# Patient Record
Sex: MALE | Race: WHITE | Employment: OTHER | URBAN - METROPOLITAN AREA
[De-identification: names, ages, dates, MRNs, and addresses within clinical notes are randomized per-mention and may not be internally consistent; named-entity substitution may affect disease eponyms.]

---

## 2019-05-03 LAB
INR, EXTERNAL: 1.1
PT, EXTERNAL: 12.7

## 2019-05-31 ENCOUNTER — HOSPITAL ENCOUNTER (OUTPATIENT)
Dept: LAB | Age: 79
Discharge: HOME | End: 2019-05-31
Payer: MEDICARE

## 2019-05-31 ENCOUNTER — OFFICE VISIT (OUTPATIENT)
Dept: FAMILY MEDICINE CLINIC | Age: 79
End: 2019-05-31

## 2019-05-31 VITALS
OXYGEN SATURATION: 94 % | RESPIRATION RATE: 14 BRPM | DIASTOLIC BLOOD PRESSURE: 49 MMHG | HEART RATE: 48 BPM | WEIGHT: 163 LBS | SYSTOLIC BLOOD PRESSURE: 101 MMHG | TEMPERATURE: 97.6 F | HEIGHT: 63 IN | BODY MASS INDEX: 28.88 KG/M2

## 2019-05-31 DIAGNOSIS — N40.1 BENIGN PROSTATIC HYPERPLASIA WITH URINARY FREQUENCY: ICD-10-CM

## 2019-05-31 DIAGNOSIS — R35.0 URINARY FREQUENCY: Primary | ICD-10-CM

## 2019-05-31 DIAGNOSIS — R05.9 COUGH: ICD-10-CM

## 2019-05-31 DIAGNOSIS — R35.0 BENIGN PROSTATIC HYPERPLASIA WITH URINARY FREQUENCY: ICD-10-CM

## 2019-05-31 LAB
BILIRUB UR QL STRIP: NEGATIVE
GLUCOSE UR-MCNC: NEGATIVE MG/DL
KETONES P FAST UR STRIP-MCNC: NEGATIVE MG/DL
PH UR STRIP: 6.5 [PH] (ref 4.6–8)
PROT UR QL STRIP: NEGATIVE
SP GR UR STRIP: 1.01 (ref 1–1.03)
UA UROBILINOGEN AMB POC: NORMAL (ref 0.2–1)
URINALYSIS CLARITY POC: CLEAR
URINALYSIS COLOR POC: YELLOW
URINE BLOOD POC: NORMAL
URINE LEUKOCYTES POC: NORMAL
URINE NITRITES POC: NEGATIVE

## 2019-05-31 PROCEDURE — 87086 URINE CULTURE/COLONY COUNT: CPT

## 2019-05-31 RX ORDER — ATORVASTATIN CALCIUM 20 MG/1
TABLET, FILM COATED ORAL
Refills: 1 | COMMUNITY
Start: 2019-04-14

## 2019-05-31 RX ORDER — NIFEDIPINE 30 MG/1
TABLET, FILM COATED, EXTENDED RELEASE ORAL
Refills: 0 | COMMUNITY
Start: 2019-05-05 | End: 2019-06-10

## 2019-05-31 RX ORDER — AMLODIPINE BESYLATE 10 MG/1
TABLET ORAL
Refills: 3 | COMMUNITY
Start: 2019-04-14 | End: 2019-06-28 | Stop reason: DRUGHIGH

## 2019-05-31 RX ORDER — CYANOCOBALAMIN 1000 UG/ML
1000 INJECTION, SOLUTION INTRAMUSCULAR; SUBCUTANEOUS ONCE
COMMUNITY
End: 2019-11-25

## 2019-05-31 RX ORDER — QUETIAPINE FUMARATE 25 MG/1
TABLET, FILM COATED ORAL
Refills: 6 | COMMUNITY
Start: 2019-04-09 | End: 2022-02-02 | Stop reason: SDUPTHER

## 2019-05-31 RX ORDER — ASPIRIN 81 MG/1
TABLET ORAL DAILY
COMMUNITY

## 2019-05-31 RX ORDER — CHOLECALCIFEROL (VITAMIN D3) 125 MCG
CAPSULE ORAL
COMMUNITY
End: 2021-01-28

## 2019-05-31 RX ORDER — BUPROPION HYDROCHLORIDE 100 MG/1
TABLET ORAL
Refills: 2 | COMMUNITY
Start: 2019-04-19 | End: 2019-12-11 | Stop reason: SDUPTHER

## 2019-05-31 RX ORDER — RANITIDINE 150 MG/1
TABLET, FILM COATED ORAL
Refills: 1 | COMMUNITY
Start: 2019-04-14 | End: 2021-01-28

## 2019-05-31 RX ORDER — ALENDRONATE SODIUM 70 MG/1
TABLET ORAL
Refills: 3 | COMMUNITY
Start: 2019-04-14 | End: 2020-01-21 | Stop reason: SINTOL

## 2019-05-31 RX ORDER — ISOSORBIDE MONONITRATE 30 MG/1
TABLET, EXTENDED RELEASE ORAL
Refills: 2 | COMMUNITY
Start: 2019-04-23 | End: 2020-03-11 | Stop reason: SDUPTHER

## 2019-05-31 RX ORDER — METFORMIN HYDROCHLORIDE 1000 MG/1
TABLET ORAL
Refills: 3 | COMMUNITY
Start: 2019-04-07 | End: 2019-11-21 | Stop reason: SDUPTHER

## 2019-05-31 RX ORDER — DONEPEZIL HYDROCHLORIDE 10 MG/1
TABLET, FILM COATED ORAL
Refills: 6 | COMMUNITY
Start: 2019-04-14

## 2019-05-31 RX ORDER — ALBUTEROL SULFATE 2.5 MG/.5ML
SOLUTION RESPIRATORY (INHALATION) ONCE
COMMUNITY
End: 2019-12-20 | Stop reason: SDUPTHER

## 2019-05-31 RX ORDER — MEMANTINE HYDROCHLORIDE 10 MG/1
TABLET ORAL
Refills: 2 | COMMUNITY
Start: 2019-05-22 | End: 2020-01-07 | Stop reason: SDUPTHER

## 2019-05-31 RX ORDER — TAMSULOSIN HYDROCHLORIDE 0.4 MG/1
0.4 CAPSULE ORAL DAILY
Qty: 30 CAP | Refills: 0 | Status: SHIPPED | OUTPATIENT
Start: 2019-05-31 | End: 2019-06-10 | Stop reason: SDUPTHER

## 2019-05-31 RX ORDER — CARVEDILOL 12.5 MG/1
TABLET ORAL
Refills: 0 | COMMUNITY
Start: 2019-04-14 | End: 2020-01-07

## 2019-05-31 RX ORDER — IRON POLYSACCHARIDE COMPLEX 200 MG
CAPSULE ORAL
Refills: 0 | COMMUNITY
Start: 2019-05-06 | End: 2019-06-10 | Stop reason: SDUPTHER

## 2019-05-31 RX ORDER — LEVOFLOXACIN 750 MG/1
750 TABLET ORAL DAILY
Qty: 7 TAB | Refills: 0 | Status: SHIPPED | OUTPATIENT
Start: 2019-05-31 | End: 2019-06-07

## 2019-05-31 RX ORDER — SERTRALINE HYDROCHLORIDE 100 MG/1
TABLET, FILM COATED ORAL
Refills: 3 | COMMUNITY
Start: 2019-04-19 | End: 2019-12-11 | Stop reason: SDUPTHER

## 2019-05-31 RX ORDER — CLOPIDOGREL BISULFATE 75 MG/1
TABLET ORAL
Refills: 3 | COMMUNITY
Start: 2019-04-14 | End: 2019-12-20 | Stop reason: SDUPTHER

## 2019-05-31 NOTE — PATIENT INSTRUCTIONS
Take the flomax every day  Take the levaquin every day for 7 days  Call Urology to schedule an appointment   We will call you with the results of the CXR and the urine culture   Follow up with cardiology  Use the albuterol scheduled four times a day x the next 3 days, then switch to as needed. Do the spiriva daily.      Follow up in 1 week

## 2019-05-31 NOTE — PROGRESS NOTES
Chief Complaint   Patient presents with    Urinary Frequency     times 10 days    Chest Congestion     times one month    Cough     1. Have you been to the ER, urgent care clinic since your last visit? Hospitalized since your last visit? N/A    2. Have you seen or consulted any other health care providers outside of the [address] [place] [address] since your last visit? Include any pap smears or colon screening.  No

## 2019-05-31 NOTE — PROGRESS NOTES
HPI       Chief Complaint: Acute care    Brianne Lloyd is a 70 y.o. male who presents for sick visit only. Has appt to establish care 6/14/19 with Dr. Terri Biggs. Pt with Alzheimer's. Prior PCP was Dr. Rony Drake ([facility]), they came to the house and provided care, but he does not take medicare. Urinary frequency - x 10 days. Increased urinary frequency (20 times yesterday), dribbles, having urinary incontinence. No dysuria. Reports hx of increased frequency but this is severely worse. Has hx of enlarged prostate, was under control, was on medication, but was taken off the medication. Reports hx of UTIs where the UA was negative but Cx was then positive. Does not remember what abx he was on before. Is not currently following with Urology. Cough, chest congestion - has been on doxy for cough, ordered by [facility] ED 5/2/19. Cough since end of April. Was admitted to [facility] ED for chest pain x 4 days, did not require cath, was treated medically. Has f/u with cardiology today. Cough became minimally improved on doxy but never resolved. Finished doxy a few weeks ago. Pt was also dc'd with prednisone taper. No fevers, cough is productive for sputum, +nasal congestion, SOB (slowly worsened over the past month). Cough does not wake him up at night. No ear pain or itchy eyes. Pt also with LE edema they are attributing to cardiac etiology. Pt with hx of COPD. Is also on albuterol neb and another inhaler. Is not following pulm. Requesting refill of spiriva     PMHx:  No past medical history on file. Meds:     Allergies: Allergies   Allergen Reactions    Adempas [Riociguat] Unknown (comments)    Ativan [Lorazepam] Other (comments)     Altered LOC    Codeine Unknown (comments)    Dilaudid [Hydromorphone] Unknown (comments)    Lisinopril Cough       ROS  Review of Systems   Constitutional: Negative for chills, fever and weight loss. HENT: Positive for congestion.  Negative for sinus pain and sore throat. Eyes: Negative for blurred vision and double vision. Respiratory: Positive for sputum production, shortness of breath and wheezing. Negative for cough. Cardiovascular: Negative for chest pain and palpitations. Gastrointestinal: Negative for abdominal pain, constipation, diarrhea, nausea and vomiting. Genitourinary: Positive for dysuria, frequency and urgency. Negative for hematuria. Neurological: Negative for dizziness, weakness and headaches. Physical Exam:  Visit Vitals  /49   Pulse (!) 48   Temp 97.6 °F (36.4 °C) (Oral)   Resp 14   Ht 5' 3\" (1.6 m)   Wt 163 lb (73.9 kg)   SpO2 94%   BMI 28.87 kg/m²     Physical Exam   Constitutional: He appears well-developed. HENT:   Head: Normocephalic and atraumatic. Neck: Normal range of motion. Neck supple. No thyromegaly present. Cardiovascular: Normal rate, regular rhythm and normal heart sounds. No murmur heard. Pulmonary/Chest: Effort normal and breath sounds normal. No respiratory distress. He has no wheezes. He has no rales. Comfortable, no increased WOB. Speaking in full sentences. Poor air movement throughout. Expiratory rhonchi/wheezing audible in all lung fields. Abdominal: Soft. Bowel sounds are normal. He exhibits no distension. There is no tenderness.    No CVA tenderness     I personally reviewed the following lab results:   Recent Results (from the past 12 hour(s))   AMB POC URINALYSIS DIP STICK AUTO W/O MICRO    Collection Time: 05/31/19 10:49 AM   Result Value Ref Range    Color (UA POC) Yellow     Clarity (UA POC) Clear     Glucose (UA POC) Negative Negative    Bilirubin (UA POC) Negative Negative    Ketones (UA POC) Negative Negative    Specific gravity (UA POC) 1.015 1.001 - 1.035    Blood (UA POC) Trace Negative    pH (UA POC) 6.5 4.6 - 8.0    Protein (UA POC) Negative Negative    Urobilinogen (UA POC) 0.2 mg/dL 0.2 - 1    Nitrites (UA POC) Negative Negative    Leukocyte esterase (UA POC) Trace Negative             Assessment     70 y.o. male with:    ICD-10-CM ICD-9-CM    1. Urinary frequency R35.0 788.41 AMB POC URINALYSIS DIP STICK AUTO W/O MICRO      CULTURE, URINE      CBC W/O DIFF      METABOLIC PANEL, COMPREHENSIVE   2. Benign prostatic hyperplasia with urinary frequency N40.1 600.01 REFERRAL TO UROLOGY    R35.0 788.41 tamsulosin (FLOMAX) 0.4 mg capsule   3. Cough R05 786.2 XR CHEST PA LAT      CBC W/O DIFF      METABOLIC PANEL, COMPREHENSIVE      levoFLOXacin (LEVAQUIN) 750 mg tablet      tiotropium bromide (SPIRIVA RESPIMAT) 1.25 mcg/actuation inhaler            Plan     1. Urinary frequency  UA with trace blood and trace LE. Will send for urine culture and treat with empiric levaquin (to cover both respiratory and urinary nay). Obtain routine bloodwork. No fever, chills, or systemic symptoms to concern for prostatitis. - AMB POC URINALYSIS DIP STICK AUTO W/O MICRO  - CULTURE, URINE  - CBC W/O DIFF  - METABOLIC PANEL, COMPREHENSIVE    2. Benign prostatic hyperplasia with urinary frequency  Sxs of increased urinary frequency likely due to return of BPH, will resume flomax and refer to Urology  - REFERRAL TO UROLOGY  - tamsulosin (FLOMAX) 0.4 mg capsule; Take 1 Cap by mouth daily. Dispense: 30 Cap; Refill: 0    3. Cough  Prolonged x 4 weeks, exam with rhonchi/wheezing. S/p course of doxy. Will treat with levaquin (to cover respiratory and UTI nay), refill spiriva. Continue albuterol QID. - XR CHEST PA LAT; Future  - CBC W/O DIFF  - METABOLIC PANEL, COMPREHENSIVE  - levoFLOXacin (LEVAQUIN) 750 mg tablet; Take 1 Tab by mouth daily for 7 days. Dispense: 7 Tab; Refill: 0  - tiotropium bromide (SPIRIVA RESPIMAT) 1.25 mcg/actuation inhaler; Take 2 Puffs by inhalation daily. Dispense: 1 Inhaler; Refill: 3    RTC in 1 week for f/u (separate from establish care appt). Discussed ED precautions.    Patient discussed with Dr. Ivania Jay (Attending Physician)    I have discussed the diagnosis with the patient and the intended plan as seen in the above orders. The patient has received an after-visit summary and questions were answered concerning future plans. I have discussed medication side effects and warnings with the patient as well.     January Calero MD  Family Medicine Resident  PGY-2

## 2019-06-02 LAB — BACTERIA UR CULT: NORMAL

## 2019-06-10 ENCOUNTER — TELEPHONE (OUTPATIENT)
Dept: FAMILY MEDICINE CLINIC | Age: 79
End: 2019-06-10

## 2019-06-10 ENCOUNTER — OFFICE VISIT (OUTPATIENT)
Dept: FAMILY MEDICINE CLINIC | Age: 79
End: 2019-06-10

## 2019-06-10 ENCOUNTER — HOSPITAL ENCOUNTER (OUTPATIENT)
Dept: LAB | Age: 79
Discharge: HOME | End: 2019-06-10
Payer: MEDICARE

## 2019-06-10 VITALS
TEMPERATURE: 97.4 F | HEIGHT: 63 IN | WEIGHT: 160.8 LBS | DIASTOLIC BLOOD PRESSURE: 60 MMHG | OXYGEN SATURATION: 95 % | BODY MASS INDEX: 28.49 KG/M2 | SYSTOLIC BLOOD PRESSURE: 112 MMHG | RESPIRATION RATE: 18 BRPM | HEART RATE: 54 BPM

## 2019-06-10 DIAGNOSIS — N40.1 BENIGN PROSTATIC HYPERPLASIA WITH URINARY FREQUENCY: ICD-10-CM

## 2019-06-10 DIAGNOSIS — E55.9 VITAMIN D DEFICIENCY: ICD-10-CM

## 2019-06-10 DIAGNOSIS — R35.0 URINARY FREQUENCY: Primary | ICD-10-CM

## 2019-06-10 DIAGNOSIS — R35.0 BENIGN PROSTATIC HYPERPLASIA WITH URINARY FREQUENCY: ICD-10-CM

## 2019-06-10 DIAGNOSIS — E53.8 B12 DEFICIENCY: ICD-10-CM

## 2019-06-10 DIAGNOSIS — R05.9 COUGH: Primary | ICD-10-CM

## 2019-06-10 DIAGNOSIS — D50.9 IRON DEFICIENCY ANEMIA, UNSPECIFIED IRON DEFICIENCY ANEMIA TYPE: ICD-10-CM

## 2019-06-10 PROCEDURE — 82607 VITAMIN B-12: CPT

## 2019-06-10 PROCEDURE — 82306 VITAMIN D 25 HYDROXY: CPT

## 2019-06-10 PROCEDURE — 36415 COLL VENOUS BLD VENIPUNCTURE: CPT

## 2019-06-10 RX ORDER — TAMSULOSIN HYDROCHLORIDE 0.4 MG/1
0.4 CAPSULE ORAL DAILY
Qty: 90 CAP | Refills: 1 | Status: SHIPPED | OUTPATIENT
Start: 2019-06-10 | End: 2019-06-14 | Stop reason: ALTCHOICE

## 2019-06-10 RX ORDER — FLUTICASONE PROPIONATE 50 MCG
2 SPRAY, SUSPENSION (ML) NASAL DAILY
Qty: 1 BOTTLE | Refills: 1 | Status: SHIPPED | OUTPATIENT
Start: 2019-06-10 | End: 2020-04-10 | Stop reason: DRUGHIGH

## 2019-06-10 RX ORDER — FLUTICASONE PROPIONATE 50 MCG
SPRAY, SUSPENSION (ML) NASAL
Qty: 1 BOTTLE | Status: CANCELLED | OUTPATIENT
Start: 2019-06-10

## 2019-06-10 RX ORDER — IRON POLYSACCHARIDE COMPLEX 200 MG
CAPSULE ORAL
Qty: 90 CAP | Refills: 1 | Status: SHIPPED | OUTPATIENT
Start: 2019-06-10 | End: 2022-07-01

## 2019-06-10 NOTE — PROGRESS NOTES
Chief Complaint   Patient presents with    Follow-up     urinary frequency, better    Medication Refill     ezfe(iron)     1. Have you been to the ER, urgent care clinic since your last visit? Hospitalized since your last visit? No    2. Have you seen or consulted any other health care providers outside of the [address] [place] [address] since your last visit? Include any pap smears or colon screening.  No

## 2019-06-10 NOTE — PROGRESS NOTES
HPI       Chief Complaint: Urinary frequency and cough    Jaleesa Carvajal is a 70 y.o. male who presents for f/u of acute visit on 5/31 for urinary frequency and cough. Will return for establish care visit. Urinary frequency - Completed antibiotics. Urinary frequency has improved, but still having urinary incontinence. Is now wearing adult diapers when out - when at home makes it to the bathroom without issue Have not yet called Urology. Denies dysuria, fevers, or chills. Cough - Cough has continued to improve since it began 4/2019 (dry and productive). Was told it may have been related to volume overload which has since improved since his cardiologist dc'd nifedipine (is still taking amlodipine). Is still taking albuterol neb QID - scheduled. Did not  the combivent - was not aware it was ready for pickup. Does not have plans to follow up with pulm. Still occasionally SOB. +Rhinorrhea, nasal congestion. No itchy, watery eyes. Cough is not worse at night. +Hx of seasonal allergies. Asking for iron refill. Reports he has chronic FREYA. Reports he went to the cardiologist (Dr. Teddy Ni) last week, now taking coreg 12.5 BID instead of 25mg daily. He was taken off the nifedipine because he was inadvertently taking nifedipine and amlodipine. Has f/u visit 6/28. States he has never had a medicare wellness exam    PMHx:  History reviewed. No pertinent past medical history. Meds:   Current Outpatient Medications   Medication Sig Dispense Refill    EZFE 200 200 mg iron cap TAKE ONE CAPSULE BY MOUTH ONE TIME DAILY 90 Cap 1    tamsulosin (FLOMAX) 0.4 mg capsule Take 1 Cap by mouth daily.  90 Cap 1    alendronate (FOSAMAX) 70 mg tablet TAKE ONE TABLET BY MOUTH EVERY WEEK  3    amLODIPine (NORVASC) 10 mg tablet TAKE ONE TABLET BY MOUTH EVERY MORNING  3    atorvastatin (LIPITOR) 20 mg tablet TAKE ONE TABLET BY MOUTH ONE TIME DAILY  1    buPROPion (WELLBUTRIN) 100 mg tablet TAKE ONE TABLET BY MOUTH TWICE A DAY  2    carvedilol (COREG) 12.5 mg tablet TAKE ONE TABLET BY MOUTH TWICE A DAY  0    clopidogrel (PLAVIX) 75 mg tab TAKE ONE TABLET BY MOUTH ONE TIME DAILY  3    donepezil (ARICEPT) 10 mg tablet TAKE ONE TABLET BY MOUTH AT BEDTIME  6    isosorbide mononitrate ER (IMDUR) 30 mg tablet TAKE THREE TABLETS BY MOUTH TWICE A DAY  2    memantine (NAMENDA) 10 mg tablet TAKE ONE TABLET BY MOUTH TWICE A DAY  2    metFORMIN (GLUCOPHAGE) 1,000 mg tablet TAKE ONE TABLET BY MOUTH TWICE A DAY  3    QUEtiapine (SEROQUEL) 25 mg tablet TAKE ONE TABLET BY MOUTH EVERY MORNING, TAKE TWO TABLETS BY MOUTH ONE TIME DAILY AT LUNCH, TAKE THREE TABLETS BY MOUTH AT BEDTIME  6    raNITIdine (ZANTAC) 150 mg tablet TAKE ONE TABLET BY MOUTH TWICE A DAY  1    sertraline (ZOLOFT) 100 mg tablet TAKE ONE TABLET BY MOUTH ONE TIME DAILY  3    aspirin delayed-release 81 mg tablet Take  by mouth daily.  cholecalciferol, vitamin D3, (VITAMIN D3) 2,000 unit tab Take  by mouth.  cyanocobalamin (VITAMIN B-12) 1,000 mcg/mL injection 1,000 mcg by IntraMUSCular route once.  albuterol sulfate (PROVENTIL;VENTOLIN) 2.5 mg/0.5 mL nebu nebulizer solution by Nebulization route once. Allergies: Allergies   Allergen Reactions    Adempas [Riociguat] Unknown (comments)    Ativan [Lorazepam] Other (comments)     Altered LOC    Codeine Unknown (comments)    Dilaudid [Hydromorphone] Unknown (comments)    Lisinopril Cough     ROS  Review of Systems   Constitutional: Negative for chills, fever and weight loss. HENT: Negative for congestion, sinus pain and sore throat. Eyes: Negative for blurred vision and double vision. Respiratory: Positive for cough and shortness of breath. Negative for wheezing. Cardiovascular: Negative for chest pain and palpitations. Gastrointestinal: Negative for abdominal pain, constipation, diarrhea, nausea and vomiting. Genitourinary: Positive for frequency and urgency.  Negative for dysuria. Neurological: Negative for dizziness, weakness and headaches. Physical Exam:  Visit Vitals  /60   Pulse (!) 54   Temp 97.4 °F (36.3 °C) (Oral)   Resp 18   Ht 5' 3\" (1.6 m)   Wt 160 lb 12.8 oz (72.9 kg)   SpO2 95%   BMI 28.48 kg/m²       Physical Exam   Constitutional: He is oriented to person, place, and time. He appears well-developed. HENT:   Head: Normocephalic and atraumatic. Eyes: Pupils are equal, round, and reactive to light. EOM are normal.   Neck: Normal range of motion. Neck supple. No thyromegaly present. Cardiovascular: Normal rate, regular rhythm and normal heart sounds. No murmur heard. Some ectopy. 1+ bilateral LE edema. Pulmonary/Chest: No respiratory distress. No increased work of breathing. Patient comfortable, speaking in full sentences. Mild intermittent end expiratory wheezing in bilateral lung bases, improved from last visit. Good air movement. Abdominal: Soft. Bowel sounds are normal. He exhibits no distension. There is no tenderness. Neurological: He is alert and oriented to person, place, and time. Assessment     70 y.o. male with:    ICD-10-CM ICD-9-CM    1. Urinary frequency R35.0 788.41    2. Vitamin D deficiency E55.9 268.9 VITAMIN D, 25 HYDROXY   3. B12 deficiency E53.8 266.2 VITAMIN B12   4. Iron deficiency anemia, unspecified iron deficiency anemia type D50.9 280.9 EZFE 200 200 mg iron cap   5. Benign prostatic hyperplasia with urinary frequency N40.1 600.01 tamsulosin (FLOMAX) 0.4 mg capsule    R35.0 788.41               Plan     1. Urinary frequency/BPH  Discussed prior referral to Urology - contact information provided again. All questions asked. Did not repeat urine as UCx was negative and symptoms have improved, unlikely to be infectious in etiology. - tamsulosin (FLOMAX) 0.4 mg capsule; Take 1 Cap by mouth daily. Dispense: 90 Cap; Refill: 1    2. Vitamin D deficiency  - VITAMIN D, 25 HYDROXY    3.  B12 deficiency  - VITAMIN B12    4. Iron deficiency anemia, unspecified iron deficiency anemia type  Refilled. Will have CBC drawn today (ordered at last visit)  - EZFE 200 200 mg iron cap; TAKE ONE CAPSULE BY MOUTH ONE TIME DAILY  Dispense: 90 Cap; Refill:     Med rec performed today. They will fill out release form for us to obtain records from cardiologist.   Patient discussed with Dr. Zeyad Harding (attending physician)  Patient to return on 6/14 for [facility] care visit / medicare wellness visit     I have discussed the diagnosis with the patient and the intended plan as seen in the above orders. The patient has received an after-visit summary and questions were answered concerning future plans. I have discussed medication side effects and warnings with the patient as well.     Kassandra Ramirez MD  Family Medicine Resident  PGY-2

## 2019-06-10 NOTE — TELEPHONE ENCOUNTER
Patients wife called stating she is at the pharmacy and the Flonase in $20 over the counter but the insurance covers it if it is prescribed.

## 2019-06-10 NOTE — PATIENT INSTRUCTIONS
Try daily zyrtec and flonase to see if cough is related to allergies. Call Urology to schedule an appointment  Please fill out a release of medical records for us to obtain records from your cardiologist.          Benign Prostatic Hyperplasia: Care Instructions  Your Care Instructions    Benign prostatic hyperplasia, or BPH, is an enlarged prostate gland. The prostate is a small gland that makes some of the fluid in semen. Prostate enlargement happens to almost all men as they age. It is usually not serious. BPH does not cause prostate cancer. As the prostate gets bigger, it may partly block the flow of urine. You may have a hard time getting a urine stream started or completely stopped. BPH can cause dribbling. You may have a weak urine stream, or you may have to urinate more often than you used to, especially at night. Most men find these problems easy to manage. You do not need treatment unless your symptoms bother you a lot or you have other problems, such as bladder infections or stones. In these cases, medicines may help. Surgery is not needed unless the urine flow is blocked or the symptoms do not get better with medicine. Follow-up care is a key part of your treatment and safety. Be sure to make and go to all appointments, and call your doctor if you are having problems. It's also a good idea to know your test results and keep a list of the medicines you take. How can you care for yourself at home? · Take plenty of time to urinate. Try to relax. · Try \"double voiding. \" Urinate as much you can, relax for a few moments, and then try to urinate again. · Sit on the toilet to urinate. · Read or think of other things while you are waiting. · Turn on a faucet, or try to picture running water. Some men find that this helps get their urine flowing. · If dribbling is a problem, wash your penis daily to avoid skin irritation and infection. · Avoid caffeine and alcohol.  These drinks will increase how often you need to urinate. Spread your fluid intake throughout the day. If the urge to urinate often wakes you at night, limit your fluid intake in the evening. Urinate right before you go to bed. · Many over-the-counter cold and allergy medicines can make the symptoms of BPH worse. Avoid antihistamines, decongestants, and allergy pills, if you can. Read the warnings on the package. · If you take any prescription medicines, especially tranquilizers or antidepressants, ask your doctor or pharmacist whether they can cause urination problems. There may be other medicines you can use that do not cause urinary problems. · Be safe with medicines. Take your medicines exactly as prescribed. Call your doctor if you think you are having a problem with your medicine. When should you call for help? Call your doctor now or seek immediate medical care if:    · You cannot urinate at all.     · You have symptoms of a urinary infection. For example:  ? You have blood or pus in your urine. ? You have pain in your back just below your rib cage. This is called flank pain. ? You have a fever, chills, or body aches. ? It hurts to urinate. ? You have groin or belly pain.    Watch closely for changes in your health, and be sure to contact your doctor if:    · It hurts when you ejaculate.     · Your urinary problems get a lot worse or bother you a lot. Where can you learn more? Go to http://laly-malka.info/. Enter K880 in the search box to learn more about \"Benign Prostatic Hyperplasia: Care Instructions. \"  Current as of: September 26, 2018  Content Version: 11.9  © 7225-5826 [employer]. Care instructions adapted under license by [employer] (which disclaims liability or warranty for this information).  If you have questions about a medical condition or this instruction, always ask your healthcare professional. [address] any warranty or liability for your use of this information.

## 2019-06-11 ENCOUNTER — TELEPHONE (OUTPATIENT)
Dept: FAMILY MEDICINE CLINIC | Age: 79
End: 2019-06-11

## 2019-06-11 DIAGNOSIS — R05.9 COUGH: Primary | ICD-10-CM

## 2019-06-11 LAB
25(OH)D3+25(OH)D2 SERPL-MCNC: 65.7 NG/ML (ref 30–100)
VIT B12 SERPL-MCNC: 418 PG/ML (ref 232–1245)

## 2019-06-11 RX ORDER — CETIRIZINE HYDROCHLORIDE 5 MG/1
5 TABLET ORAL
Status: CANCELLED | OUTPATIENT
Start: 2019-06-11

## 2019-06-11 RX ORDER — CETIRIZINE HCL 10 MG
10 TABLET ORAL DAILY
Qty: 30 TAB | Refills: 1 | Status: SHIPPED | OUTPATIENT
Start: 2019-06-11 | End: 2019-07-18 | Stop reason: SDUPTHER

## 2019-06-11 NOTE — PROGRESS NOTES
Vit D and B12 were normal.     Note that the CBC and CMP I ordered from 5/31 were not drawn.  Will ask Dr. Cara Brown to ensure this is drawn during patient's establish care visit on 6/14

## 2019-06-11 NOTE — TELEPHONE ENCOUNTER
----- Message from [address] sent at 6/10/2019  4:26 PM EDT -----  Regarding: Dr. Mallory Wynne from [facility] is requesting a prescription for zyrtec. She advised that it is cheaper for the pt to get it through the pharmacy than otc.  Best contact number is [phone]

## 2019-06-13 ENCOUNTER — DOCUMENTATION ONLY (OUTPATIENT)
Dept: FAMILY MEDICINE CLINIC | Age: 79
End: 2019-06-13

## 2019-06-13 NOTE — PROGRESS NOTES
Patient scheduled for establish care visit with Dr. Asim Lerner tomorrow    Received fax of outside records from cardiologist (Dr. Raymond Gil)    Diagnoses include  1. CAD   2. Hx of NSTEMI 2015 - currently on BB  3. Primary hypertension    Studies  - ECHO 6/5/19 with EF 45%, mildly decreased LV systolic fn. Septal hypokinesis and inferior hypokinesis with moderate LVH. - ECHO 11/2016 with EF 50%  - Dobutamine stress test 10/11 - inferior wall ischemia  - Lexiscan 3/2016 - abnormal myocardial perfusion with a fixed inferior defect (scar)    Procedures  - Cardiac cath 3/2014 - 3v CAD with NSTEMI, patent LIMA  - PTCA with stent placement 2011 - LCx restenting  - CABG 2000    ----------------------------------------------  Received fax of outside records from prior PCP Dr. Verito Cowart     Vaccinations   - Influenza 10/26/2013  - Pneumococcal conjugate vaccine 7-valent 3/26/2008    Imaging  - DEXA done 1/23/2017 - positive for osteoporosis with high fracture risk (T scoare -1.5 in lumbar spine and -2.5 in boilateral femoral necks. 10 year probability of major osteoporotic fracture was 10.4% with hip fracture in the 4.1%) Pharmacological therapy was recommended and repeat DEXA in 1 year. Last office visit was 7/2018  1. HTN - amlodipine 10mg daily, carvedilol 12.5mg BID, imdur ER 24h SR 90mg BID (3 tablets of 30mg each), ramipril 2.5mg daily  2. HLD - atorvastatin 20mg daily  3. DMII - metformin 1000mg BID  4. COPD - albuterol neb q8h PRN, incruise ellipta 62.5 1 puff daily, symbicort 2 puffs BID  5. GERD - ranitidine 150mg BID  6. Dysthymic disorder - sertraline 100mg daily, buproprion Hcl ER(SR), 100mg BID  7. CAD - clopidogrel bisulfate 75mg daily, ASA 81mg daily  8. B12 deficiency - B12 1000mcg/ml, 1ml twice a month  9. Osteoporosis - alendronate 70mg weekly  10. BPH - flomax 0.4mg daily   11.  FREYA - ferrous sulfate 325mg daily      Will place at Dr. Yuko Vincent MD  06/13/19  3:40 PM

## 2019-06-14 ENCOUNTER — OFFICE VISIT (OUTPATIENT)
Dept: FAMILY MEDICINE CLINIC | Age: 79
End: 2019-06-14

## 2019-06-14 ENCOUNTER — HOSPITAL ENCOUNTER (OUTPATIENT)
Dept: LAB | Age: 79
Discharge: HOME | End: 2019-06-14
Payer: MEDICARE

## 2019-06-14 VITALS
RESPIRATION RATE: 16 BRPM | HEIGHT: 63 IN | BODY MASS INDEX: 28.35 KG/M2 | HEART RATE: 61 BPM | DIASTOLIC BLOOD PRESSURE: 48 MMHG | OXYGEN SATURATION: 95 % | SYSTOLIC BLOOD PRESSURE: 99 MMHG | WEIGHT: 160 LBS | TEMPERATURE: 97.7 F

## 2019-06-14 DIAGNOSIS — Z00.00 MEDICARE ANNUAL WELLNESS VISIT, SUBSEQUENT: Primary | ICD-10-CM

## 2019-06-14 DIAGNOSIS — Z23 ENCOUNTER FOR IMMUNIZATION: ICD-10-CM

## 2019-06-14 DIAGNOSIS — Z13.39 SCREENING FOR ALCOHOLISM: ICD-10-CM
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
## 2019-06-14 PROCEDURE — 36415 COLL VENOUS BLD VENIPUNCTURE: CPT

## 2019-06-14 PROCEDURE — 85027 COMPLETE CBC AUTOMATED: CPT

## 2019-06-14 PROCEDURE — 80053 COMPREHEN METABOLIC PANEL: CPT

## 2019-06-14 NOTE — PROGRESS NOTES
This is the Subsequent Medicare Annual Wellness Exam, performed 12 months or more after the Initial AWV or the last Subsequent AWV      I have reviewed the patient's medical history in detail and updated the computerized patient record. History   History reviewed. No pertinent past medical history. History reviewed. No pertinent surgical history. Current Outpatient Medications   Medication Sig Dispense Refill    varicella-zoster recombinant, PF, (SHINGRIX, PF,) 50 mcg/0.5 mL susr injection 0.5mL by IntraMUSCular route once now and then repeat in 2-6 months 0.5 mL 1    cetirizine (ZYRTEC) 10 mg tablet Take 1 Tab by mouth daily. 30 Tab 1    EZFE 200 200 mg iron cap TAKE ONE CAPSULE BY MOUTH ONE TIME DAILY 90 Cap 1    tamsulosin (FLOMAX) 0.4 mg capsule Take 1 Cap by mouth daily. 90 Cap 1    fluticasone propionate (FLONASE) 50 mcg/actuation nasal spray 2 Sprays by Both Nostrils route daily.  1 Bottle 1    alendronate (FOSAMAX) 70 mg tablet TAKE ONE TABLET BY MOUTH EVERY WEEK  3    amLODIPine (NORVASC) 10 mg tablet TAKE ONE TABLET BY MOUTH EVERY MORNING  3    atorvastatin (LIPITOR) 20 mg tablet TAKE ONE TABLET BY MOUTH ONE TIME DAILY  1    buPROPion (WELLBUTRIN) 100 mg tablet TAKE ONE TABLET BY MOUTH TWICE A DAY  2    carvedilol (COREG) 12.5 mg tablet TAKE ONE TABLET BY MOUTH TWICE A DAY  0    donepezil (ARICEPT) 10 mg tablet TAKE ONE TABLET BY MOUTH AT BEDTIME  6    isosorbide mononitrate ER (IMDUR) 30 mg tablet TAKE THREE TABLETS BY MOUTH TWICE A DAY  2    memantine (NAMENDA) 10 mg tablet TAKE ONE TABLET BY MOUTH TWICE A DAY  2    metFORMIN (GLUCOPHAGE) 1,000 mg tablet TAKE ONE TABLET BY MOUTH TWICE A DAY  3    QUEtiapine (SEROQUEL) 25 mg tablet TAKE ONE TABLET BY MOUTH EVERY MORNING, TAKE TWO TABLETS BY MOUTH ONE TIME DAILY AT LUNCH, TAKE THREE TABLETS BY MOUTH AT BEDTIME  6    raNITIdine (ZANTAC) 150 mg tablet TAKE ONE TABLET BY MOUTH TWICE A DAY  1    sertraline (ZOLOFT) 100 mg tablet TAKE ONE TABLET BY MOUTH ONE TIME DAILY  3    aspirin delayed-release 81 mg tablet Take  by mouth daily.  cyanocobalamin (VITAMIN B-12) 1,000 mcg/mL injection 1,000 mcg by IntraMUSCular route once.  albuterol sulfate (PROVENTIL;VENTOLIN) 2.5 mg/0.5 mL nebu nebulizer solution by Nebulization route once.  clopidogrel (PLAVIX) 75 mg tab TAKE ONE TABLET BY MOUTH ONE TIME DAILY  3    cholecalciferol, vitamin D3, (VITAMIN D3) 2,000 unit tab Take  by mouth. Allergies   Allergen Reactions    Adempas [Riociguat] Unknown (comments)    Ativan [Lorazepam] Other (comments)     Altered LOC    Codeine Unknown (comments)    Dilaudid [Hydromorphone] Unknown (comments)    Lisinopril Cough     History reviewed. No pertinent family history. Social History     Tobacco Use    Smoking status: Former Smoker    Smokeless tobacco: Never Used   Substance Use Topics    Alcohol use: Never     Frequency: Never     Patient Active Problem List   Diagnosis Code    Urinary frequency R35.0       Depression Risk Factor Screening:     3 most recent PHQ Screens 6/10/2019   Little interest or pleasure in doing things Several days   Feeling down, depressed, irritable, or hopeless Not at all   Total Score PHQ 2 1     Alcohol Risk Factor Screening: You do not drink alcohol or very rarely. Functional Ability and Level of Safety:   Hearing Loss  The patient wears hearing aids. Has another hearing test scheduled on July 31 st    Activities of Daily Living  The home contains: handrails and grab bars  Patient needs help with:  transportation, preparing meals, laundry, housework, managing medications and managing money    Fall Risk  Fall Risk Assessment, last 12 mths 6/14/2019   Able to walk? Yes   Fall in past 12 months? Yes   Fall with injury? Yes   Number of falls in past 12 months 3   Fall Risk Score 4     Will refer to PT for balance and strength training. Uses a cane. I advised to continue using a walker.   Abuse Screen  Patient is not abused    Cognitive Screening   Evaluation of Cognitive Function:  Has your family/caregiver stated any concerns about your memory: yes  Clock Drawing test normal    Patient Care Team   Patient Care Team:  Seth Ross MD as PCP - General (Family Practice)    Assessment/Plan   Education and counseling provided:  End-of-Life planning (with patient's consent): discussed with patient. Patient will consider  Pneumococcal Vaccine: refused  Colorectal cancer screening tests; utd, last done 3 years ago per patient  Screening for glaucoma: patient will follow up with his ophtalmologist  Discussed lab result with patient. Advised to collect CBC, CMP lab today  Diagnoses and all orders for this visit:    1. Medicare annual wellness visit, subsequent    2. Encounter for immunization  -     varicella-zoster recombinant, PF, (SHINGRIX, PF,) 50 mcg/0.5 mL susr injection; 0.5mL by IntraMUSCular route once now and then repeat in 2-6 months    . 3. Screening for alcoholism   negative  4. Fall, initial encounter  Will refer to PT for balance and strength training. Uses a cane. I advised to continue using a walker. BP on low side, may need to go down on BP meds. Urinary problem seem to be incontinence more that prostate problem. Advised to stop Flomax. F/u with urologist as planned. They will make the appt. F/u in one week for BP check. .  medication f/u with PCP.  Will f/u with ophtalmologist.      Health Maintenance Due   Topic Date Due    DTaP/Tdap/Td series (1 - Tdap) 06/04/1963    Shingrix Vaccine Age 50> (1 of 2) 06/04/1992    GLAUCOMA SCREENING Q2Y  06/04/2007    Pneumococcal 65+ years (2 of 2 - PPSV23) 06/04/2007    MEDICARE YEARLY EXAM  03/28/2018

## 2019-06-14 NOTE — PATIENT INSTRUCTIONS
Medicare Wellness Visit, Male    The best way to live healthy is to have a lifestyle where you eat a well-balanced diet, exercise regularly, limit alcohol use, and quit all forms of tobacco/nicotine, if applicable. Regular preventive services are another way to keep healthy. Preventive services (vaccines, screening tests, monitoring & exams) can help personalize your care plan, which helps you manage your own care. Screening tests can find health problems at the earliest stages, when they are easiest to treat. [facility] follows the current, evidence-based guidelines published by the [facility]ong ([facility]) when recommending preventive services for our patients. Because we follow these guidelines, sometimes recommendations change over time as research supports it. (For example, a prostate screening blood test is no longer routinely recommended for men with no symptoms.)  Of course, you and your doctor may decide to screen more often for some diseases, based on your risk and co-morbidities (chronic disease you are already diagnosed with). Preventive services for you include:  - Medicare offers their members a free annual wellness visit, which is time for you and your primary care provider to discuss and plan for your preventive service needs. Take advantage of this benefit every year!  -All adults over age 72 should receive the recommended pneumonia vaccines. Current USPSTF guidelines recommend a series of two vaccines for the best pneumonia protection.   -All adults should have a flu vaccine yearly and an ECG.  All adults age 61 and older should receive a shingles vaccine once in their lifetime.    -All adults age 38-68 who are overweight should have a diabetes screening test once every three years.   -Other screening tests & preventive services for persons with diabetes include: an eye exam to screen for diabetic retinopathy, a kidney function test, a foot exam, and stricter control over your cholesterol.   -Cardiovascular screening for adults with routine risk involves an electrocardiogram (ECG) at intervals determined by the provider.   -Colorectal cancer screening should be done for adults age 54-65 with no increased risk factors for colorectal cancer. There are a number of acceptable methods of screening for this type of cancer. Each test has its own benefits and drawbacks. Discuss with your provider what is most appropriate for you during your annual wellness visit. The different tests include: colonoscopy (considered the best screening method), a fecal occult blood test, a fecal DNA test, and sigmoidoscopy.  -All adults born between [facility] should be screened once for Hepatitis C.  -An Abdominal Aortic Aneurysm (AAA) Screening is recommended for men age 73-68 who has ever smoked in their lifetime.      Here is a list of your current Health Maintenance items (your personalized list of preventive services) with a due date:  Health Maintenance Due   Topic Date Due    DTaP/Tdap/Td  (1 - Tdap) 06/04/1963    Shingles Vaccine (1 of 2) 06/04/1992    Glaucoma Screening   06/04/2007    Pneumococcal Vaccine (2 of 2 - PPSV23) 06/04/2007    Annual Well Visit  03/28/2018

## 2019-06-14 NOTE — PROGRESS NOTES
Chief Complaint   Patient presents with   [address] [place] [address] Annual Wellness Visit     1. Have you been to the ER, urgent care clinic since your last visit? Hospitalized since your last visit? No    2. Have you seen or consulted any other health care providers outside of the [address] [place] [address] since your last visit? Include any pap smears or colon screening.  No    Eye exam--blindness in half of right--blood clot    Pneumonia vaccine--had in January    Shingles vaccine--declined    Had heart attack first week of may

## 2019-06-15 LAB
ALBUMIN SERPL-MCNC: 4.1 G/DL (ref 3.5–4.8)
ALBUMIN/GLOB SERPL: 2.4 {RATIO} (ref 1.2–2.2)
ALP SERPL-CCNC: 79 IU/L (ref 39–117)
ALT SERPL-CCNC: 10 IU/L (ref 0–44)
AST SERPL-CCNC: 16 IU/L (ref 0–40)
BILIRUB SERPL-MCNC: <0.2 MG/DL (ref 0–1.2)
BUN SERPL-MCNC: 30 MG/DL (ref 8–27)
BUN/CREAT SERPL: 19 (ref 10–24)
CALCIUM SERPL-MCNC: 8.9 MG/DL (ref 8.6–10.2)
CHLORIDE SERPL-SCNC: 107 MMOL/L (ref 96–106)
CO2 SERPL-SCNC: 21 MMOL/L (ref 20–29)
CREAT SERPL-MCNC: 1.58 MG/DL (ref 0.76–1.27)
ERYTHROCYTE [DISTWIDTH] IN BLOOD BY AUTOMATED COUNT: 15.1 % (ref 12.3–15.4)
GLOBULIN SER CALC-MCNC: 1.7 G/DL (ref 1.5–4.5)
GLUCOSE SERPL-MCNC: 108 MG/DL (ref 65–99)
HCT VFR BLD AUTO: 32.6 % (ref 37.5–51)
HGB BLD-MCNC: 10 G/DL (ref 13–17.7)
INTERPRETATION: NORMAL
MCH RBC QN AUTO: 27.5 PG (ref 26.6–33)
MCHC RBC AUTO-ENTMCNC: 30.7 G/DL (ref 31.5–35.7)
MCV RBC AUTO: 90 FL (ref 79–97)
PLATELET # BLD AUTO: 169 X10E3/UL (ref 150–450)
POTASSIUM SERPL-SCNC: 4.5 MMOL/L (ref 3.5–5.2)
PROT SERPL-MCNC: 5.8 G/DL (ref 6–8.5)
RBC # BLD AUTO: 3.64 X10E6/UL (ref 4.14–5.8)
SODIUM SERPL-SCNC: 145 MMOL/L (ref 134–144)
WBC # BLD AUTO: 10.8 X10E3/UL (ref 3.4–10.8)

## 2019-06-18 NOTE — PROGRESS NOTES
Hgb 10.0, pt with known hx of FREYA on iron supp but do not know baseline Hgb   Cr 1.58, again do not know baseline    Will send letter

## 2019-06-28 ENCOUNTER — OFFICE VISIT (OUTPATIENT)
Dept: FAMILY MEDICINE CLINIC | Age: 79
End: 2019-06-28

## 2019-06-28 VITALS
HEART RATE: 65 BPM | OXYGEN SATURATION: 99 % | WEIGHT: 160 LBS | DIASTOLIC BLOOD PRESSURE: 46 MMHG | HEIGHT: 63 IN | RESPIRATION RATE: 16 BRPM | TEMPERATURE: 97.8 F | BODY MASS INDEX: 28.35 KG/M2 | SYSTOLIC BLOOD PRESSURE: 121 MMHG

## 2019-06-28 DIAGNOSIS — D64.9 ANEMIA OF UNKNOWN ETIOLOGY: Primary | ICD-10-CM

## 2019-06-28 DIAGNOSIS — I10 ESSENTIAL HYPERTENSION: ICD-10-CM

## 2019-06-28 DIAGNOSIS — F02.80 ALZHEIMER'S DEMENTIA WITHOUT BEHAVIORAL DISTURBANCE, UNSPECIFIED TIMING OF DEMENTIA ONSET: ICD-10-CM

## 2019-06-28 DIAGNOSIS — H91.13 PRESBYCUSIS OF BOTH EARS: ICD-10-CM

## 2019-06-28 DIAGNOSIS — N40.1 BENIGN PROSTATIC HYPERPLASIA WITH URINARY FREQUENCY: ICD-10-CM

## 2019-06-28 DIAGNOSIS — R35.0 BENIGN PROSTATIC HYPERPLASIA WITH URINARY FREQUENCY: ICD-10-CM

## 2019-06-28 DIAGNOSIS — G30.9 ALZHEIMER'S DEMENTIA WITHOUT BEHAVIORAL DISTURBANCE, UNSPECIFIED TIMING OF DEMENTIA ONSET: ICD-10-CM

## 2019-06-28 DIAGNOSIS — Z87.09 HISTORY OF COPD: ICD-10-CM

## 2019-06-28 DIAGNOSIS — R05.9 COUGH: ICD-10-CM

## 2019-06-28 DIAGNOSIS — R23.3 EASY BRUISING: ICD-10-CM

## 2019-06-28 PROBLEM — M21.40 PES PLANUS: Status: ACTIVE | Noted: 2019-06-28

## 2019-06-28 PROBLEM — B35.1 ONYCHOMYCOSIS DUE TO DERMATOPHYTE: Status: ACTIVE | Noted: 2019-06-28

## 2019-06-28 PROBLEM — E11.9 DIABETES MELLITUS (HCC): Status: ACTIVE | Noted: 2019-06-28

## 2019-06-28 PROBLEM — M21.6X9 ACQUIRED EQUINUS DEFORMITY OF FOOT: Status: ACTIVE | Noted: 2019-06-28

## 2019-06-28 PROBLEM — B35.3 TINEA PEDIS: Status: ACTIVE | Noted: 2019-06-28

## 2019-06-28 RX ORDER — FUROSEMIDE 20 MG/1
TABLET ORAL DAILY
COMMUNITY
End: 2021-01-28 | Stop reason: ALTCHOICE

## 2019-06-28 RX ORDER — AMLODIPINE BESYLATE 5 MG/1
5 TABLET ORAL DAILY
COMMUNITY
End: 2020-03-11 | Stop reason: SDUPTHER

## 2019-06-28 NOTE — PATIENT INSTRUCTIONS
Return for bloodwork at your earliest convenience  We will talk to the cardiologist about potentially cutting back on the aspirin  You have refills of the spiriva at the pharmacy   Return the hemoccult cards at your convenience     Anemia: Care Instructions  Your Care Instructions    Anemia is a low level of red blood cells, which carry oxygen throughout your body. Many things can cause anemia. Lack of iron is one of the most common causes. Your body needs iron to make hemoglobin, a substance in red blood cells that carries oxygen from the lungs to your body's cells. Without enough iron, the body produces fewer and smaller red blood cells. As a result, your body's cells do not get enough oxygen, and you feel tired and weak. And you may have trouble concentrating. Bleeding is the most common cause of a lack of iron. You may have heavy menstrual bleeding or bleeding caused by conditions such as ulcers, hemorrhoids, or cancer. Regular use of aspirin or other anti-inflammatory medicines (such as ibuprofen) also can cause bleeding in some people. A lack of iron in your diet also can cause anemia, especially at times when the body needs more iron, such as during pregnancy, infancy, and the teen years. Your doctor may have prescribed iron pills. It may take several months of treatment for your iron levels to return to normal. Your doctor also may suggest that you eat foods that are rich in iron, such as meat and beans. There are many other causes of anemia. It is not always due to a lack of iron. Finding the specific cause of your anemia will help your doctor find the right treatment for you. Follow-up care is a key part of your treatment and safety. Be sure to make and go to all appointments, and call your doctor if you are having problems. It's also a good idea to know your test results and keep a list of the medicines you take. How can you care for yourself at home? · Take your medicines exactly as prescribed. Call your doctor if you think you are having a problem with your medicine. · If your doctor recommends iron pills, take them as directed:  ? Try to take the pills on an empty stomach about 1 hour before or 2 hours after meals. But you may need to take iron with food to avoid an upset stomach. ? Do not take antacids or drink milk or caffeine drinks (such as coffee, tea, or cola) at the same time or within 2 hours of the time that you take your iron. They can make it hard for your body to absorb the iron. ? Vitamin C (from food or supplements) helps your body absorb iron. Try taking iron pills with a glass of orange juice or some other food that is high in vitamin C, such as citrus fruits. ? Iron pills may cause stomach problems, such as heartburn, nausea, diarrhea, constipation, and cramps. Be sure to drink plenty of fluids, and include fruits, vegetables, and fiber in your diet each day. Iron pills often make your bowel movements dark or green. ? If you forget to take an iron pill, do not take a double dose of iron the next time you take a pill. ? Keep iron pills out of the reach of small children. An overdose of iron can be very dangerous. · Follow your doctor's advice about eating iron-rich foods. These include red meat, shellfish, poultry, eggs, beans, raisins, whole-grain bread, and leafy green vegetables. · Steam vegetables to help them keep their iron content. When should you call for help? Call 911 anytime you think you may need emergency care. For example, call if:    · You have symptoms of a heart attack. These may include:  ? Chest pain or pressure, or a strange feeling in the chest.  ? Sweating. ? Shortness of breath. ? Nausea or vomiting. ? Pain, pressure, or a strange feeling in the back, neck, jaw, or upper belly or in one or both shoulders or arms. ? Lightheadedness or sudden weakness. ? A fast or irregular heartbeat.   After you call 911, the  may tell you to chew 1 adult-strength or 2 to 4 low-dose aspirin. Wait for an ambulance. Do not try to drive yourself.     · You passed out (lost consciousness).    Call your doctor now or seek immediate medical care if:    · You have new or increased shortness of breath.     · You are dizzy or lightheaded, or you feel like you may faint.     · Your fatigue and weakness continue or get worse.     · You have any abnormal bleeding, such as:  ? Nosebleeds. ? Vaginal bleeding that is different (heavier, more frequent, at a different time of the month) than what you are used to.  ? Bloody or black stools, or rectal bleeding. ? Bloody or pink urine.    Watch closely for changes in your health, and be sure to contact your doctor if:    · You do not get better as expected. Where can you learn more? Go to http://laly-malka.info/. Enter R301 in the search box to learn more about \"Anemia: Care Instructions. \"  Current as of: May 6, 2018  Content Version: 11.9  © 6628-0454 [employer]. Care instructions adapted under license by [employer] (which disclaims liability or warranty for this information). If you have questions about a medical condition or this instruction, always ask your healthcare professional. [place] [address] any warranty or liability for your use of this information. Bruises: Care Instructions  Your Care Instructions    Bruises occur when small blood vessels under the skin tear or rupture, most often from a twist, bump, or fall. Blood leaks into tissues under the skin and causes a black-and-blue spot that often turns colors, including purplish black, reddish blue, or yellowish green, as the bruise heals. Bruises hurt, but most are not serious and will go away on their own within 2 to 4 weeks. Sometimes, gravity causes them to spread down the body. A leg bruise usually will take longer to heal than a bruise on the face or arms.   Follow-up care is a key part of your treatment and safety. Be sure to make and go to all appointments, and call your doctor if you are having problems. It's also a good idea to know your test results and keep a list of the medicines you take. How can you care for yourself at home? · Take pain medicines exactly as directed. ? If the doctor gave you a prescription medicine for pain, take it as prescribed. ? If you are not taking a prescription pain medicine, ask your doctor if you can take an over-the-counter medicine. · Put ice or a cold pack on the area for 10 to 20 minutes at a time. Put a thin cloth between the ice and your skin. · If you can, prop up the bruised area on pillows as much as possible for the next few days. Try to keep the bruise above the level of your heart. When should you call for help? Call your doctor now or seek immediate medical care if:    · You have signs of infection, such as:  ? Increased pain, swelling, warmth, or redness. ? Red streaks leading from the bruise. ? Pus draining from the bruise. ? A fever.     · You have a bruise on your leg and signs of a blood clot, such as:  ? Increasing redness and swelling along with warmth, tenderness, and pain in the bruised area. ? Pain in your calf, back of the knee, thigh, or groin. ? Redness and swelling in your leg or groin.     · Your pain gets worse.    Watch closely for changes in your health, and be sure to contact your doctor if:    · You do not get better as expected. Where can you learn more? Go to http://laly-malka.info/. Enter ([phone] 483-722 in the search box to learn more about \"Bruises: Care Instructions. \"  Current as of: September 23, 2018  Content Version: 11.9  © 7027-3035 [employer]. Care instructions adapted under license by [employer] (which disclaims liability or warranty for this information).  If you have questions about a medical condition or this instruction, always ask your healthcare professional. [employer] disclaims any warranty or liability for your use of this information.

## 2019-06-28 NOTE — PROGRESS NOTES
HPI       Chief Complaint: BP check     Jaleesa Carvajal is a 70 y.o. male who presents for BP check and to discuss anemia    HTN- Reports his home BP is \"all over the place. \" HR has also been low, 40s-60s. Sees Dr. Elvi Sweeney (cardiology) and had appointment earlier today - brought this up with him and he decreased amlodipine from 10mg to 5mg daily and decreased lasix from 40mg to 20mg daily. Anemia of unknown etiology - reports baseline is around 9.0. \"Once we got it to 11 but that's the highest I've seen, ever. \" Reports previous PCP put him on iron supplements which have improved his Hgb to 10.0 but \"nobody knows why it's so low\" Reports he has never had iron studies. Last colonoscopy was 2-3 years ago. Denies hx of abnormal colonoscopy. Denies blood in stool, abdominal pain. Occasional diarrhea but not severe, no blood. Urinary Incontinence - reports the flomax improved his symptoms, but Dr. Melissa Mayberry dc'd it due to dizziness/lightheadedness. Stopping the flomax did not change his chronic dizziness/lightheadedness but his incontinence did return. They restarted the flomax with improvement in the incontinence. Has appt with Dr. Judi Kessler next week [facility], Urology). Chronic cough - hx of COPD. Chronic cough improved 95% with zyrtec. Pt has run out of the spiriva. Is using albuterol as needed - some days not at all, some days twice a day. Reports in the morning has a lot of coughing and phlegm (chronic), used to continue through the day which has stopped. Pt's medications are prescribed by:  Dr. Elvi Sweeney (Cardiology) - lasix, norvasc, coreg, imdur, plavix, ASA 81  Dr. Doyal Lanes (Audiology)  Dr. Judi Kessler (Urology) - establishing care next week  Dr. Cristy Yan (Neuropsychiatrist) - diagnosed with Alzheimer's -  seroquel, aricept, namenda  Podiatry at the [place]   OTC - calcium, vitamin D, flonase, ranitidine   PCP -  fosamax, atorvastatin.  Sertraline, buproprion, metformin, B12 injections    PMHx:  No past medical history on file. Meds:   Current Outpatient Medications   Medication Sig Dispense Refill    furosemide (LASIX) 20 mg tablet Take  by mouth daily.  amLODIPine (NORVASC) 5 mg tablet Take 5 mg by mouth daily.  calcium-cholecalciferol, d3, (CALCIUM 600 + D) 600-125 mg-unit tab Take  by mouth.  varicella-zoster recombinant, PF, (SHINGRIX, PF,) 50 mcg/0.5 mL susr injection 0.5mL by IntraMUSCular route once now and then repeat in 2-6 months 0.5 mL 1    cetirizine (ZYRTEC) 10 mg tablet Take 1 Tab by mouth daily. 30 Tab 1    EZFE 200 200 mg iron cap TAKE ONE CAPSULE BY MOUTH ONE TIME DAILY 90 Cap 1    fluticasone propionate (FLONASE) 50 mcg/actuation nasal spray 2 Sprays by Both Nostrils route daily. 1 Bottle 1    alendronate (FOSAMAX) 70 mg tablet TAKE ONE TABLET BY MOUTH EVERY WEEK  3    atorvastatin (LIPITOR) 20 mg tablet TAKE ONE TABLET BY MOUTH ONE TIME DAILY  1    buPROPion (WELLBUTRIN) 100 mg tablet TAKE ONE TABLET BY MOUTH TWICE A DAY  2    carvedilol (COREG) 12.5 mg tablet TAKE ONE TABLET BY MOUTH TWICE A DAY  0    clopidogrel (PLAVIX) 75 mg tab TAKE ONE TABLET BY MOUTH ONE TIME DAILY  3    donepezil (ARICEPT) 10 mg tablet TAKE ONE TABLET BY MOUTH AT BEDTIME  6    isosorbide mononitrate ER (IMDUR) 30 mg tablet TAKE THREE TABLETS BY MOUTH TWICE A DAY  2    memantine (NAMENDA) 10 mg tablet TAKE ONE TABLET BY MOUTH TWICE A DAY  2    metFORMIN (GLUCOPHAGE) 1,000 mg tablet TAKE ONE TABLET BY MOUTH TWICE A DAY  3    QUEtiapine (SEROQUEL) 25 mg tablet TAKE ONE TABLET BY MOUTH EVERY MORNING, TAKE TWO TABLETS BY MOUTH ONE TIME DAILY AT LUNCH, TAKE THREE TABLETS BY MOUTH AT BEDTIME  6    raNITIdine (ZANTAC) 150 mg tablet TAKE ONE TABLET BY MOUTH TWICE A DAY  1    sertraline (ZOLOFT) 100 mg tablet TAKE ONE TABLET BY MOUTH ONE TIME DAILY  3    aspirin delayed-release 81 mg tablet Take  by mouth daily.  cholecalciferol, vitamin D3, (VITAMIN D3) 2,000 unit tab Take  by mouth.       cyanocobalamin (VITAMIN B-12) 1,000 mcg/mL injection 1,000 mcg by IntraMUSCular route once.  albuterol sulfate (PROVENTIL;VENTOLIN) 2.5 mg/0.5 mL nebu nebulizer solution by Nebulization route once. Allergies: Allergies   Allergen Reactions    Adempas [Riociguat] Unknown (comments)    Ativan [Lorazepam] Other (comments)     Altered LOC    Codeine Unknown (comments)    Dilaudid [Hydromorphone] Unknown (comments)    Lisinopril Cough     ROS  Review of Systems   Constitutional: Negative for chills, fever and weight loss. Respiratory: Negative for shortness of breath and wheezing. Cardiovascular: Negative for chest pain and palpitations. Gastrointestinal: Negative for abdominal pain, constipation, diarrhea, nausea and vomiting. Genitourinary: Positive for frequency and urgency. Negative for dysuria. Skin: Negative for itching and rash. Neurological: Negative for headaches. Physical Exam:  Visit Vitals  /46   Pulse 65   Temp 97.8 °F (36.6 °C) (Oral)   Resp 16   Ht 5' 3\" (1.6 m)   Wt 160 lb (72.6 kg)   SpO2 99%   BMI 28.34 kg/m²     Physical Exam   Constitutional: He appears well-developed and well-nourished. HENT:   Head: Normocephalic and atraumatic. Neck: Normal range of motion. Neck supple. Cardiovascular: Normal rate, regular rhythm and normal heart sounds. No murmur heard. Pulmonary/Chest: Effort normal. No respiratory distress. Good air movement, but with faint expiratory wheezing throughout all lung fields   Abdominal: Soft. Bowel sounds are normal. He exhibits no distension. There is no tenderness. Musculoskeletal:   Moving all extremities spontaneously   Neurological: He is alert. Skin:   Severe bruising present along both forearms, though not in area covered by his watch. Assessment     70 y.o. male with:    ICD-10-CM ICD-9-CM    1.  Anemia of unknown etiology D64.9 285.9 FERRITIN      IRON PROFILE      FOLATE   2. Easy bruising R23.8 782.9    3. Cough R05 786.2    4. Alzheimer's dementia without behavioral disturbance, unspecified timing of dementia onset G30.9 331.0     F02.80 294.10    5. Essential hypertension I10 401.9    6. 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 388.01    7. Benign prostatic hyperplasia with urinary frequency N40.1 600.01     R35.0 788.41    8. History of COPD Z87.09 V12.69               Plan     1. Easy bruising  Likely related to [id number]. Recommended they d/w cardiology whether he can decrease to every other day. Will also send message to his cardiologist.     2. Chronic Cough  Improved with zyrtec/flonase. 3. Anemia of unknown etiology  Hgb 10.0, per wife is at baseline but no records available. Will obtain iron studies. Also given card for home FOBT. - FERRITIN  - IRON PROFILE  - FOLATE    4. Alzheimer's dementia without behavioral disturbance, unspecified timing of dementia onset  Followed by psychiatry    5. Essential hypertension  Defer to cardiology     6. Presbycusis of both ears  Followed by audiology    7. Benign prostatic hyperplasia with urinary frequency  Ok to resume flomax as it helped his symptoms of urinary frequency/urgency/incontinence and stopping it did not improve his chronic dizziness/lightheadedness which they attribute to his blood pressure. Pt to establish care with Urology next week. 8. History of COPD   Is having wheezing on exam today. They were not aware refills of Spiriva were present at the pharmacy - they will . Continue albuterol inhaler. Patient discussed with Dr. Katie Cormier (attending physician)    I have discussed the diagnosis with the patient and the intended plan as seen in the above orders. The patient has received an after-visit summary and questions were answered concerning future plans. I have discussed medication side effects and warnings with the patient as well.     Bianca Del Valle MD  Family Medicine Resident  PGY-2

## 2019-06-28 NOTE — PROGRESS NOTES
Here today for BP and anemia    Pt was seen today by cardiology earlier     Hx anemia -- baseline 9's -- will check labs    Last colonoscopy was 2-3 years ago    Incontinence improved with flomax -- will f/u with urology -- will continue flomax for now    Hx COPD, ran out of spriva -- will provide medication refill    On baby ASA and plavix -- has bruising on his forearms     I reviewed with the resident the medical history and the resident's findings on the physical examination. I discussed with the resident the patient's diagnosis and concur with the plan.

## 2019-07-09 ENCOUNTER — DOCUMENTATION ONLY (OUTPATIENT)
Dept: FAMILY MEDICINE CLINIC | Age: 79
End: 2019-07-09

## 2019-07-09 NOTE — PROGRESS NOTES
Received fax that patient evaluated by [address] [place] [address] for home PT  Plan is for 3 visits/week for total 90 days. Frequency will be tapered to 1 visit/week by the end of the plan of care.      Will place in [facility]

## 2019-07-17 ENCOUNTER — DOCUMENTATION ONLY (OUTPATIENT)
Dept: FAMILY MEDICINE CLINIC | Age: 79
End: 2019-07-17

## 2019-07-17 NOTE — PROGRESS NOTES
Received outside records from Dr. Kellie Mccormick ([facility]    Last office note 6/28/2019 - they discussed recent low home BPs  Amlodipine 10 decreased to 5mg    Will place in scan pile    Carolinericardo West MD  07/17/19  5:06 PM

## 2019-07-18 DIAGNOSIS — R05.9 COUGH: ICD-10-CM

## 2019-07-18 RX ORDER — CETIRIZINE HCL 10 MG
10 TABLET ORAL DAILY
Qty: 30 TAB | Refills: 1 | Status: SHIPPED | OUTPATIENT
Start: 2019-07-18 | End: 2019-11-06 | Stop reason: SDUPTHER

## 2019-07-19 ENCOUNTER — TELEPHONE (OUTPATIENT)
Dept: FAMILY MEDICINE CLINIC | Age: 79
End: 2019-07-19

## 2019-07-19 ENCOUNTER — OFFICE VISIT (OUTPATIENT)
Dept: FAMILY MEDICINE CLINIC | Age: 79
End: 2019-07-19

## 2019-07-19 VITALS
TEMPERATURE: 98 F | SYSTOLIC BLOOD PRESSURE: 129 MMHG | WEIGHT: 158 LBS | HEIGHT: 63 IN | DIASTOLIC BLOOD PRESSURE: 66 MMHG | RESPIRATION RATE: 18 BRPM | OXYGEN SATURATION: 92 % | BODY MASS INDEX: 28 KG/M2 | HEART RATE: 64 BPM

## 2019-07-19 DIAGNOSIS — R35.0 URINARY FREQUENCY: Primary | ICD-10-CM

## 2019-07-19 LAB
BILIRUB UR QL STRIP: NEGATIVE
GLUCOSE UR-MCNC: NEGATIVE MG/DL
KETONES P FAST UR STRIP-MCNC: NEGATIVE MG/DL
PH UR STRIP: 5 [PH] (ref 4.6–8)
PROT UR QL STRIP: NEGATIVE
SP GR UR STRIP: 1.02 (ref 1–1.03)
UA UROBILINOGEN AMB POC: NORMAL (ref 0.2–1)
URINALYSIS CLARITY POC: CLEAR
URINALYSIS COLOR POC: YELLOW
URINE BLOOD POC: NEGATIVE
URINE LEUKOCYTES POC: NORMAL
URINE NITRITES POC: NEGATIVE

## 2019-07-19 RX ORDER — LORAZEPAM 0.5 MG/1
0.5 TABLET ORAL AS NEEDED
COMMUNITY

## 2019-07-19 RX ORDER — AMLODIPINE BESYLATE 10 MG/1
10 TABLET ORAL
Refills: 3 | COMMUNITY
Start: 2019-07-11 | End: 2020-01-07

## 2019-07-19 RX ORDER — CIPROFLOXACIN 500 MG/1
TABLET ORAL
Refills: 0 | COMMUNITY
Start: 2019-07-03 | End: 2019-11-25

## 2019-07-19 RX ORDER — IPRATROPIUM BROMIDE AND ALBUTEROL 20; 100 UG/1; UG/1
SPRAY, METERED RESPIRATORY (INHALATION)
Refills: 3 | COMMUNITY
Start: 2019-07-03 | End: 2019-10-09 | Stop reason: SDUPTHER

## 2019-07-19 RX ORDER — TAMSULOSIN HYDROCHLORIDE 0.4 MG/1
0.4 CAPSULE ORAL DAILY
Refills: 1 | COMMUNITY
Start: 2019-07-03 | End: 2019-11-06 | Stop reason: SDUPTHER

## 2019-07-19 NOTE — PROGRESS NOTES
70year old male     Had appt with urology -- prostate gland normal, PVR normal -->  F/U in a few weeks    Here for urinary urgency, frequency, incontinence    Recent urine culture was negative    /66    Will discuss with Cardiology if he can stop lasix    I saw and evaluated the patient, performing the key elements of the service. I discussed the findings, assessment and plan with the resident and agree with the resident's findings and plan as documented in the resident's note.

## 2019-07-19 NOTE — PROGRESS NOTES
[address] [facility] [address] [place] [address]   Office [phone], Fax [phone]    Subjective:     Chief Complaint   Patient presents with    Urinary Frequency       HPI:  Joelle Guerrero is a 70 y.o. male with a PMHx significant for CAD (with bypass/3 stents), BPH, Alzheimer's, and Diabetes, presented for Urinary frequency, urgency, and incontinence on day in duration. This is not uncommon for the patient and has happened in the past. Patient is a poor historian due to Alzheimer's but reported urinating 7 or more times since the AM, twice was unnoticed by patient and later realized on adult diaper check by wife. Wife was concerned over another UTI and brought patient in for UA and check up. Patient denied fever, chills, flank pain, pelvic/abdominal pain, and dysuria. Patient has a complicated history with many specialist and meds and requested to come back in one week to review all meds and reconcile them and review specialist recommendations and test results. Patient will bring list of meds and documentation from the other cardio and uro specialists at that time. Allergy reviewed. Allergies   Allergen Reactions    Adempas [Riociguat] Unknown (comments)    Ativan [Lorazepam] Other (comments)     Altered LOC  Patient able to tolerate 0.5mg    Codeine Unknown (comments)    Dilaudid [Hydromorphone] Unknown (comments)    Lisinopril Cough        Medication reviewed. Current Outpatient Medications on File Prior to Visit   Medication Sig Dispense Refill    tamsulosin (FLOMAX) 0.4 mg capsule Take 0.4 mg by mouth daily. 1    COMBIVENT RESPIMAT  mcg/actuation inhaler INHALE ONE PUFF BY MOUTH FOUR TIMES A DAY  3    LORazepam (ATIVAN) 0.5 mg tablet Take 0.5 mg by mouth as needed for Anxiety.  cetirizine (ZYRTEC) 10 mg tablet Take 1 Tab by mouth daily. 30 Tab 1    furosemide (LASIX) 20 mg tablet Take  by mouth daily.  amLODIPine (NORVASC) 5 mg tablet Take 5 mg by mouth daily.       calcium-cholecalciferol, d3, (CALCIUM 600 + D) 600-125 mg-unit tab Take  by mouth.  EZFE 200 200 mg iron cap TAKE ONE CAPSULE BY MOUTH ONE TIME DAILY 90 Cap 1    fluticasone propionate (FLONASE) 50 mcg/actuation nasal spray 2 Sprays by Both Nostrils route daily. 1 Bottle 1    alendronate (FOSAMAX) 70 mg tablet TAKE ONE TABLET BY MOUTH EVERY WEEK  3    atorvastatin (LIPITOR) 20 mg tablet TAKE ONE TABLET BY MOUTH ONE TIME DAILY  1    buPROPion (WELLBUTRIN) 100 mg tablet TAKE ONE TABLET BY MOUTH TWICE A DAY  2    carvedilol (COREG) 12.5 mg tablet TAKE ONE TABLET BY MOUTH TWICE A DAY  0    clopidogrel (PLAVIX) 75 mg tab TAKE ONE TABLET BY MOUTH ONE TIME DAILY  3    donepezil (ARICEPT) 10 mg tablet TAKE ONE TABLET BY MOUTH AT BEDTIME  6    isosorbide mononitrate ER (IMDUR) 30 mg tablet TAKE THREE TABLETS BY MOUTH TWICE A DAY  2    memantine (NAMENDA) 10 mg tablet TAKE ONE TABLET BY MOUTH TWICE A DAY  2    metFORMIN (GLUCOPHAGE) 1,000 mg tablet TAKE ONE TABLET BY MOUTH TWICE A DAY  3    QUEtiapine (SEROQUEL) 25 mg tablet TAKE ONE TABLET BY MOUTH EVERY MORNING, TAKE TWO TABLETS BY MOUTH ONE TIME DAILY AT LUNCH, TAKE THREE TABLETS BY MOUTH AT BEDTIME  6    raNITIdine (ZANTAC) 150 mg tablet TAKE ONE TABLET BY MOUTH TWICE A DAY  1    sertraline (ZOLOFT) 100 mg tablet TAKE ONE TABLET BY MOUTH ONE TIME DAILY  3    aspirin delayed-release 81 mg tablet Take  by mouth daily.  cholecalciferol, vitamin D3, (VITAMIN D3) 2,000 unit tab Take  by mouth.  albuterol sulfate (PROVENTIL;VENTOLIN) 2.5 mg/0.5 mL nebu nebulizer solution by Nebulization route once.  amLODIPine (NORVASC) 10 mg tablet Take 10 mg by mouth daily (with breakfast).   3    ciprofloxacin HCl (CIPRO) 500 mg tablet TAKE ONE TABLET BY MOUTH TWICE A DAY  0    varicella-zoster recombinant, PF, (SHINGRIX, PF,) 50 mcg/0.5 mL susr injection 0.5mL by IntraMUSCular route once now and then repeat in 2-6 months 0.5 mL 1    cyanocobalamin (VITAMIN B-12) 1,000 mcg/mL injection 1,000 mcg by IntraMUSCular route once. No current facility-administered medications on file prior to visit. ROS:   Negative for fever, chills, changes in weight, and changes in appetite. Negative for chest pain, palpitations, and edema. Negative for cough, shortness of breath, and wheezing. Negative for dysuria, and hematuria. Positive for increased frequency, urgency, and incontinence. Negative for dizziness, headache, confusion, and weakness. Objective:   Vitals - reviewed. /66    Visit Vitals  Wt 158 lb (71.7 kg)   BMI 27.99 kg/m²      Physical exam:   GEN: NAD, alert. HEAD: NC/AT. ABDOMEN: Soft, NT, ND, +bowel sounds x 4, no rebound tenderness, no guarding. no masses or organomegaly. Bladder non-distended dull on palpation, and non-tender to palpation. BACK: Negative for CVA tenderness. NEUROLOGIC: No focal neurologic deficits. Strength and sensation grossly intact. EXT: Well perfused, no edema, no erythema. PSYCH: Appropriate mood and affect, good insight and judgement, cooperative. SKIN: Acanthosis nigricans b/l forearms. Patient declined prostate exam.    Pertinent Labs/Studies:   - UA performed in clinic today, results unremarkable, only 1+ Leukocyte esterase all other results wnl    Assessment and orders:     1.  Urinary frequency/incontinence  - Pt has history of BPH and is currently on Lasix  - This is not uncommon for this patient and has happened frequently in the past  - Wife was concerned for infection  - Urinary F/I only occurs during the daytime and not at night  - Order UA in clinic  - UA unremarkable with only a 1+ Leukocyte esterase, negative for CVA tenderness, no flank pain or dysuria or fever/chills, bladder non-distended, non-tender on palpation, dull to percusion therefore no concern at this time for infection or obstruction.  - Instructed patient to immediately go to ER if patient is unable to void and begins to have bladder pain. - Patient has scheduled a f/u with his urologist for 2nd week in august and reported that both prostate exam and post void testing 3 weeks ago with Uro was wnl  - Patient currently on Tamulosin . 04 mg and will continue  - May be related to Lasix administration, currently on 20 mg, since this occurs during daytime rather than night as expected with BPH  - Patient also complains of low BP in the 12V systolic, recommended patient to f/u with cardio for management as I do not have those records at this time    Pt was discussed with Dr Anéglica Alvarez (attending physician). I have reviewed patient medical and social history and medications. I have reviewed pertinent labs results and other data. I have discussed the diagnosis with the patient and the intended plan as seen in the above orders. The patient has received an after-visit summary and questions were answered concerning future plans. I have discussed medication side effects and warnings with the patient as well.     Eliceo Gill MD  Resident [address] [facility]  07/19/19

## 2019-07-19 NOTE — PROGRESS NOTES
1. Have you been to the ER, urgent care clinic since your last visit? Hospitalized since your last visit? No    2. Have you seen or consulted any other health care providers outside of the [address] [place] [address] since your last visit? Include any pap smears or colon screening. No    Chief Complaint   Patient presents with    Urinary Frequency     Patient's wife states patient has urinated on himself twice and not realized it. Patient sees Dr. Dennie Simpers, Urology. Has follow up in August for ultrasound of kidneys.

## 2019-07-19 NOTE — TELEPHONE ENCOUNTER
Spoke to wife Fernie Shannas on hippa regarding patient having urinary issues. Wife states patient has been urinating on himself and does not realize it. Wife states patient does have Alzheimer's. Wife states patient has been seen before for urinary issues before. Advised wife to schedule appointment for patient to be seen. Appointment scheduled today (7/19/19) with Dr. Janet Centeno. Wife verbalized understanding.

## 2019-07-19 NOTE — TELEPHONE ENCOUNTER
Patient wife calling for advice Jhon Louise) on hippa. She states patient is having urinary issues and have had this problem before. She now states patient is urinating on himself without realizing it.     Nurse Ramila Bone took call

## 2019-09-13 ENCOUNTER — OFFICE VISIT (OUTPATIENT)
Dept: FAMILY MEDICINE CLINIC | Age: 79
End: 2019-09-13

## 2019-09-13 VITALS
BODY MASS INDEX: 27.82 KG/M2 | OXYGEN SATURATION: 98 % | WEIGHT: 157 LBS | RESPIRATION RATE: 18 BRPM | TEMPERATURE: 97.7 F | HEART RATE: 70 BPM | SYSTOLIC BLOOD PRESSURE: 148 MMHG | HEIGHT: 63 IN | DIASTOLIC BLOOD PRESSURE: 73 MMHG

## 2019-09-13 DIAGNOSIS — J44.9 CHRONIC OBSTRUCTIVE PULMONARY DISEASE, UNSPECIFIED COPD TYPE (HCC): ICD-10-CM

## 2019-09-13 DIAGNOSIS — S82.832D CLOSED AVULSION FRACTURE OF DISTAL END OF LEFT FIBULA WITH ROUTINE HEALING, SUBSEQUENT ENCOUNTER: Primary | ICD-10-CM

## 2019-09-13 RX ORDER — PREDNISONE 10 MG/1
TABLET ORAL
Qty: 6 TAB | Refills: 0 | Status: SHIPPED | OUTPATIENT
Start: 2019-09-13 | End: 2019-11-25

## 2019-09-13 NOTE — PROGRESS NOTES
69 yo male here for transition of care    Left distal fibula fx  DVT and NSTEMI    Hx COPD    Has MEADE --     Not on home oxygen    Productive cough -- on prednisone since discharge -- will now need to taper dosage    Admitted to [facility] -- twice   Second time 8/15/2019 to 8/20/2019    Has F/U with ortho and cardiology    I saw and evaluated the patient, performing the key elements of the service. I discussed the findings, assessment and plan with the resident and agree with the resident's findings and plan as documented in the resident's note.

## 2019-09-13 NOTE — PROGRESS NOTES
Identified Patient with two Patient identifiers (Name and ). Two Patient Identifiers confirmed. Reviewed record in preparation for visit and have obtained necessary documentation.     Chief Complaint   Patient presents with   [facility] Follow Up     2019 Discharge to [facility] to back to [facility] admitted Heart Attack/Blood Clot then [facility] [place] (5 to 6 Days)        Visit Vitals  /73 (BP 1 Location: Right arm, BP Patient Position: Sitting)   Pulse 70   Temp 97.7 °F (36.5 °C) (Oral)   Resp 18   Ht 5' 3\" (1.6 m)   Wt 157 lb (71.2 kg)   SpO2 98%   BMI 27.81 kg/m²

## 2019-09-13 NOTE — PROGRESS NOTES
[address] [facility] [address]   Office [phone], Fax [phone]      Subjective:     CC: Transition of care   History provided by patient and wife     HPI:  Hallie Desai is a 70 y.o. PATIENT REFUSED male with significant PMH of CAD (with bypass/3 stents), BPH, Alzheimer's, anemia, COPD, HTN, DVT, and Diabetes presents for transitional care visit. Patient was recently admitted to [facility] on 7/29 for a distal fibula fx and had ORIF surgery. Pt was discharged to [facility] after that point and then his O2 saturation was found to be 84% and he was sent back to [facility]. The next day patient was found to have an NSTEMI, DVT of his LLE, CHF exac, and COPD exac. Pt was discharged on 8/20 to [facility] for rehab. Then was discharged home 4-6 days after because his insurance ran out and was told to continue home PT. Today patient states that he is doing well. L foot is still stiff, but can move it. Is weightbearing. Using cane to assist with ambulation. Reports almost being back to his baseline breathing. Still reports MEADE, but is improving every day. Also has productive cough, that is also improving. Review of Systems   Constitutional: Negative for chills, fever and malaise/fatigue. HENT: Negative for congestion. Eyes: Negative for blurred vision and double vision. Respiratory: Negative for cough, shortness of breath and wheezing. Cardiovascular: Negative for chest pain, palpitations and claudication. Gastrointestinal: Negative for abdominal pain, constipation, diarrhea, nausea and vomiting. Genitourinary: Positive for frequency and urgency. Negative for dysuria. Musculoskeletal: Negative for myalgias. Skin: Negative for rash. Neurological: Negative for dizziness, weakness and headaches. SocHx  - smoking: none  - alcohol: none  - drugs:  none      No past medical history on file.     Current Outpatient Medications on File Prior to Visit   Medication Sig Dispense Refill    amLODIPine (NORVASC) 10 mg tablet Take 10 mg by mouth daily (with breakfast). 3    tamsulosin (FLOMAX) 0.4 mg capsule Take 0.4 mg by mouth daily. 1    COMBIVENT RESPIMAT  mcg/actuation inhaler INHALE ONE PUFF BY MOUTH FOUR TIMES A DAY  3    ciprofloxacin HCl (CIPRO) 500 mg tablet TAKE ONE TABLET BY MOUTH TWICE A DAY  0    LORazepam (ATIVAN) 0.5 mg tablet Take 0.5 mg by mouth as needed for Anxiety.  cetirizine (ZYRTEC) 10 mg tablet Take 1 Tab by mouth daily. 30 Tab 1    furosemide (LASIX) 20 mg tablet Take  by mouth daily.  amLODIPine (NORVASC) 5 mg tablet Take 5 mg by mouth daily.  calcium-cholecalciferol, d3, (CALCIUM 600 + D) 600-125 mg-unit tab Take  by mouth.  varicella-zoster recombinant, PF, (SHINGRIX, PF,) 50 mcg/0.5 mL susr injection 0.5mL by IntraMUSCular route once now and then repeat in 2-6 months 0.5 mL 1    EZFE 200 200 mg iron cap TAKE ONE CAPSULE BY MOUTH ONE TIME DAILY 90 Cap 1    fluticasone propionate (FLONASE) 50 mcg/actuation nasal spray 2 Sprays by Both Nostrils route daily.  1 Bottle 1    alendronate (FOSAMAX) 70 mg tablet TAKE ONE TABLET BY MOUTH EVERY WEEK  3    atorvastatin (LIPITOR) 20 mg tablet TAKE ONE TABLET BY MOUTH ONE TIME DAILY  1    buPROPion (WELLBUTRIN) 100 mg tablet TAKE ONE TABLET BY MOUTH TWICE A DAY  2    carvedilol (COREG) 12.5 mg tablet TAKE ONE TABLET BY MOUTH TWICE A DAY  0    clopidogrel (PLAVIX) 75 mg tab TAKE ONE TABLET BY MOUTH ONE TIME DAILY  3    donepezil (ARICEPT) 10 mg tablet TAKE ONE TABLET BY MOUTH AT BEDTIME  6    isosorbide mononitrate ER (IMDUR) 30 mg tablet TAKE THREE TABLETS BY MOUTH TWICE A DAY  2    memantine (NAMENDA) 10 mg tablet TAKE ONE TABLET BY MOUTH TWICE A DAY  2    metFORMIN (GLUCOPHAGE) 1,000 mg tablet TAKE ONE TABLET BY MOUTH TWICE A DAY  3    QUEtiapine (SEROQUEL) 25 mg tablet TAKE ONE TABLET BY MOUTH EVERY MORNING, TAKE TWO TABLETS BY MOUTH ONE TIME DAILY AT LUNCH, TAKE THREE TABLETS BY MOUTH AT BEDTIME  6    raNITIdine (ZANTAC) 150 mg tablet TAKE ONE TABLET BY MOUTH TWICE A DAY  1    sertraline (ZOLOFT) 100 mg tablet TAKE ONE TABLET BY MOUTH ONE TIME DAILY  3    aspirin delayed-release 81 mg tablet Take  by mouth daily.  cholecalciferol, vitamin D3, (VITAMIN D3) 2,000 unit tab Take  by mouth.  cyanocobalamin (VITAMIN B-12) 1,000 mcg/mL injection 1,000 mcg by IntraMUSCular route once.  albuterol sulfate (PROVENTIL;VENTOLIN) 2.5 mg/0.5 mL nebu nebulizer solution by Nebulization route once. No current facility-administered medications on file prior to visit. Allergies   Allergen Reactions    Adempas [Riociguat] Unknown (comments)    Ativan [Lorazepam] Other (comments)     Altered LOC  Patient able to tolerate 0.5mg    Codeine Unknown (comments)    Dilaudid [Hydromorphone] Unknown (comments)    Lisinopril Cough       No past surgical history on file.     Social History     Socioeconomic History    Marital status:      Spouse name: Not on file    Number of children: Not on file    Years of education: Not on file    Highest education level: Not on file   Occupational History    Not on file   Social Needs    Financial resource strain: Not on file    Food insecurity:     Worry: Not on file     Inability: Not on file    Transportation needs:     Medical: Not on file     Non-medical: Not on file   Tobacco Use    Smoking status: Former Smoker    Smokeless tobacco: Never Used   Substance and Sexual Activity    Alcohol use: Never     Frequency: Never    Drug use: Never    Sexual activity: Not Currently   Lifestyle    Physical activity:     Days per week: Not on file     Minutes per session: Not on file    Stress: Not on file   Relationships    Social connections:     Talks on phone: Not on file     Gets together: Not on file     Attends Mandaen service: Not on file     Active member of club or organization: Not on file     Attends meetings of clubs or organizations: Not on file     Relationship status: Not on file    Intimate partner violence:     Fear of current or ex partner: Not on file     Emotionally abused: Not on file     Physically abused: Not on file     Forced sexual activity: Not on file   Other Topics Concern    Not on file   Social History Narrative    Not on file       Patient Active Problem List   Diagnosis Code    Urinary frequency R35.0    Acquired equinus deformity of foot M21.6X9    Diabetes mellitus ([facility] [address] E11.9    Onychomycosis due to dermatophyte B35.1    Pes planus M21.40    Tinea pedis B35.3    Essential hypertension I10    Anemia of unknown etiology D64.9    Alzheimer's disease, unspecified G30.9, F02.80    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    Benign prostatic hyperplasia with urinary frequency N40.1, R35.0    Easy bruising R23.8    History of COPD Z87.09    COPD [facility] J44.9         Objective:     Visit Vitals  /73 (BP 1 Location: Right arm, BP Patient Position: Sitting)   Pulse 70   Temp 97.7 °F (36.5 °C) (Oral)   Resp 18   Ht 5' 3\" (1.6 m)   Wt 157 lb (71.2 kg)   SpO2 98%   BMI 27.81 kg/m²   O2 saturation dropped to 96% while walking       Physical Exam   Constitutional: He is oriented to person, place, and time. He appears well-developed and well-nourished. No distress. HENT:   Head: Normocephalic and atraumatic. Eyes: Conjunctivae are normal. No scleral icterus. Neck: Normal range of motion. Neck supple. No thyromegaly present. Cardiovascular: Normal rate, regular rhythm, normal heart sounds and intact distal pulses. Exam reveals no gallop and no friction rub. No murmur heard. Pulmonary/Chest: He has no wheezes. He has no rales. Abdominal: Soft. Bowel sounds are normal. He exhibits no distension. There is no tenderness. There is no guarding. Neurological: He is alert and oriented to person, place, and time. Skin: Skin is warm and dry.  He is not diaphoretic. Psychiatric: He has a normal mood and affect. His behavior is normal.        Assessment and orders:     Hospital transition of care for L distal fibular fracture, COPD  - Reports doing much better overall, almost back at baseline with SOB and cough, continuing to improve  - Long prednisone taper, 30x3 days, 20x3 days, 01i4edil, 5x3 days, 5 every other day for a few days  - Follow up with ortho in 6 weeks for L fibular ORIF  - Continue PT at home   - Follow up as needed        Pt was discussed with Dr Rebbecca Paget (attending physician). I have reviewed patient medical and social history and medications. I have reviewed pertinent labs results and other data. I have discussed the diagnosis with the patient and the intended plan as seen in the above orders. The patient has received an after-visit summary and questions were answered concerning future plans. I have discussed medication side effects and warnings with the patient as well.     Wayne Parham DO  Resident [facility]  09/13/19

## 2019-09-16 ENCOUNTER — TELEPHONE (OUTPATIENT)
Dept: FAMILY MEDICINE CLINIC | Age: 79
End: 2019-09-16

## 2019-09-16 NOTE — TELEPHONE ENCOUNTER
Per call from patient wife ( Rosey)on hippa,  Notes that her  was on  Lasix 40 mg previously and it was cut in 1/2 because of muscle cramps. She wants to know if he is to continue with the lasix or go ahead and refill the bumetanide 1 mg. She states the bumetanide works well.     Also notes her  need refill on potassium MEQ    Asking this be addressed today

## 2019-09-17 ENCOUNTER — TELEPHONE (OUTPATIENT)
Dept: OBGYN | Age: 79
End: 2019-09-17

## 2019-09-17 DIAGNOSIS — I10 ESSENTIAL HYPERTENSION: Primary | ICD-10-CM

## 2019-09-17 RX ORDER — POTASSIUM CHLORIDE 20 MEQ/1
20 TABLET, EXTENDED RELEASE ORAL DAILY
Qty: 90 TAB | Refills: 3 | Status: SHIPPED | OUTPATIENT
Start: 2019-09-17 | End: 2020-07-02

## 2019-09-17 NOTE — TELEPHONE ENCOUNTER
Instructed patient to stop the bumex, which was started during his hospital stay and to resume his Lasix 20mg, which is being managed by Dr. Sarah Flood, his cardiologist. Prior to his hospital stay, Dr. Sarah Flood had decreased his Lasix from 40 to 20mg due to muscle cramps. Patient is making a follow up to Dr. Sarah Flood in the near future. Encouraged pt to discuss the Lasix dose and if it needed to be adjusted at that follow up visit. Also refilled K and Calcium.       Signed By: Dre Wolf DO     September 17, 2019

## 2019-10-09 RX ORDER — IPRATROPIUM BROMIDE AND ALBUTEROL 20; 100 UG/1; UG/1
SPRAY, METERED RESPIRATORY (INHALATION)
Qty: 1 INHALER | Refills: 3 | Status: SHIPPED | OUTPATIENT
Start: 2019-10-09 | End: 2020-01-05

## 2019-10-10 ENCOUNTER — TELEPHONE (OUTPATIENT)
Dept: FAMILY MEDICINE CLINIC | Age: 79
End: 2019-10-10

## 2019-10-10 NOTE — TELEPHONE ENCOUNTER
Tried calling patient regarding his question about bumex, he or his wife did not answer. Left a VM. Will try calling back later.     Signed By: Jose Stone DO     October 10, 2019

## 2019-10-15 ENCOUNTER — OFFICE VISIT (OUTPATIENT)
Dept: FAMILY MEDICINE CLINIC | Age: 79
End: 2019-10-15

## 2019-10-15 VITALS
SYSTOLIC BLOOD PRESSURE: 116 MMHG | DIASTOLIC BLOOD PRESSURE: 49 MMHG | WEIGHT: 168 LBS | BODY MASS INDEX: 29.77 KG/M2 | OXYGEN SATURATION: 96 % | HEART RATE: 38 BPM | HEIGHT: 63 IN | TEMPERATURE: 97.5 F | RESPIRATION RATE: 16 BRPM

## 2019-10-15 DIAGNOSIS — R06.02 SOB (SHORTNESS OF BREATH): Primary | ICD-10-CM

## 2019-10-15 DIAGNOSIS — J44.9 CHRONIC OBSTRUCTIVE PULMONARY DISEASE, UNSPECIFIED COPD TYPE (HCC): ICD-10-CM

## 2019-10-15 NOTE — PATIENT INSTRUCTIONS
Patient is 68yo M with hx of COPD, CHF, CABG, HTN who presented to PCP office with Worsening SOB. Patient noted to have wheezing on lung exam and notes increased suptum purulence and amount in past 3 wks. Patient given Duoneb treatment with some improvement in symptoms. Sending patient to ED for full work up given likely COPD exacerbation, but CHF may also be contributing to symptoms.

## 2019-10-15 NOTE — PROGRESS NOTES
CC: SOB     HPI:    Patient is a 80M with hx of HTN, DM, Alzheimer's, COPD, CABG, CHF who presents for complaint of weight gain and worsening SOB. Patient had seen his cardiologist Dr. Madison Clark last Thursday for the same complaint and was switched from [facility] to [facility]. Per patient's wife, he was told to take metolazone for 5 days at 5mg daily. He had not lost any weight on this regimen, infact he gained one pound. Overall, he has gained 8lb since Oct 1st. He reports that his SOB is felt at rest and worse on exertion, started 3wks ago and has been getting progressively worse. He has had increased sputum production with increased purulence as well. He denies any LE swelling. Denies any CP or palpitations. He does have three pillow orthopnea but no PND. Denies any dizziness or syncope. Denies any fever or chills. Denies any sick contacts. For his COPD he is currently taking albuterol 4 times daily as well as spiriva BID. Review of Systems: (positive items in bold)    Constitutional: Fever,chills, night sweats, weight change  CV:  CP, palpitations, dizziness, syncope   Resp:  SOB, Cough, Wheezing  GI:  abdominal pain, n/v/d/c       Current Outpatient Medications:     COMBIVENT RESPIMAT  mcg/actuation inhaler, INHALE ONE PUFF BY MOUTH FOUR TIMES A DAY, Disp: 1 Inhaler, Rfl: 3    calcium-cholecalciferol, d3, (CALCIUM 600 + D) 600-125 mg-unit tab, Take 600 mg by mouth daily. , Disp: 90 Tab, Rfl: 3    potassium chloride (K-DUR, KLOR-CON) 20 mEq tablet, Take 1 Tab by mouth daily. , Disp: 90 Tab, Rfl: 3    tamsulosin (FLOMAX) 0.4 mg capsule, Take 0.4 mg by mouth daily. , Disp: , Rfl: 1    LORazepam (ATIVAN) 0.5 mg tablet, Take 0.5 mg by mouth as needed for Anxiety. , Disp: , Rfl:     cetirizine (ZYRTEC) 10 mg tablet, Take 1 Tab by mouth daily. , Disp: 30 Tab, Rfl: 1    amLODIPine (NORVASC) 5 mg tablet, Take 5 mg by mouth daily. , Disp: , Rfl:     EZFE 200 200 mg iron cap, TAKE ONE CAPSULE BY MOUTH ONE TIME DAILY, Disp: 90 Cap, Rfl: 1    fluticasone propionate (FLONASE) 50 mcg/actuation nasal spray, 2 Sprays by Both Nostrils route daily. , Disp: 1 Bottle, Rfl: 1    alendronate (FOSAMAX) 70 mg tablet, TAKE ONE TABLET BY MOUTH EVERY WEEK, Disp: , Rfl: 3    atorvastatin (LIPITOR) 20 mg tablet, TAKE ONE TABLET BY MOUTH ONE TIME DAILY, Disp: , Rfl: 1    buPROPion (WELLBUTRIN) 100 mg tablet, TAKE ONE TABLET BY MOUTH TWICE A DAY, Disp: , Rfl: 2    carvedilol (COREG) 12.5 mg tablet, TAKE ONE TABLET BY MOUTH TWICE A DAY, Disp: , Rfl: 0    clopidogrel (PLAVIX) 75 mg tab, TAKE ONE TABLET BY MOUTH ONE TIME DAILY, Disp: , Rfl: 3    donepezil (ARICEPT) 10 mg tablet, TAKE ONE TABLET BY MOUTH AT BEDTIME, Disp: , Rfl: 6    isosorbide mononitrate ER (IMDUR) 30 mg tablet, TAKE THREE TABLETS BY MOUTH TWICE A DAY, Disp: , Rfl: 2    memantine (NAMENDA) 10 mg tablet, TAKE ONE TABLET BY MOUTH TWICE A DAY, Disp: , Rfl: 2    metFORMIN (GLUCOPHAGE) 1,000 mg tablet, TAKE ONE TABLET BY MOUTH TWICE A DAY, Disp: , Rfl: 3    QUEtiapine (SEROQUEL) 25 mg tablet, TAKE ONE TABLET BY MOUTH EVERY MORNING, TAKE TWO TABLETS BY MOUTH ONE TIME DAILY AT LUNCH, TAKE THREE TABLETS BY MOUTH AT BEDTIME, Disp: , Rfl: 6    raNITIdine (ZANTAC) 150 mg tablet, TAKE ONE TABLET BY MOUTH TWICE A DAY, Disp: , Rfl: 1    sertraline (ZOLOFT) 100 mg tablet, TAKE ONE TABLET BY MOUTH ONE TIME DAILY, Disp: , Rfl: 3    cholecalciferol, vitamin D3, (VITAMIN D3) 2,000 unit tab, Take  by mouth., Disp: , Rfl:     cyanocobalamin (VITAMIN B-12) 1,000 mcg/mL injection, 1,000 mcg by IntraMUSCular route once., Disp: , Rfl:     albuterol sulfate (PROVENTIL;VENTOLIN) 2.5 mg/0.5 mL nebu nebulizer solution, by Nebulization route once., Disp: , Rfl:     predniSONE (DELTASONE) 10 mg tablet, TAKE 10mg (one tab) per day for 3 days, then 5mg (1/2 tab) per day for 3 days, and then 5mg (1/2 tab) every other day for 3 days, Disp: 6 Tab, Rfl: 0    amLODIPine (NORVASC) 10 mg tablet, Take 10 mg by mouth daily (with breakfast). , Disp: , Rfl: 3    ciprofloxacin HCl (CIPRO) 500 mg tablet, TAKE ONE TABLET BY MOUTH TWICE A DAY, Disp: , Rfl: 0    furosemide (LASIX) 20 mg tablet, Take  by mouth daily. , Disp: , Rfl:     varicella-zoster recombinant, PF, (SHINGRIX, PF,) 50 mcg/0.5 mL susr injection, 0.5mL by IntraMUSCular route once now and then repeat in 2-6 months, Disp: 0.5 mL, Rfl: 1    aspirin delayed-release 81 mg tablet, Take  by mouth daily. , Disp: , Rfl:     ALLERGIES- REVIEWED  Allergies   Allergen Reactions    Adempas [Riociguat] Unknown (comments)    Ativan [Lorazepam] Other (comments)     Altered LOC  Patient able to tolerate 0.5mg    Codeine Unknown (comments)    Dilaudid [Hydromorphone] Unknown (comments)    Lisinopril Cough       PAST MEDICAL HISTORY - REVIEWED  History reviewed. No pertinent past medical history.      SOCIAL HISTORY - REVIEWED   Social History     Socioeconomic History    Marital status:      Spouse name: Not on file    Number of children: Not on file    Years of education: Not on file    Highest education level: Not on file   Occupational History    Not on file   Social Needs    Financial resource strain: Not on file    Food insecurity:     Worry: Not on file     Inability: Not on file    Transportation needs:     Medical: Not on file     Non-medical: Not on file   Tobacco Use    Smoking status: Former Smoker    Smokeless tobacco: Never Used   Substance and Sexual Activity    Alcohol use: Never     Frequency: Never    Drug use: Never    Sexual activity: Not Currently   Lifestyle    Physical activity:     Days per week: Not on file     Minutes per session: Not on file    Stress: Not on file   Relationships    Social connections:     Talks on phone: Not on file     Gets together: Not on file     Attends Nondenominational service: Not on file     Active member of club or organization: Not on file     Attends meetings of clubs or organizations: Not on file Relationship status: Not on file    Intimate partner violence:     Fear of current or ex partner: Not on file     Emotionally abused: Not on file     Physically abused: Not on file     Forced sexual activity: Not on file   Other Topics Concern    Not on file   Social History Narrative    Not on file        [address]  No family history on file. Physical Exam:     Visit Vitals  Pulse 84   Temp 97.5 °F (36.4 °C) (Oral)   Resp 16   Ht 5' 3\" (1.6 m)   Wt 168 lb (76.2 kg)   SpO2 96%   BMI 29.76 kg/m²       General appearance: alert, oriented, NAD   HEENT: PERRLA, moist mucus membranes, no LAD  CV: RRR, S1/S2 heard, no m/r/g  Resp: Expiratory wheezing with rhonchi heard throughout all lung fields   Abdominal: soft, non-tender to palpation, +BS  Extremities: Trace lower extremity edema BL  Skin: no visible rashes      Assessment/Plan:     1. SOB (shortness of breath): Patient with clinical symptoms consistent with acute COPD exacerbation but symptoms likely multifactorial given hx of CHF. EKG today is regularly irregular but unchanged from previous EKG from 2018. Patient had mild improvement in symptoms following duoneb treatment in clinic today. Given interplay of chronic respiratory and cardiac disease, will send patient to ED for further evaluation and treatment. Patient and spouse refused EMS transport and form for refusal signed and scanned into patient chart. - AMB POC EKG ROUTINE W/ 12 LEADS, INTER & REP  - ALBUTEROL IPRATROP NON-COMP  - CT PRESSURIZED/NONPRESSURIZED INHALATION TREATMENT      I have discussed the diagnosis with the patient and the intended plan as seen in the above orders. The patient has received an after-visit summary and questions were answered concerning future plans. I have discussed medication side effects and warnings with the patient as well.       Patient discussed with Dr. Mario Perkins MD  Family Medicine Resident   PGY - 2

## 2019-10-17 NOTE — PROGRESS NOTES
I saw and evaluated the patient, performing the key elements of the service. I discussed the findings, assessment and plan with the resident and agree with the resident's findings and plan as documented in the resident's note. Pt presented with dyspnea and productive cough. Alert elderly male, mild respiratory distress. Bilateral wheezing and rhonchi on lung exam. Given duoneb in the office. Agreed with ED evaluation at time of visit.

## 2019-10-25 ENCOUNTER — OFFICE VISIT (OUTPATIENT)
Dept: FAMILY MEDICINE CLINIC | Age: 79
End: 2019-10-25

## 2019-10-25 VITALS
HEART RATE: 62 BPM | TEMPERATURE: 98.7 F | WEIGHT: 166 LBS | OXYGEN SATURATION: 98 % | BODY MASS INDEX: 29.41 KG/M2 | RESPIRATION RATE: 19 BRPM | SYSTOLIC BLOOD PRESSURE: 104 MMHG | DIASTOLIC BLOOD PRESSURE: 57 MMHG | HEIGHT: 63 IN

## 2019-10-25 DIAGNOSIS — M54.50 ACUTE RIGHT-SIDED LOW BACK PAIN WITHOUT SCIATICA: Primary | ICD-10-CM

## 2019-10-25 RX ORDER — LIDOCAINE 50 MG/G
PATCH TOPICAL
Qty: 30 EACH | Refills: 0 | Status: SHIPPED | OUTPATIENT
Start: 2019-10-25 | End: 2019-10-25

## 2019-10-25 RX ORDER — LIDOCAINE 50 MG/G
PATCH TOPICAL
Qty: 30 EACH | Refills: 0 | Status: SHIPPED | OUTPATIENT
Start: 2019-10-25 | End: 2020-02-12

## 2019-10-25 RX ORDER — CARVEDILOL 25 MG/1
25 TABLET ORAL 2 TIMES DAILY WITH MEALS
COMMUNITY
End: 2020-01-07 | Stop reason: DRUGHIGH

## 2019-10-25 NOTE — PROGRESS NOTES
Josephine Fuller is a 70 y.o. male who presents today for back pain. Pain is in lower back, pt unsure of how long back pain has been going on for but has been getting worse. Broke distal fibula July 29th s/p ORIF by Anika Mishra. Pt also reports that he had a lumbar fracture 4 years ago. Denies any recent falls since his last surgery. Pain stays in lower back doesn't go down leg. Tylenol has not helped with pain. Is currently getting physical therapy at home daily. Pt has chronic bladder incontinence. Pt has MI a week ago and discharged 10/19/19 from [facility], reports beds were not comfortable to sleep on. Data reviewed or ordered today:       Other problems include:  Patient Active Problem List   Diagnosis Code    Urinary frequency R35.0    Acquired equinus deformity of foot M21.6X9    Diabetes mellitus ([facility] [address] E11.9    Onychomycosis due to dermatophyte B35.1    Pes planus M21.40    Tinea pedis B35.3    Essential hypertension I10    Anemia of unknown etiology D64.9    Alzheimer's disease, unspecified ([facility] [address] G30.9, F02.80    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    Benign prostatic hyperplasia with urinary frequency N40.1, R35.0    Easy bruising R23.8    History of COPD Z87.09    COPD (chronic obstructive pulmonary disease) (HCC) J44.9       Medications:  Current Outpatient Medications   Medication Sig Dispense Refill    carvedilol (COREG) 25 mg tablet Take 25 mg by mouth two (2) times daily (with meals).  lidocaine (LIDODERM) 5 % Apply patch to the affected area for 12 hours a day and remove for 12 hours a day. 30 Each 0    COMBIVENT RESPIMAT  mcg/actuation inhaler INHALE ONE PUFF BY MOUTH FOUR TIMES A DAY 1 Inhaler 3    calcium-cholecalciferol, d3, (CALCIUM 600 + D) 600-125 mg-unit tab Take 600 mg by mouth daily. 90 Tab 3    potassium chloride (K-DUR, KLOR-CON) 20 mEq tablet Take 1 Tab by mouth daily.  90 Tab 3    predniSONE (DELTASONE) 10 mg tablet TAKE 10mg (one tab) per day for 3 days, then 5mg (1/2 tab) per day for 3 days, and then 5mg (1/2 tab) every other day for 3 days 6 Tab 0    amLODIPine (NORVASC) 10 mg tablet Take 10 mg by mouth daily (with breakfast). 3    tamsulosin (FLOMAX) 0.4 mg capsule Take 0.4 mg by mouth daily. 1    ciprofloxacin HCl (CIPRO) 500 mg tablet TAKE ONE TABLET BY MOUTH TWICE A DAY  0    LORazepam (ATIVAN) 0.5 mg tablet Take 0.5 mg by mouth as needed for Anxiety.  cetirizine (ZYRTEC) 10 mg tablet Take 1 Tab by mouth daily. 30 Tab 1    furosemide (LASIX) 20 mg tablet Take  by mouth daily.  amLODIPine (NORVASC) 5 mg tablet Take 5 mg by mouth daily.  varicella-zoster recombinant, PF, (SHINGRIX, PF,) 50 mcg/0.5 mL susr injection 0.5mL by IntraMUSCular route once now and then repeat in 2-6 months 0.5 mL 1    EZFE 200 200 mg iron cap TAKE ONE CAPSULE BY MOUTH ONE TIME DAILY 90 Cap 1    fluticasone propionate (FLONASE) 50 mcg/actuation nasal spray 2 Sprays by Both Nostrils route daily.  1 Bottle 1    alendronate (FOSAMAX) 70 mg tablet TAKE ONE TABLET BY MOUTH EVERY WEEK  3    atorvastatin (LIPITOR) 20 mg tablet TAKE ONE TABLET BY MOUTH ONE TIME DAILY  1    buPROPion (WELLBUTRIN) 100 mg tablet TAKE ONE TABLET BY MOUTH TWICE A DAY  2    carvedilol (COREG) 12.5 mg tablet TAKE ONE TABLET BY MOUTH TWICE A DAY  0    clopidogrel (PLAVIX) 75 mg tab TAKE ONE TABLET BY MOUTH ONE TIME DAILY  3    donepezil (ARICEPT) 10 mg tablet TAKE ONE TABLET BY MOUTH AT BEDTIME  6    isosorbide mononitrate ER (IMDUR) 30 mg tablet TAKE THREE TABLETS BY MOUTH TWICE A DAY  2    memantine (NAMENDA) 10 mg tablet TAKE ONE TABLET BY MOUTH TWICE A DAY  2    metFORMIN (GLUCOPHAGE) 1,000 mg tablet TAKE ONE TABLET BY MOUTH TWICE A DAY  3    QUEtiapine (SEROQUEL) 25 mg tablet TAKE ONE TABLET BY MOUTH EVERY MORNING, TAKE TWO TABLETS BY MOUTH ONE TIME DAILY AT LUNCH, TAKE THREE TABLETS BY MOUTH AT BEDTIME  6    raNITIdine (ZANTAC) 150 mg tablet TAKE ONE TABLET BY MOUTH TWICE A DAY  1    sertraline (ZOLOFT) 100 mg tablet TAKE ONE TABLET BY MOUTH ONE TIME DAILY  3    aspirin delayed-release 81 mg tablet Take  by mouth daily.  cholecalciferol, vitamin D3, (VITAMIN D3) 2,000 unit tab Take  by mouth.  cyanocobalamin (VITAMIN B-12) 1,000 mcg/mL injection 1,000 mcg by IntraMUSCular route once.  albuterol sulfate (PROVENTIL;VENTOLIN) 2.5 mg/0.5 mL nebu nebulizer solution by Nebulization route once. Allergies: Allergies   Allergen Reactions    Adempas [Riociguat] Unknown (comments)    Ativan [Lorazepam] Other (comments)     Altered LOC  Patient able to tolerate 0.5mg    Codeine Unknown (comments)    Dilaudid [Hydromorphone] Unknown (comments)    Lisinopril Cough       LMP:  No LMP for male patient.     Social History     Socioeconomic History    Marital status:      Spouse name: Not on file    Number of children: Not on file    Years of education: Not on file    Highest education level: Not on file   Occupational History    Not on file   Social Needs    Financial resource strain: Not on file    Food insecurity:     Worry: Not on file     Inability: Not on file    Transportation needs:     Medical: Not on file     Non-medical: Not on file   Tobacco Use    Smoking status: Former Smoker    Smokeless tobacco: Never Used   Substance and Sexual Activity    Alcohol use: Never     Frequency: Never    Drug use: Never    Sexual activity: Not Currently   Lifestyle    Physical activity:     Days per week: Not on file     Minutes per session: Not on file    Stress: Not on file   Relationships    Social connections:     Talks on phone: Not on file     Gets together: Not on file     Attends Spiritism service: Not on file     Active member of club or organization: Not on file     Attends meetings of clubs or organizations: Not on file     Relationship status: Not on file    Intimate partner violence:     Fear of current or ex partner: Not on file Emotionally abused: Not on file     Physically abused: Not on file     Forced sexual activity: Not on file   Other Topics Concern    Not on file   Social History Narrative    Not on file       No family history on file. ROS:  Fever: no  Weight loss: no  Fatigue: no   Headaches: no  Chest Pain: no  SOB: no, improved   Cough: no      Physical Exam  Visit Vitals  /57   Pulse 62   Temp 98.7 °F (37.1 °C)   Resp 19   Ht 5' 3\" (1.6 m)   Wt 166 lb (75.3 kg)   SpO2 98%   BMI 29.41 kg/m²     Constitutional: Appears well,  No acute distress, Vitals noted  Eyes:  Pupils equally round and reactive, EOMI, conjunctiva clear  Neck:  General inspection and Thyroid normal.  No abnormal cervical or supraclavicular nodes. Lungs:  Mild diffuse wheezing, decrease air movement in bases, no crackles   Heart:   Normal HR, Normal S1 and S2,  Regular rhythm. Abd: soft no TTP, normoactive BS   Skin:  Warm to palpation, without rashes, bruising, or suspicious lesions   MSK:    Posture: Normal   Deformity: None    ROM:     Flexion: Normal    Extension: Normal     Lateral bending: Normal      Gait: slow      Palpation:    L1-L5: No tenderness    Sacrum: No tenderness    Coccyx: No tenderness    Left Paraspinal: No tenderness    Right Paraspinal: yes      Strength (0-5/5)    Hip Flexion:   Left: 5/5  Right: 5/5    Hip Extension:  Left: 5/5  Right: 5/5    Hip Abduction:  Left: 5/5  Right: 5/5    Hip Adduction:  Left: 5/5  Right: 5/5     Sensation: Intact, no deficits    Special test:    Straight leg: Left: Negative  Right: Negative    Elisabeths: Left: Negative  Right: Negative        BP Readings from Last 3 Encounters:   10/25/19 104/57   10/15/19 116/49   09/13/19 148/73       Assessment/Plan:      ICD-10-CM ICD-9-CM    1. Acute right-sided low back pain without sciatica M54.5 724.2 REFERRAL TO PHYSICAL THERAPY      lidocaine (LIDODERM) 5 %      DISCONTINUED: lidocaine (LIDODERM) 5 %     1.  Acute right-sided low back pain without sciatica- likely MSK with paraspinal muscle strain   - REFERRAL TO PHYSICAL THERAPY  - lidocaine (LIDODERM) 5 %; Apply patch to the affected area for 12 hours a day and remove for 12 hours a day. Dispense: 30 Each; Refill: 0  - Handout on exercises/stretches   - Heat/ice therapy   - follow up in 4 weeks if no improvement for imagining and further management       Orders Placed This Encounter    REFERRAL TO PHYSICAL THERAPY     Referral Priority:   Routine     Referral Type:   PT/OT/ST     Referral Reason:   Specialty Services Required     Requested Specialty:   Physical Therapy     Number of Visits Requested:   1    carvedilol (COREG) 25 mg tablet     Sig: Take 25 mg by mouth two (2) times daily (with meals).  DISCONTD: lidocaine (LIDODERM) 5 %     Sig: Apply patch to the affected area for 12 hours a day and remove for 12 hours a day. Dispense:  30 Each     Refill:  0    lidocaine (LIDODERM) 5 %     Sig: Apply patch to the affected area for 12 hours a day and remove for 12 hours a day.      Dispense:  30 Each     Refill:  0         Alberta Grubbs MD  [id number] [facility]

## 2019-10-28 NOTE — PROGRESS NOTES
I saw and evaluated the patient, performing the key elements of the service. I discussed the findings, assessment and plan with the resident and agree with the resident's findings and plan as documented in the resident's note. Pleasant gentleman in NAD. Breathing comfortably on room air which is much improved from my last eval. No focal neurologic deficits appreciated on exam, ambulating independently. Agree with plan as outlined.

## 2019-11-06 DIAGNOSIS — R05.9 COUGH: ICD-10-CM

## 2019-11-07 RX ORDER — TAMSULOSIN HYDROCHLORIDE 0.4 MG/1
0.4 CAPSULE ORAL DAILY
Qty: 90 CAP | Refills: 2 | Status: SHIPPED | OUTPATIENT
Start: 2019-11-07 | End: 2020-10-14 | Stop reason: SDUPTHER

## 2019-11-07 RX ORDER — CETIRIZINE HCL 10 MG
10 TABLET ORAL DAILY
Qty: 90 TAB | Refills: 2 | Status: SHIPPED | OUTPATIENT
Start: 2019-11-07 | End: 2020-08-07 | Stop reason: SDUPTHER

## 2019-11-20 ENCOUNTER — TELEPHONE (OUTPATIENT)
Dept: FAMILY MEDICINE CLINIC | Age: 79
End: 2019-11-20

## 2019-11-20 NOTE — TELEPHONE ENCOUNTER
[phone]    Mrs Orly Moore wife, on hippa, called for a refill of metformin. She said the patient has been on this medication for years as prescribed by this office. She did say Dr. Donna Oglesby did last prescribe this when the patient was in the ER. He is out of medication. It was not on current medication list and not chosen.

## 2019-11-20 NOTE — TELEPHONE ENCOUNTER
Called and spoke with wife per physician. Relayed the information and offered an appointment for tomorrow morning. She said the patient is out of medication now as took the last pill this morning. She was informed to contact the pharmacy to ask if their policy to dispense several pills until the morning appointment. She declined to make any appointment at this time and asked to speak with the doctor. She was asked to hold for nurse advice. (nurse Quinn Delcid assisted also a saying there has been no A1C lab work done in this office)    Nurse advice, Lala Cushing, took the call.

## 2019-11-21 RX ORDER — METFORMIN HYDROCHLORIDE 1000 MG/1
TABLET ORAL
Qty: 60 TAB | Refills: 0 | Status: SHIPPED | OUTPATIENT
Start: 2019-11-21 | End: 2019-12-12 | Stop reason: SDUPTHER

## 2019-11-21 NOTE — TELEPHONE ENCOUNTER
Will refill for one month please have pt make appointment to follow up diabetes     EILEEN OROZCO [facility] [place]

## 2019-11-25 ENCOUNTER — OFFICE VISIT (OUTPATIENT)
Dept: FAMILY MEDICINE CLINIC | Age: 79
End: 2019-11-25

## 2019-11-25 VITALS
DIASTOLIC BLOOD PRESSURE: 68 MMHG | SYSTOLIC BLOOD PRESSURE: 132 MMHG | HEART RATE: 60 BPM | WEIGHT: 164 LBS | OXYGEN SATURATION: 94 % | BODY MASS INDEX: 29.06 KG/M2 | RESPIRATION RATE: 18 BRPM | HEIGHT: 63 IN | TEMPERATURE: 97.5 F

## 2019-11-25 DIAGNOSIS — J44.1 COPD WITH ACUTE EXACERBATION (HCC): Primary | ICD-10-CM

## 2019-11-25 DIAGNOSIS — R91.1 LUNG NODULE: ICD-10-CM

## 2019-11-25 DIAGNOSIS — R05.9 COUGH: ICD-10-CM

## 2019-11-25 RX ORDER — PREDNISONE 20 MG/1
40 TABLET ORAL
Qty: 10 TAB | Refills: 0 | Status: SHIPPED | OUTPATIENT
Start: 2019-11-25 | End: 2019-11-30

## 2019-11-25 RX ORDER — CEFDINIR 300 MG/1
300 CAPSULE ORAL 2 TIMES DAILY
Qty: 20 CAP | Refills: 0 | Status: SHIPPED | OUTPATIENT
Start: 2019-11-25 | End: 2019-12-05

## 2019-11-25 NOTE — PROGRESS NOTES
Identified Patient with two Patient identifiers (Name and ). Two Patient Identifiers confirmed. Reviewed record in preparation for visit and have obtained necessary documentation. Chief Complaint   Patient presents with    Cough     Home health nurse want concerned about patient's lung sounds - possible pneumonia        Visit Vitals  /68 (BP 1 Location: Right arm, BP Patient Position: Sitting)   Pulse 60   Temp 97.5 °F (36.4 °C) (Oral)   Resp 18   Ht 5' 3\" (1.6 m)   Wt 164 lb (74.4 kg)   SpO2 94%   BMI 29.05 kg/m²       1. Have you been to the ER, urgent care clinic since your last visit? Hospitalized since your last visit? No    2. Have you seen or consulted any other health care providers outside of the [address] [place] [address] since your last visit? Include any pap smears or colon screening.  No

## 2019-11-25 NOTE — PROGRESS NOTES
Hallie Desai is a 70 y.o. male here today to address the following issues:  Chief Complaint   Patient presents with    Cough     Home health nurse want concerned about patient's lung sounds - possible pneumonia        Onset: 4 days  Symptoms:cough with some shortness of breath, no fevers  Alleviating Factors: Inhaler  Aggravating Factors:Nothing  Sick Contacts: No sick contacts  Recent Travel: None  Recent Use of Antibiotics: None  Risk Factors: COPD    Had his flu shot this year    No past medical history on file. No past surgical history on file.   Social History     Socioeconomic History    Marital status:      Spouse name: Not on file    Number of children: Not on file    Years of education: Not on file    Highest education level: Not on file   Occupational History    Not on file   Social Needs    Financial resource strain: Not on file    Food insecurity:     Worry: Not on file     Inability: Not on file    Transportation needs:     Medical: Not on file     Non-medical: Not on file   Tobacco Use    Smoking status: Former Smoker    Smokeless tobacco: Never Used   Substance and Sexual Activity    Alcohol use: Never     Frequency: Never    Drug use: Never    Sexual activity: Not Currently   Lifestyle    Physical activity:     Days per week: Not on file     Minutes per session: Not on file    Stress: Not on file   Relationships    Social connections:     Talks on phone: Not on file     Gets together: Not on file     Attends Episcopal service: Not on file     Active member of club or organization: Not on file     Attends meetings of clubs or organizations: Not on file     Relationship status: Not on file    Intimate partner violence:     Fear of current or ex partner: Not on file     Emotionally abused: Not on file     Physically abused: Not on file     Forced sexual activity: Not on file   Other Topics Concern    Not on file   Social History Narrative    Not on file       Allergies Allergen Reactions    Adempas [Riociguat] Unknown (comments)    Ativan [Lorazepam] Other (comments)     Altered LOC  Patient able to tolerate 0.5mg    Codeine Unknown (comments)    Dilaudid [Hydromorphone] Unknown (comments)    Lisinopril Cough       Current Outpatient Medications   Medication Sig    predniSONE (DELTASONE) 20 mg tablet Take 40 mg by mouth daily (with breakfast) for 5 days.  cefdinir (OMNICEF) 300 mg capsule Take 1 Cap by mouth two (2) times a day for 10 days.  metFORMIN (GLUCOPHAGE) 1,000 mg tablet TAKE ONE TABLET BY MOUTH TWICE A DAY    cetirizine (ZYRTEC) 10 mg tablet Take 1 Tab by mouth daily.  tamsulosin (FLOMAX) 0.4 mg capsule Take 1 Cap by mouth daily.  carvedilol (COREG) 25 mg tablet Take 25 mg by mouth two (2) times daily (with meals).  COMBIVENT RESPIMAT  mcg/actuation inhaler INHALE ONE PUFF BY MOUTH FOUR TIMES A DAY    calcium-cholecalciferol, d3, (CALCIUM 600 + D) 600-125 mg-unit tab Take 600 mg by mouth daily.  potassium chloride (K-DUR, KLOR-CON) 20 mEq tablet Take 1 Tab by mouth daily.  amLODIPine (NORVASC) 10 mg tablet Take 10 mg by mouth daily (with breakfast).  LORazepam (ATIVAN) 0.5 mg tablet Take 0.5 mg by mouth as needed for Anxiety.  furosemide (LASIX) 20 mg tablet Take  by mouth daily.  amLODIPine (NORVASC) 5 mg tablet Take 5 mg by mouth daily.  varicella-zoster recombinant, PF, (SHINGRIX, PF,) 50 mcg/0.5 mL susr injection 0.5mL by IntraMUSCular route once now and then repeat in 2-6 months    EZFE 200 200 mg iron cap TAKE ONE CAPSULE BY MOUTH ONE TIME DAILY    fluticasone propionate (FLONASE) 50 mcg/actuation nasal spray 2 Sprays by Both Nostrils route daily.     alendronate (FOSAMAX) 70 mg tablet TAKE ONE TABLET BY MOUTH EVERY WEEK    atorvastatin (LIPITOR) 20 mg tablet TAKE ONE TABLET BY MOUTH ONE TIME DAILY    buPROPion (WELLBUTRIN) 100 mg tablet TAKE ONE TABLET BY MOUTH TWICE A DAY    carvedilol (COREG) 12.5 mg tablet TAKE ONE TABLET BY MOUTH TWICE A DAY    clopidogrel (PLAVIX) 75 mg tab TAKE ONE TABLET BY MOUTH ONE TIME DAILY    donepezil (ARICEPT) 10 mg tablet TAKE ONE TABLET BY MOUTH AT BEDTIME    isosorbide mononitrate ER (IMDUR) 30 mg tablet TAKE THREE TABLETS BY MOUTH TWICE A DAY    memantine (NAMENDA) 10 mg tablet TAKE ONE TABLET BY MOUTH TWICE A DAY    QUEtiapine (SEROQUEL) 25 mg tablet TAKE ONE TABLET BY MOUTH EVERY MORNING, TAKE TWO TABLETS BY MOUTH ONE TIME DAILY AT LUNCH, TAKE THREE TABLETS BY MOUTH AT BEDTIME    raNITIdine (ZANTAC) 150 mg tablet TAKE ONE TABLET BY MOUTH TWICE A DAY    sertraline (ZOLOFT) 100 mg tablet TAKE ONE TABLET BY MOUTH ONE TIME DAILY    aspirin delayed-release 81 mg tablet Take  by mouth daily.  cholecalciferol, vitamin D3, (VITAMIN D3) 2,000 unit tab Take  by mouth.  albuterol sulfate (PROVENTIL;VENTOLIN) 2.5 mg/0.5 mL nebu nebulizer solution by Nebulization route once.  lidocaine (LIDODERM) 5 % Apply patch to the affected area for 12 hours a day and remove for 12 hours a day. No current facility-administered medications for this visit. Review of Systems   Constitutional: Negative for chills and fever. Respiratory: Positive for cough, shortness of breath and wheezing. Cardiovascular: Negative for chest pain and palpitations. Gastrointestinal: Negative for abdominal pain, diarrhea and vomiting. Musculoskeletal: Negative for myalgias. Skin: Negative for rash. A comprehensive review of systems was negative except for that written in the HPI and listed above. Visit Vitals  /68 (BP 1 Location: Right arm, BP Patient Position: Sitting)   Pulse 60   Temp 97.5 °F (36.4 °C) (Oral)   Resp 18   Ht 5' 3\" (1.6 m)   Wt 164 lb (74.4 kg)   SpO2 94%   BMI 29.05 kg/m²       Physical Exam  Vitals signs and nursing note reviewed. Constitutional:       General: He is not in acute distress. Appearance: He is not diaphoretic.    HENT:      Head: Normocephalic and atraumatic. Right Ear: External ear normal.      Left Ear: External ear normal.      Nose: Nose normal.      Mouth/Throat:      Pharynx: No oropharyngeal exudate. Eyes:      General:         Right eye: No discharge. Left eye: No discharge. Conjunctiva/sclera: Conjunctivae normal.   Cardiovascular:      Rate and Rhythm: Normal rate and regular rhythm. Heart sounds: Normal heart sounds. No murmur. No friction rub. No gallop. Pulmonary:      Effort: Pulmonary effort is normal. No respiratory distress. Breath sounds: Wheezing present. No rales. Abdominal:      Palpations: Abdomen is soft. Lymphadenopathy:      Cervical: No cervical adenopathy. Skin:     General: Skin is warm and dry. Neurological:      Mental Status: He is alert. Psychiatric:         Mood and Affect: Affect normal.         No results found for this or any previous visit (from the past 12 hour(s)). 1. Cough  - XR CHEST PA LAT; Future    2. COPD with acute exacerbation (HCC)  - predniSONE (DELTASONE) 20 mg tablet; Take 40 mg by mouth daily (with breakfast) for 5 days. Dispense: 10 Tab; Refill: 0  - cefdinir (OMNICEF) 300 mg capsule; Take 1 Cap by mouth two (2) times a day for 10 days. Dispense: 20 Cap; Refill: 0    Personally reviewed patient's chest x-ray. No acute findings. Potential mass noted on the left lung field. We will get a CT of the chest to better characterize. Can start steroids and antibiotics. Follow-up in 1 to 2 days to follow-up to see how he is doing. Follow-up and Dispositions    · Return in about 2 days (around 11/27/2019) for COPD exacerbation .          Laura Garland MD, CAQSM, RMSK

## 2019-11-26 ENCOUNTER — TELEPHONE (OUTPATIENT)
Dept: FAMILY MEDICINE CLINIC | Age: 79
End: 2019-11-26

## 2019-11-26 ENCOUNTER — HOSPITAL ENCOUNTER (OUTPATIENT)
Dept: CT IMAGING | Age: 79
Discharge: HOME | End: 2019-11-26
Attending: FAMILY MEDICINE
Payer: MEDICARE

## 2019-11-26 DIAGNOSIS — R91.1 LUNG NODULE: ICD-10-CM

## 2019-11-26 PROCEDURE — 71250 CT THORAX DX C-: CPT

## 2019-11-26 NOTE — TELEPHONE ENCOUNTER
Kamron Vicente with [facility] called to verify we received results and that doctor has reviewed.   Informed of below      All Reviewers List     Isaac Whitman MD on 11/25/2019 20:30     XR CHEST PA LAT

## 2019-11-27 ENCOUNTER — HOSPITAL ENCOUNTER (OUTPATIENT)
Dept: LAB | Age: 79
Discharge: HOME | End: 2019-11-27

## 2019-11-27 ENCOUNTER — OFFICE VISIT (OUTPATIENT)
Dept: FAMILY MEDICINE CLINIC | Age: 79
End: 2019-11-27

## 2019-11-27 VITALS
RESPIRATION RATE: 19 BRPM | BODY MASS INDEX: 29.59 KG/M2 | WEIGHT: 167 LBS | TEMPERATURE: 97.7 F | HEIGHT: 63 IN | DIASTOLIC BLOOD PRESSURE: 74 MMHG | HEART RATE: 72 BPM | OXYGEN SATURATION: 94 % | SYSTOLIC BLOOD PRESSURE: 122 MMHG

## 2019-11-27 DIAGNOSIS — R41.82 ALTERED MENTAL STATUS, UNSPECIFIED ALTERED MENTAL STATUS TYPE: ICD-10-CM

## 2019-11-27 DIAGNOSIS — G89.29 CHRONIC LEFT-SIDED LOW BACK PAIN WITHOUT SCIATICA: Primary | ICD-10-CM

## 2019-11-27 DIAGNOSIS — M54.50 CHRONIC LEFT-SIDED LOW BACK PAIN WITHOUT SCIATICA: Primary | ICD-10-CM

## 2019-11-27 LAB
APPEARANCE UR: CLEAR
BACTERIA URNS QL MICRO: NEGATIVE /HPF
BILIRUB UR QL: NEGATIVE
COLOR UR: ABNORMAL
EPITH CASTS URNS QL MICRO: ABNORMAL /LPF
GLUCOSE UR STRIP.AUTO-MCNC: NEGATIVE MG/DL
HGB UR QL STRIP: NEGATIVE
HYALINE CASTS URNS QL MICRO: ABNORMAL /LPF (ref 0–5)
KETONES UR QL STRIP.AUTO: NEGATIVE MG/DL
LEUKOCYTE ESTERASE UR QL STRIP.AUTO: ABNORMAL
NITRITE UR QL STRIP.AUTO: NEGATIVE
PH UR STRIP: 5 [PH] (ref 5–8)
PROT UR STRIP-MCNC: NEGATIVE MG/DL
RBC #/AREA URNS HPF: ABNORMAL /HPF (ref 0–5)
SP GR UR REFRACTOMETRY: 1.01 (ref 1–1.03)
UROBILINOGEN UR QL STRIP.AUTO: 0.2 EU/DL (ref 0.2–1)
WBC URNS QL MICRO: ABNORMAL /HPF (ref 0–4)

## 2019-11-27 NOTE — PROGRESS NOTES
Calvin Mckeon is a 70 y.o. male who presents today. Wife reports that he has some worsening of his memory and fatigue and would like to make sure he doesn't have a UTI She reports that he gets like this sometimes when he has a UTI. Pt currently on Cefdinir for possible PNA. Wife also reports that pt cntinues to have back pain that has not improved. Pt can only stand for 5 mins before he has to sit down. Pain worst in LL back. Pt had Lumbar fracture 4 years ago. 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 10 Tab 0    cefdinir (OMNICEF) 300 mg capsule Take 1 Cap by mouth two (2) times a day for 10 days. 20 Cap 0    metFORMIN (GLUCOPHAGE) 1,000 mg tablet TAKE ONE TABLET BY MOUTH TWICE A DAY 60 Tab 0    cetirizine (ZYRTEC) 10 mg tablet Take 1 Tab by mouth daily. 90 Tab 2    tamsulosin (FLOMAX) 0.4 mg capsule Take 1 Cap by mouth daily. 90 Cap 2    carvedilol (COREG) 25 mg tablet Take 25 mg by mouth two (2) times daily (with meals).  lidocaine (LIDODERM) 5 % Apply patch to the affected area for 12 hours a day and remove for 12 hours a day.  30 Each 0    COMBIVENT RESPIMAT  mcg/actuation inhaler INHALE ONE PUFF BY MOUTH FOUR TIMES A DAY 1 Inhaler 3    calcium-cholecalciferol, d3, (CALCIUM 600 + D) 600-125 mg-unit tab Take 600 mg by mouth daily. 90 Tab 3    potassium chloride (K-DUR, KLOR-CON) 20 mEq tablet Take 1 Tab by mouth daily. 90 Tab 3    amLODIPine (NORVASC) 10 mg tablet Take 10 mg by mouth daily (with breakfast). 3    LORazepam (ATIVAN) 0.5 mg tablet Take 0.5 mg by mouth as needed for Anxiety.  furosemide (LASIX) 20 mg tablet Take  by mouth daily.  amLODIPine (NORVASC) 5 mg tablet Take 5 mg by mouth daily.  varicella-zoster recombinant, PF, (SHINGRIX, PF,) 50 mcg/0.5 mL susr injection 0.5mL by IntraMUSCular route once now and then repeat in 2-6 months 0.5 mL 1    EZFE 200 200 mg iron cap TAKE ONE CAPSULE BY MOUTH ONE TIME DAILY 90 Cap 1    fluticasone propionate (FLONASE) 50 mcg/actuation nasal spray 2 Sprays by Both Nostrils route daily. 1 Bottle 1    alendronate (FOSAMAX) 70 mg tablet TAKE ONE TABLET BY MOUTH EVERY WEEK  3    atorvastatin (LIPITOR) 20 mg tablet TAKE ONE TABLET BY MOUTH ONE TIME DAILY  1    buPROPion (WELLBUTRIN) 100 mg tablet TAKE ONE TABLET BY MOUTH TWICE A DAY  2    carvedilol (COREG) 12.5 mg tablet TAKE ONE TABLET BY MOUTH TWICE A DAY  0    clopidogrel (PLAVIX) 75 mg tab TAKE ONE TABLET BY MOUTH ONE TIME DAILY  3    donepezil (ARICEPT) 10 mg tablet TAKE ONE TABLET BY MOUTH AT BEDTIME  6    isosorbide mononitrate ER (IMDUR) 30 mg tablet TAKE THREE TABLETS BY MOUTH TWICE A DAY  2    memantine (NAMENDA) 10 mg tablet TAKE ONE TABLET BY MOUTH TWICE A DAY  2    QUEtiapine (SEROQUEL) 25 mg tablet TAKE ONE TABLET BY MOUTH EVERY MORNING, TAKE TWO TABLETS BY MOUTH ONE TIME DAILY AT LUNCH, TAKE THREE TABLETS BY MOUTH AT BEDTIME  6    raNITIdine (ZANTAC) 150 mg tablet TAKE ONE TABLET BY MOUTH TWICE A DAY  1    sertraline (ZOLOFT) 100 mg tablet TAKE ONE TABLET BY MOUTH ONE TIME DAILY  3    aspirin delayed-release 81 mg tablet Take  by mouth daily.       cholecalciferol, vitamin D3, (VITAMIN D3) 2,000 unit tab Take  by mouth.  albuterol sulfate (PROVENTIL;VENTOLIN) 2.5 mg/0.5 mL nebu nebulizer solution by Nebulization route once. Allergies: Allergies   Allergen Reactions    Adempas [Riociguat] Unknown (comments)    Ativan [Lorazepam] Other (comments)     Altered LOC  Patient able to tolerate 0.5mg    Codeine Unknown (comments)    Dilaudid [Hydromorphone] Unknown (comments)    Lisinopril Cough     LMP:  No LMP for male patient. Social History     Socioeconomic History    Marital status:      Spouse name: Not on file    Number of children: Not on file    Years of education: Not on file    Highest education level: Not on file   Occupational History    Not on file   Social Needs    Financial resource strain: Not on file    Food insecurity:     Worry: Not on file     Inability: Not on file    Transportation needs:     Medical: Not on file     Non-medical: Not on file   Tobacco Use    Smoking status: Former Smoker    Smokeless tobacco: Never Used   Substance and Sexual Activity    Alcohol use: Never     Frequency: Never    Drug use: Never    Sexual activity: Not Currently   Lifestyle    Physical activity:     Days per week: Not on file     Minutes per session: Not on file    Stress: Not on file   Relationships    Social connections:     Talks on phone: Not on file     Gets together: Not on file     Attends Buddhism service: Not on file     Active member of club or organization: Not on file     Attends meetings of clubs or organizations: Not on file     Relationship status: Not on file    Intimate partner violence:     Fear of current or ex partner: Not on file     Emotionally abused: Not on file     Physically abused: Not on file     Forced sexual activity: Not on file   Other Topics Concern    Not on file   Social History Narrative    Not on file       No family history on file.         ROS:  Fever: no  Weight loss: no  Headaches: no  Chest Pain: no  SOB: no  Cough: no  Other significant ROS:        Physical Exam  Visit Vitals  /74   Pulse 72   Temp 97.7 °F (36.5 °C)   Resp 19   Ht 5' 3\" (1.6 m)   Wt 167 lb (75.8 kg)   SpO2 94%   BMI 29.58 kg/m²     Constitutional:  Appears well,  No acute distress, Vitals noted  Neck:   General inspection and Thyroid normal.  No abnormal cervical or supraclavicular nodes. Lungs:   Clear to auscultation, good respiratory effort, no wheezes, rales or rhonchi  Heart:   Normal HR, Normal S1 and S2,  Regular rhythm. No cardiac murmurs. No carotid bruits. Abd: no suprapubic tenderness   Chest wall normal  Extremities:   without edema, good peripheral pulses  Skin:   Warm to palpation, without rashes, bruising, or suspicious lesions     MSK:    Posture: Normal   Deformity: None    ROM: limited due to chronic morbidity and pain      Gait: Normal       Palpation:    L1-L5: present     Sacrum: No tenderness    Coccyx: No tenderness    Left Paraspinal: Present     Right Paraspinal: No tenderness     Strength (0-5/5)    Hip Flexion:   Left: 5/5  Right: 5/5    Knee Extension:  Left: 5/5  Right: 5/5    Knee Flexion:   Left: 5/5  Right: 5/5     Sensation: Intact, no deficits      DTR:    Patella:  Left: +2  Right: +2    Achilles:  Left: +2  Right: +2     Special test:    Straight leg: Left: Negative  Right: Negative    Elisabeths: Left: Negative  Right: Negative           BP Readings from Last 3 Encounters:   11/27/19 122/74   11/25/19 132/68   10/25/19 104/57       Assessment/Plan:      ICD-10-CM ICD-9-CM    1. Chronic left-sided low back pain without sciatica M54.5 724.2 XR SPINE LUMB 2 OR 3 V    G89.29 338.29    2. Altered mental status, unspecified altered mental status type R41.82 780.97 URINALYSIS W/ RFLX MICROSCOPIC      CULTURE, URINE     1.  Chronic left-sided low back pain without sciatica  - XR SPINE LUMB 2 OR 3 V; Future- no acute fractures seen, previous L1 compression fracture   - Pt will follow up with sports medicine for possible US guided injection   - Given fall risk   - heat/ice therapy   - continue exercise and stretching      2. Altered mental status, unspecified altered mental status type- likely worsening of baseline dementia, Given previous Hx of worsening mental status with UTI will work up but likely covered by Cefdinir that pt is taking now   - URINALYSIS W/ RFLX MICROSCOPIC; Future  - CULTURE, URINE;  Future      Orders Placed This Encounter    CULTURE, URINE     Standing Status:   Future     Standing Expiration Date:   11/27/2020    XR SPINE LUMB 2 OR 3 V     Standing Status:   Future     Number of Occurrences:   1     Standing Expiration Date:   12/27/2020     Order Specific Question:   Reason for Exam     Answer:   chronic back pain    URINALYSIS W/ RFLX MICROSCOPIC     Standing Status:   Future     Standing Expiration Date:   11/27/2020         Connor Pace MD  [id number] [facility]

## 2019-11-29 LAB
BACTERIA SPEC CULT: NORMAL
CC UR VC: NORMAL
SERVICE CMNT-IMP: NORMAL

## 2019-12-06 ENCOUNTER — TELEPHONE (OUTPATIENT)
Dept: FAMILY MEDICINE CLINIC | Age: 79
End: 2019-12-06

## 2019-12-06 DIAGNOSIS — E07.9 THYROID MASS: Primary | ICD-10-CM

## 2019-12-06 PROBLEM — R91.1 LUNG NODULE: Status: ACTIVE | Noted: 2019-12-06

## 2019-12-12 ENCOUNTER — HOSPITAL ENCOUNTER (OUTPATIENT)
Dept: ULTRASOUND IMAGING | Age: 79
Discharge: HOME | End: 2019-12-12
Attending: FAMILY MEDICINE
Payer: MEDICARE

## 2019-12-12 DIAGNOSIS — E07.9 THYROID MASS: ICD-10-CM

## 2019-12-12 PROCEDURE — 76536 US EXAM OF HEAD AND NECK: CPT

## 2019-12-12 RX ORDER — SERTRALINE HYDROCHLORIDE 100 MG/1
TABLET, FILM COATED ORAL
Qty: 30 TAB | Refills: 0 | Status: SHIPPED | OUTPATIENT
Start: 2019-12-12 | End: 2020-01-15 | Stop reason: SDUPTHER

## 2019-12-12 RX ORDER — BUPROPION HYDROCHLORIDE 100 MG/1
TABLET ORAL
Qty: 60 TAB | Refills: 0 | Status: SHIPPED | OUTPATIENT
Start: 2019-12-12 | End: 2020-01-06 | Stop reason: SDUPTHER

## 2019-12-12 RX ORDER — METFORMIN HYDROCHLORIDE 1000 MG/1
TABLET ORAL
Qty: 60 TAB | Refills: 0 | Status: SHIPPED | OUTPATIENT
Start: 2019-12-12 | End: 2020-01-06 | Stop reason: SDUPTHER

## 2019-12-12 RX ORDER — ISOSORBIDE MONONITRATE 30 MG/1
TABLET, EXTENDED RELEASE ORAL
Refills: 2 | OUTPATIENT
Start: 2019-12-12

## 2019-12-12 NOTE — TELEPHONE ENCOUNTER
Requested Prescriptions     Pending Prescriptions Disp Refills    buPROPion (WELLBUTRIN) 100 mg tablet  2    sertraline (ZOLOFT) 100 mg tablet  3    isosorbide mononitrate ER (IMDUR) 30 mg tablet  2    metFORMIN (GLUCOPHAGE) 1,000 mg tablet 60 Tab 0     Sig: TAKE ONE TABLET BY MOUTH TWICE A DAY

## 2019-12-13 RX ORDER — ISOSORBIDE MONONITRATE 30 MG/1
TABLET, EXTENDED RELEASE ORAL
Refills: 2 | Status: CANCELLED | OUTPATIENT
Start: 2019-12-13

## 2019-12-13 NOTE — TELEPHONE ENCOUNTER
Received refill request for several medications. I last saw pt 6/2019 and since then pt has been asked several times by multiple providers to come in for labs and to discuss chronic conditions but has only been seen for acute concerns. Will provide 30 day refills of wellbutrin, zoloft, and metformin and again ask nurse to please inform pt he needs to come in for follow up appt and labs before any additional refills will be provided, as we don't even have A1c on file at this office. He established care with us in the Spring of this year. Declining imdur as his cardiologist should be filling that for him.

## 2019-12-20 ENCOUNTER — TELEPHONE (OUTPATIENT)
Dept: FAMILY MEDICINE CLINIC | Age: 79
End: 2019-12-20

## 2019-12-20 RX ORDER — ALBUTEROL SULFATE 2.5 MG/.5ML
2.5 SOLUTION RESPIRATORY (INHALATION)
Qty: 0.5 ML | Refills: 3 | Status: SHIPPED | OUTPATIENT
Start: 2019-12-20 | End: 2019-12-26 | Stop reason: ALTCHOICE

## 2019-12-20 RX ORDER — CLOPIDOGREL BISULFATE 75 MG/1
TABLET ORAL
Qty: 30 TAB | Refills: 3 | Status: SHIPPED | OUTPATIENT
Start: 2019-12-20 | End: 2020-04-02

## 2019-12-20 NOTE — TELEPHONE ENCOUNTER
[phone],   [phone], cell    Mrs, Maryan Hayes, wife, came by the office and wanted these refill requests to go to  Dr. Joaquina Butler as he has seen the patient several times. She said he is out of both of these medications in which he needs them. Prasad Salazar this has been discussed at this office but they were prescribed by [facility], [facility], when patient was there. 1.  plavix  2. Albuterol solution.     Said to call and if not available, can leave a message per her request.

## 2019-12-20 NOTE — TELEPHONE ENCOUNTER
Wife, Mrs. Rod Barba  --[phone]  [facility]--[phone]    She came by the office and asked specifically for me at the . She spoke about the appointment with   Dr. Garret Breen  for Monday and wanted know if it was to discuss the results of thyroid ultrasound. She said if it is not about that, she will cancel that appointment as her child is coming in from [place]. She wants a call from the nurse or doctor and can leave a message.     Monday, December 23, 2019 05:45 PM [facility] OFFICE, [facility]  Acute Care, 15min  discuss lab results, appt per DR. Shanae Parmar cm    appt, on 12/23--per Dr. Garret Breen cm

## 2019-12-26 ENCOUNTER — TELEPHONE (OUTPATIENT)
Dept: FAMILY MEDICINE CLINIC | Age: 79
End: 2019-12-26

## 2019-12-26 RX ORDER — ALBUTEROL SULFATE 90 UG/1
2 AEROSOL, METERED RESPIRATORY (INHALATION)
Qty: 1 INHALER | Refills: 4 | Status: SHIPPED | OUTPATIENT
Start: 2019-12-26 | End: 2021-04-02 | Stop reason: SDUPTHER

## 2019-12-26 NOTE — TELEPHONE ENCOUNTER
----- Message from Jane Parisi sent at 12/26/2019  9:33 AM EST -----  Regarding: [place]/telephone  Antonette with [facility] pharmacy stated the insurance will not cover the Albuterol and she is requesting a different medication. [facility] number is [phone].

## 2020-01-05 RX ORDER — IPRATROPIUM BROMIDE AND ALBUTEROL 20; 100 UG/1; UG/1
SPRAY, METERED RESPIRATORY (INHALATION)
Qty: 4 G | Refills: 3 | Status: SHIPPED | OUTPATIENT
Start: 2020-01-05 | End: 2021-01-28 | Stop reason: SDUPTHER

## 2020-01-07 ENCOUNTER — OFFICE VISIT (OUTPATIENT)
Dept: FAMILY MEDICINE CLINIC | Age: 80
End: 2020-01-07

## 2020-01-07 ENCOUNTER — TELEPHONE (OUTPATIENT)
Dept: FAMILY MEDICINE CLINIC | Age: 80
End: 2020-01-07

## 2020-01-07 ENCOUNTER — HOSPITAL ENCOUNTER (OUTPATIENT)
Dept: LAB | Age: 80
Discharge: HOME | End: 2020-01-07

## 2020-01-07 VITALS
SYSTOLIC BLOOD PRESSURE: 112 MMHG | BODY MASS INDEX: 29.06 KG/M2 | HEART RATE: 51 BPM | WEIGHT: 164 LBS | OXYGEN SATURATION: 94 % | TEMPERATURE: 97.6 F | DIASTOLIC BLOOD PRESSURE: 60 MMHG | HEIGHT: 63 IN | RESPIRATION RATE: 18 BRPM

## 2020-01-07 DIAGNOSIS — F02.80 ALZHEIMER'S DEMENTIA WITHOUT BEHAVIORAL DISTURBANCE, UNSPECIFIED TIMING OF DEMENTIA ONSET: ICD-10-CM

## 2020-01-07 DIAGNOSIS — R00.1 BRADYCARDIA: Primary | ICD-10-CM

## 2020-01-07 DIAGNOSIS — E04.1 THYROID CYST: ICD-10-CM

## 2020-01-07 DIAGNOSIS — R00.1 BRADYCARDIA: ICD-10-CM

## 2020-01-07 DIAGNOSIS — R91.1 PULMONARY NODULE: ICD-10-CM

## 2020-01-07 DIAGNOSIS — E11.69 TYPE 2 DIABETES MELLITUS WITH OTHER SPECIFIED COMPLICATION, UNSPECIFIED WHETHER LONG TERM INSULIN USE (HCC): ICD-10-CM

## 2020-01-07 DIAGNOSIS — I25.10 CORONARY ARTERY DISEASE WITHOUT ANGINA PECTORIS, UNSPECIFIED VESSEL OR LESION TYPE, UNSPECIFIED WHETHER NATIVE OR TRANSPLANTED HEART: ICD-10-CM

## 2020-01-07 DIAGNOSIS — G30.9 ALZHEIMER'S DEMENTIA WITHOUT BEHAVIORAL DISTURBANCE, UNSPECIFIED TIMING OF DEMENTIA ONSET: ICD-10-CM

## 2020-01-07 LAB
ALBUMIN SERPL-MCNC: 4 G/DL (ref 3.5–5)
ALBUMIN/GLOB SERPL: 1.4 {RATIO} (ref 1.1–2.2)
ALP SERPL-CCNC: 87 U/L (ref 45–117)
ALT SERPL-CCNC: 26 U/L (ref 12–78)
ANION GAP SERPL CALC-SCNC: 6 MMOL/L (ref 5–15)
AST SERPL-CCNC: 18 U/L (ref 15–37)
BASOPHILS # BLD: 0.1 K/UL (ref 0–0.1)
BASOPHILS NFR BLD: 1 % (ref 0–1)
BILIRUB SERPL-MCNC: 0.3 MG/DL (ref 0.2–1)
BUN SERPL-MCNC: 33 MG/DL (ref 6–20)
BUN/CREAT SERPL: 15 (ref 12–20)
CALCIUM SERPL-MCNC: 9.9 MG/DL (ref 8.5–10.1)
CHLORIDE SERPL-SCNC: 108 MMOL/L (ref 97–108)
CO2 SERPL-SCNC: 28 MMOL/L (ref 21–32)
CREAT SERPL-MCNC: 2.25 MG/DL (ref 0.7–1.3)
DIFFERENTIAL METHOD BLD: ABNORMAL
EOSINOPHIL # BLD: 0.9 K/UL (ref 0–0.4)
EOSINOPHIL NFR BLD: 9 % (ref 0–7)
ERYTHROCYTE [DISTWIDTH] IN BLOOD BY AUTOMATED COUNT: 13.5 % (ref 11.5–14.5)
GLOBULIN SER CALC-MCNC: 2.9 G/DL (ref 2–4)
GLUCOSE SERPL-MCNC: 87 MG/DL (ref 65–100)
HCT VFR BLD AUTO: 37.3 % (ref 36.6–50.3)
HGB BLD-MCNC: 11.6 G/DL (ref 12.1–17)
IMM GRANULOCYTES # BLD AUTO: 0.1 K/UL (ref 0–0.04)
IMM GRANULOCYTES NFR BLD AUTO: 1 % (ref 0–0.5)
LYMPHOCYTES # BLD: 2.3 K/UL (ref 0.8–3.5)
LYMPHOCYTES NFR BLD: 22 % (ref 12–49)
MCH RBC QN AUTO: 29.1 PG (ref 26–34)
MCHC RBC AUTO-ENTMCNC: 31.1 G/DL (ref 30–36.5)
MCV RBC AUTO: 93.5 FL (ref 80–99)
MONOCYTES # BLD: 0.8 K/UL (ref 0–1)
MONOCYTES NFR BLD: 8 % (ref 5–13)
NEUTS SEG # BLD: 6.1 K/UL (ref 1.8–8)
NEUTS SEG NFR BLD: 59 % (ref 32–75)
NRBC # BLD: 0 K/UL (ref 0–0.01)
NRBC BLD-RTO: 0 PER 100 WBC
PLATELET # BLD AUTO: 141 K/UL (ref 150–400)
PMV BLD AUTO: 11.5 FL (ref 8.9–12.9)
POTASSIUM SERPL-SCNC: 4.9 MMOL/L (ref 3.5–5.1)
PROT SERPL-MCNC: 6.9 G/DL (ref 6.4–8.2)
RBC # BLD AUTO: 3.99 M/UL (ref 4.1–5.7)
SODIUM SERPL-SCNC: 142 MMOL/L (ref 136–145)
TSH SERPL DL<=0.05 MIU/L-ACNC: 4.58 UIU/ML (ref 0.36–3.74)
WBC # BLD AUTO: 10.3 K/UL (ref 4.1–11.1)

## 2020-01-07 RX ORDER — MEMANTINE HYDROCHLORIDE 10 MG/1
10 TABLET ORAL DAILY
Qty: 90 TAB | Refills: 4 | Status: SHIPPED | OUTPATIENT
Start: 2020-01-07 | End: 2020-01-08

## 2020-01-07 RX ORDER — CARVEDILOL 12.5 MG/1
12.5 TABLET ORAL 2 TIMES DAILY WITH MEALS
Qty: 60 TAB | Refills: 2 | Status: SHIPPED | OUTPATIENT
Start: 2020-01-07 | End: 2021-04-22 | Stop reason: SDUPTHER

## 2020-01-07 NOTE — TELEPHONE ENCOUNTER
----- Message from Israel Stevenson sent at 1/7/2020  3:03 PM EST -----  Regarding: Dr. Megan Cosme request for a call back from [facility] at the practice in regards to discussing the pt's medication refills/ She stated that there were some medications that she thought were going to be refilled that weren't. Best contact number is [phone].

## 2020-01-07 NOTE — PROGRESS NOTES
Identified Patient with two Patient identifiers (Name and ). Two Patient Identifiers confirmed. Reviewed record in preparation for visit and have obtained necessary documentation. Chief Complaint   Patient presents with    Dementia     follow up and medication follow up    Thyroid Problem     follow up on thyroid issues and follow up on ultrasound       Visit Vitals  /60 (BP 1 Location: Left arm, BP Patient Position: Sitting)   Pulse (!) 51   Temp 97.6 °F (36.4 °C) (Oral)   Resp 18   Ht 5' 3\" (1.6 m)   Wt 164 lb (74.4 kg)   SpO2 94%   BMI 29.05 kg/m²       1. Have you been to the ER, urgent care clinic since your last visit? Hospitalized since your last visit? No    2. Have you seen or consulted any other health care providers outside of the [address] [place] [address] since your last visit? Include any pap smears or colon screening.  No

## 2020-01-07 NOTE — PROGRESS NOTES
Brooklyn Wu is a 70 y.o. male here today to address the following issues:  Chief Complaint   Patient presents with    Dementia     follow up and medication follow up    Thyroid Problem     follow up on thyroid issues and follow up on ultrasound     5 mm noncalcified pulmonary nodule in the right lower lobe on CT Lung 11/26/19, former smoker for 50 years, quit 10 years ago. Thyroid nodule on CT, on US was found to be a cyst and reported to have suspicious nodule present. Hx of dementia and on Aricept and nemenda. Sees Dr. Hetal Dubois, Neurospych every 6 months. Noted to be bradycardic. On BB. Hx of falls and injuried his knee in the past.  Hx of 5 MIs with stents. No hx of arrhythmia. Wife reports at home, sometimes in the 45s. Patient with hx of diabetes. Takes Metformin twice a day. No A1C on file. No past medical history on file. No past surgical history on file.   Social History     Socioeconomic History    Marital status:      Spouse name: Not on file    Number of children: Not on file    Years of education: Not on file    Highest education level: Not on file   Occupational History    Not on file   Social Needs    Financial resource strain: Not on file    Food insecurity:     Worry: Not on file     Inability: Not on file    Transportation needs:     Medical: Not on file     Non-medical: Not on file   Tobacco Use    Smoking status: Former Smoker    Smokeless tobacco: Never Used   Substance and Sexual Activity    Alcohol use: Never     Frequency: Never    Drug use: Never    Sexual activity: Not Currently   Lifestyle    Physical activity:     Days per week: Not on file     Minutes per session: Not on file    Stress: Not on file   Relationships    Social connections:     Talks on phone: Not on file     Gets together: Not on file     Attends Restoration service: Not on file     Active member of club or organization: Not on file     Attends meetings of clubs or organizations: Not on file     Relationship status: Not on file    Intimate partner violence:     Fear of current or ex partner: Not on file     Emotionally abused: Not on file     Physically abused: Not on file     Forced sexual activity: Not on file   Other Topics Concern    Not on file   Social History Narrative    Not on file       Allergies   Allergen Reactions    Adempas [Riociguat] Unknown (comments)    Ativan [Lorazepam] Other (comments)     Altered LOC  Patient able to tolerate 0.5mg    Codeine Unknown (comments)    Dilaudid [Hydromorphone] Unknown (comments)    Lisinopril Cough       Current Outpatient Medications   Medication Sig    memantine (NAMENDA) 10 mg tablet Take 1 Tab by mouth daily.  carvedilol (COREG) 12.5 mg tablet Take 1 Tab by mouth two (2) times daily (with meals).  COMBIVENT RESPIMAT  mcg/actuation inhaler INHALE ONE PUFF BY MOUTH FOUR TIMES A DAY    albuterol (PROVENTIL HFA, VENTOLIN HFA, PROAIR HFA) 90 mcg/actuation inhaler Take 2 Puffs by inhalation every four (4) hours as needed for Wheezing.  clopidogrel (PLAVIX) 75 mg tab TAKE ONE TABLET BY MOUTH ONE TIME DAILY    buPROPion (WELLBUTRIN) 100 mg tablet TAKE ONE TABLET BY MOUTH TWICE A DAY    sertraline (ZOLOFT) 100 mg tablet TAKE ONE TABLET BY MOUTH ONE TIME DAILY    metFORMIN (GLUCOPHAGE) 1,000 mg tablet TAKE ONE TABLET BY MOUTH TWICE A DAY    cetirizine (ZYRTEC) 10 mg tablet Take 1 Tab by mouth daily.  tamsulosin (FLOMAX) 0.4 mg capsule Take 1 Cap by mouth daily.  lidocaine (LIDODERM) 5 % Apply patch to the affected area for 12 hours a day and remove for 12 hours a day.  calcium-cholecalciferol, d3, (CALCIUM 600 + D) 600-125 mg-unit tab Take 600 mg by mouth daily.  potassium chloride (K-DUR, KLOR-CON) 20 mEq tablet Take 1 Tab by mouth daily.  LORazepam (ATIVAN) 0.5 mg tablet Take 0.5 mg by mouth as needed for Anxiety.  furosemide (LASIX) 20 mg tablet Take  by mouth daily.     amLODIPine (NORVASC) 5 mg tablet Take 5 mg by mouth daily.  varicella-zoster recombinant, PF, (SHINGRIX, PF,) 50 mcg/0.5 mL susr injection 0.5mL by IntraMUSCular route once now and then repeat in 2-6 months    EZFE 200 200 mg iron cap TAKE ONE CAPSULE BY MOUTH ONE TIME DAILY    fluticasone propionate (FLONASE) 50 mcg/actuation nasal spray 2 Sprays by Both Nostrils route daily.  alendronate (FOSAMAX) 70 mg tablet TAKE ONE TABLET BY MOUTH EVERY WEEK    atorvastatin (LIPITOR) 20 mg tablet TAKE ONE TABLET BY MOUTH ONE TIME DAILY    donepezil (ARICEPT) 10 mg tablet TAKE ONE TABLET BY MOUTH AT BEDTIME    isosorbide mononitrate ER (IMDUR) 30 mg tablet TAKE THREE TABLETS BY MOUTH TWICE A DAY    QUEtiapine (SEROQUEL) 25 mg tablet TAKE ONE TABLET BY MOUTH EVERY MORNING, TAKE TWO TABLETS BY MOUTH ONE TIME DAILY AT LUNCH, TAKE THREE TABLETS BY MOUTH AT BEDTIME    raNITIdine (ZANTAC) 150 mg tablet TAKE ONE TABLET BY MOUTH TWICE A DAY    aspirin delayed-release 81 mg tablet Take  by mouth daily.  cholecalciferol, vitamin D3, (VITAMIN D3) 2,000 unit tab Take  by mouth. No current facility-administered medications for this visit. Review of Systems   Constitutional: Negative for chills and fever. Respiratory: Negative for shortness of breath and wheezing. Cardiovascular: Negative for chest pain and palpitations. Gastrointestinal: Negative for abdominal pain, diarrhea, nausea and vomiting. Psychiatric/Behavioral: Negative for depression and suicidal ideas. Visit Vitals  /60 (BP 1 Location: Left arm, BP Patient Position: Sitting)   Pulse (!) 51   Temp 97.6 °F (36.4 °C) (Oral)   Resp 18   Ht 5' 3\" (1.6 m)   Wt 164 lb (74.4 kg)   SpO2 94%   BMI 29.05 kg/m²       Physical Exam  Vitals signs and nursing note reviewed. Constitutional:       General: He is not in acute distress. Appearance: He is not diaphoretic. Eyes:      General: No scleral icterus.      Conjunctiva/sclera: Conjunctivae normal.   Cardiovascular:      Rate and Rhythm: Regular rhythm. Bradycardia present. Heart sounds: Normal heart sounds. No murmur. No gallop. Pulmonary:      Effort: Pulmonary effort is normal. No respiratory distress. Breath sounds: Normal breath sounds. No wheezing. Abdominal:      General: Bowel sounds are normal. There is no distension. Palpations: Abdomen is soft. Tenderness: There is no tenderness. Lymphadenopathy:      Cervical: No cervical adenopathy. Skin:     General: Skin is warm. Neurological:      Mental Status: He is alert. Psychiatric:         Mood and Affect: Affect normal.         No results found for this or any previous visit (from the past 12 hour(s)). 1. Bradycardia  We will get labs below and decrease Coreg dose. Follow-up for heart rate and blood pressure check. - CBC WITH AUTOMATED DIFF; Future  - METABOLIC PANEL, COMPREHENSIVE; Future  - TSH 3RD GENERATION; Future    2. Alzheimer's dementia without behavioral disturbance, unspecified timing of dementia onset [facility])  Medical record release form needed from neuropsych. Namenda refilled. - memantine (NAMENDA) 10 mg tablet; Take 1 Tab by mouth daily. Dispense: 90 Tab; Refill: 4    3. Pulmonary nodule  Patient high risk given his history of smoking. Recommend repeat CT November 2020.    4. Thyroid cyst  Patient reassured cyst is normal.    5. Type 2 diabetes mellitus with other specified complication, unspecified whether long term insulin use ([facility]  We will get labs for diabetes. Needs follow-up appointment in 2 weeks for this. - AMB POC URINE, MICROALBUMIN, SEMIQUANT (3 RESULTS)  - HEMOGLOBIN A1C WITH EAG; Future    6. Coronary artery disease without angina pectoris, unspecified vessel or lesion type, unspecified whether native or transplanted heart  Patient with a history of multiple heart attacks. Will decrease Coreg. Try to keep that most tolerable dose.   - carvedilol (COREG) 12.5 mg tablet; Take 1 Tab by mouth two (2) times daily (with meals). Dispense: 60 Tab; Refill: 2    Patient encounter was at least 45 minutes with more than 50 percent of the visit involved in counseling regarding comorbidities above. Including risk for fall and hip fracture given his low heart rate and on a beta-blocker. For COPD, will return for spirometry. Patient states he feels at baseline from his last exacerbation. No issues at this time. We will get labs for diabetes. Patient reassured and care once visits. Return for this. Follow-up and Dispositions    · Return for 2 weeks for Diabetes, 1 month for COPD(Spirometry), 5 months for Medicare Wellness Visit.          Doreen Raya MD, CAQSM, RMSK

## 2020-01-08 ENCOUNTER — TELEPHONE (OUTPATIENT)
Dept: FAMILY MEDICINE CLINIC | Age: 80
End: 2020-01-08

## 2020-01-08 DIAGNOSIS — F02.80 ALZHEIMER'S DEMENTIA WITHOUT BEHAVIORAL DISTURBANCE, UNSPECIFIED TIMING OF DEMENTIA ONSET: ICD-10-CM

## 2020-01-08 DIAGNOSIS — G30.9 ALZHEIMER'S DEMENTIA WITHOUT BEHAVIORAL DISTURBANCE, UNSPECIFIED TIMING OF DEMENTIA ONSET: ICD-10-CM

## 2020-01-08 DIAGNOSIS — R79.89 ELEVATED SERUM CREATININE: Primary | ICD-10-CM

## 2020-01-08 LAB
EST. AVERAGE GLUCOSE BLD GHB EST-MCNC: 111 MG/DL
HBA1C MFR BLD: 5.5 % (ref 4–5.6)

## 2020-01-08 RX ORDER — METFORMIN HYDROCHLORIDE 1000 MG/1
TABLET ORAL
Qty: 60 TAB | Refills: 0 | Status: SHIPPED | OUTPATIENT
Start: 2020-01-08 | End: 2021-01-08

## 2020-01-08 RX ORDER — MEMANTINE HYDROCHLORIDE 10 MG/1
10 TABLET ORAL 2 TIMES DAILY
Qty: 180 TAB | Refills: 3 | Status: SHIPPED | OUTPATIENT
Start: 2020-01-08 | End: 2021-03-26

## 2020-01-08 RX ORDER — BUPROPION HYDROCHLORIDE 100 MG/1
TABLET ORAL
Qty: 60 TAB | Refills: 0 | Status: SHIPPED | OUTPATIENT
Start: 2020-01-08 | End: 2020-02-05 | Stop reason: SDUPTHER

## 2020-01-08 NOTE — TELEPHONE ENCOUNTER
Message left on patient's VM to return call to discuss. 2 medications were confirmed to be sent to the [facility] pharmacy yesterday.

## 2020-01-08 NOTE — TELEPHONE ENCOUNTER
Received refill request for metformin and wellbutrin  Pt scheduled for preventative visit with Dr. Heather Longo 1/21  Will provide 30 day refill until then  No further refills without being seen    Pt does have several other appts with Dr. Coleen Levine for acute care visits

## 2020-01-08 NOTE — TELEPHONE ENCOUNTER
Call patient to inform them of elevated creatinine. Patient is doing well and wife states that he is doing great. BMP ordered. Informed to come in as soon as possible to check this. Stay well-hydrated. Avoid NSAIDs. If any concerns, swelling, or deterioration, informed to call 911.

## 2020-01-09 ENCOUNTER — HOSPITAL ENCOUNTER (OUTPATIENT)
Dept: LAB | Age: 80
Discharge: HOME | End: 2020-01-09

## 2020-01-09 DIAGNOSIS — R79.89 ELEVATED SERUM CREATININE: ICD-10-CM

## 2020-01-09 LAB
ANION GAP SERPL CALC-SCNC: 5 MMOL/L (ref 5–15)
BUN SERPL-MCNC: 36 MG/DL (ref 6–20)
BUN/CREAT SERPL: 16 (ref 12–20)
CALCIUM SERPL-MCNC: 9.7 MG/DL (ref 8.5–10.1)
CHLORIDE SERPL-SCNC: 105 MMOL/L (ref 97–108)
CO2 SERPL-SCNC: 30 MMOL/L (ref 21–32)
CREAT SERPL-MCNC: 2.23 MG/DL (ref 0.7–1.3)
GLUCOSE SERPL-MCNC: 95 MG/DL (ref 65–100)
POTASSIUM SERPL-SCNC: 4.4 MMOL/L (ref 3.5–5.1)
SODIUM SERPL-SCNC: 140 MMOL/L (ref 136–145)

## 2020-01-15 ENCOUNTER — OFFICE VISIT (OUTPATIENT)
Dept: FAMILY MEDICINE CLINIC | Age: 80
End: 2020-01-15

## 2020-01-15 VITALS
SYSTOLIC BLOOD PRESSURE: 95 MMHG | HEART RATE: 57 BPM | HEIGHT: 63 IN | WEIGHT: 161 LBS | BODY MASS INDEX: 28.53 KG/M2 | RESPIRATION RATE: 20 BRPM | OXYGEN SATURATION: 94 % | TEMPERATURE: 97.5 F | DIASTOLIC BLOOD PRESSURE: 51 MMHG

## 2020-01-15 DIAGNOSIS — E11.21 TYPE 2 DIABETES WITH NEPHROPATHY (HCC): ICD-10-CM

## 2020-01-15 DIAGNOSIS — J44.9 CHRONIC OBSTRUCTIVE PULMONARY DISEASE, UNSPECIFIED COPD TYPE (HCC): ICD-10-CM

## 2020-01-15 DIAGNOSIS — N18.4 CKD (CHRONIC KIDNEY DISEASE) STAGE 4, GFR 15-29 ML/MIN (HCC): Primary | ICD-10-CM

## 2020-01-15 DIAGNOSIS — F32.A DEPRESSION, UNSPECIFIED DEPRESSION TYPE: ICD-10-CM

## 2020-01-15 DIAGNOSIS — E11.69 TYPE 2 DIABETES MELLITUS WITH OTHER SPECIFIED COMPLICATION, UNSPECIFIED WHETHER LONG TERM INSULIN USE (HCC): ICD-10-CM

## 2020-01-15 RX ORDER — SERTRALINE HYDROCHLORIDE 100 MG/1
TABLET, FILM COATED ORAL
Qty: 30 TAB | Refills: 0 | Status: SHIPPED | OUTPATIENT
Start: 2020-01-15 | End: 2020-02-04

## 2020-01-15 RX ORDER — METFORMIN HYDROCHLORIDE 500 MG/1
500 TABLET, EXTENDED RELEASE ORAL
Qty: 30 TAB | Refills: 0 | Status: SHIPPED | OUTPATIENT
Start: 2020-01-15 | End: 2021-01-08

## 2020-01-15 RX ORDER — METFORMIN HYDROCHLORIDE 1000 MG/1
TABLET ORAL
Qty: 60 TAB | Refills: 0 | Status: CANCELLED | OUTPATIENT
Start: 2020-01-15

## 2020-01-15 RX ORDER — TAMSULOSIN HYDROCHLORIDE 0.4 MG/1
0.4 CAPSULE ORAL DAILY
Qty: 90 CAP | Refills: 2 | Status: CANCELLED | OUTPATIENT
Start: 2020-01-15

## 2020-01-15 RX ORDER — BUMETANIDE 1 MG/1
TABLET ORAL DAILY
COMMUNITY
End: 2020-10-13 | Stop reason: SDUPTHER

## 2020-01-15 RX ORDER — FLUTICASONE PROPIONATE AND SALMETEROL 55; 14 UG/1; UG/1
1 POWDER, METERED RESPIRATORY (INHALATION) 2 TIMES DAILY
Qty: 1 EACH | Refills: 5 | Status: SHIPPED | OUTPATIENT
Start: 2020-01-15 | End: 2021-04-12

## 2020-01-15 RX ORDER — BUPROPION HYDROCHLORIDE 100 MG/1
TABLET ORAL
Qty: 60 TAB | Refills: 0 | Status: CANCELLED | OUTPATIENT
Start: 2020-01-15

## 2020-01-15 NOTE — PROGRESS NOTES
Identified Patient with two Patient identifiers (Name and ). Two Patient Identifiers confirmed. Reviewed record in preparation for visit and have obtained necessary documentation. Chief Complaint   Patient presents with    Hip Pain    LOW BACK PAIN       Visit Vitals  BP 95/51 (BP 1 Location: Left arm, BP Patient Position: Sitting)   Pulse (!) 57   Temp 97.5 °F (36.4 °C) (Oral)   Resp 20   Ht 5' 3\" (1.6 m)   Wt 161 lb (73 kg)   SpO2 94%   BMI 28.52 kg/m²       1. Have you been to the ER, urgent care clinic since your last visit? Hospitalized since your last visit? No    2. Have you seen or consulted any other health care providers outside of the [address] [place] [address] since your last visit? Include any pap smears or colon screening.  No

## 2020-01-15 NOTE — PROGRESS NOTES
Ralph Gayle is a 70 y.o. male here today to address the following issues:  Chief Complaint   Patient presents with    Chronic Kidney Disease    Slow Heart Rate    Depression     DM:  A1C is good. On Metformin 1000mg bid. Prior labs reviewed. CKD:  Does not take NSAIDS. Wife has CKD3 and has nephrologist.  Prior labs reviewed. Bradycardia:  Decreased Coreg in half last week. HR now better in upper 50s. Depression: Has been on same dose for 10 years. Stable. Denies depression and thoughts of hurting self or others. COPD:  Uses rescue inhaler 2-4 times a day. Not on controller. Wife states she gives him this when he wheezes. Currently not wheezing. Has Spirometry scheduled for next month. No past medical history on file. No past surgical history on file.   Social History     Socioeconomic History    Marital status:      Spouse name: Not on file    Number of children: Not on file    Years of education: Not on file    Highest education level: Not on file   Occupational History    Not on file   Social Needs    Financial resource strain: Not on file    Food insecurity:     Worry: Not on file     Inability: Not on file    Transportation needs:     Medical: Not on file     Non-medical: Not on file   Tobacco Use    Smoking status: Former Smoker     Packs/day: 2.00     Years: 30.00     Pack years: 60.00    Smokeless tobacco: Never Used   Substance and Sexual Activity    Alcohol use: Never     Frequency: Never    Drug use: Never    Sexual activity: Not Currently   Lifestyle    Physical activity:     Days per week: Not on file     Minutes per session: Not on file    Stress: Not on file   Relationships    Social connections:     Talks on phone: Not on file     Gets together: Not on file     Attends Oriental orthodox service: Not on file     Active member of club or organization: Not on file     Attends meetings of clubs or organizations: Not on file     Relationship status: Not on file    Intimate partner violence:     Fear of current or ex partner: Not on file     Emotionally abused: Not on file     Physically abused: Not on file     Forced sexual activity: Not on file   Other Topics Concern    Not on file   Social History Narrative    Not on file       Allergies   Allergen Reactions    Adempas [Riociguat] Unknown (comments)    Ativan [Lorazepam] Other (comments)     Altered LOC  Patient able to tolerate 0.5mg    Codeine Unknown (comments)    Dilaudid [Hydromorphone] Unknown (comments)    Lisinopril Cough       Current Outpatient Medications   Medication Sig    bumetanide (BUMEX) 1 mg tablet Take  by mouth daily.  sertraline (ZOLOFT) 100 mg tablet TAKE ONE TABLET BY MOUTH ONE TIME DAILY    metFORMIN ER (GLUCOPHAGE XR) 500 mg tablet Take 1 Tab by mouth daily (with dinner).  fluticasone propion-salmeterol (AIRDUO RESPICLICK) 26-09 mcg/actuation aepb Take 1 Inhalation by inhalation two (2) times a day.  metFORMIN (GLUCOPHAGE) 1,000 mg tablet TAKE ONE TABLET BY MOUTH TWICE A DAY    buPROPion (WELLBUTRIN) 100 mg tablet TAKE ONE TABLET BY MOUTH TWICE A DAY    memantine (NAMENDA) 10 mg tablet Take 1 Tab by mouth two (2) times a day.  carvedilol (COREG) 12.5 mg tablet Take 1 Tab by mouth two (2) times daily (with meals).  COMBIVENT RESPIMAT  mcg/actuation inhaler INHALE ONE PUFF BY MOUTH FOUR TIMES A DAY    albuterol (PROVENTIL HFA, VENTOLIN HFA, PROAIR HFA) 90 mcg/actuation inhaler Take 2 Puffs by inhalation every four (4) hours as needed for Wheezing.  clopidogrel (PLAVIX) 75 mg tab TAKE ONE TABLET BY MOUTH ONE TIME DAILY    cetirizine (ZYRTEC) 10 mg tablet Take 1 Tab by mouth daily.  tamsulosin (FLOMAX) 0.4 mg capsule Take 1 Cap by mouth daily.  lidocaine (LIDODERM) 5 % Apply patch to the affected area for 12 hours a day and remove for 12 hours a day.     calcium-cholecalciferol, d3, (CALCIUM 600 + D) 600-125 mg-unit tab Take 600 mg by mouth daily.    potassium chloride (K-DUR, KLOR-CON) 20 mEq tablet Take 1 Tab by mouth daily.  LORazepam (ATIVAN) 0.5 mg tablet Take 0.5 mg by mouth as needed for Anxiety.  furosemide (LASIX) 20 mg tablet Take  by mouth daily.  amLODIPine (NORVASC) 5 mg tablet Take 5 mg by mouth daily.  varicella-zoster recombinant, PF, (SHINGRIX, PF,) 50 mcg/0.5 mL susr injection 0.5mL by IntraMUSCular route once now and then repeat in 2-6 months    EZFE 200 200 mg iron cap TAKE ONE CAPSULE BY MOUTH ONE TIME DAILY    fluticasone propionate (FLONASE) 50 mcg/actuation nasal spray 2 Sprays by Both Nostrils route daily.  alendronate (FOSAMAX) 70 mg tablet TAKE ONE TABLET BY MOUTH EVERY WEEK    atorvastatin (LIPITOR) 20 mg tablet TAKE ONE TABLET BY MOUTH ONE TIME DAILY    donepezil (ARICEPT) 10 mg tablet TAKE ONE TABLET BY MOUTH AT BEDTIME    isosorbide mononitrate ER (IMDUR) 30 mg tablet TAKE THREE TABLETS BY MOUTH TWICE A DAY    QUEtiapine (SEROQUEL) 25 mg tablet TAKE ONE TABLET BY MOUTH EVERY MORNING, TAKE TWO TABLETS BY MOUTH ONE TIME DAILY AT LUNCH, TAKE THREE TABLETS BY MOUTH AT BEDTIME    raNITIdine (ZANTAC) 150 mg tablet TAKE ONE TABLET BY MOUTH TWICE A DAY    aspirin delayed-release 81 mg tablet Take  by mouth daily.  cholecalciferol, vitamin D3, (VITAMIN D3) 2,000 unit tab Take  by mouth. No current facility-administered medications for this visit. Review of Systems   Constitutional: Negative for chills and fever. HENT: Negative for congestion and ear pain. Respiratory: Negative for shortness of breath and wheezing. Cardiovascular: Negative for chest pain and palpitations. Gastrointestinal: Negative for abdominal pain, diarrhea, nausea and vomiting. Psychiatric/Behavioral: Negative for depression and suicidal ideas.        Visit Vitals  BP 95/51 (BP 1 Location: Left arm, BP Patient Position: Sitting)   Pulse (!) 57   Temp 97.5 °F (36.4 °C) (Oral)   Resp 20   Ht 5' 3\" (1.6 m)   Wt 161 lb (73 kg)   SpO2 94%   BMI 28.52 kg/m²       Physical Exam  Vitals signs and nursing note reviewed. Constitutional:       General: He is not in acute distress. Appearance: He is not diaphoretic. HENT:      Head: Normocephalic and atraumatic. Right Ear: External ear normal.      Left Ear: External ear normal.      Nose: Nose normal.      Mouth/Throat:      Pharynx: No oropharyngeal exudate. Eyes:      General:         Right eye: No discharge. Left eye: No discharge. Conjunctiva/sclera: Conjunctivae normal.   Cardiovascular:      Rate and Rhythm: Regular rhythm. Bradycardia present. Heart sounds: Normal heart sounds. No murmur. No friction rub. No gallop. Pulmonary:      Effort: Pulmonary effort is normal. No respiratory distress. Breath sounds: Normal breath sounds. No wheezing or rales. Abdominal:      Palpations: Abdomen is soft. Lymphadenopathy:      Cervical: No cervical adenopathy. Skin:     General: Skin is warm and dry. Neurological:      Mental Status: He is alert. Psychiatric:         Mood and Affect: Affect normal.         No results found for this or any previous visit (from the past 12 hour(s)). 1. CKD (chronic kidney disease) stage 4, GFR 15-29 ml/min ([facility])  - US RETROPERITONEUM COMP; Future  - REFERRAL TO NEPHROLOGY    2. Type 2 diabetes mellitus with other specified complication, unspecified whether long term insulin use ([facility])  - metFORMIN ER (GLUCOPHAGE XR) 500 mg tablet; Take 1 Tab by mouth daily (with dinner). Dispense: 30 Tab; Refill: 0    3. Depression, unspecified depression type  - sertraline (ZOLOFT) 100 mg tablet; TAKE ONE TABLET BY MOUTH ONE TIME DAILY  Dispense: 30 Tab; Refill: 0    4. Type 2 diabetes with nephropathy ([facility] [address]    5. Chronic obstructive pulmonary disease, unspecified COPD type ([facility])  - fluticasone propion-salmeterol (AIRDUO RESPICLICK) 64-78 mcg/actuation aepb; Take 1 Inhalation by inhalation two (2) times a day.   Dispense: 1 Each; Refill: 5      Patient encounter was at least 45 minutes with more than 50 percent of the visit involved in counseling regarding new diagnosis of CKD, DM medication changes, COPD control, bradycardia and depression. Also arranged appropriate follow ups. Spirometry set for next month. Bradycardia improved. Recheck HR next week, and hope he gets above 60. Patient asymptomatic today. Decrease Metformin dose, follow up with Nephrology, get renal US. Start Airduo. Follow-up and Dispositions    · Return in about 1 week (around 1/22/2020) for 1 week  HR check and 2 weeks for hip and knee pain  .            Jared Lyles MD, CAQSM, RMSK

## 2020-01-16 ENCOUNTER — TELEPHONE (OUTPATIENT)
Dept: FAMILY MEDICINE CLINIC | Age: 80
End: 2020-01-16

## 2020-01-16 NOTE — TELEPHONE ENCOUNTER
Returned call to patients spouse, but she was unavailable. ... I left my name & number for her to call me when she is available. ...

## 2020-01-16 NOTE — TELEPHONE ENCOUNTER
----- Message from Stacy Vargas sent at 1/16/2020 12:14 PM EST -----  Regarding: Reina/Telephone  Patient return call    Caller's first and last name and relationship (if not the patient):      Best contact number(s):[phone]      Whose call is being returned: Morenita Islas      Details to clarify the request: Patient is returning a call today       Stacy Vargas

## 2020-01-17 ENCOUNTER — HOSPITAL ENCOUNTER (OUTPATIENT)
Dept: ULTRASOUND IMAGING | Age: 80
Discharge: HOME | End: 2020-01-17
Attending: FAMILY MEDICINE
Payer: MEDICARE

## 2020-01-17 DIAGNOSIS — N18.4 CKD (CHRONIC KIDNEY DISEASE) STAGE 4, GFR 15-29 ML/MIN (HCC): ICD-10-CM

## 2020-01-17 PROCEDURE — 76770 US EXAM ABDO BACK WALL COMP: CPT

## 2020-01-20 ENCOUNTER — TELEPHONE (OUTPATIENT)
Dept: FAMILY MEDICINE CLINIC | Age: 80
End: 2020-01-20

## 2020-01-20 RX ORDER — QUETIAPINE FUMARATE 25 MG/1
TABLET, FILM COATED ORAL
Refills: 6 | OUTPATIENT
Start: 2020-01-20

## 2020-01-20 NOTE — TELEPHONE ENCOUNTER
----- Message from [employer] sent at 1/17/2020  4:28 PM EST -----  Regarding: Dr. Vladislav Quintero telephone  Caller's first and last name: Miranda Thomas, Spouse    Reason for call: Kidney doctor    Callback required yes/no and why: yes    Best contact number(s): [phone] or [phone]    Details to clarify the request: Pt spouse is following up on 1/15/2020 appointment. Pt' spouse is checking  to see if medical records were sent to  Kidney specialist Dr. Thomas Ignacio.

## 2020-01-20 NOTE — TELEPHONE ENCOUNTER
----- Message from Mark Steve sent at 1/20/2020 12:19 PM EST -----  Regarding: Antonette/Telephone  General Message/Vendor Calls    Caller's first and last name: Elvin Maddox      Reason for call:fax number  [phone]      Callback required yes/no and why:yes      Best contact number(s):[phone]      Details to clarify the request: This is the fax number to Dr. Malik Amaral

## 2020-01-20 NOTE — TELEPHONE ENCOUNTER
I do not manage this patient's psych issues and have never prescribed this medication for him.   He should request this from his psychiatrist

## 2020-01-20 NOTE — TELEPHONE ENCOUNTER
Patient requesting med refill for:  QUEtiapine (SEROQUEL) 25 mg tablet    He is almost of of meds and the refills have  and patient takes 6 pills per day

## 2020-01-21 ENCOUNTER — OFFICE VISIT (OUTPATIENT)
Dept: FAMILY MEDICINE CLINIC | Age: 80
End: 2020-01-21

## 2020-01-21 VITALS
RESPIRATION RATE: 18 BRPM | WEIGHT: 161 LBS | SYSTOLIC BLOOD PRESSURE: 124 MMHG | OXYGEN SATURATION: 96 % | HEIGHT: 63 IN | TEMPERATURE: 97.5 F | DIASTOLIC BLOOD PRESSURE: 65 MMHG | BODY MASS INDEX: 28.53 KG/M2 | HEART RATE: 61 BPM

## 2020-01-21 DIAGNOSIS — N18.4 CKD (CHRONIC KIDNEY DISEASE) STAGE 4, GFR 15-29 ML/MIN (HCC): ICD-10-CM

## 2020-01-21 DIAGNOSIS — E11.69 TYPE 2 DIABETES MELLITUS WITH OTHER SPECIFIED COMPLICATION, UNSPECIFIED WHETHER LONG TERM INSULIN USE (HCC): Primary | ICD-10-CM

## 2020-01-21 NOTE — PROGRESS NOTES
Jenn Lal is a 70 y.o. male who presents today for diabetes management. Pt recently seen by Dr. Ashley Miner and A1c 5.6. Pt was on Metformin 2000mg daily and decreased to 500mg daily. Pt with worsening renal function and now with GFR of 28. Pt to see Dr. Robby Johnson (Nephrology) tomorrow. Pt exercise limited by by chronic back and leg pain. 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  sertraline (ZOLOFT) 100 mg tablet TAKE ONE TABLET BY MOUTH ONE TIME DAILY 30 Tab 0    metFORMIN ER (GLUCOPHAGE XR) 500 mg tablet Take 1 Tab by mouth daily (with dinner). 30 Tab 0    fluticasone propion-salmeterol (AIRDUO RESPICLICK) 41-16 mcg/actuation aepb Take 1 Inhalation by inhalation two (2) times a day. 1 Each 5    metFORMIN (GLUCOPHAGE) 1,000 mg tablet TAKE ONE TABLET BY MOUTH TWICE A DAY 60 Tab 0    buPROPion (WELLBUTRIN) 100 mg tablet TAKE ONE TABLET BY MOUTH TWICE A DAY 60 Tab 0    memantine (NAMENDA) 10 mg tablet Take 1 Tab by mouth two (2) times a day. 180 Tab 3    carvedilol (COREG) 12.5 mg tablet Take 1 Tab by mouth two (2) times daily (with meals).  60 Tab 2    COMBIVENT RESPIMAT  mcg/actuation inhaler INHALE ONE PUFF BY MOUTH FOUR TIMES A DAY 4 g 3    albuterol (PROVENTIL HFA, VENTOLIN HFA, PROAIR HFA) 90 mcg/actuation inhaler Take 2 Puffs by inhalation every four (4) hours as needed for Wheezing. 1 Inhaler 4    clopidogrel (PLAVIX) 75 mg tab TAKE ONE TABLET BY MOUTH ONE TIME DAILY 30 Tab 3    cetirizine (ZYRTEC) 10 mg tablet Take 1 Tab by mouth daily. 90 Tab 2    tamsulosin (FLOMAX) 0.4 mg capsule Take 1 Cap by mouth daily. 90 Cap 2    lidocaine (LIDODERM) 5 % Apply patch to the affected area for 12 hours a day and remove for 12 hours a day. 30 Each 0    calcium-cholecalciferol, d3, (CALCIUM 600 + D) 600-125 mg-unit tab Take 600 mg by mouth daily. 90 Tab 3    potassium chloride (K-DUR, KLOR-CON) 20 mEq tablet Take 1 Tab by mouth daily. 90 Tab 3    LORazepam (ATIVAN) 0.5 mg tablet Take 0.5 mg by mouth as needed for Anxiety.  furosemide (LASIX) 20 mg tablet Take  by mouth daily.  amLODIPine (NORVASC) 5 mg tablet Take 5 mg by mouth daily.  varicella-zoster recombinant, PF, (SHINGRIX, PF,) 50 mcg/0.5 mL susr injection 0.5mL by IntraMUSCular route once now and then repeat in 2-6 months 0.5 mL 1    EZFE 200 200 mg iron cap TAKE ONE CAPSULE BY MOUTH ONE TIME DAILY 90 Cap 1    fluticasone propionate (FLONASE) 50 mcg/actuation nasal spray 2 Sprays by Both Nostrils route daily. 1 Bottle 1    atorvastatin (LIPITOR) 20 mg tablet TAKE ONE TABLET BY MOUTH ONE TIME DAILY  1    donepezil (ARICEPT) 10 mg tablet TAKE ONE TABLET BY MOUTH AT BEDTIME  6    isosorbide mononitrate ER (IMDUR) 30 mg tablet TAKE THREE TABLETS BY MOUTH TWICE A DAY  2    QUEtiapine (SEROQUEL) 25 mg tablet TAKE ONE TABLET BY MOUTH EVERY MORNING, TAKE TWO TABLETS BY MOUTH ONE TIME DAILY AT LUNCH, TAKE THREE TABLETS BY MOUTH AT BEDTIME  6    raNITIdine (ZANTAC) 150 mg tablet TAKE ONE TABLET BY MOUTH TWICE A DAY  1    aspirin delayed-release 81 mg tablet Take  by mouth daily.       cholecalciferol, vitamin D3, (VITAMIN D3) 2,000 unit tab Take  by mouth. Allergies: Allergies   Allergen Reactions    Adempas [Riociguat] Unknown (comments)    Ativan [Lorazepam] Other (comments)     Altered LOC  Patient able to tolerate 0.5mg    Codeine Unknown (comments)    Dilaudid [Hydromorphone] Unknown (comments)    Lisinopril Cough       LMP:  No LMP for male patient. Social History     Socioeconomic History    Marital status:      Spouse name: Not on file    Number of children: Not on file    Years of education: Not on file    Highest education level: Not on file   Occupational History    Not on file   Social Needs    Financial resource strain: Not on file    Food insecurity:     Worry: Not on file     Inability: Not on file    Transportation needs:     Medical: Not on file     Non-medical: Not on file   Tobacco Use    Smoking status: Former Smoker     Packs/day: 2.00     Years: 30.00     Pack years: 60.00    Smokeless tobacco: Never Used   Substance and Sexual Activity    Alcohol use: Never     Frequency: Never    Drug use: Never    Sexual activity: Not Currently   Lifestyle    Physical activity:     Days per week: Not on file     Minutes per session: Not on file    Stress: Not on file   Relationships    Social connections:     Talks on phone: Not on file     Gets together: Not on file     Attends Episcopalian service: Not on file     Active member of club or organization: Not on file     Attends meetings of clubs or organizations: Not on file     Relationship status: Not on file    Intimate partner violence:     Fear of current or ex partner: Not on file     Emotionally abused: Not on file     Physically abused: Not on file     Forced sexual activity: Not on file   Other Topics Concern    Not on file   Social History Narrative    Not on file       No family history on file.         ROS:  Fever: no  Weight loss: no  Headaches:  no  Chest Pain:  no  SOB:  no  Cough:  no  Other significant ROS: Physical Exam  Visit Vitals  /65 (BP 1 Location: Left arm, BP Patient Position: Sitting)   Pulse 61   Temp 97.5 °F (36.4 °C) (Oral)   Resp 18   Ht 5' 3\" (1.6 m)   Wt 161 lb (73 kg)   SpO2 96%   BMI 28.52 kg/m²     Constitutional: Appears well,  No acute distress, Vitals noted  Neck:   General inspection and Thyroid normal.  No abnormal cervical or supraclavicular nodes. Lungs:   Clear to auscultation, good respiratory effort, no wheezes, rales or rhonchi  Heart:   Normal HR, Normal S1 and S2,  Regular rhythm. No cardiac murmurs. No carotid bruits. Chest wall normal  Extremities:   without edema, good peripheral pulses  Skin:  Warm to palpation, without rashes, bruising, or suspicious lesions       BP Readings from Last 3 Encounters:   01/21/20 124/65   01/15/20 95/51   01/07/20 112/60       Assessment/Plan:      ICD-10-CM ICD-9-CM    1. Type 2 diabetes mellitus with other specified complication, unspecified whether long term insulin use ([facility]) E11.69 250.80    2. CKD (chronic kidney disease) stage 4, GFR 15-29 ml/min ([facility]) N18.4 585.4      1. Type 2 diabetes mellitus with other specified complication, unspecified whether long term insulin use ([facility] [address]  - pt is to discuss metformin with nephrology, but will stop at this point given current A1c   - follow up in 3 months off metformin for repeat A1c      2. CKD (chronic kidney disease) stage 4, GFR 15-29 ml/min ([facility])  - stop metformin and fosamax   - close follow up with nephrology      No orders of the defined types were placed in this encounter.         Raymond Loza MD  [facility]

## 2020-01-22 NOTE — TELEPHONE ENCOUNTER
This patient needs to be called by the nurse and discussed with patient as further questions will be asked that I can't talk to the patient about. .    thanks

## 2020-01-23 DIAGNOSIS — N20.0 KIDNEY STONES: Primary | ICD-10-CM

## 2020-01-23 NOTE — TELEPHONE ENCOUNTER
Called patient (and his wife) back to discuss medications. We have never rxed this patient's Seroquel. This is a antipsychotic medication and patient is not followed by us for psych care. Patient needs an appointment with his psych or doctor who used to rx this medication. If they are looking for a new doctor to rx this they need a 30 min appointment to discuss. If they call back please schedule an appointment with PCP regarding this medication.

## 2020-02-05 ENCOUNTER — DOCUMENTATION ONLY (OUTPATIENT)
Dept: FAMILY MEDICINE CLINIC | Age: 80
End: 2020-02-05

## 2020-02-05 DIAGNOSIS — F32.A DEPRESSION, UNSPECIFIED DEPRESSION TYPE: Primary | ICD-10-CM

## 2020-02-05 RX ORDER — BUPROPION HYDROCHLORIDE 100 MG/1
TABLET ORAL
Qty: 60 TAB | Refills: 0 | Status: CANCELLED | OUTPATIENT
Start: 2020-02-05

## 2020-02-05 RX ORDER — BUPROPION HYDROCHLORIDE 100 MG/1
TABLET ORAL
Qty: 180 TAB | Refills: 0 | OUTPATIENT
Start: 2020-02-05 | End: 2020-04-07 | Stop reason: SDUPTHER

## 2020-02-05 RX ORDER — METFORMIN HYDROCHLORIDE 1000 MG/1
TABLET ORAL
Qty: 60 TAB | Refills: 0 | Status: CANCELLED | OUTPATIENT
Start: 2020-02-05

## 2020-02-05 NOTE — PROGRESS NOTES
Received fax for refill request from pt's pharmacy for metformin and wellbutrin as I was the last one to refill these (provided courtesy refill until pt was able to see other provider). I have never seen patient for chronic problems, only acute, last seen June 2019. Metformin was discontinued by Dr. Ingrid Mcmanus on 1/21/2020 so denied that request    Pt's depression was discussed w patient on 1/15/2020 by Dr. Anthony Reyes and patient was reportedly doing well at that time. Zoloft was refilled at that time but not wellbutrin. Will send refill of wellbutrin.      Osvaldo Lloyd MD  5:14 PM

## 2020-02-06 RX ORDER — METFORMIN HYDROCHLORIDE 1000 MG/1
TABLET ORAL
Qty: 60 TAB | Refills: 0 | OUTPATIENT
Start: 2020-02-06

## 2020-02-06 NOTE — TELEPHONE ENCOUNTER
Metformin was discontinued per Dr. Alfredo Hollingsworth last note.  I haven't seen this pt for DMII

## 2020-02-12 ENCOUNTER — OFFICE VISIT (OUTPATIENT)
Dept: FAMILY MEDICINE CLINIC | Age: 80
End: 2020-02-12

## 2020-02-12 VITALS
BODY MASS INDEX: 29.41 KG/M2 | SYSTOLIC BLOOD PRESSURE: 124 MMHG | WEIGHT: 166 LBS | DIASTOLIC BLOOD PRESSURE: 65 MMHG | RESPIRATION RATE: 22 BRPM | HEART RATE: 61 BPM | HEIGHT: 63 IN | TEMPERATURE: 97.4 F | OXYGEN SATURATION: 95 %

## 2020-02-12 DIAGNOSIS — J44.9 STAGE 3 SEVERE COPD BY GOLD CLASSIFICATION (HCC): Primary | ICD-10-CM

## 2020-02-12 DIAGNOSIS — Z87.09 HISTORY OF COPD: ICD-10-CM

## 2020-02-12 RX ORDER — ALBUTEROL SULFATE 0.83 MG/ML
2.5 SOLUTION RESPIRATORY (INHALATION) ONCE
Qty: 1 EACH | Refills: 0
Start: 2020-02-12 | End: 2020-02-12

## 2020-02-12 NOTE — PATIENT INSTRUCTIONS
Breathing Techniques for COPD: Care Instructions  Your Care Instructions    Breathing is hard when you have chronic obstructive pulmonary disease (COPD). You may take quick, short breaths. Breathing this way makes it harder to get air into your lungs. Learning new ways to control your breathing may help. You may feel better and be able to do more. You can try three basic ways to control your breathing. They are pursed-lip breathing, diaphragmatic breathing, and breathing while bending. Use these methods when you are more short of breath than normal. Practice them often so you can do them well. Follow-up care is a key part of your treatment and safety. Be sure to make and go to all appointments, and call your doctor if you are having problems. It's also a good idea to know your test results and keep a list of the medicines you take. How can you care for yourself at home? · Pursed-lip breathing helps you breathe more air out so that your next breath can be deeper. For this type of breathing, you breathe in through your nose and out through your mouth while almost closing your lips. Breathe in for about 2 seconds, and breathe out for 4 to 6 seconds. Pursed-lip breathing decreases shortness of breath and improves your ability to exercise. · Diaphragmatic breathing helps your lungs expand so that they take in more air. ? Lie on your back, or prop yourself up on several pillows. ? Put one hand on your belly and the other on your chest. When you breathe in, push your belly out as far as possible. You should feel the hand on your belly move out, while the hand on your chest does not move. ? When you breathe out, you should feel the hand on your belly move in. When you can do this type of breathing well while lying down, learn to do it while sitting or standing. Many people with COPD find this breathing method helpful. ? Practice diaphragmatic breathing for 20 minutes, 2 or 3 times a day.   · Breathing while bending forward at the waist may make breathing easier. It can reduce shortness of breath while you exercise or rest. It helps the diaphragm move more easily. The diaphragm is a large muscle that separates your lungs from your belly. It helps draw air into your lungs as you breathe. · Do not smoke. Smoking makes COPD worse. If you need help quitting, talk to your doctor about stop-smoking programs and medicines. These can increase your chances of quitting for good. When should you call for help? Call your doctor now or seek immediate medical care if:    · Your breathing methods do not help.     · Your shortness of breath gets worse.     · You cough up blood.     · You have swelling in your belly and legs.     · You have severe chest pain.    Watch closely for changes in your health, and be sure to contact your doctor if you have any problems. Where can you learn more? Go to http://laly-malka.info/. Enter Z512 in the search box to learn more about \"Breathing Techniques for COPD: Care Instructions. \"  Current as of: June 9, 2019  Content Version: 12.2  © 4315-0042 [employer]. Care instructions adapted under license by [employer] (which disclaims liability or warranty for this information). If you have questions about a medical condition or this instruction, always ask your healthcare professional. [address] any warranty or liability for your use of this information.

## 2020-02-12 NOTE — PROGRESS NOTES
Identified Patient with two Patient identifiers (Name and ). Two Patient Identifiers confirmed. Reviewed record in preparation for visit and have obtained necessary documentation. Chief Complaint   Patient presents with    Breathing Problem     Spirometry testing       Visit Vitals  /65 (BP 1 Location: Right arm, BP Patient Position: Sitting)   Pulse 61   Temp 97.4 °F (36.3 °C) (Oral)   Resp 22   Ht 5' 3\" (1.6 m)   Wt 166 lb (75.3 kg)   SpO2 95%   BMI 29.41 kg/m²       1. Have you been to the ER, urgent care clinic since your last visit? Hospitalized since your last visit? No    2. Have you seen or consulted any other health care providers outside of the [address] [place] [address] since your last visit? Include any pap smears or colon screening.  No

## 2020-02-12 NOTE — PROGRESS NOTES
Nakul Blackwood is a 70 y.o. male here today to address the following issues:  Chief Complaint   Patient presents with    Breathing Problem     Spirometry testing     Here for concerns of shortness of breath. This has been going on for over a decade. He was using albuterol 2-4 times a day, and now stopped albuterol. Thinks she now using an inhaler twice a day, but not sure which one she is using. Was Rx Airduo and Combivent, and not sure which they are taking. Never been hospitalized for COPD and never on the ventilator. States he has never had a lung function test.      No past medical history on file. No past surgical history on file.   Social History     Socioeconomic History    Marital status:      Spouse name: Not on file    Number of children: Not on file    Years of education: Not on file    Highest education level: Not on file   Occupational History    Not on file   Social Needs    Financial resource strain: Not on file    Food insecurity:     Worry: Not on file     Inability: Not on file    Transportation needs:     Medical: Not on file     Non-medical: Not on file   Tobacco Use    Smoking status: Former Smoker     Packs/day: 2.00     Years: 30.00     Pack years: 60.00    Smokeless tobacco: Never Used   Substance and Sexual Activity    Alcohol use: Never     Frequency: Never    Drug use: Never    Sexual activity: Not Currently   Lifestyle    Physical activity:     Days per week: Not on file     Minutes per session: Not on file    Stress: Not on file   Relationships    Social connections:     Talks on phone: Not on file     Gets together: Not on file     Attends [employer] service: Not on file     Active member of club or organization: Not on file     Attends meetings of clubs or organizations: Not on file     Relationship status: Not on file    Intimate partner violence:     Fear of current or ex partner: Not on file     Emotionally abused: Not on file     Physically abused: Not on file     Forced sexual activity: Not on file   Other Topics Concern    Not on file   Social History Narrative    Not on file       Allergies   Allergen Reactions    Adempas [Riociguat] Unknown (comments)    Ativan [Lorazepam] Other (comments)     Altered LOC  Patient able to tolerate 0.5mg    Codeine Unknown (comments)    Dilaudid [Hydromorphone] Unknown (comments)    Lisinopril Cough       Current Outpatient Medications   Medication Sig    albuterol (PROVENTIL VENTOLIN) 2.5 mg /3 mL (0.083 %) nebu 3 mL by Nebulization route once for 1 dose.  buPROPion (WELLBUTRIN) 100 mg tablet TAKE ONE TABLET BY MOUTH TWICE A DAY    sertraline (ZOLOFT) 100 mg tablet TAKE ONE TABLET BY MOUTH ONE TIME DAILY    bumetanide (BUMEX) 1 mg tablet Take  by mouth daily.  memantine (NAMENDA) 10 mg tablet Take 1 Tab by mouth two (2) times a day.  carvedilol (COREG) 12.5 mg tablet Take 1 Tab by mouth two (2) times daily (with meals).  COMBIVENT RESPIMAT  mcg/actuation inhaler INHALE ONE PUFF BY MOUTH FOUR TIMES A DAY    clopidogrel (PLAVIX) 75 mg tab TAKE ONE TABLET BY MOUTH ONE TIME DAILY    cetirizine (ZYRTEC) 10 mg tablet Take 1 Tab by mouth daily.  tamsulosin (FLOMAX) 0.4 mg capsule Take 1 Cap by mouth daily.  calcium-cholecalciferol, d3, (CALCIUM 600 + D) 600-125 mg-unit tab Take 600 mg by mouth daily.  potassium chloride (K-DUR, KLOR-CON) 20 mEq tablet Take 1 Tab by mouth daily.  LORazepam (ATIVAN) 0.5 mg tablet Take 0.5 mg by mouth as needed for Anxiety.  amLODIPine (NORVASC) 5 mg tablet Take 5 mg by mouth daily.  EZFE 200 200 mg iron cap TAKE ONE CAPSULE BY MOUTH ONE TIME DAILY    fluticasone propionate (FLONASE) 50 mcg/actuation nasal spray 2 Sprays by Both Nostrils route daily.     atorvastatin (LIPITOR) 20 mg tablet TAKE ONE TABLET BY MOUTH ONE TIME DAILY    donepezil (ARICEPT) 10 mg tablet TAKE ONE TABLET BY MOUTH AT BEDTIME    isosorbide mononitrate ER (IMDUR) 30 mg tablet TAKE THREE TABLETS BY MOUTH TWICE A DAY    QUEtiapine (SEROQUEL) 25 mg tablet TAKE ONE TABLET BY MOUTH EVERY MORNING, TAKE TWO TABLETS BY MOUTH ONE TIME DAILY AT LUNCH, TAKE THREE TABLETS BY MOUTH AT BEDTIME    raNITIdine (ZANTAC) 150 mg tablet TAKE ONE TABLET BY MOUTH TWICE A DAY    aspirin delayed-release 81 mg tablet Take  by mouth daily.  cholecalciferol, vitamin D3, (VITAMIN D3) 2,000 unit tab Take  by mouth.  metFORMIN ER (GLUCOPHAGE XR) 500 mg tablet Take 1 Tab by mouth daily (with dinner).  fluticasone propion-salmeterol (AIRDUO RESPICLICK) 15-67 mcg/actuation aepb Take 1 Inhalation by inhalation two (2) times a day.  metFORMIN (GLUCOPHAGE) 1,000 mg tablet TAKE ONE TABLET BY MOUTH TWICE A DAY    albuterol (PROVENTIL HFA, VENTOLIN HFA, PROAIR HFA) 90 mcg/actuation inhaler Take 2 Puffs by inhalation every four (4) hours as needed for Wheezing.  furosemide (LASIX) 20 mg tablet Take  by mouth daily.  varicella-zoster recombinant, PF, (SHINGRIX, PF,) 50 mcg/0.5 mL susr injection 0.5mL by IntraMUSCular route once now and then repeat in 2-6 months     No current facility-administered medications for this visit. Review of Systems   Constitutional: Negative for chills and fever. HENT: Positive for congestion. Negative for ear pain. Respiratory: Positive for cough, shortness of breath and wheezing. Cardiovascular: Negative for chest pain, palpitations and leg swelling. Gastrointestinal: Negative for abdominal pain, diarrhea, nausea and vomiting. Skin: Negative for rash. Visit Vitals  /65 (BP 1 Location: Right arm, BP Patient Position: Sitting)   Pulse 61   Temp 97.4 °F (36.3 °C) (Oral)   Resp 22   Ht 5' 3\" (1.6 m)   Wt 166 lb (75.3 kg)   SpO2 95%   BMI 29.41 kg/m²       Physical Exam  Vitals signs and nursing note reviewed. Constitutional:       General: He is not in acute distress. Appearance: He is not diaphoretic. Eyes:      General: No scleral icterus. Conjunctiva/sclera: Conjunctivae normal.   Cardiovascular:      Rate and Rhythm: Normal rate and regular rhythm. Heart sounds: Normal heart sounds. No murmur. No gallop. Pulmonary:      Effort: Pulmonary effort is normal. No respiratory distress. Breath sounds: Normal breath sounds. No wheezing. Abdominal:      General: Bowel sounds are normal. There is no distension. Palpations: Abdomen is soft. Tenderness: There is no abdominal tenderness. Lymphadenopathy:      Cervical: No cervical adenopathy. Skin:     General: Skin is warm. Neurological:      Mental Status: He is alert. Psychiatric:         Mood and Affect: Affect normal.       Indication:  Shortness of Breath and PERSISTENT COUGH    Spirometry Findings:    Pre-Bronchodilator:  FVC:60  FEV1:44  FEV1/FVC Ratio: 73    Post-Bronchodilator:  FVC:68  FEV1:59  FEV1/FVC Ratio: 68    Interpretation: Severe airway obstruction with improvement with postbronchodilator      No results found for this or any previous visit (from the past 12 hour(s)). 1. Stage 3 severe COPD by GOLD classification (HCC)  - AMB POC SPIROMETRY REVIEW/INTERP  - albuterol (PROVENTIL VENTOLIN) 2.5 mg /3 mL (0.083 %) nebu; 3 mL by Nebulization route once for 1 dose. Dispense: 1 Each; Refill: 0  - ALBUTEROL, INHAL. SOL., FDA-APPROVED FINAL, NON-COMPOUND UNIT DOSE, 1 MG  - INHAL RX, AIRWAY OBST/DX SPUTUM INDUCT    2. History of COPD  - AMB POC SPIROMETRY W/BRONCHODILATOR  - albuterol (PROVENTIL VENTOLIN) 2.5 mg /3 mL (0.083 %) nebu; 3 mL by Nebulization route once for 1 dose. Dispense: 1 Each; Refill: 0  - ALBUTEROL, INHAL. SOL., FDA-APPROVED FINAL, NON-COMPOUND UNIT DOSE, 1 MG  - INHAL RX, AIRWAY OBST/DX SPUTUM INDUCT    Patient encounter was at least 25 minutes with more than 50 percent of the visit involved in counseling them with regards to his new official diagnosis of COPD and his symptoms.   Per patient this was their first lung function test.  Wife states he is doing a lot better and patient also reports feeling better since last seen. Compliance with medication is a concern. They are not sure what medications they are taking. Wife states that they will go home and return in the next day or 2 to let me know which inhalers he is using. If he has not taken his inhaled corticosteroid as directed for the past month, will make sure he is doing this a repeat spirometry in 4 to 6 weeks. If he has been on this medication, consider increasing dose and reassess in 4 to 6 weeks. Consider pulmonary rehab versus referral to pulmonology if he maintained stage III or worsens. Follow-up and Dispositions    · Return in about 6 weeks (around 3/25/2020) for Spirometry .            Denver Alexandria, MD, CAQSM, RMSK

## 2020-02-24 ENCOUNTER — OFFICE VISIT (OUTPATIENT)
Dept: FAMILY MEDICINE CLINIC | Age: 80
End: 2020-02-24

## 2020-02-24 VITALS
RESPIRATION RATE: 18 BRPM | SYSTOLIC BLOOD PRESSURE: 144 MMHG | TEMPERATURE: 97.6 F | OXYGEN SATURATION: 98 % | HEIGHT: 63 IN | BODY MASS INDEX: 29.23 KG/M2 | WEIGHT: 165 LBS | HEART RATE: 54 BPM | DIASTOLIC BLOOD PRESSURE: 62 MMHG

## 2020-02-24 DIAGNOSIS — E11.69 TYPE 2 DIABETES MELLITUS WITH OTHER SPECIFIED COMPLICATION, UNSPECIFIED WHETHER LONG TERM INSULIN USE (HCC): Primary | ICD-10-CM

## 2020-02-24 RX ORDER — INSULIN PUMP SYRINGE, 3 ML
EACH MISCELLANEOUS
Qty: 1 KIT | Refills: 0 | Status: SHIPPED | OUTPATIENT
Start: 2020-02-24

## 2020-02-24 NOTE — PROGRESS NOTES
Chief Complaint   Patient presents with    Follow-up     DM    Medication Evaluation     Discuss Metformin

## 2020-02-24 NOTE — PROGRESS NOTES
Jenn Lal is a 70 y.o. male for diabetes follow up? Pt seen 1 month  Pt was seen by Dr. Robby Johnson (nephrology). He agreed with discontinuing metformin and fosamax due to declining renal function. DIABETIC CARE CHECKLIST   1. Previous lab work reviewed         - Last A1c: 5.5    2. Patient on ASA: yes  3. Patient on Statin: yes  4. Patient on ACE: no   5. Patient attempting to follow diabetic diet:good, avoids high carb diet    6. Activity log: no   7. Last eye check: [facility] Dr. Jack Jones   8. Last comprehensive foot exam: none  9. 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    Benign prostatic hyperplasia with urinary frequency N40.1, R35.0    Easy bruising R23.8    History of COPD Z87.09    COPD (chronic obstructive pulmonary disease) ([facility] J44.9    Pulmonary nodule R91.1    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  fluticasone propion-salmeterol (AIRDUO RESPICLICK) 61-23 mcg/actuation aepb Take 1 Inhalation by inhalation two (2) times a day.  1 Each 5    memantine (NAMENDA) 10 mg tablet Take 1 Tab by mouth two (2) times a day. 180 Tab 3    COMBIVENT RESPIMAT  mcg/actuation inhaler INHALE ONE PUFF BY MOUTH FOUR TIMES A DAY 4 g 3    albuterol (PROVENTIL HFA, VENTOLIN HFA, PROAIR HFA) 90 mcg/actuation inhaler Take 2 Puffs by inhalation every four (4) hours as needed for Wheezing. 1 Inhaler 4    clopidogrel (PLAVIX) 75 mg tab TAKE ONE TABLET BY MOUTH ONE TIME DAILY 30 Tab 3    cetirizine (ZYRTEC) 10 mg tablet Take 1 Tab by mouth daily. 90 Tab 2    calcium-cholecalciferol, d3, (CALCIUM 600 + D) 600-125 mg-unit tab Take 600 mg by mouth daily. 90 Tab 3    potassium chloride (K-DUR, KLOR-CON) 20 mEq tablet Take 1 Tab by mouth daily. 90 Tab 3    LORazepam (ATIVAN) 0.5 mg tablet Take 0.5 mg by mouth as needed for Anxiety.  furosemide (LASIX) 20 mg tablet Take  by mouth daily.  amLODIPine (NORVASC) 5 mg tablet Take 5 mg by mouth daily.  varicella-zoster recombinant, PF, (SHINGRIX, PF,) 50 mcg/0.5 mL susr injection 0.5mL by IntraMUSCular route once now and then repeat in 2-6 months 0.5 mL 1    EZFE 200 200 mg iron cap TAKE ONE CAPSULE BY MOUTH ONE TIME DAILY 90 Cap 1    fluticasone propionate (FLONASE) 50 mcg/actuation nasal spray 2 Sprays by Both Nostrils route daily. 1 Bottle 1    atorvastatin (LIPITOR) 20 mg tablet TAKE ONE TABLET BY MOUTH ONE TIME DAILY  1    donepezil (ARICEPT) 10 mg tablet TAKE ONE TABLET BY MOUTH AT BEDTIME  6    isosorbide mononitrate ER (IMDUR) 30 mg tablet TAKE THREE TABLETS BY MOUTH TWICE A DAY  2    QUEtiapine (SEROQUEL) 25 mg tablet TAKE ONE TABLET BY MOUTH EVERY MORNING, TAKE TWO TABLETS BY MOUTH ONE TIME DAILY AT LUNCH, TAKE THREE TABLETS BY MOUTH AT BEDTIME  6    aspirin delayed-release 81 mg tablet Take  by mouth daily.  cholecalciferol, vitamin D3, (VITAMIN D3) 2,000 unit tab Take  by mouth.  metFORMIN ER (GLUCOPHAGE XR) 500 mg tablet Take 1 Tab by mouth daily (with dinner).  30 Tab 0    metFORMIN (GLUCOPHAGE) 1,000 mg tablet TAKE ONE TABLET BY MOUTH TWICE A DAY 60 Tab 0    carvedilol (COREG) 12.5 mg tablet Take 1 Tab by mouth two (2) times daily (with meals). 60 Tab 2    tamsulosin (FLOMAX) 0.4 mg capsule Take 1 Cap by mouth daily. 90 Cap 2    raNITIdine (ZANTAC) 150 mg tablet TAKE ONE TABLET BY MOUTH TWICE A DAY  1       Allergies: Allergies   Allergen Reactions    Adempas [Riociguat] Unknown (comments)    Ativan [Lorazepam] Other (comments)     Altered LOC  Patient able to tolerate 0.5mg    Codeine Unknown (comments)    Dilaudid [Hydromorphone] Unknown (comments)    Lisinopril Cough       LMP:  No LMP for male patient.     Social History     Socioeconomic History    Marital status:      Spouse name: Not on file    Number of children: Not on file    Years of education: Not on file    Highest education level: Not on file   Occupational History    Not on file   Social Needs    Financial resource strain: Not on file    Food insecurity:     Worry: Not on file     Inability: Not on file    Transportation needs:     Medical: Not on file     Non-medical: Not on file   Tobacco Use    Smoking status: Former Smoker     Packs/day: 2.00     Years: 30.00     Pack years: 60.00    Smokeless tobacco: Never Used   Substance and Sexual Activity    Alcohol use: Never     Frequency: Never    Drug use: Never    Sexual activity: Not Currently   Lifestyle    Physical activity:     Days per week: Not on file     Minutes per session: Not on file    Stress: Not on file   Relationships    Social connections:     Talks on phone: Not on file     Gets together: Not on file     Attends [employer] service: Not on file     Active member of club or organization: Not on file     Attends meetings of clubs or organizations: Not on file     Relationship status: Not on file    Intimate partner violence:     Fear of current or ex partner: Not on file     Emotionally abused: Not on file     Physically abused: Not on file     Forced sexual activity: Not on file Other Topics Concern    Not on file   Social History Narrative    Not on file       No family history on file. ROS:  Fever: no  Weight loss: no  Headaches:  no  Chest Pain:  no  SOB:  no  Cough:  no  Other significant ROS:        Physical Exam  Visit Vitals  /72 (BP 1 Location: Left arm, BP Patient Position: Sitting)   Pulse (!) 54   Temp 97.6 °F (36.4 °C) (Oral)   Resp 18   Ht 5' 3\" (1.6 m)   Wt 165 lb (74.8 kg)   SpO2 98%   BMI 29.23 kg/m²     Constitutional: Appears well,  No acute distress, Vitals noted  Lungs:   Clear to auscultation, good respiratory effort, no wheezes, rales or rhonchi  Heart:   Normal HR, Normal S1 and S2,  Regular rhythm. No cardiac murmurs. No carotid bruits. Chest wall normal  Extremities:   without edema, good peripheral pulses  Skin:  Warm to palpation, without rashes, bruising, or suspicious lesions     - Diabetic foot exam:   Appearance: no skin break down,  erythema, warmth, tenderness, swelling, purulence  Sensation: decrease sensation on dorsal aspect of foot and toes   Vascular: DP intact   BP Readings from Last 3 Encounters:   02/24/20 164/72   02/12/20 124/65   01/21/20 124/65       Assessment/Plan:      ICD-10-CM ICD-9-CM    1. Type 2 diabetes mellitus with other specified complication, unspecified whether long term insulin use ([facility]) E11.69 250.80  DIABETES FOOT EXAM      Blood-Glucose Meter monitoring kit       1. Type 2 diabetes mellitus with other specified complication, unspecified whether long term insulin use ([facility] No changes at this point will follow up in 2 months for repeat labs off metformin, continue diet and exercise for glycemic control   -  DIABETES FOOT EXAM  - Blood-Glucose Meter monitoring kit; Please check blood glucose once a day in morning  Dispense: 1 Kit;  Refill: 0    Orders Placed This Encounter     DIABETES FOOT EXAM    Blood-Glucose Meter monitoring kit     Sig: Please check blood glucose once a day in morning     Dispense: 1 Kit     Refill:  [address] [facility],[address], [place]  [address]  Medicine [facility]idency

## 2020-03-11 ENCOUNTER — OFFICE VISIT (OUTPATIENT)
Dept: FAMILY MEDICINE CLINIC | Age: 80
End: 2020-03-11

## 2020-03-11 VITALS
RESPIRATION RATE: 20 BRPM | DIASTOLIC BLOOD PRESSURE: 60 MMHG | OXYGEN SATURATION: 96 % | SYSTOLIC BLOOD PRESSURE: 131 MMHG | WEIGHT: 166 LBS | BODY MASS INDEX: 29.41 KG/M2 | TEMPERATURE: 97.7 F | HEART RATE: 50 BPM | HEIGHT: 63 IN

## 2020-03-11 DIAGNOSIS — R00.1 BRADYCARDIA: ICD-10-CM

## 2020-03-11 DIAGNOSIS — I10 ESSENTIAL HYPERTENSION: ICD-10-CM

## 2020-03-11 DIAGNOSIS — G89.29 CHRONIC LOW BACK PAIN, UNSPECIFIED BACK PAIN LATERALITY, UNSPECIFIED WHETHER SCIATICA PRESENT: Primary | ICD-10-CM

## 2020-03-11 DIAGNOSIS — M54.50 CHRONIC LOW BACK PAIN, UNSPECIFIED BACK PAIN LATERALITY, UNSPECIFIED WHETHER SCIATICA PRESENT: Primary | ICD-10-CM

## 2020-03-11 DIAGNOSIS — M81.0 OSTEOPOROSIS, UNSPECIFIED OSTEOPOROSIS TYPE, UNSPECIFIED PATHOLOGICAL FRACTURE PRESENCE: ICD-10-CM

## 2020-03-11 RX ORDER — ISOSORBIDE MONONITRATE 30 MG/1
TABLET, EXTENDED RELEASE ORAL
Qty: 360 TAB | Refills: 1 | Status: SHIPPED | OUTPATIENT
Start: 2020-03-11 | End: 2020-07-02

## 2020-03-11 RX ORDER — AMLODIPINE BESYLATE 5 MG/1
5 TABLET ORAL DAILY
Qty: 90 TAB | Refills: 1 | Status: SHIPPED | OUTPATIENT
Start: 2020-03-11 | End: 2020-07-02

## 2020-03-11 RX ORDER — CALCIUM CARBONATE/VITAMIN D3 600 MG-125
600 TABLET ORAL DAILY
Qty: 90 TAB | Refills: 3 | Status: SHIPPED | OUTPATIENT
Start: 2020-03-11 | End: 2021-03-24

## 2020-03-11 NOTE — PROGRESS NOTES
History of Present Illness     Chief Complaint   Patient presents with    Back Pain       Patient Identification  Jami Garay is a 70 y.o. male complains of pain in the bilateral lower back pain. No radiation to lower extremities. No incontinence. No perirectal numbness. Date of Onset:7/2019  Mechanism of Injury: States occurred after a fall and broke his ankle  Alleviating Factors:Nothing but heat helps little  Aggravating Factors: Standing and walking     Imaging: XR 11/27/19: L1 moderate compression fracture and kyphoplasty post procedure  changes. Osteopenia. No acute fracture or subluxation. No past medical history on file. No family history on file. Current Outpatient Medications   Medication Sig Dispense Refill    isosorbide mononitrate ER (IMDUR) 30 mg tablet TAKE THREE TABLETS BY MOUTH TWICE A  Tab 1    calcium-cholecalciferol, d3, (CALCIUM 600 + D) 600-125 mg-unit tab Take 600 mg by mouth daily. 90 Tab 3    amLODIPine (NORVASC) 5 mg tablet Take 1 Tab by mouth daily. 90 Tab 1    Blood-Glucose Meter monitoring kit Please check blood glucose once a day in morning 1 Kit 0    buPROPion (WELLBUTRIN) 100 mg tablet TAKE ONE TABLET BY MOUTH TWICE A  Tab 0    sertraline (ZOLOFT) 100 mg tablet TAKE ONE TABLET BY MOUTH ONE TIME DAILY 90 Tab 1    bumetanide (BUMEX) 1 mg tablet Take  by mouth daily.  memantine (NAMENDA) 10 mg tablet Take 1 Tab by mouth two (2) times a day. 180 Tab 3    carvedilol (COREG) 12.5 mg tablet Take 1 Tab by mouth two (2) times daily (with meals). 60 Tab 2    COMBIVENT RESPIMAT  mcg/actuation inhaler INHALE ONE PUFF BY MOUTH FOUR TIMES A DAY 4 g 3    albuterol (PROVENTIL HFA, VENTOLIN HFA, PROAIR HFA) 90 mcg/actuation inhaler Take 2 Puffs by inhalation every four (4) hours as needed for Wheezing.  1 Inhaler 4    clopidogrel (PLAVIX) 75 mg tab TAKE ONE TABLET BY MOUTH ONE TIME DAILY 30 Tab 3    cetirizine (ZYRTEC) 10 mg tablet Take 1 Tab by mouth daily. 90 Tab 2    tamsulosin (FLOMAX) 0.4 mg capsule Take 1 Cap by mouth daily. 90 Cap 2    potassium chloride (K-DUR, KLOR-CON) 20 mEq tablet Take 1 Tab by mouth daily. 90 Tab 3    LORazepam (ATIVAN) 0.5 mg tablet Take 0.5 mg by mouth as needed for Anxiety.  EZFE 200 200 mg iron cap TAKE ONE CAPSULE BY MOUTH ONE TIME DAILY 90 Cap 1    atorvastatin (LIPITOR) 20 mg tablet TAKE ONE TABLET BY MOUTH ONE TIME DAILY  1    donepezil (ARICEPT) 10 mg tablet TAKE ONE TABLET BY MOUTH AT BEDTIME  6    QUEtiapine (SEROQUEL) 25 mg tablet TAKE ONE TABLET BY MOUTH EVERY MORNING, TAKE TWO TABLETS BY MOUTH ONE TIME DAILY AT LUNCH, TAKE THREE TABLETS BY MOUTH AT BEDTIME  6    raNITIdine (ZANTAC) 150 mg tablet TAKE ONE TABLET BY MOUTH TWICE A DAY  1    aspirin delayed-release 81 mg tablet Take  by mouth daily.  cholecalciferol, vitamin D3, (VITAMIN D3) 2,000 unit tab Take  by mouth.  metFORMIN ER (GLUCOPHAGE XR) 500 mg tablet Take 1 Tab by mouth daily (with dinner). 30 Tab 0    fluticasone propion-salmeterol (AIRDUO RESPICLICK) 19-36 mcg/actuation aepb Take 1 Inhalation by inhalation two (2) times a day. 1 Each 5    metFORMIN (GLUCOPHAGE) 1,000 mg tablet TAKE ONE TABLET BY MOUTH TWICE A DAY 60 Tab 0    furosemide (LASIX) 20 mg tablet Take  by mouth daily.  varicella-zoster recombinant, PF, (SHINGRIX, PF,) 50 mcg/0.5 mL susr injection 0.5mL by IntraMUSCular route once now and then repeat in 2-6 months 0.5 mL 1    fluticasone propionate (FLONASE) 50 mcg/actuation nasal spray 2 Sprays by Both Nostrils route daily. 1 Bottle 1     Allergies   Allergen Reactions    Adempas [Riociguat] Unknown (comments)    Ativan [Lorazepam] Other (comments)     Altered LOC  Patient able to tolerate 0.5mg    Codeine Unknown (comments)    Dilaudid [Hydromorphone] Unknown (comments)    Lisinopril Cough       Review of Systems  A comprehensive review of systems was negative except for that written in the HPI. Physical Exam     Visit Vitals  /60 (BP 1 Location: Right arm, BP Patient Position: Sitting)   Pulse (!) 50   Temp 97.7 °F (36.5 °C) (Oral)   Resp 20   Ht 5' 3\" (1.6 m)   Wt 166 lb (75.3 kg)   SpO2 96%   BMI 29.41 kg/m²       General: Alert and oriented and in no acute distress. Responds to all questions appropriately  LUNGS: Respirations unlabored  Skin: No obvious rash    MSK:    Posture: Normal   Deformity: None    ROM:     Lumbar Flexion: Normal    Lumbar Extension: Normal    Lateral bending: Normal     Hip Flexion: Normal     Gait: Normal       Palpation:    L1-L5: No Tenderness    Sacrum: No Tenderness    Coccyx: No Tenderness    Paraspinal:   Left: No Tenderness Right: No Tenderness    Sacroiliac Joint:  Left: Tenderness Right: Tenderness    Piriformis Muscle:  Left: No Tenderness Right: No Tenderness    Greater Trochanter:   Left: No Tenderness Right: No Tenderness    Ischial Tuberosity:  Left: No Tenderness Right: No Tenderness      Strength (0-5/5)    Ankle eversion:  Left: 5/5  Right: 5/5    Ankle dorsiflexion:  Left: 5/5  Right: 5/5    Great toe extension:  Left: 5/5  Right: 5/5     Sensation: L4-S1 intact, no deficits noted     DTR:    Patella:  Left: +2  Right: +2    Achilles:  Left: +2  Right: +2     Special test:    Straight leg:  Left: Negative  Right: Negative    LANI:  Left: Positive  Right: Positive    FADIR:   Left: Negative  Right: Negative    Piriformis:  Left: Negative  Right: Negative    Stinchfield:  Left: Negative  Right: Negative        No results found for any visits on 03/11/20. Assessment:    ICD-10-CM ICD-9-CM    1. Chronic low back pain, unspecified back pain laterality, unspecified whether sciatica present M54.5 724.2     G89.29 338.29    2. Bradycardia R00.1 427.89    3. Essential hypertension I10 401.9 isosorbide mononitrate ER (IMDUR) 30 mg tablet      amLODIPine (NORVASC) 5 mg tablet   4.  Osteoporosis, unspecified osteoporosis type, unspecified pathological fracture presence M81.0 733.00 calcium-cholecalciferol, d3, (CALCIUM 600 + D) 600-125 mg-unit tab       Plan:  -Needs to follow up with Cardiologist for bradycardia and medications. Refilled for now as he is low. Return for back pain and consider injection at that time. I personally reviewed lumbar XR. OA noted and prior compression fracture. Recommend DEXA. Follow-up and Dispositions    · Return in about 2 weeks (around 3/25/2020). Follow-up and Dispositions    · Return in about 2 weeks (around 3/25/2020).

## 2020-03-11 NOTE — PROGRESS NOTES
Identified Patient with two Patient identifiers (Name and ). Two Patient Identifiers confirmed. Reviewed record in preparation for visit and have obtained necessary documentation. Chief Complaint   Patient presents with    Back Pain       Visit Vitals  /60 (BP 1 Location: Right arm, BP Patient Position: Sitting)   Pulse (!) 50   Temp 97.7 °F (36.5 °C) (Oral)   Resp 20   Ht 5' 3\" (1.6 m)   Wt 166 lb (75.3 kg)   SpO2 96%   BMI 29.41 kg/m²       1. Have you been to the ER, urgent care clinic since your last visit? Hospitalized since your last visit? No    2. Have you seen or consulted any other health care providers outside of the [address] [place] [address] since your last visit? Include any pap smears or colon screening.  No

## 2020-03-23 ENCOUNTER — TELEPHONE (OUTPATIENT)
Dept: FAMILY MEDICINE CLINIC | Age: 80
End: 2020-03-23

## 2020-03-23 NOTE — TELEPHONE ENCOUNTER
[address] message per nurse Flor A and appt was canceled.     Please inform of tickler to be placed for patient

## 2020-03-23 NOTE — TELEPHONE ENCOUNTER
I called [facility] to touched base with them with regards to their routine/non-urgent scheduled visit. Patient did NOT answer. Message left on patient's VMB to return call and discuss appointment scheduled for tomorrow.  This appointment will need to be rescheduled due to COVID-19.    - Danitza Trinidad LPN

## 2020-04-02 RX ORDER — CLOPIDOGREL BISULFATE 75 MG/1
TABLET ORAL
Qty: 30 TAB | Refills: 3 | Status: SHIPPED | OUTPATIENT
Start: 2020-04-02 | End: 2020-08-07 | Stop reason: SDUPTHER

## 2020-04-06 DIAGNOSIS — F32.A DEPRESSION, UNSPECIFIED DEPRESSION TYPE: ICD-10-CM

## 2020-04-07 RX ORDER — BUPROPION HYDROCHLORIDE 100 MG/1
TABLET ORAL
Qty: 180 TAB | Refills: 0 | Status: SHIPPED | OUTPATIENT
Start: 2020-04-07 | End: 2020-08-06

## 2020-04-10 ENCOUNTER — OFFICE VISIT (OUTPATIENT)
Dept: FAMILY MEDICINE CLINIC | Age: 80
End: 2020-04-10

## 2020-04-10 DIAGNOSIS — N18.30 STAGE 3 CHRONIC KIDNEY DISEASE (HCC): ICD-10-CM

## 2020-04-10 DIAGNOSIS — J44.9 CHRONIC OBSTRUCTIVE PULMONARY DISEASE, UNSPECIFIED COPD TYPE (HCC): Primary | ICD-10-CM

## 2020-04-10 DIAGNOSIS — R06.2 WHEEZING: ICD-10-CM

## 2020-04-10 DIAGNOSIS — J30.89 ENVIRONMENTAL AND SEASONAL ALLERGIES: ICD-10-CM

## 2020-04-10 RX ORDER — FLUTICASONE PROPIONATE 50 MCG
SPRAY, SUSPENSION (ML) NASAL
Qty: 48 G | Refills: 0 | Status: SHIPPED | OUTPATIENT
Start: 2020-04-10 | End: 2020-09-18 | Stop reason: SDUPTHER

## 2020-04-10 RX ORDER — ALBUTEROL SULFATE 0.83 MG/ML
2.5 SOLUTION RESPIRATORY (INHALATION)
Qty: 30 NEBULE | Refills: 1 | Status: SHIPPED | OUTPATIENT
Start: 2020-04-10 | End: 2020-04-21 | Stop reason: SDUPTHER

## 2020-04-10 RX ORDER — FLUTICASONE PROPIONATE 50 MCG
SPRAY, SUSPENSION (ML) NASAL
Qty: 1 BOTTLE | Refills: 1 | Status: SHIPPED | OUTPATIENT
Start: 2020-04-10 | End: 2020-04-10

## 2020-04-10 NOTE — PROGRESS NOTES
I reviewed with the resident the medical history and the resident's findings. I discussed with the resident the patient's diagnosis and concur with the plan.

## 2020-04-10 NOTE — PROGRESS NOTES
Kirstin Luo  70 y.o. male  1940  [address] [place] [address] Lila  [id number]    [phone] (home)      [address]:    Telephone Encounter  Elizabeth Espino [place]       Encounter Date: 4/10/2020 at 2:08 PM    Consent:  He and/or the health care decision maker is aware that that he may receive a bill for this telephone service, depending on his insurance coverage, and has provided verbal consent to proceed: Yes    No chief complaint on file. History of Present Illness   Kirstin Luo is a 70 y.o. male was evaluated by telephone. I communicated with the patient and/or health care decision maker about     Wife states that he needs a refill of his Albuterol nebs. Has had COPD for decades. Quit smoking 12 years ago. Also on Airduo but not using that frequently. States with the pollen he has been coughing and yesterday started wheezing. Used their last Albuterol neb yesterday which relieved his wheezing. Denies fever, chills, chest pain, shortness of breath, nausea, vomiting, diarrhea or any sick contacts. Have not been out of the house. History was primarily given by wife as patient has Alzheimers. Review of Systems   Review of Systems   Constitutional: Negative for chills and fever. Respiratory: Positive for cough and wheezing. Negative for sputum production and shortness of breath. Cardiovascular: Negative for chest pain. Gastrointestinal: Negative for diarrhea, nausea and vomiting. Vitals/Objective:   General: Patient speaking in complete sentences without effort. Normal speech and cooperative. Had patient get on phone. Was able to speak in complete sentences without effort. Did not sound wheezy or coughing on the phone. Due to this being a Virtual Check-in/Telephone evaluation, many elements of the physical examination are unable to be assessed.     Assessment and Plan:   Time-based coding, delete if not needed: I spent at least 15 minutes with this established patient, and >50% of the time was spent counseling and/or coordinating care regarding cough, wheezing  Total Time: minutes: 11-20 minutes    1. Wheezing - likely 2/2 seasonal allergies with hx of COPD  - refilled Albuterol nebs  - discussed if sx worsen, develops fever/ chills, chest pain or shortness of breath, n/v or diarrhea needs to go to flu clinic    2. Environmental and seasonal allergies  - Recommend Flonase given CKD3 and Alzheimers    3. Chronic obstructive pulmonary disease, unspecified COPD type (HCC)  - Albuterol nebs refilled    4. Stage 3 chronic kidney disease ([facility]    Patient discussed with Dr. Agustín Leger (Attending)    We discussed the expected course, resolution and complications of the diagnosis(es) in detail. Medication risks, benefits, costs, interactions, and alternatives were discussed as indicated. I advised him to contact the office if his condition worsens, changes or fails to improve as anticipated. He expressed understanding with the diagnosis(es) and plan. Patient understands that this encounter was a temporary measure, and the importance of further follow up and examination was emphasized. Patient verbalized understanding. Patient informed to follow up: if sx are not improving    I affirm this is a Patient Initiated Episode with an Established Patient who has not had a related appointment within my department in the past 7 days or scheduled within the next 24 hours. Note: not billable if this call serves to triage the patient into an appointment for the relevant concern      Electronically Signed: Henrique Melendez DO  Providers location when delivering service: clinic    Patient discussed with Dr. Agustín Leger (Attending)      ICD-10-CM ICD-9-CM    1. Chronic obstructive pulmonary disease, unspecified COPD type (HCC) J44.9 496 albuterol (PROVENTIL VENTOLIN) 2.5 mg /3 mL (0.083 %) nebu      CANCELED: [place] PHYS/QHP TELEPHONE EVALUATION 11-20 MIN   2. Wheezing R06.2 786.07 albuterol (PROVENTIL VENTOLIN) 2.5 mg /3 mL (0.083 %) nebu      CANCELED: [place] PHYS/QHP TELEPHONE EVALUATION 11-20 MIN   3. Environmental and seasonal allergies J30.89 477.8 fluticasone propionate (Flonase Allergy Relief) 50 mcg/actuation nasal spray      CANCELED: [place] PHYS/QHP TELEPHONE EVALUATION 11-20 MIN   4. Stage 3 chronic kidney disease ([facility] N18.3 585.3 CANCELED: [place] PHYS/QHP TELEPHONE EVALUATION 11-20 MIN     Pursuant to the emergency declaration under the [facility], [facility] waiver authority and the [employer] Act, this Virtual  Visit was conducted, with patient's consent, to reduce the patient's risk of exposure to COVID-19 and provide continuity of care for an established patient. History   Patients past medical, surgical and family histories were personally reviewed and updated. No past medical history on file. No past surgical history on file. No family history on file.   Social History     Socioeconomic History    Marital status:      Spouse name: Not on file    Number of children: Not on file    Years of education: Not on file    Highest education level: Not on file   Occupational History    Not on file   Social Needs    Financial resource strain: Not on file    Food insecurity     Worry: Not on file     Inability: Not on file    Transportation needs     Medical: Not on file     Non-medical: Not on file   Tobacco Use    Smoking status: Former Smoker     Packs/day: 2.00     Years: 30.00     Pack years: 60.00    Smokeless tobacco: Never Used   Substance and Sexual Activity    Alcohol use: Never     Frequency: Never    Drug use: Never    Sexual activity: Not Currently   Lifestyle    Physical activity     Days per week: Not on file     Minutes per session: Not on file    Stress: Not on file   Relationships    Social connections     Talks on phone: Not on file     Gets together: Not on file     Attends Denominational service: Not on file     Active member of club or organization: Not on file     Attends meetings of clubs or organizations: Not on file     Relationship status: Not on file    Intimate partner violence     Fear of current or ex partner: Not on file     Emotionally abused: Not on file     Physically abused: Not on file     Forced sexual activity: Not on file   Other Topics Concern    Not on file   Social History Narrative    Not on file            Current Medications/Allergies   Medications and Allergies reviewed:    Current Outpatient Medications   Medication Sig Dispense Refill    albuterol (PROVENTIL VENTOLIN) 2.5 mg /3 mL (0.083 %) nebu 3 mL by Nebulization route every four (4) hours as needed for Wheezing. 30 Nebule 1    fluticasone propionate (Flonase Allergy Relief) 50 mcg/actuation nasal spray 1 spray in each nostril daily 1 Bottle 1    buPROPion (WELLBUTRIN) 100 mg tablet TAKE ONE TABLET BY MOUTH TWICE A  Tab 0    clopidogreL (PLAVIX) 75 mg tab TAKE ONE TABLET BY MOUTH ONE TIME DAILY 30 Tab 3    isosorbide mononitrate ER (IMDUR) 30 mg tablet TAKE THREE TABLETS BY MOUTH TWICE A  Tab 1    calcium-cholecalciferol, d3, (CALCIUM 600 + D) 600-125 mg-unit tab Take 600 mg by mouth daily. 90 Tab 3    amLODIPine (NORVASC) 5 mg tablet Take 1 Tab by mouth daily. 90 Tab 1    Blood-Glucose Meter monitoring kit Please check blood glucose once a day in morning 1 Kit 0    sertraline (ZOLOFT) 100 mg tablet TAKE ONE TABLET BY MOUTH ONE TIME DAILY 90 Tab 1    bumetanide (BUMEX) 1 mg tablet Take  by mouth daily.  metFORMIN ER (GLUCOPHAGE XR) 500 mg tablet Take 1 Tab by mouth daily (with dinner). 30 Tab 0    fluticasone propion-salmeterol (AIRDUO RESPICLICK) 75-99 mcg/actuation aepb Take 1 Inhalation by inhalation two (2) times a day.  1 Each 5    metFORMIN (GLUCOPHAGE) 1,000 mg tablet TAKE ONE TABLET BY MOUTH TWICE A DAY 60 Tab 0    memantine (NAMENDA) 10 mg tablet Take 1 Tab by mouth two (2) times a day. 180 Tab 3    carvedilol (COREG) 12.5 mg tablet Take 1 Tab by mouth two (2) times daily (with meals). 60 Tab 2    COMBIVENT RESPIMAT  mcg/actuation inhaler INHALE ONE PUFF BY MOUTH FOUR TIMES A DAY 4 g 3    albuterol (PROVENTIL HFA, VENTOLIN HFA, PROAIR HFA) 90 mcg/actuation inhaler Take 2 Puffs by inhalation every four (4) hours as needed for Wheezing. 1 Inhaler 4    cetirizine (ZYRTEC) 10 mg tablet Take 1 Tab by mouth daily. 90 Tab 2    tamsulosin (FLOMAX) 0.4 mg capsule Take 1 Cap by mouth daily. 90 Cap 2    potassium chloride (K-DUR, KLOR-CON) 20 mEq tablet Take 1 Tab by mouth daily. 90 Tab 3    LORazepam (ATIVAN) 0.5 mg tablet Take 0.5 mg by mouth as needed for Anxiety.  furosemide (LASIX) 20 mg tablet Take  by mouth daily.  varicella-zoster recombinant, PF, (SHINGRIX, PF,) 50 mcg/0.5 mL susr injection 0.5mL by IntraMUSCular route once now and then repeat in 2-6 months 0.5 mL 1    EZFE 200 200 mg iron cap TAKE ONE CAPSULE BY MOUTH ONE TIME DAILY 90 Cap 1    atorvastatin (LIPITOR) 20 mg tablet TAKE ONE TABLET BY MOUTH ONE TIME DAILY  1    donepezil (ARICEPT) 10 mg tablet TAKE ONE TABLET BY MOUTH AT BEDTIME  6    QUEtiapine (SEROQUEL) 25 mg tablet TAKE ONE TABLET BY MOUTH EVERY MORNING, TAKE TWO TABLETS BY MOUTH ONE TIME DAILY AT LUNCH, TAKE THREE TABLETS BY MOUTH AT BEDTIME  6    raNITIdine (ZANTAC) 150 mg tablet TAKE ONE TABLET BY MOUTH TWICE A DAY  1    aspirin delayed-release 81 mg tablet Take  by mouth daily.  cholecalciferol, vitamin D3, (VITAMIN D3) 2,000 unit tab Take  by mouth.        Allergies   Allergen Reactions    Adempas [Riociguat] Unknown (comments)    Ativan [Lorazepam] Other (comments)     Altered LOC  Patient able to tolerate 0.5mg    Codeine Unknown (comments)    Dilaudid [Hydromorphone] Unknown (comments)    Lisinopril Cough

## 2020-04-21 DIAGNOSIS — R06.2 WHEEZING: ICD-10-CM

## 2020-04-21 DIAGNOSIS — J44.9 CHRONIC OBSTRUCTIVE PULMONARY DISEASE, UNSPECIFIED COPD TYPE (HCC): ICD-10-CM

## 2020-04-21 RX ORDER — ALBUTEROL SULFATE 0.83 MG/ML
2.5 SOLUTION RESPIRATORY (INHALATION)
Qty: 180 NEBULE | Refills: 2 | Status: SHIPPED | OUTPATIENT
Start: 2020-04-21 | End: 2020-08-10 | Stop reason: SDUPTHER

## 2020-05-07 ENCOUNTER — TELEPHONE (OUTPATIENT)
Dept: FAMILY MEDICINE CLINIC | Age: 80
End: 2020-05-07

## 2020-05-07 DIAGNOSIS — M54.50 LOW BACK PAIN, UNSPECIFIED BACK PAIN LATERALITY, UNSPECIFIED CHRONICITY, UNSPECIFIED WHETHER SCIATICA PRESENT: Primary | ICD-10-CM

## 2020-05-07 NOTE — TELEPHONE ENCOUNTER
----- Message from Janean Leventhal sent at 5/7/2020  3:45 PM EDT -----  Regarding: Dr. Brenton Pereyra would like a call back regarding scheduling her husbands back injection.  Contact is [phone]

## 2020-05-24 ENCOUNTER — TELEPHONE (OUTPATIENT)
Dept: FAMILY MEDICINE CLINIC | Age: 80
End: 2020-05-24

## 2020-05-24 NOTE — TELEPHONE ENCOUNTER
----- Message from Eliza Guerin sent at 5/23/2020 10:14 AM EDT -----  Regarding: /Telephone  Bella Rodriguez RN  , needs to get list of medication and reconcile medication since the pt  hospital stay. Best contact number is [phone].

## 2020-05-26 NOTE — TELEPHONE ENCOUNTER
Attempted to call back and got voicemail, not leaving a message as not sure who this person is calling for the information. We have no info in our system that patient was in hospital and last Office visit was in April.

## 2020-06-04 ENCOUNTER — TELEPHONE (OUTPATIENT)
Dept: FAMILY MEDICINE CLINIC | Age: 80
End: 2020-06-04

## 2020-06-04 NOTE — TELEPHONE ENCOUNTER
----- Message from Patric Morse sent at 6/4/2020 10:08 AM EDT -----  Regarding: Gerry Sinclair Message/Vendor Calls    Caller's first and last name:[facility]      Reason for call: Certificate of Medical Necessities      Callback required yes/no and why: Yes      Best contact number(s): [phone]      Details to clarify the request:  Caller requesting confirmation of Cert. Of Med.  Necessities being received; submitted 05.19.2020      Patric Morse

## 2020-06-05 RX ORDER — INSULIN PUMP SYRINGE, 3 ML
EACH MISCELLANEOUS
Qty: 1 KIT | Refills: 0 | Status: SHIPPED | OUTPATIENT
Start: 2020-06-05

## 2020-06-05 NOTE — TELEPHONE ENCOUNTER
Dr Ba Steel   Received: Today   Message Contents   Rosita, [facility] Message/Vendor Calls     Caller's first and last name:[facility]       Reason for call:Status of fax sent for pt incontinence supplies on May 18,2020.        Callback required yes/no and why:yes       Best contact number(s):[phone]       Details to clarify the request:       Amber Coelho

## 2020-06-08 ENCOUNTER — TELEPHONE (OUTPATIENT)
Dept: FAMILY MEDICINE CLINIC | Age: 80
End: 2020-06-08

## 2020-06-09 NOTE — TELEPHONE ENCOUNTER
Pharmacy called. [phone]    Pharmacy called as has more questions regarding this order. Nurse took the call.

## 2020-06-10 ENCOUNTER — TELEPHONE (OUTPATIENT)
Dept: FAMILY MEDICINE CLINIC | Age: 80
End: 2020-06-10

## 2020-06-10 RX ORDER — DONEPEZIL HYDROCHLORIDE 10 MG/1
TABLET, FILM COATED ORAL
Qty: 90 TAB | Refills: 3 | OUTPATIENT
Start: 2020-06-10

## 2020-06-10 NOTE — TELEPHONE ENCOUNTER
We have never prescribed this medication and this medication has a high risk of side effects when taken with Quetiapine, which I believe he is taking, which we also don't prescribe. Please have patient follow up with I believe their psychiatrist or who ever prescribes this medication for them.   Thank you

## 2020-06-10 NOTE — TELEPHONE ENCOUNTER
----- Message from Gente Julian sent at 6/10/2020  2:48 PM EDT -----  Regarding: Dr. Jey Silveira: [phone]  Caller's first and last name: Makenna Martines, Pharmacist at [facility] required yes/no and why: yes  Best contact number(s): [phone]  Details to clarify the request: She received a refill request for the pt, but the request does not have the testing supplies section filled out. She is calling to see if they'd like to add those test strips and lancets refills.

## 2020-06-11 ENCOUNTER — TELEPHONE (OUTPATIENT)
Dept: FAMILY MEDICINE CLINIC | Age: 80
End: 2020-06-11

## 2020-06-11 NOTE — TELEPHONE ENCOUNTER
Attempted to contact patient in regards to Dr. Soto Fast message below. If patient or wife returns call please relay message below.

## 2020-06-11 NOTE — TELEPHONE ENCOUNTER
Called and asked company to refax this paperwork as either I never received it OR it has already been faxed and placed into scan.

## 2020-07-02 DIAGNOSIS — I10 ESSENTIAL HYPERTENSION: ICD-10-CM

## 2020-07-02 RX ORDER — AMLODIPINE BESYLATE 5 MG/1
TABLET ORAL
Qty: 90 TAB | Refills: 1 | Status: SHIPPED | OUTPATIENT
Start: 2020-07-02 | End: 2020-12-29 | Stop reason: SDUPTHER

## 2020-07-02 RX ORDER — POTASSIUM CHLORIDE 20 MEQ/1
TABLET, EXTENDED RELEASE ORAL
Qty: 90 TAB | Refills: 3 | Status: SHIPPED | OUTPATIENT
Start: 2020-07-02 | End: 2021-07-27 | Stop reason: SDUPTHER

## 2020-07-02 RX ORDER — ISOSORBIDE MONONITRATE 30 MG/1
TABLET, EXTENDED RELEASE ORAL
Qty: 360 TAB | Refills: 1 | Status: SHIPPED | OUTPATIENT
Start: 2020-07-02 | End: 2020-10-01 | Stop reason: SDUPTHER

## 2020-08-06 DIAGNOSIS — F32.A DEPRESSION, UNSPECIFIED DEPRESSION TYPE: ICD-10-CM

## 2020-08-06 RX ORDER — BUPROPION HYDROCHLORIDE 100 MG/1
TABLET ORAL
Qty: 180 TAB | Refills: 0 | Status: SHIPPED | OUTPATIENT
Start: 2020-08-06 | End: 2020-09-30 | Stop reason: SDUPTHER

## 2020-08-07 ENCOUNTER — VIRTUAL VISIT (OUTPATIENT)
Dept: FAMILY MEDICINE CLINIC | Age: 80
End: 2020-08-07
Payer: MEDICARE

## 2020-08-07 ENCOUNTER — TELEPHONE (OUTPATIENT)
Dept: FAMILY MEDICINE CLINIC | Age: 80
End: 2020-08-07

## 2020-08-07 DIAGNOSIS — I25.10 CORONARY ARTERY DISEASE INVOLVING NATIVE CORONARY ARTERY WITHOUT ANGINA PECTORIS, UNSPECIFIED WHETHER NATIVE OR TRANSPLANTED HEART: ICD-10-CM

## 2020-08-07 DIAGNOSIS — E11.69 TYPE 2 DIABETES MELLITUS WITH OTHER SPECIFIED COMPLICATION, UNSPECIFIED WHETHER LONG TERM INSULIN USE (HCC): ICD-10-CM

## 2020-08-07 DIAGNOSIS — G30.9 ALZHEIMER'S DEMENTIA WITHOUT BEHAVIORAL DISTURBANCE, UNSPECIFIED TIMING OF DEMENTIA ONSET: ICD-10-CM

## 2020-08-07 DIAGNOSIS — N18.30 STAGE 3 CHRONIC KIDNEY DISEASE (HCC): ICD-10-CM

## 2020-08-07 DIAGNOSIS — R05.9 COUGH: ICD-10-CM

## 2020-08-07 DIAGNOSIS — F32.A DEPRESSION, UNSPECIFIED DEPRESSION TYPE: Primary | ICD-10-CM

## 2020-08-07 DIAGNOSIS — F02.80 ALZHEIMER'S DEMENTIA WITHOUT BEHAVIORAL DISTURBANCE, UNSPECIFIED TIMING OF DEMENTIA ONSET: ICD-10-CM

## 2020-08-07 DIAGNOSIS — J44.9 CHRONIC OBSTRUCTIVE PULMONARY DISEASE, UNSPECIFIED COPD TYPE (HCC): ICD-10-CM

## 2020-08-07 DIAGNOSIS — J30.89 ENVIRONMENTAL AND SEASONAL ALLERGIES: ICD-10-CM

## 2020-08-07 DIAGNOSIS — I10 ESSENTIAL HYPERTENSION: ICD-10-CM

## 2020-08-07 PROCEDURE — 99442 PR PHYS/QHP TELEPHONE EVALUATION 11-20 MIN: CPT | Performed by: STUDENT IN AN ORGANIZED HEALTH CARE EDUCATION/TRAINING PROGRAM

## 2020-08-07 RX ORDER — CLOPIDOGREL BISULFATE 75 MG/1
TABLET ORAL
Qty: 30 TAB | Refills: 3 | Status: SHIPPED | OUTPATIENT
Start: 2020-08-07 | End: 2020-09-30

## 2020-08-07 RX ORDER — ALBUTEROL SULFATE 0.83 MG/ML
2.5 SOLUTION RESPIRATORY (INHALATION)
Qty: 30 NEBULE | Refills: 4 | Status: SHIPPED | OUTPATIENT
Start: 2020-08-07 | End: 2021-04-02 | Stop reason: SDUPTHER

## 2020-08-07 RX ORDER — CETIRIZINE HCL 10 MG
10 TABLET ORAL DAILY
Qty: 90 TAB | Refills: 2 | Status: SHIPPED | OUTPATIENT
Start: 2020-08-07 | End: 2021-06-03

## 2020-08-07 NOTE — PROGRESS NOTES
Alysa Lopez  68 y.o. male  1940  [address] [place] [address] Alexandre  [id number]    [phone] (home)      [address]:    Telephone Encounter  Denilson Cochran       Encounter Date: 8/7/2020 at 11:07 AM    Consent:  He and/or the health care decision maker is aware that that he may receive a bill for this telephone service, depending on his insurance coverage, and has provided verbal consent to proceed: Yes    Chief Complaint   Patient presents with    Medication Refill     History of Present Illness   Alysa Lopez is a 68 y.o. male was evaluated by telephone. Patient has dementia and his wife was present over the phone to discuss management:     Dementia: Seroquel 25 mg 6X a day (prescribed by his psychiatrist Dr. Austyn Simon), Donepezil 10 mg (prescribed by psychiatrist), Memenatine 10 mg BID     Depression: Bupropion 100 mg BID, Sertraline 100 mg once a day     Seasonal Allergies: Cetrizine 10 mg daily     CHF: Carvidevol 12.5 mg BID, Bumex 1 mg daily, Potassium ER 2 meq once a day, Isosorbide 90 mg BID    BPH: Flomax 0.4 mg once a day     HX of multiple MIs/CAD: Atorvastatin 20 mg daily, ASA 81 mg, Clopidogrel 75 mg once daily    HTN: Amlodipine 5 mg daily     COPD: Albuterol q4hr PRN  & Combivent  mcg PRN, needs nebulizer     Diaper refills: Home care delivery: [phone], size small, 3 months supply     Sade Caro is his cardiologist   Dr. Austyn Simon is psychiatrist     Review of Systems   Review of Systems   Unable to perform ROS: Dementia     History   Patients past medical, surgical and family histories were personally reviewed and updated. No past medical history on file. No past surgical history on file. No family history on file.   Social History     Socioeconomic History    Marital status:      Spouse name: Not on file    Number of children: Not on file    Years of education: Not on file    Highest education level: Not on file Occupational History    Not on file   Social Needs    Financial resource strain: Not on file    Food insecurity     Worry: Not on file     Inability: Not on file    Transportation needs     Medical: Not on file     Non-medical: Not on file   Tobacco Use    Smoking status: Former Smoker     Packs/day: 2.00     Years: 30.00     Pack years: 60.00    Smokeless tobacco: Never Used   Substance and Sexual Activity    Alcohol use: Never     Frequency: Never    Drug use: Never    Sexual activity: Not Currently   Lifestyle    Physical activity     Days per week: Not on file     Minutes per session: Not on file    Stress: Not on file   Relationships    Social connections     Talks on phone: Not on file     Gets together: Not on file     Attends Adventism service: Not on file     Active member of club or organization: Not on file     Attends meetings of clubs or organizations: Not on file     Relationship status: Not on file    Intimate partner violence     Fear of current or ex partner: Not on file     Emotionally abused: Not on file     Physically abused: Not on file     Forced sexual activity: Not on file   Other Topics Concern    Not on file   Social History Narrative    Not on file       Vitals/Objective:     General: Patient unable to contribute due to dementia      Due to this being a Virtual Check-in/Telephone evaluation, many elements of the physical examination are unable to be assessed. Assessment and Plan:     Chronic obstructive pulmonary disease, unspecified COPD type ([facility]  - albuterol (PROVENTIL VENTOLIN) 2.5 mg /3 mL (0.083 %) nebu; 3 mL by Nebulization route every four (4) hours as needed for Wheezing. Dispense: 30 Nebule;  Refill: 4  - Will call medical supply company for nebulizer    Stage 3 chronic kidney disease ([facility]  - METABOLIC PANEL, BASIC; Future    Alzheimer's dementia without behavioral disturbance, unspecified timing of dementia onset ([facility]  - Called home care delivery --> will fax form for medical necessity     Type 2 diabetes mellitus with other specified complication, unspecified whether long term insulin use ([facility]  Pt was started of insulin during recent discharge from [facility] but his last A1c was only 5.5. Will recheck  - HEMOGLOBIN A1C WITH EAG; Future    Environmental and seasonal allergies  - cetirizine (ZYRTEC) 10 mg tablet; Take 1 Tab by mouth daily. Dispense: 90 Tab; Refill: 2    Coronary artery disease involving native coronary artery without angina pectoris, unspecified whether native or transplanted heart  - clopidogreL (PLAVIX) 75 mg tab; TAKE ONE TABLET BY MOUTH ONE TIME DAILY  Dispense: 30 Tab; Refill: 3  - Discussed with wife to check with cardiology if patient needs to be on plavix at this time     We discussed the expected course, resolution and complications of the diagnosis(es) in detail. Medication risks, benefits, costs, interactions, and alternatives were discussed as indicated. I advised him to contact the office if his condition worsens, changes or fails to improve as anticipated. He expressed understanding with the diagnosis(es) and plan. Patient understands that this encounter was a temporary measure, and the importance of further follow up and examination was emphasized. Patient verbalized understanding. Patient informed to follow up: for lab visit     I affirm this is a Patient Initiated Episode with an Established Patient who has not had a related appointment within my department in the past 7 days or scheduled within the next 24 hours. Note: not billable if this call serves to triage the patient into an appointment for the relevant concern    Electronically Signed: Todd Hussein DO    Providers location when delivering service: Home     CPT:  30296 (11-20 minutes)        ICD-10-CM ICD-9-CM    1. Depression, unspecified depression type  F32.9 311    2. Essential hypertension  I10 401.9    3.  Chronic obstructive pulmonary disease, unspecified COPD type ([facility]  J44.9 496    4. Stage 3 chronic kidney disease (HCC)  N18.3 585.3    5. Alzheimer's dementia without behavioral disturbance, unspecified timing of dementia onset ([facility]  G30.9 331.0     F02.80 294.10        Pursuant to the emergency declaration under the [facility] [place] [address] [facility] authority and the [employer] Act, this Virtual  Visit was conducted, with patient's consent, to reduce the patient's risk of exposure to COVID-19 and provide continuity of care for an established patient. Current Medications/Allergies   Medications and Allergies reviewed:    Current Outpatient Medications   Medication Sig Dispense Refill    buPROPion (WELLBUTRIN) 100 mg tablet TAKE ONE TABLET BY MOUTH TWICE A  Tab 0    sertraline (ZOLOFT) 100 mg tablet TAKE ONE TABLET BY MOUTH ONE TIME DAILY 90 Tab 0    amLODIPine (NORVASC) 5 mg tablet TAKE ONE TABLET BY MOUTH ONE TIME DAILY 90 Tab 1    isosorbide mononitrate ER (IMDUR) 30 mg tablet TAKE THREE TABLETS BY MOUTH TWICE A  Tab 1    potassium chloride (K-DUR, KLOR-CON) 20 mEq tablet TAKE ONE TABLET BY MOUTH ONE TIME DAILY 90 Tab 3    Blood-Glucose Meter monitoring kit One Touch Ultra Meter 1 Kit 0    albuterol (PROVENTIL VENTOLIN) 2.5 mg /3 mL (0.083 %) nebu 3 mL by Nebulization route every four (4) hours as needed for Wheezing. ICD10: J44.9 180 Nebule 2    fluticasone propionate (FLONASE) 50 mcg/actuation nasal spray SHAKE LIQUID AND USE 1 SPRAY IN EACH NOSTRIL DAILY 48 g 0    clopidogreL (PLAVIX) 75 mg tab TAKE ONE TABLET BY MOUTH ONE TIME DAILY 30 Tab 3    calcium-cholecalciferol, d3, (CALCIUM 600 + D) 600-125 mg-unit tab Take 600 mg by mouth daily. 90 Tab 3    Blood-Glucose Meter monitoring kit Please check blood glucose once a day in morning 1 Kit 0    bumetanide (BUMEX) 1 mg tablet Take  by mouth daily.       fluticasone propion-salmeterol (AIRDUO RESPICLICK) 35-80 mcg/actuation aepb Take 1 Inhalation by inhalation two (2) times a day. 1 Each 5    memantine (NAMENDA) 10 mg tablet Take 1 Tab by mouth two (2) times a day. 180 Tab 3    carvedilol (COREG) 12.5 mg tablet Take 1 Tab by mouth two (2) times daily (with meals). 60 Tab 2    COMBIVENT RESPIMAT  mcg/actuation inhaler INHALE ONE PUFF BY MOUTH FOUR TIMES A DAY 4 g 3    albuterol (PROVENTIL HFA, VENTOLIN HFA, PROAIR HFA) 90 mcg/actuation inhaler Take 2 Puffs by inhalation every four (4) hours as needed for Wheezing. 1 Inhaler 4    cetirizine (ZYRTEC) 10 mg tablet Take 1 Tab by mouth daily. 90 Tab 2    tamsulosin (FLOMAX) 0.4 mg capsule Take 1 Cap by mouth daily. 90 Cap 2    EZFE 200 200 mg iron cap TAKE ONE CAPSULE BY MOUTH ONE TIME DAILY 90 Cap 1    atorvastatin (LIPITOR) 20 mg tablet TAKE ONE TABLET BY MOUTH ONE TIME DAILY  1    donepezil (ARICEPT) 10 mg tablet TAKE ONE TABLET BY MOUTH AT BEDTIME  6    QUEtiapine (SEROQUEL) 25 mg tablet TAKE ONE TABLET BY MOUTH EVERY MORNING, TAKE TWO TABLETS BY MOUTH ONE TIME DAILY AT LUNCH, TAKE THREE TABLETS BY MOUTH AT BEDTIME  6    raNITIdine (ZANTAC) 150 mg tablet TAKE ONE TABLET BY MOUTH TWICE A DAY  1    aspirin delayed-release 81 mg tablet Take  by mouth daily.  cholecalciferol, vitamin D3, (VITAMIN D3) 2,000 unit tab Take  by mouth.  metFORMIN ER (GLUCOPHAGE XR) 500 mg tablet Take 1 Tab by mouth daily (with dinner). 30 Tab 0    metFORMIN (GLUCOPHAGE) 1,000 mg tablet TAKE ONE TABLET BY MOUTH TWICE A DAY 60 Tab 0    LORazepam (ATIVAN) 0.5 mg tablet Take 0.5 mg by mouth as needed for Anxiety.  furosemide (LASIX) 20 mg tablet Take  by mouth daily.       varicella-zoster recombinant, PF, (SHINGRIX, PF,) 50 mcg/0.5 mL susr injection 0.5mL by IntraMUSCular route once now and then repeat in 2-6 months 0.5 mL 1     Allergies   Allergen Reactions    Adempas [Riociguat] Unknown (comments)    Ativan [Lorazepam] Other (comments) Altered LOC  Patient able to tolerate 0.5mg    Codeine Unknown (comments)    Dilaudid [Hydromorphone] Unknown (comments)    Lisinopril Cough

## 2020-08-07 NOTE — TELEPHONE ENCOUNTER
Per call from [facility],    Patient has medicare part B.  A Diagnosis code is needed      albuterol (PROVENTIL VENTOLIN) 2.5 mg /3 mL (0.083 %) nebu         Call [phone]

## 2020-08-07 NOTE — PROGRESS NOTES
[address] Medicine Residency Attending Addendum:  Dr. Todd Hussein DO,  the patient and I were not physically present during this encounter. The resident and I are concurrently monitoring the patient care through appropriate telecommunication technology. I discussed the findings, assessment and plan with the resident and agree with the resident's findings and plan as documented in the resident's note.       Adama Gonzalez MD

## 2020-08-10 NOTE — TELEPHONE ENCOUNTER
I spoke with the patient himself, let him know that I called [facility] and the nebulizer medication is there and ready. The other message I do not see it being completed:    Dr. Migue Mcallister   Received: Today   Message Contents   [facility] Office    Phone Number: [phone]               Caller (if not patient): Delayne Keto   Relationship of caller (if not patient): wife   Best contact number(s): [phone]   Name of medication and dosage if known: Diapers size small   Is patient out of this medication (yes/no): yes   Pharmacy name: [address] listed in chart? (yes/no): no   Pharmacy phone number and fax: [phone]   Date of last visit: 8/7/20   Details to clarify the request: They are waiting on a call for this prescription to be sent in. She had an appointment about this 8/7 and it still hasn't been sent in and he is out of diapers.

## 2020-08-10 NOTE — TELEPHONE ENCOUNTER
----- Message from Lb Vega sent at 8/10/2020  1:41 PM EDT -----  Regarding: Dr. Priyanka Ruiz: [phone] (if not patient): Sagar Vargas  Relationship of caller (if not patient): wife  Best contact number(s): [phone]  Name of medication and dosage if known: Diapers size small  Is patient out of this medication (yes/no): yes  Pharmacy name: [address] [place] [address] listed in chart? (yes/no): no  Pharmacy phone number and fax: [phone]  Date of last visit: 8/7/20  Details to clarify the request: They are waiting on a call for this prescription to be sent in. She had an appointment about this 8/7 and it still hasn't been sent in and he is out of diapers.

## 2020-08-10 NOTE — TELEPHONE ENCOUNTER
I discussed the albuterol scripts with the pharmacist today and they should be ready to . For the diapers- I provided the home care agency with my [employer] fax no on the day of the visit 8/7) and I still have not received any paperwork to fill out. He was not signed up for their medicare delivery program so this paperwork needs to be filled out prior to the diapers being ordered. I on working night shift this week but will call them again tomorrow afternoon when able. Thanks.

## 2020-08-13 ENCOUNTER — HOSPITAL ENCOUNTER (OUTPATIENT)
Dept: LAB | Age: 80
Discharge: HOME | End: 2020-08-13
Payer: MEDICARE

## 2020-08-13 ENCOUNTER — APPOINTMENT (OUTPATIENT)
Dept: FAMILY MEDICINE CLINIC | Age: 80
End: 2020-08-13

## 2020-08-13 DIAGNOSIS — N18.30 STAGE 3 CHRONIC KIDNEY DISEASE (HCC): ICD-10-CM

## 2020-08-13 DIAGNOSIS — E11.69 TYPE 2 DIABETES MELLITUS WITH OTHER SPECIFIED COMPLICATION, UNSPECIFIED WHETHER LONG TERM INSULIN USE (HCC): ICD-10-CM

## 2020-08-13 PROBLEM — E78.5 HYPERLIPIDEMIA: Status: ACTIVE | Noted: 2020-08-13

## 2020-08-13 LAB
ANION GAP SERPL CALC-SCNC: 11 MMOL/L (ref 5–15)
BUN SERPL-MCNC: 38 MG/DL (ref 6–20)
BUN/CREAT SERPL: 17 (ref 12–20)
CALCIUM SERPL-MCNC: 8.6 MG/DL (ref 8.5–10.1)
CHLORIDE SERPL-SCNC: 111 MMOL/L (ref 97–108)
CO2 SERPL-SCNC: 20 MMOL/L (ref 21–32)
CREAT SERPL-MCNC: 2.21 MG/DL (ref 0.7–1.3)
EST. AVERAGE GLUCOSE BLD GHB EST-MCNC: 117 MG/DL
GLUCOSE SERPL-MCNC: 239 MG/DL (ref 65–100)
HBA1C MFR BLD: 5.7 % (ref 4–5.6)
POTASSIUM SERPL-SCNC: 4.7 MMOL/L (ref 3.5–5.1)
SODIUM SERPL-SCNC: 142 MMOL/L (ref 136–145)

## 2020-08-13 PROCEDURE — 83036 HEMOGLOBIN GLYCOSYLATED A1C: CPT

## 2020-08-13 PROCEDURE — 80048 BASIC METABOLIC PNL TOTAL CA: CPT

## 2020-08-13 PROCEDURE — 36415 COLL VENOUS BLD VENIPUNCTURE: CPT

## 2020-08-17 ENCOUNTER — TELEPHONE (OUTPATIENT)
Dept: FAMILY MEDICINE CLINIC | Age: 80
End: 2020-08-17

## 2020-08-17 NOTE — TELEPHONE ENCOUNTER
Patient came in as a walk in to state that she called the home care delivery and they stated that they did receive a fax but it was not completely filled out. The patients spouse stated specially Item 4 to be filled. She states that she is tired of buying diapers and she needs that prescription To be completely filled out and faxed to home care delivery.

## 2020-08-17 NOTE — PROGRESS NOTES
Discussed A1c of 5.7 with patient's wife. Per wife his glucose has been ranging around 140s fasting and 200s prior to eating due to which she has been giving him insulin per sliding scale. Currently patient is on prednisone which might be elevating glucose. Pt with CKD which would possibly be leading to falsely low a1c? Advised patient's wife to keep a log of blood glucose starting next week (once pred is completed) for fasting and 2 hrs PP. Patient to schedule appointment with this log to further manage diabetes.

## 2020-08-18 ENCOUNTER — TELEPHONE (OUTPATIENT)
Dept: FAMILY MEDICINE CLINIC | Age: 80
End: 2020-08-18

## 2020-08-18 NOTE — TELEPHONE ENCOUNTER
Insurance company requiring further explanation on why diapers are required. Filled out further paperwork stating the pt has incontinence which will lead to skin breakdown therefore requiring diapers. Can you please call wife and make her aware that I have now faxed more paperwork and that I understand that she is frustrated but it usually requires 1-2 weeks for a new medical supply to be approved by insurance.

## 2020-08-18 NOTE — TELEPHONE ENCOUNTER
I tried calling patient to let him know that Emi Vanessa recieved a message in her [employer] fax outbox with section 4 filled out and signed of the home care delivery prescription for diapers. The doctor will call the company today to see what else is needed and address it. If she calls back please let her know.

## 2020-09-18 DIAGNOSIS — J30.89 ENVIRONMENTAL AND SEASONAL ALLERGIES: ICD-10-CM

## 2020-09-23 RX ORDER — FLUTICASONE PROPIONATE 50 MCG
SPRAY, SUSPENSION (ML) NASAL
Qty: 48 G | Refills: 0 | Status: SHIPPED | OUTPATIENT
Start: 2020-09-23 | End: 2021-03-24 | Stop reason: SDUPTHER

## 2020-09-30 DIAGNOSIS — I10 ESSENTIAL HYPERTENSION: ICD-10-CM

## 2020-09-30 DIAGNOSIS — I25.10 CORONARY ARTERY DISEASE INVOLVING NATIVE CORONARY ARTERY WITHOUT ANGINA PECTORIS, UNSPECIFIED WHETHER NATIVE OR TRANSPLANTED HEART: ICD-10-CM

## 2020-09-30 DIAGNOSIS — F32.A DEPRESSION, UNSPECIFIED DEPRESSION TYPE: ICD-10-CM

## 2020-09-30 RX ORDER — SERTRALINE HYDROCHLORIDE 100 MG/1
TABLET, FILM COATED ORAL
Qty: 90 TAB | Refills: 0 | OUTPATIENT
Start: 2020-09-30

## 2020-09-30 RX ORDER — BUPROPION HYDROCHLORIDE 100 MG/1
TABLET ORAL
Qty: 180 TAB | Refills: 0 | OUTPATIENT
Start: 2020-09-30

## 2020-09-30 RX ORDER — BUPROPION HYDROCHLORIDE 100 MG/1
TABLET ORAL
Qty: 180 TAB | Refills: 4 | Status: SHIPPED | OUTPATIENT
Start: 2020-09-30 | End: 2021-12-21

## 2020-09-30 RX ORDER — SERTRALINE HYDROCHLORIDE 100 MG/1
TABLET, FILM COATED ORAL
Qty: 90 TAB | Refills: 4 | Status: SHIPPED | OUTPATIENT
Start: 2020-09-30 | End: 2021-12-21

## 2020-09-30 RX ORDER — ISOSORBIDE MONONITRATE 30 MG/1
TABLET, EXTENDED RELEASE ORAL
Qty: 360 TAB | Refills: 1 | OUTPATIENT
Start: 2020-09-30

## 2020-09-30 RX ORDER — CLOPIDOGREL BISULFATE 75 MG/1
TABLET ORAL
Qty: 30 TAB | Refills: 3 | Status: SHIPPED | OUTPATIENT
Start: 2020-09-30 | End: 2020-12-29

## 2020-09-30 NOTE — TELEPHONE ENCOUNTER
Fuentes Arora is this patient now following Rebecca? I don't want to keep sending this stuff to her and our team if they decided to follow someone else?

## 2020-10-01 RX ORDER — ISOSORBIDE MONONITRATE 30 MG/1
TABLET, EXTENDED RELEASE ORAL
Qty: 360 TAB | Refills: 1 | Status: SHIPPED | OUTPATIENT
Start: 2020-10-01 | End: 2020-12-29

## 2020-10-12 ENCOUNTER — TRANSCRIBE ORDER (OUTPATIENT)
Dept: SCHEDULING | Age: 80
End: 2020-10-12

## 2020-10-12 ENCOUNTER — HOSPITAL ENCOUNTER (OUTPATIENT)
Dept: MRI IMAGING | Age: 80
Discharge: HOME | End: 2020-10-12
Attending: NURSE PRACTITIONER
Payer: MEDICARE

## 2020-10-12 DIAGNOSIS — M54.16 LUMBAR RADICULOPATHY: Primary | ICD-10-CM

## 2020-10-12 DIAGNOSIS — M54.16 LUMBAR RADICULOPATHY: ICD-10-CM

## 2020-10-12 PROCEDURE — 72148 MRI LUMBAR SPINE W/O DYE: CPT

## 2020-10-12 RX ORDER — BUMETANIDE 1 MG/1
TABLET ORAL DAILY
OUTPATIENT
Start: 2020-10-12

## 2020-10-13 RX ORDER — BUMETANIDE 1 MG/1
1 TABLET ORAL DAILY
Qty: 90 TAB | Refills: 1 | Status: SHIPPED | OUTPATIENT
Start: 2020-10-13

## 2020-10-14 ENCOUNTER — TRANSCRIBE ORDER (OUTPATIENT)
Dept: SCHEDULING | Age: 80
End: 2020-10-14

## 2020-10-14 RX ORDER — TAMSULOSIN HYDROCHLORIDE 0.4 MG/1
0.4 CAPSULE ORAL DAILY
Qty: 90 CAP | Refills: 2 | Status: SHIPPED | OUTPATIENT
Start: 2020-10-14 | End: 2021-06-24 | Stop reason: SDUPTHER

## 2020-10-14 NOTE — PROGRESS NOTES
Sent the script for test strips (One touch) per patient request as the Rx should be given by the attending physician.   11:22 AM  10/14/2020  Nawaf Dumont MD

## 2020-12-15 NOTE — TELEPHONE ENCOUNTER
----- Message from Nawaf Stevens sent at 12/14/2020  1:22 PM EST -----  Regarding: Dr. Destinee Colindres refill  Medication Refill    Caller (if not patient): José Miguel Miller       Relationship of caller (if not patient): Spouse       Best contact number(s): [phone] or [phone]      Name of medication and dosage if known: glucose blood VI test strips      Is patient out of this medication (yes/no): yes       Pharmacy name: [address] listed in chart? (yes/no): yes      Pharmacy phone number: [phone]       Details to clarify the request: Pt needs PCP to send rx w/ diabetes code so insurance will cover the medication       Nawaf Stevens

## 2020-12-15 NOTE — TELEPHONE ENCOUNTER
Good morning ,  I received a message stating that the patient is requesting a refill for:    glucose blood VI test strips (ASCENSIA AUTODISC VI, ONE TOUCH ULTRA TEST VI) strip   To be sent to the pharmacy. The patient states he wants you to put the diagnosis code of diabetes with the prescription when you send it to the pharmacy. Patient states this way the insurance will cover it. Can you please help me with this?     Thank you

## 2020-12-16 ENCOUNTER — TELEPHONE (OUTPATIENT)
Dept: FAMILY MEDICINE CLINIC | Age: 80
End: 2020-12-16

## 2020-12-16 DIAGNOSIS — E11.69 TYPE 2 DIABETES MELLITUS WITH OTHER SPECIFIED COMPLICATION, UNSPECIFIED WHETHER LONG TERM INSULIN USE (HCC): Primary | ICD-10-CM

## 2020-12-16 NOTE — TELEPHONE ENCOUNTER
Dr. Chrissy Murillo: Halina Haddad, [address] [facility] [address] [facility]             Caller's first and last name: Rosa Maria/Kaylyn Luong   Reason for call: need dx   Callback required yes/no and why: yes   Best contact number(s): [phone]   Details to clarify the request: Ms. Todd Bang is needing dx for test strips.

## 2020-12-16 NOTE — TELEPHONE ENCOUNTER
----- Message from Krishna Carmona sent at 12/11/2020 11:18 AM EST -----  Regarding: /Telephone    General Message/Vendor Calls    Caller's first and last name: Mrs. Gypsy Gamble      Reason for call: New script request for diapers      Callback required yes/no and why: Yes to advise script has been sent      Best contact number(s): [phone]      Details to clarify the request: Pt's wife called back in today to advise the previous request for diapers for the pt has tho have the size changed from Small to Med. The DME company is requesting a new script that says medium diapers.  Mrs. Roman Gemini did not have information on [employer] company but it is the same one that has been used in the past.       Krishna Carmona

## 2020-12-29 DIAGNOSIS — I10 ESSENTIAL HYPERTENSION: ICD-10-CM

## 2020-12-29 DIAGNOSIS — I25.10 CORONARY ARTERY DISEASE INVOLVING NATIVE CORONARY ARTERY WITHOUT ANGINA PECTORIS, UNSPECIFIED WHETHER NATIVE OR TRANSPLANTED HEART: ICD-10-CM

## 2020-12-29 RX ORDER — ISOSORBIDE MONONITRATE 30 MG/1
TABLET, EXTENDED RELEASE ORAL
Qty: 360 TAB | Refills: 1 | Status: SHIPPED | OUTPATIENT
Start: 2020-12-29 | End: 2021-06-24

## 2020-12-29 RX ORDER — AMLODIPINE BESYLATE 5 MG/1
TABLET ORAL
Qty: 90 TAB | Refills: 1 | OUTPATIENT
Start: 2020-12-29

## 2020-12-29 RX ORDER — CLOPIDOGREL BISULFATE 75 MG/1
TABLET ORAL
Qty: 30 TAB | Refills: 3 | Status: SHIPPED | OUTPATIENT
Start: 2020-12-29 | End: 2021-03-26

## 2020-12-29 RX ORDER — AMLODIPINE BESYLATE 5 MG/1
TABLET ORAL
Qty: 90 TAB | Refills: 1 | Status: SHIPPED | OUTPATIENT
Start: 2020-12-29 | End: 2021-06-24

## 2021-01-06 NOTE — PROGRESS NOTES
History of Present Illness:     Consent:  Patient and/or health care decision maker is aware that that she may receive a bill for this telephone service, depending on her insurance coverage, and has provided verbal consent to proceed: Yes    Patient was evaluated by telephone from home     Chief Complaint   Patient presents with    Follow Up Chronic Condition     John Boyd is a 68 y.o. male. Pt's wife is present to assist and coordinate as patient has dementia . Adult diapers- Pt's wife states that she has been trying to get diaper prescription filled but has had no luck. She states she has called multiple times. She says medicare pays for them but she is having to cover as they have been ordered for the incorrect size (She needs a medium). Home care delivery: [phone], 3 months supply. COPD- Pt needs a new nebulizer machine. Albuterol q4hr PRN  & Combivent  mcg PRN    [place]- Pt has had 3 heart attacks since his last visit in August. All were at [facility]. We do not have any records in our system. Diabetes?- A1c in 09/2020- 5.7. Does not have any elevated A1c on file. Per wife when pt was admitted to [facility] it was noted that he has elevated glucose and he was started on SSI. Wife is currently administering SSI with meals. She shares some values with me today- , 159, 195, 173, 161, 165, 179. Bedtime 221, 165. Prior to meals 161. CKD4: Follows with Dr. Chirag Ludwig. Requires blood work prior to visit with him on 1.13. Wife unsure of what blood work is required.      PMH (REVIEWED):  No past medical history on file. Current Medications/Allergies (REVIEWED):     Current Outpatient Medications on File Prior to Visit   Medication Sig Dispense Refill    albuterol (PROVENTIL VENTOLIN) 2.5 mg /3 mL (0.083 %) nebu 3 mL by Nebulization route every four (4) hours as needed for Wheezing.  ICD10: J44.9 180 Nebule 2    albuterol (PROVENTIL VENTOLIN) 2.5 mg /3 mL (0.083 %) nebu 3 mL by Nebulization route every four (4) hours as needed for Wheezing. 30 Nebule 4    albuterol (PROVENTIL HFA, VENTOLIN HFA, PROAIR HFA) 90 mcg/actuation inhaler Take 2 Puffs by inhalation every four (4) hours as needed for Wheezing. 1 Inhaler 4    isosorbide mononitrate ER (IMDUR) 30 mg tablet TAKE THREE TABLETS BY MOUTH TWICE A  Tab 1    clopidogreL (PLAVIX) 75 mg tab TAKE ONE TABLET BY MOUTH ONE TIME DAILY 30 Tab 3    amLODIPine (NORVASC) 5 mg tablet TAKE ONE TABLET BY MOUTH ONE TIME DAILY 90 Tab 1    glucose blood VI test strips (ASCENSIA AUTODISC VI, ONE TOUCH ULTRA TEST VI) strip Use 4 times a day as directed to check blood glucose levels. 100 Strip 3    tamsulosin (FLOMAX) 0.4 mg capsule Take 1 Cap by mouth daily. 90 Cap 2    bumetanide (BUMEX) 1 mg tablet Take 1 Tab by mouth daily. 90 Tab 1    buPROPion (WELLBUTRIN) 100 mg tablet TAKE ONE TABLET BY MOUTH TWICE A  Tab 4    sertraline (ZOLOFT) 100 mg tablet TAKE ONE TABLET BY MOUTH ONE TIME DAILY 90 Tab 4    fluticasone propionate (FLONASE) 50 mcg/actuation nasal spray SHAKE LIQUID AND USE 1 SPRAY IN EACH NOSTRIL DAILY 48 g 0    Nebulizer & Compressor machine 1 Each by Does Not Apply route as needed for Wheezing or Shortness of Breath. 1 Each 0    Nebulizer Accessories kit For albuterol q4-8hrs PRN 1 Kit 0    cetirizine (ZYRTEC) 10 mg tablet Take 1 Tab by mouth daily. 90 Tab 2    potassium chloride (K-DUR, KLOR-CON) 20 mEq tablet TAKE ONE TABLET BY MOUTH ONE TIME DAILY 90 Tab 3    Blood-Glucose Meter monitoring kit One Touch Ultra Meter 1 Kit 0    calcium-cholecalciferol, d3, (CALCIUM 600 + D) 600-125 mg-unit tab Take 600 mg by mouth daily. 90 Tab 3    Blood-Glucose Meter monitoring kit Please check blood glucose once a day in morning 1 Kit 0    metFORMIN ER (GLUCOPHAGE XR) 500 mg tablet Take 1 Tab by mouth daily (with dinner).  30 Tab 0    fluticasone propion-salmeterol (AIRDUO RESPICLICK) 34-01 mcg/actuation aepb Take 1 Inhalation by inhalation two (2) times a day. 1 Each 5    metFORMIN (GLUCOPHAGE) 1,000 mg tablet TAKE ONE TABLET BY MOUTH TWICE A DAY 60 Tab 0    memantine (NAMENDA) 10 mg tablet Take 1 Tab by mouth two (2) times a day. 180 Tab 3    carvedilol (COREG) 12.5 mg tablet Take 1 Tab by mouth two (2) times daily (with meals). 60 Tab 2    COMBIVENT RESPIMAT  mcg/actuation inhaler INHALE ONE PUFF BY MOUTH FOUR TIMES A DAY 4 g 3    LORazepam (ATIVAN) 0.5 mg tablet Take 0.5 mg by mouth as needed for Anxiety.  furosemide (LASIX) 20 mg tablet Take  by mouth daily.  varicella-zoster recombinant, PF, (SHINGRIX, PF,) 50 mcg/0.5 mL susr injection 0.5mL by IntraMUSCular route once now and then repeat in 2-6 months 0.5 mL 1    EZFE 200 200 mg iron cap TAKE ONE CAPSULE BY MOUTH ONE TIME DAILY 90 Cap 1    atorvastatin (LIPITOR) 20 mg tablet TAKE ONE TABLET BY MOUTH ONE TIME DAILY  1    donepezil (ARICEPT) 10 mg tablet TAKE ONE TABLET BY MOUTH AT BEDTIME  6    QUEtiapine (SEROQUEL) 25 mg tablet TAKE ONE TABLET BY MOUTH EVERY MORNING, TAKE TWO TABLETS BY MOUTH ONE TIME DAILY AT LUNCH, TAKE THREE TABLETS BY MOUTH AT BEDTIME  6    raNITIdine (ZANTAC) 150 mg tablet TAKE ONE TABLET BY MOUTH TWICE A DAY  1    aspirin delayed-release 81 mg tablet Take  by mouth daily.  cholecalciferol, vitamin D3, (VITAMIN D3) 2,000 unit tab Take  by mouth. No current facility-administered medications on file prior to visit.         Allergies   Allergen Reactions    Adempas [Riociguat] Unknown (comments)    Ativan [Lorazepam] Other (comments)     Altered LOC  Patient able to tolerate 0.5mg    Codeine Unknown (comments)    Dilaudid [Hydromorphone] Unknown (comments)    Lisinopril Cough         Review of Systems:     Review of Systems   Unable to perform ROS: Dementia      Objective:     General: alert, cooperative, no distress, talking in full sentences    Due to this being a TeleHealth evaluation, many elements of the physical examination are unable to be assessed. Assessment and Plan:     Type 2 diabetes mellitus with other specified complication, unspecified whether long term insulin use (HCC)  - glucose blood VI test strips (ASCENSIA AUTODISC VI, ONE TOUCH ULTRA TEST VI) strip; Use 4 times a day as directed to check blood glucose levels. Dispense: 100 Strip; Refill: 3 --> talked to pharmacist, they will fill a script that was filled last month by Dr. Lisa Parham as she is medicare certified.   - HEMOGLOBIN A1C WITH EAG; Future    Chronic obstructive pulmonary disease, unspecified COPD type ([facility] [address]  - Nebulizer & Compressor machine; 1 Each by Does Not Apply route as needed for Wheezing or Shortness of Breath. Dispense: 1 Each; Refill: 0 --> discussed with pharmacist who states they will have a neb ready for     3. CKD (chronic kidney disease) stage 4, GFR 15-29 ml/min ([facility])  - Message sent to Dr. Jay Brunner (pt's nephrologist) to ask which labs he would like drawn so they can be performed at our clinic prior to appointment on 1/13     4. Other urinary incontinence  - Called and discussed with home care company- they will fax the paperwork to my personal [employer] account. Follow up: for blood work     García Valentine, DO    Time spent in direct conversation with the patient to include medical condition(s) discussed, assessment and treatment plan:    We discussed the expected course, resolution and complications of the diagnosis(es) in detail. Medication risks, benefits, costs, interactions, and alternatives were discussed as indicated. I advised her to contact the office if her condition worsens, changes or fails to improve as anticipated. She expressed understanding with the diagnosis(es) and plan. Patient understands that this encounter was a temporary measure, and the importance of further follow up and examination was emphasized. Patient verbalized understanding.        Patient informed to follow up: for blood work    Time spent: 11-20 mins    Electronically Signed: Natasha Montgomery DO    Providers location when delivering service (clinic, hospital, home): Home

## 2021-01-08 ENCOUNTER — VIRTUAL VISIT (OUTPATIENT)
Dept: FAMILY MEDICINE CLINIC | Age: 81
End: 2021-01-08
Payer: MEDICARE

## 2021-01-08 DIAGNOSIS — N39.498 OTHER URINARY INCONTINENCE: ICD-10-CM

## 2021-01-08 DIAGNOSIS — N18.4 CKD (CHRONIC KIDNEY DISEASE) STAGE 4, GFR 15-29 ML/MIN (HCC): ICD-10-CM

## 2021-01-08 DIAGNOSIS — E11.69 TYPE 2 DIABETES MELLITUS WITH OTHER SPECIFIED COMPLICATION, UNSPECIFIED WHETHER LONG TERM INSULIN USE (HCC): Primary | ICD-10-CM

## 2021-01-08 DIAGNOSIS — J44.9 CHRONIC OBSTRUCTIVE PULMONARY DISEASE, UNSPECIFIED COPD TYPE (HCC): ICD-10-CM

## 2021-01-08 PROCEDURE — 99442 PR PHYS/QHP TELEPHONE EVALUATION 11-20 MIN: CPT | Performed by: STUDENT IN AN ORGANIZED HEALTH CARE EDUCATION/TRAINING PROGRAM

## 2021-01-08 RX ORDER — NEBULIZER AND COMPRESSOR
1 EACH MISCELLANEOUS AS NEEDED
Qty: 1 EACH | Refills: 0 | Status: SHIPPED | OUTPATIENT
Start: 2021-01-08

## 2021-01-08 NOTE — PROGRESS NOTES
I reviewed the patient's medical history, the patient's diagnoses, and treatment plan as documented in the resident note. I concur with the treatment plan as documented.

## 2021-01-21 ENCOUNTER — TELEPHONE (OUTPATIENT)
Dept: FAMILY MEDICINE CLINIC | Age: 81
End: 2021-01-21

## 2021-01-21 DIAGNOSIS — E11.69 TYPE 2 DIABETES MELLITUS WITH OTHER SPECIFIED COMPLICATION, UNSPECIFIED WHETHER LONG TERM INSULIN USE (HCC): Primary | ICD-10-CM

## 2021-01-21 NOTE — TELEPHONE ENCOUNTER
Attempted to call patient to make her aware that the DME company has received my paperwork and just need patient to call back so they can confirm the address but patient's wife (POA) did not answer. VM left. Also added fructosamine to blood work as patient's A1c might be falsely low as per my last office note.

## 2021-01-28 ENCOUNTER — HOSPITAL ENCOUNTER (OUTPATIENT)
Dept: LAB | Age: 81
Discharge: HOME | End: 2021-01-28

## 2021-01-28 ENCOUNTER — OFFICE VISIT (OUTPATIENT)
Dept: FAMILY MEDICINE CLINIC | Age: 81
End: 2021-01-28
Payer: MEDICARE

## 2021-01-28 VITALS
BODY MASS INDEX: 30.65 KG/M2 | TEMPERATURE: 98.4 F | RESPIRATION RATE: 24 BRPM | HEART RATE: 56 BPM | HEIGHT: 63 IN | OXYGEN SATURATION: 94 % | DIASTOLIC BLOOD PRESSURE: 64 MMHG | WEIGHT: 173 LBS | SYSTOLIC BLOOD PRESSURE: 123 MMHG

## 2021-01-28 DIAGNOSIS — R05.9 COUGH: ICD-10-CM

## 2021-01-28 DIAGNOSIS — J44.9 CHRONIC OBSTRUCTIVE PULMONARY DISEASE, UNSPECIFIED COPD TYPE (HCC): Primary | ICD-10-CM

## 2021-01-28 DIAGNOSIS — E11.69 TYPE 2 DIABETES MELLITUS WITH OTHER SPECIFIED COMPLICATION, UNSPECIFIED WHETHER LONG TERM INSULIN USE (HCC): ICD-10-CM

## 2021-01-28 PROCEDURE — G0463 HOSPITAL OUTPT CLINIC VISIT: HCPCS | Performed by: STUDENT IN AN ORGANIZED HEALTH CARE EDUCATION/TRAINING PROGRAM

## 2021-01-28 PROCEDURE — G8754 DIAS BP LESS 90: HCPCS | Performed by: STUDENT IN AN ORGANIZED HEALTH CARE EDUCATION/TRAINING PROGRAM

## 2021-01-28 PROCEDURE — G8427 DOCREV CUR MEDS BY ELIG CLIN: HCPCS | Performed by: STUDENT IN AN ORGANIZED HEALTH CARE EDUCATION/TRAINING PROGRAM

## 2021-01-28 PROCEDURE — G8752 SYS BP LESS 140: HCPCS | Performed by: STUDENT IN AN ORGANIZED HEALTH CARE EDUCATION/TRAINING PROGRAM

## 2021-01-28 PROCEDURE — G8417 CALC BMI ABV UP PARAM F/U: HCPCS | Performed by: STUDENT IN AN ORGANIZED HEALTH CARE EDUCATION/TRAINING PROGRAM

## 2021-01-28 PROCEDURE — G8536 NO DOC ELDER MAL SCRN: HCPCS | Performed by: STUDENT IN AN ORGANIZED HEALTH CARE EDUCATION/TRAINING PROGRAM

## 2021-01-28 PROCEDURE — 1101F PT FALLS ASSESS-DOCD LE1/YR: CPT | Performed by: STUDENT IN AN ORGANIZED HEALTH CARE EDUCATION/TRAINING PROGRAM

## 2021-01-28 PROCEDURE — G8432 DEP SCR NOT DOC, RNG: HCPCS | Performed by: STUDENT IN AN ORGANIZED HEALTH CARE EDUCATION/TRAINING PROGRAM

## 2021-01-28 PROCEDURE — 99214 OFFICE O/P EST MOD 30 MIN: CPT | Performed by: STUDENT IN AN ORGANIZED HEALTH CARE EDUCATION/TRAINING PROGRAM

## 2021-01-28 RX ORDER — PREDNISONE 20 MG/1
40 TABLET ORAL
Qty: 10 TAB | Refills: 0 | Status: SHIPPED | OUTPATIENT
Start: 2021-01-28 | End: 2021-02-02

## 2021-01-28 RX ORDER — DOXYCYCLINE 100 MG/1
100 CAPSULE ORAL 2 TIMES DAILY
Qty: 20 CAP | Refills: 0 | Status: SHIPPED | OUTPATIENT
Start: 2021-01-28 | End: 2021-02-07

## 2021-01-28 NOTE — PROGRESS NOTES
[address] [facility] [address]   Office [phone], Fax [phone]    Subjective:     Chief Complaint   Patient presents with    COPD     follow      History provided by patient     HPI:  Estela Coelho is a 68 y.o. PATIENT REFUSED male with past medical history of COPD, CAD, CKD4, T2DM, Alzheimer's Dementia, Depression, HLD, Carotid Artery Stenosis, and BPH presents for   Chief Complaint   Patient presents with    COPD     follow         [facility] SHALA presents with his wife to clinic for follow up and refills of COPD and COPD medications. They are concerned that he might be having pneumonia or a flare of COPD. Over the past week his cough has worsened and has become more productive of mostly clear to yellow phlegm. He does not have any associated shortness of breath but his wife says that he has been wheezing more and that he has required his rescue inhaler more frequently as of late as well. She has been giving him his rescue inhaler at least four times a day in addition to his maintenance inhaler. He does not use oxygen at home and has never required oxygen at home. Denies fevers, chills, chest pain, myalgias, diarrhea, loss of taste/smell, known COVID exposure, or recent travel, no blood in the sputum.     Social History     Socioeconomic History    Marital status:      Spouse name: Not on file    Number of children: Not on file    Years of education: Not on file    Highest education level: Not on file   Occupational History    Not on file   Social Needs    Financial resource strain: Not on file    Food insecurity     Worry: Not on file     Inability: Not on file    Transportation needs     Medical: Not on file     Non-medical: Not on file   Tobacco Use    Smoking status: Former Smoker     Packs/day: 2.00     Years: 30.00     Pack years: 60.00    Smokeless tobacco: Never Used   Substance and Sexual Activity    Alcohol use: Never     Frequency: Never    Drug use: Never    Sexual activity: Not Currently   Lifestyle    Physical activity     Days per week: Not on file     Minutes per session: Not on file    Stress: Not on file   Relationships    Social connections     Talks on phone: Not on file     Gets together: Not on file     Attends [employer] service: Not on file     Active member of club or organization: Not on file     Attends meetings of clubs or organizations: Not on file     Relationship status: Not on file    Intimate partner violence     Fear of current or ex partner: Not on file     Emotionally abused: Not on file     Physically abused: Not on file     Forced sexual activity: Not on file   Other Topics Concern    Not on file   Social History Narrative    Not on file     Review of Systems   Constitutional: Negative for chills, fever and weight loss. Respiratory: Positive for cough and sputum production. Negative for hemoptysis and shortness of breath. Cardiovascular: Negative for chest pain. Gastrointestinal: Negative for diarrhea, nausea and vomiting. Musculoskeletal: Negative for myalgias. Objective:     Visit Vitals  /64 (BP 1 Location: Right arm, BP Patient Position: Sitting)   Pulse (!) 56   Temp 98.4 °F (36.9 °C) (Temporal)   Resp 24   Ht 5' 3\" (1.6 m)   Wt 173 lb (78.5 kg)   SpO2 94%   BMI 30.65 kg/m²      Physical Exam  Constitutional:       Appearance: Normal appearance. HENT:      Head: Normocephalic and atraumatic. Cardiovascular:      Rate and Rhythm: Normal rate and regular rhythm. Pulses: Normal pulses. Heart sounds: Normal heart sounds. Pulmonary:      Effort: Pulmonary effort is normal. No respiratory distress. Breath sounds: Rhonchi present. No wheezing or rales. Abdominal:      General: Abdomen is flat. Bowel sounds are normal.      Palpations: Abdomen is soft. Neurological:      Mental Status: He is alert. Assessment and orders:       ICD-10-CM ICD-9-CM    1.  Chronic obstructive pulmonary disease, unspecified COPD type ([facility])  J44.9 496 ipratropium-albuteroL (Combivent Respimat)  mcg/actuation inhaler      doxycycline (VIBRAMYCIN) 100 mg capsule      predniSONE (DELTASONE) 20 mg tablet      NOVEL CORONAVIRUS (COVID-19)   2. Cough  R05 786.2 NOVEL CORONAVIRUS (COVID-19)   3. Type 2 diabetes mellitus with other specified complication, unspecified whether long term insulin use ([facility])  E11.69 250.80 glucose blood VI test strips (ASCENSIA AUTODISC VI, ONE TOUCH ULTRA TEST VI) strip     1. COPD: possible moderate flare of known COPD vs COVID-19 infection vs CAP  - Prednisone 40mg for 5 days  - Doxycycline 100mg BID for 10 days  - Refilled Combivent, patient has only been taking morning dosage of Combivent, advised to take morning and evening dosages for better maintanence of COPD exacerbations  - COVID test drawn today     Labs, imaging and immunization ordered as above    Follow Up: 1 week virtually    Pt was discussed with Dr. Nakul Ortiz (attending physician). I have reviewed patient medical and social history and medications. I have reviewed pertinent labs results and other data. I have discussed the diagnosis with the patient and the intended plan as seen in the above orders. The patient has received an after-visit summary and questions were answered concerning future plans. I have discussed medication side effects and warnings with the patient as well.     Armand Chris MD  Resident [facility]  01/30/21

## 2021-01-28 NOTE — PATIENT INSTRUCTIONS
COPD Exacerbation Plan: Care Instructions  Your Care Instructions     If you have chronic obstructive pulmonary disease (COPD), your usual shortness of breath could suddenly get worse. You may start coughing more and have more mucus. This flare-up is called a COPD exacerbation (say \"fy-BAN-nu-BAY-fallon\"). A lung infection or air pollution could set off an exacerbation. Sometimes it can happen after a quick change in temperature or being around chemicals. Work with your doctor to make a plan for dealing with an exacerbation. You can better manage it if you plan ahead. Follow-up care is a key part of your treatment and safety. Be sure to make and go to all appointments, and call your doctor if you are having problems. It's also a good idea to know your test results and keep a list of the medicines you take. How can you care for yourself at home? During an exacerbation  · Do not panic if you start to have one. Quick treatment at home may help you prevent serious breathing problems. If you have a COPD exacerbation plan that you developed with your doctor, follow it. · Take your medicines exactly as your doctor tells you.  ? Use your inhaler as directed by your doctor. If your symptoms do not get better after you use your medicine, have someone take you to the emergency room. Call an ambulance if necessary. ? With inhaled medicines, a spacer or a nebulizer may help you get more medicine to your lungs. Ask your doctor or pharmacist how to use them properly. Practice using the spacer in front of a mirror before you have an exacerbation. This may help you get the medicine into your lungs quickly. ? If your doctor has given you steroid pills, take them as directed. ? Your doctor may have given you a prescription for antibiotics, which you can fill if you need it. ? Talk to your doctor if you have any problems with your medicine.  And call your doctor if you have to use your antibiotic or steroid pills.  Preventing an exacerbation  · Do not smoke. This is the most important step you can take to prevent more damage to your lungs and prevent problems. If you already smoke, it is never too late to stop. If you need help quitting, talk to your doctor about stop-smoking programs and medicines. These can increase your chances of quitting for good. · Take your daily medicines as prescribed. · Avoid colds and flu. ? Get a pneumococcal vaccine. ? Get a flu vaccine each year, as soon as it is available. Ask those you live or work with to do the same, so they will not get the flu and infect you. ? Try to stay away from people with colds or the flu. ? Wash your hands often. · Avoid secondhand smoke; air pollution; cold, dry air; hot, humid air; and high altitudes. Stay at home with your windows closed when air pollution is bad. · Learn breathing techniques for COPD, such as breathing through pursed lips. These techniques can help you breathe easier during an exacerbation. When should you call for help? Call 911 anytime you think you may need emergency care. For example, call if:    · You have severe trouble breathing.     · You have severe chest pain. Call your doctor now or seek immediate medical care if:    · You have new or worse shortness of breath.     · You develop new chest pain.     · You are coughing more deeply or more often, especially if you notice more mucus or a change in the color of your mucus.     · You cough up blood.     · You have new or increased swelling in your legs or belly.     · You have a fever. Watch closely for changes in your health, and be sure to contact your doctor if:    · You need to use your antibiotic or steroid pills.     · Your symptoms are getting worse. Where can you learn more? Go to http://www.gray.com/  Enter U536 in the search box to learn more about \"COPD Exacerbation Plan: Care Instructions. \"  Current as of: February 24, 2020               Content Version: 12.6  © 0410-5048 [employer]. Care instructions adapted under license by [employer] (which disclaims liability or warranty for this information). If you have questions about a medical condition or this instruction, always ask your healthcare professional. [address] any warranty or liability for your use of this information. Chronic Obstructive Pulmonary Disease (COPD) Flare-Ups: Care Instructions  Your Care Instructions     Chronic obstructive pulmonary disease (COPD) is a lung disease that makes it hard to breathe. It is caused by damage to the lungs over many years, usually from smoking. COPD is often a mix of two diseases:  · Chronic bronchitis: The airways that carry air to the lungs (bronchial tubes) get inflamed and make a lot of mucus. This can narrow or block the airways. · Emphysema: In a healthy person, the tiny air sacs in the lungs are like balloons. As you breathe in and out, they get bigger and smaller to move air through your lungs. But with emphysema, these air sacs are damaged and lose their stretch. Less air gets in and out of the lungs. Many people with COPD have attacks called flare-ups or exacerbations. This is when your usual symptoms quickly get worse and stay worse. The doctor has checked you carefully. But problems can develop later. If you notice any problems or new symptoms, get medical treatment right away. Follow-up care is a key part of your treatment and safety. Be sure to make and go to all appointments, and call your doctor if you are having problems. It's also a good idea to know your test results and keep a list of the medicines you take. How can you care for yourself at home? · Be safe with medicines. Take your medicines exactly as prescribed. Call your doctor if you think you are having a problem with your medicine. You may be taking medicines such as:  ? Bronchodilators.  These help open your airways and make breathing easier. ? Corticosteroids. These reduce airway inflammation. They may be given as pills, in a vein, or in an inhaled form. You may go home with pills in addition to an inhaler that you already use. · A spacer may help you get more inhaled medicine to your lungs. Ask your doctor or pharmacist if a spacer is right for you. If it is, ask how to use it properly. · If your doctor prescribed antibiotics, take them as directed. Do not stop taking them just because you feel better. You need to take the full course of antibiotics. · If your doctor prescribed oxygen, use the flow rate your doctor has recommended. Do not change it without talking to your doctor first.  · Do not smoke. Smoking makes COPD worse. If you need help quitting, talk to your doctor about stop-smoking programs and medicines. These can increase your chances of quitting for good. When should you call for help? Call 911 anytime you think you may need emergency care. For example, call if:    · You have severe trouble breathing. Call your doctor now or seek immediate medical care if:    · You have new or worse trouble breathing.     · Your coughing or wheezing gets worse.     · You cough up dark brown or bloody mucus (sputum).     · You have a new or higher fever. Watch closely for changes in your health, and be sure to contact your doctor if:    · You notice more mucus or a change in the color of your mucus.     · You need to use your antibiotic or steroid pills.     · You do not get better as expected. Where can you learn more? Go to http://www.gray.com/  Enter D989 in the search box to learn more about \"Chronic Obstructive Pulmonary Disease (COPD) Flare-Ups: Care Instructions. \"  Current as of: February 24, 2020               Content Version: 12.6  © 2910-2464 [email or web address], [employer].    Care instructions adapted under license by [employer] (which disclaims liability or warranty for this information). If you have questions about a medical condition or this instruction, always ask your healthcare professional. [place] [address] any warranty or liability for your use of this information.

## 2021-01-31 NOTE — PROGRESS NOTES
I saw and evaluated the patient, performing the key elements of the service. I discussed the findings, assessment and plan with the resident and agree with the resident's findings and plan as documented in the resident's note. 69 yo male who is evaluated for cough. On exam, well appearing male, NAD, lungs CTA. VSS. Will treat as mild COPD exacerbation with Prednisone and Doxycycline, empirically will be covered for CAP. If no improvement will need to get CXR. COVID collected.

## 2021-02-02 ENCOUNTER — OFFICE VISIT (OUTPATIENT)
Dept: FAMILY MEDICINE CLINIC | Age: 81
End: 2021-02-02
Payer: MEDICARE

## 2021-02-02 VITALS
TEMPERATURE: 98 F | BODY MASS INDEX: 30.69 KG/M2 | DIASTOLIC BLOOD PRESSURE: 60 MMHG | OXYGEN SATURATION: 94 % | SYSTOLIC BLOOD PRESSURE: 132 MMHG | RESPIRATION RATE: 20 BRPM | HEIGHT: 63 IN | HEART RATE: 58 BPM | WEIGHT: 173.2 LBS

## 2021-02-02 DIAGNOSIS — J44.9 CHRONIC OBSTRUCTIVE PULMONARY DISEASE, UNSPECIFIED COPD TYPE (HCC): Primary | ICD-10-CM

## 2021-02-02 DIAGNOSIS — L98.9 SKIN LESION: ICD-10-CM

## 2021-02-02 LAB — SARS-COV-2, COV2NT: NOT DETECTED

## 2021-02-02 PROCEDURE — G8754 DIAS BP LESS 90: HCPCS | Performed by: STUDENT IN AN ORGANIZED HEALTH CARE EDUCATION/TRAINING PROGRAM

## 2021-02-02 PROCEDURE — G8428 CUR MEDS NOT DOCUMENT: HCPCS | Performed by: STUDENT IN AN ORGANIZED HEALTH CARE EDUCATION/TRAINING PROGRAM

## 2021-02-02 PROCEDURE — 99213 OFFICE O/P EST LOW 20 MIN: CPT | Performed by: STUDENT IN AN ORGANIZED HEALTH CARE EDUCATION/TRAINING PROGRAM

## 2021-02-02 PROCEDURE — G8417 CALC BMI ABV UP PARAM F/U: HCPCS | Performed by: STUDENT IN AN ORGANIZED HEALTH CARE EDUCATION/TRAINING PROGRAM

## 2021-02-02 PROCEDURE — G8432 DEP SCR NOT DOC, RNG: HCPCS | Performed by: STUDENT IN AN ORGANIZED HEALTH CARE EDUCATION/TRAINING PROGRAM

## 2021-02-02 PROCEDURE — 1101F PT FALLS ASSESS-DOCD LE1/YR: CPT | Performed by: STUDENT IN AN ORGANIZED HEALTH CARE EDUCATION/TRAINING PROGRAM

## 2021-02-02 PROCEDURE — G8536 NO DOC ELDER MAL SCRN: HCPCS | Performed by: STUDENT IN AN ORGANIZED HEALTH CARE EDUCATION/TRAINING PROGRAM

## 2021-02-02 PROCEDURE — G8752 SYS BP LESS 140: HCPCS | Performed by: STUDENT IN AN ORGANIZED HEALTH CARE EDUCATION/TRAINING PROGRAM

## 2021-02-02 PROCEDURE — G0463 HOSPITAL OUTPT CLINIC VISIT: HCPCS | Performed by: STUDENT IN AN ORGANIZED HEALTH CARE EDUCATION/TRAINING PROGRAM

## 2021-02-02 NOTE — PATIENT INSTRUCTIONS
Chronic Obstructive Pulmonary Disease (COPD): Care Instructions  Your Care Instructions     Chronic obstructive pulmonary disease (COPD) is a general term for a group of lung diseases, including emphysema and chronic bronchitis. People with COPD have decreased airflow in and out of the lungs, which makes it hard to breathe. The airways also can get clogged with thick mucus. Cigarette smoking is a major cause of COPD. Although there is no cure for COPD, you can slow its progress. Following your treatment plan and taking care of yourself can help you feel better and live longer. Follow-up care is a key part of your treatment and safety. Be sure to make and go to all appointments, and call your doctor if you are having problems. It's also a good idea to know your test results and keep a list of the medicines you take. How can you care for yourself at home? Staying healthy    · Do not smoke. This is the most important step you can take to prevent more damage to your lungs. If you need help quitting, talk to your doctor about stop-smoking programs and medicines. These can increase your chances of quitting for good.     · Avoid colds and flu. Get a pneumococcal vaccine shot. If you have had one before, ask your doctor whether you need a second dose. Get the flu vaccine every fall. If you must be around people with colds or the flu, wash your hands often.     · Avoid secondhand smoke, air pollution, and high altitudes. Also avoid cold, dry air and hot, humid air. Stay at home with your windows closed when air pollution is bad. Medicines and oxygen therapy    · Take your medicines exactly as prescribed. Call your doctor if you think you are having a problem with your medicine. You may be taking medicines such as:  ? Bronchodilators. These help open your airways and make breathing easier. They are either short-acting (work for 6 to 9 hours) or long-acting (work for 24 hours).  You inhale most bronchodilators, so they start to act quickly. Always carry your quick-relief inhaler with you in case you need it while you are away from home. ? Corticosteroids (prednisone, budesonide). These reduce airway inflammation. They come in pill or inhaled form. You must take these medicines every day for them to work well.     · Ask your doctor or pharmacist if a spacer is right for you. A spacer may help you get more inhaled medicine to your lungs. If you use one, ask how to use it properly.     · Do not take any vitamins, over-the-counter medicine, or herbal products without talking to your doctor first.     · If your doctor prescribed antibiotics, take them as directed. Do not stop taking them just because you feel better. You need to take the full course of antibiotics.     · If you use oxygen therapy, use the flow rate your doctor has recommended. Don't change it without talking to your doctor first. Oxygen therapy boosts the amount of oxygen in your blood and helps you breathe easier. Activity    · Get regular exercise. Walking is an easy way to get exercise. Start out slowly, and walk a little more each day.     · Pay attention to your breathing. You are exercising too hard if you can't talk while you exercise.     · Take short rest breaks when doing household chores and other activities.     · Learn breathing methods--such as breathing through pursed lips--to help you become less short of breath.     · If your doctor has not set you up with a pulmonary rehabilitation program, ask if rehab is right for you. Rehab includes exercise programs, education about your disease and how to manage it, help with diet and other changes, and emotional support. Diet    · Eat regular, healthy meals. Use bronchodilators about 1 hour before you eat to make it easier to eat. Eat several small meals instead of three large ones.  Drink beverages at the end of the meal. Avoid foods that are hard to chew.     · Eat foods that contain protein so you don't lose muscle mass.     · Talk with your doctor if you gain too much weight or if you lose weight without trying. Mental health    · Talk to your family, friends, or a therapist about your feelings. Some people feel frightened, angry, hopeless, helpless, and even guilty. Talking openly about bad feelings can help you cope. If these feelings last, talk to your doctor. When should you call for help? Call 911 anytime you think you may need emergency care. For example, call if:    · You have severe trouble breathing. Call your doctor now or seek immediate medical care if:    · You have new or worse trouble breathing.     · You cough up blood.     · You have a fever. Watch closely for changes in your health, and be sure to contact your doctor if:    · You cough more deeply or more often, especially if you notice more mucus or a change in the color of your mucus.     · You have new or worse swelling in your legs or belly.     · You are not getting better as expected. Where can you learn more? Go to http://www.gray.com/  Enter V615 in the search box to learn more about \"Chronic Obstructive Pulmonary Disease (COPD): Care Instructions. \"  Current as of: February 24, 2020               Content Version: 12.6  © 2006-2020 [employer]. Care instructions adapted under license by [employer] (which disclaims liability or warranty for this information). If you have questions about a medical condition or this instruction, always ask your healthcare professional. [place] [address] any warranty or liability for your use of this information. Antibiotics for Skin Conditions: Care Instructions  Your Care Instructions     Antibiotics are medicines that kill bacteria. Bacteria can cause some skin problems or conditions, such as impetigo. They can also lead to problems like acne. There are many types of antibiotics.  Each works a little differently and acts on different types of bacteria. Your doctor will decide which medicine will work best for you. You can put an antibiotic ointment or cream on your skin. Or you can take pills by mouth to kill bacteria in your skin pores. Antibiotics are not used to treat skin problems that are caused by viruses or allergies. But sometimes bacteria get into a skin problem you already have. Then you may need this medicine. Follow-up care is a key part of your treatment and safety. Be sure to make and go to all appointments, and call your doctor if you are having problems. It's also a good idea to know your test results and keep a list of the medicines you take. How can you care for yourself at home? To take antibiotics  · If your doctor prescribed pills, take them as directed. Do not stop taking them just because your skin problem gets better. You need to take the full course of antibiotics. · Apply antibiotic ointment exactly as instructed. · Read the label to learn how to store your medicine. · Do not use antibiotics that were prescribed for a different illness or for someone else. You may take longer to heal. And your skin problem may get worse. To take care of your skin  · Try not to scratch rashes or sores. Scratching may spread bacteria to other parts of your skin or body. · Clean your skin with mild soap and water 2 times a day unless your doctor gives you different instructions. Don't use hydrogen peroxide or alcohol, which can slow healing. · Protect your skin from the sun. Wear hats with wide brims, sunglasses, and loose-fitting, tightly woven clothing that covers your arms and legs. Make sure to use a broad-spectrum sunscreen that has a sun protection factor (SPF) of 30 or higher. Apply it several times a day. What are the possible side effects? Many people do not have side effects from antibiotics. But sometimes people have problems, such as:  · Nausea, diarrhea, and belly pain.   · Allergic reactions, such as a skin rash. · Vaginal yeast infections. If the side effects bother you, ask your doctor if there is another antibiotic that will work as well but will not cause these effects. When should you call for help? Call your doctor now or seek immediate medical care if:    · You have signs of an infection, such as:  ? Increased pain, swelling, warmth, and redness. ? Red streaks leading from the affected area. ? Pus draining from the area. ? A fever. Watch closely for changes in your health, and be sure to contact your doctor if:    · You think you may be having a problem with the medicine.     · You do not get better as expected. Where can you learn more? Go to http://www.gray.com/  Enter C829 in the search box to learn more about \"Antibiotics for Skin Conditions: Care Instructions. \"  Current as of: July 2, 2020               Content Version: 12.6  © 2509-0807 [employer]. Care instructions adapted under license by [employer] (which disclaims liability or warranty for this information). If you have questions about a medical condition or this instruction, always ask your healthcare professional. [place] [address] any warranty or liability for your use of this information.

## 2021-02-02 NOTE — PROGRESS NOTES
[address] [place] [address]   Office [phone], Fax [phone]    Subjective:     Chief Complaint   Patient presents with    COPD     follow up - congestion    Skin Problem     bump on upper chest     History provided by patient     HPI:  Deandra Stearns is a 68 y.o. PATIENT REFUSED male with past medical history of COPD, CAD, CKD4, T2DM, Alzheimer's Dementia, Depression, HLD, Carotid Artery Stenosis, and BPH presents for   Chief Complaint   Patient presents with    COPD     follow up - congestion    Skin Problem     bump on upper chest       Mr. Itz Duke presents for follow up on COPD/congestion and skin issue. He was seen on 1/28 for COPD and prescribed Prednisone 40mg for 5 days, Doxy 100mg BID for 10 days, and was tested for COVID. The COVID was negative. He is doing better. His cough has gotten better, it has been productive but the sputum is more clear and thin now. He does not have wheezes or shortness of breath. He is using is Symbicort as prescribed and is only using his rescue inhaler once daily as opposed to the four times he was using it previously. He is also complaining of a skin bump on his chest. He has had this bump for about two months. He thought it was a little pimple on his chest, but it has not gone away and how it is a little tender. He does not have any drainage or bleeding. He has not had any fevers chills weight loss. No history of skin cancer.     Social History     Socioeconomic History    Marital status:      Spouse name: Not on file    Number of children: Not on file    Years of education: Not on file    Highest education level: Not on file   Occupational History    Not on file   Social Needs    Financial resource strain: Not on file    Food insecurity     Worry: Not on file     Inability: Not on file    Transportation needs     Medical: Not on file     Non-medical: Not on file   Tobacco Use    Smoking status: Former Smoker     Packs/day: 2.00     Years: 30.00     Pack years: 60.00    Smokeless tobacco: Never Used   Substance and Sexual Activity    Alcohol use: Never     Frequency: Never    Drug use: Never    Sexual activity: Not Currently   Lifestyle    Physical activity     Days per week: Not on file     Minutes per session: Not on file    Stress: Not on file   Relationships    Social connections     Talks on phone: Not on file     Gets together: Not on file     Attends [employer] service: Not on file     Active member of club or organization: Not on file     Attends meetings of clubs or organizations: Not on file     Relationship status: Not on file    Intimate partner violence     Fear of current or ex partner: Not on file     Emotionally abused: Not on file     Physically abused: Not on file     Forced sexual activity: Not on file   Other Topics Concern    Not on file   Social History Narrative    Not on file     Review of Systems   Constitutional: Negative for chills and fever. Respiratory: Positive for cough and sputum production. Negative for shortness of breath and wheezing. Cardiovascular: Negative for chest pain and leg swelling. Skin: Negative for itching and rash. Bump on chest   Neurological: Negative for dizziness and headaches. Objective:     Visit Vitals  /60   Pulse (!) 58   Temp 98 °F (36.7 °C) (Oral)   Resp 20   Ht 5' 3\" (1.6 m)   Wt 173 lb 3.2 oz (78.6 kg)   SpO2 94%   BMI 30.68 kg/m²     Physical Exam  Constitutional:       Appearance: Normal appearance. HENT:      Head: Normocephalic and atraumatic. Cardiovascular:      Rate and Rhythm: Normal rate and regular rhythm. Pulses: Normal pulses. Heart sounds: Normal heart sounds. Pulmonary:      Effort: Pulmonary effort is normal.      Breath sounds: Rhonchi (Less rhonchourus than previously) present. No wheezing. Abdominal:      General: Abdomen is flat. Bowel sounds are normal.      Palpations: Abdomen is soft.    Neurological: Mental Status: He is alert. Assessment and orders:       ICD-10-CM ICD-9-CM    1. Chronic obstructive pulmonary disease, unspecified COPD type ([facility]  J44.9 496    2. Skin lesion  L98.9 709.9      1. COPD: doing better, taking Symbicort as prescribed now  - Finish courses of Prednisone and Doxycycline  - Will follow up virtually in two weeks to ensure recovery    2. Skin Lesion: likely small abscess vs lipoma vs malignant lesion  - Will follow up in clinic for possible shave biopsy after acute worsening of COPD resolves     Labs, imaging and immunization ordered as above    Follow Up: In two weeks virtually for COPD    Pt was discussed with Dr. Alysa Fulton (attending physician). I have reviewed patient medical and social history and medications. I have reviewed pertinent labs results and other data. I have discussed the diagnosis with the patient and the intended plan as seen in the above orders. The patient has received an after-visit summary and questions were answered concerning future plans. I have discussed medication side effects and warnings with the patient as well.     Delta Avina MD  Resident [address] [facility]  02/03/21

## 2021-02-02 NOTE — PROGRESS NOTES
Identified pt with two pt identifiers(name and ). Reviewed record in preparation for visit and have obtained necessary documentation. Chief Complaint   Patient presents with    COPD     follow up - congestion    Skin Problem     bump on upper chest        Health Maintenance Due   Topic    MICROALBUMIN Q1     Eye Exam Retinal or Dilated     Lipid Screen     COVID-19 Vaccine (1 of 2)    Shingrix Vaccine Age 50> (1 of 2)    Pneumococcal 65+ yrs at Risk Vaccine (2 of 2 - PCV13)    Medicare Yearly Exam     Flu Vaccine (1)    Foot Exam Q1        Visit Vitals  /60   Pulse (!) 58   Temp 98 °F (36.7 °C) (Oral)   Resp 20   Ht 5' 3\" (1.6 m)   Wt 173 lb 3.2 oz (78.6 kg)   SpO2 94%   BMI 30.68 kg/m²         Coordination of Care Questionnaire:  :   1) Have you been to an emergency room, urgent care, or hospitalized since your last visit? If yes, where when, and reason for visit? no       2. Have seen or consulted any other health care provider since your last visit? If yes, where when, and reason for visit? NO        Patient is accompanied by his wife. I have received verbal consent from Jody Phillip to discuss any/all medical information while they are present in the room.

## 2021-02-05 NOTE — PROGRESS NOTES
I saw and evaluated the patient, performing the key elements of the service. I discussed the findings, assessment and plan with the resident and agree with the resident's findings and plan as documented in the resident's note. COPD. Pt symptoms improving. VSS. Breathing comfortably on room air, diminished breath sounds throughout lung fields bilaterlly. Non healing skin lesion on upper right chest. Concerning for skin cancer as it is a non healing lesion present for months now. Pt will RTC for biopsy. Reiterated importance of returning for the biopsy and my concern it could be malignancy. Pt in agreement with plan.

## 2021-02-16 ENCOUNTER — OFFICE VISIT (OUTPATIENT)
Dept: FAMILY MEDICINE CLINIC | Age: 81
End: 2021-02-16
Payer: MEDICARE

## 2021-02-16 VITALS
DIASTOLIC BLOOD PRESSURE: 61 MMHG | SYSTOLIC BLOOD PRESSURE: 127 MMHG | WEIGHT: 177 LBS | TEMPERATURE: 97.7 F | HEIGHT: 61 IN | BODY MASS INDEX: 33.42 KG/M2 | HEART RATE: 57 BPM | RESPIRATION RATE: 20 BRPM | OXYGEN SATURATION: 96 %

## 2021-02-16 DIAGNOSIS — M72.2 PLANTAR FASCIITIS: ICD-10-CM

## 2021-02-16 DIAGNOSIS — R63.5 WEIGHT GAIN: ICD-10-CM

## 2021-02-16 DIAGNOSIS — R35.0 FREQUENCY OF URINATION: Primary | ICD-10-CM

## 2021-02-16 DIAGNOSIS — E11.69 TYPE 2 DIABETES MELLITUS WITH OTHER SPECIFIED COMPLICATION, UNSPECIFIED WHETHER LONG TERM INSULIN USE (HCC): ICD-10-CM

## 2021-02-16 LAB
BILIRUB UR QL STRIP: NEGATIVE
GLUCOSE UR-MCNC: NEGATIVE MG/DL
KETONES P FAST UR STRIP-MCNC: NEGATIVE MG/DL
PH UR STRIP: 5 [PH] (ref 4.6–8)
PROT UR QL STRIP: NEGATIVE
SP GR UR STRIP: 1.01 (ref 1–1.03)
UA UROBILINOGEN AMB POC: NORMAL (ref 0.2–1)
URINALYSIS CLARITY POC: CLEAR
URINALYSIS COLOR POC: YELLOW
URINE BLOOD POC: NEGATIVE
URINE LEUKOCYTES POC: NORMAL
URINE NITRITES POC: NEGATIVE

## 2021-02-16 PROCEDURE — G8752 SYS BP LESS 140: HCPCS | Performed by: STUDENT IN AN ORGANIZED HEALTH CARE EDUCATION/TRAINING PROGRAM

## 2021-02-16 PROCEDURE — G8754 DIAS BP LESS 90: HCPCS | Performed by: STUDENT IN AN ORGANIZED HEALTH CARE EDUCATION/TRAINING PROGRAM

## 2021-02-16 PROCEDURE — G0463 HOSPITAL OUTPT CLINIC VISIT: HCPCS | Performed by: STUDENT IN AN ORGANIZED HEALTH CARE EDUCATION/TRAINING PROGRAM

## 2021-02-16 PROCEDURE — 1101F PT FALLS ASSESS-DOCD LE1/YR: CPT | Performed by: STUDENT IN AN ORGANIZED HEALTH CARE EDUCATION/TRAINING PROGRAM

## 2021-02-16 PROCEDURE — 3051F HG A1C>EQUAL 7.0%<8.0%: CPT | Performed by: STUDENT IN AN ORGANIZED HEALTH CARE EDUCATION/TRAINING PROGRAM

## 2021-02-16 PROCEDURE — G8427 DOCREV CUR MEDS BY ELIG CLIN: HCPCS | Performed by: STUDENT IN AN ORGANIZED HEALTH CARE EDUCATION/TRAINING PROGRAM

## 2021-02-16 PROCEDURE — 81003 URINALYSIS AUTO W/O SCOPE: CPT | Performed by: STUDENT IN AN ORGANIZED HEALTH CARE EDUCATION/TRAINING PROGRAM

## 2021-02-16 PROCEDURE — 99213 OFFICE O/P EST LOW 20 MIN: CPT | Performed by: STUDENT IN AN ORGANIZED HEALTH CARE EDUCATION/TRAINING PROGRAM

## 2021-02-16 PROCEDURE — G8417 CALC BMI ABV UP PARAM F/U: HCPCS | Performed by: STUDENT IN AN ORGANIZED HEALTH CARE EDUCATION/TRAINING PROGRAM

## 2021-02-16 PROCEDURE — G8536 NO DOC ELDER MAL SCRN: HCPCS | Performed by: STUDENT IN AN ORGANIZED HEALTH CARE EDUCATION/TRAINING PROGRAM

## 2021-02-16 PROCEDURE — G8510 SCR DEP NEG, NO PLAN REQD: HCPCS | Performed by: STUDENT IN AN ORGANIZED HEALTH CARE EDUCATION/TRAINING PROGRAM

## 2021-02-16 NOTE — PROGRESS NOTES
Chief Complaint   Patient presents with    Urinary Pain     Patient presents with wife thinking he may have a UTI; Also complaining of Left heel pain after broken ankle healed. Vitals:    02/16/21 1524   BP: 127/61   BP 1 Location: Left upper arm   BP Patient Position: Sitting   BP Cuff Size: Adult   Pulse: (!) 57   Resp: 20   Temp: 97.7 °F (36.5 °C)   TempSrc: Temporal   SpO2: 96%   Weight: 177 lb (80.3 kg)   Height: 5' 0.5\" (1.537 m)       1. Have you been to the ER, urgent care clinic since your last visit? Hospitalized since your last visit? No     2. Have you seen or consulted any other health care providers outside of the [address] [place] [address] since your last visit? Include any pap smears or colon screening.  No

## 2021-02-16 NOTE — PATIENT INSTRUCTIONS
[facility]: [phone] ask for Dr. Juancarlos Morales, Dr. Macy Matthews, Dr. Deandra Dumont, Dr. Kim Cartwright     Painful Urination (Dysuria): Care Instructions  Your Care Instructions  Burning pain with urination (dysuria) is a common symptom of a urinary tract infection or other urinary problems. The bladder may become inflamed. This can cause pain when the bladder fills and empties. You may also feel pain if the tube that carries urine from the bladder to the outside of the body (urethra) gets irritated or infected. Sexually transmitted infections (STIs) also may cause pain when you urinate. Sometimes the pain can be caused by things other than an infection. The urethra can be irritated by soaps, perfumes, or foreign objects in the urethra. Kidney stones can cause pain when they pass through the urethra. The cause may be hard to find. You may need tests. Treatment for painful urination depends on the cause. Follow-up care is a key part of your treatment and safety. Be sure to make and go to all appointments, and call your doctor if you are having problems. It's also a good idea to know your test results and keep a list of the medicines you take. How can you care for yourself at home? · Drink extra water for the next day or two. This will help make the urine less concentrated. (If you have kidney, heart, or liver disease and have to limit fluids, talk with your doctor before you increase the amount of fluids you drink.)  · Avoid drinks that are carbonated or have caffeine. They can irritate the bladder. · Urinate often. Try to empty your bladder each time. For women:  · Urinate right after you have sex. · After going to the bathroom, wipe from front to back. · Avoid douches, bubble baths, and feminine hygiene sprays. And avoid other feminine hygiene products that have deodorants. When should you call for help?    Call your doctor now or seek immediate medical care if:    · You have new symptoms, such as fever, nausea, or vomiting.     · You have new or worse symptoms of a urinary problem. For example:  ? You have blood or pus in your urine. ? You have chills or body aches. ? It hurts worse to urinate. ? You have groin or belly pain. ? You have pain in your back just below your rib cage (the flank area). Watch closely for changes in your health, and be sure to contact your doctor if you have any problems. Where can you learn more? Go to http://www.gray.com/  Enter H814 in the search box to learn more about \"Painful Urination (Dysuria): Care Instructions. \"  Current as of: June 29, 2020               Content Version: 12.6  © 7137-2464 [employer]. Care instructions adapted under license by [employer] (which disclaims liability or warranty for this information). If you have questions about a medical condition or this instruction, always ask your healthcare professional. [place] [address] any warranty or liability for your use of this information.

## 2021-02-16 NOTE — PROGRESS NOTES
[address] [place] [address]   Office [phone], Fax [phone]    Subjective:     Chief Complaint   Patient presents with    Urinary Pain     Patient presents with wife thinking he may have a UTI; Also complaining of Left heel pain after broken ankle healed. History provided by patient     HPI:  Ector Sanchez is a 68 y.o. DECLINED male with past medical history of COPD, CAD, CKD4, T2DM, Alzheimer's Dementia, Depression, HLD, Carotid Artery Stenosis, and BPH presents for   Chief Complaint   Patient presents with    Urinary Pain     Patient presents with wife thinking he may have a UTI; Also complaining of Left heel pain after broken ankle healed. Urinary Symptoms: No burning while urinating, increased frequency due to Bumex, no urgency, no hematuria. Has history of UTI when blood sugars are elevated and when gaining weight. Weight Gain: Usual weight is around 168. Has been fluctuating between 164-172. Has been taking extra Bumex when weight is above 170. Has increased MEADE, but better than previously while having a COPD exacerbation. No increased cough, no increased sputum. Sputum is sometimes yellow and gunky, sometimes more clear and watery. No orthopnea/playpnea. Elevated Blood Sugars: Glucose range has been in the 150s-220s. Has had more elevated blood sugars as of late, despite trying to eat well and stay away from carbs/sugars. Has not had increased thirst. Currently diet controlled, no medications. Left Heel Pain: Has been an ongoing problem that has worsened. No mechanism of injury. Worsens with prolonged standing or sitting. No change in gait or range of motion. Worse after rest, particularly in the mornings.     Social History     Socioeconomic History    Marital status:      Spouse name: Not on file    Number of children: Not on file    Years of education: Not on file    Highest education level: Not on file   Occupational History    Not on file Social Needs    Financial resource strain: Not on file    Food insecurity     Worry: Not on file     Inability: Not on file    Transportation needs     Medical: Not on file     Non-medical: Not on file   Tobacco Use    Smoking status: Former Smoker     Packs/day: 2.00     Years: 30.00     Pack years: 60.00    Smokeless tobacco: Never Used   Substance and Sexual Activity    Alcohol use: Never     Frequency: Never    Drug use: Never    Sexual activity: Not Currently   Lifestyle    Physical activity     Days per week: Not on file     Minutes per session: Not on file    Stress: Not on file   Relationships    Social connections     Talks on phone: Not on file     Gets together: Not on file     Attends Mandaen service: Not on file     Active member of club or organization: Not on file     Attends meetings of clubs or organizations: Not on file     Relationship status: Not on file    Intimate partner violence     Fear of current or ex partner: Not on file     Emotionally abused: Not on file     Physically abused: Not on file     Forced sexual activity: Not on file   Other Topics Concern    Not on file   Social History Narrative    Not on file     Review of Systems   Constitutional: Negative for chills and fever. Respiratory: Negative for cough and shortness of breath. Cardiovascular: Negative for chest pain and leg swelling. Gastrointestinal: Negative for constipation, diarrhea, nausea and vomiting. Genitourinary: Positive for frequency. Negative for dysuria, flank pain, hematuria and urgency. Musculoskeletal:        Left heel pain   Endo/Heme/Allergies: Negative for polydipsia.      Objective:     Visit Vitals  /61 (BP 1 Location: Left upper arm, BP Patient Position: Sitting, BP Cuff Size: Adult)   Pulse (!) 57   Temp 97.7 °F (36.5 °C) (Temporal)   Resp 20   Ht 5' 0.5\" (1.537 m)   Wt 177 lb (80.3 kg)   SpO2 96%   BMI 34.00 kg/m²      Physical Exam  Constitutional:       General: He is not in acute distress. Appearance: Normal appearance. He is not ill-appearing. HENT:      Head: Normocephalic and atraumatic. Cardiovascular:      Rate and Rhythm: Normal rate and regular rhythm. Pulses: Normal pulses. Heart sounds: Normal heart sounds. Pulmonary:      Effort: Pulmonary effort is normal.      Breath sounds: Normal breath sounds. Abdominal:      General: Abdomen is flat. Bowel sounds are normal.      Palpations: Abdomen is soft. Musculoskeletal:      Right foot: Normal range of motion. No deformity. Left foot: Normal range of motion. No deformity. Comments: No TTP over bony structures/ligamentous structures of left foot. FROM of left foot. TTP on plantar surface of foot particularly along plantar aponeurosis. Neurological:      Mental Status: He is alert. Pertinent Labs/Studies: U/A w/ trace leuks, no nitrates  Assessment and orders:       ICD-10-CM ICD-9-CM    1. Frequency of urination  R35.0 788.41 AMB POC URINALYSIS DIP STICK AUTO W/O MICRO      CULTURE, URINE      CULTURE, URINE      CULTURE, URINE   2. Weight gain  R63.5 783.1    3. Plantar fasciitis  M72.2 728.71 REFERRAL TO SPORTS MEDICINE   4. Type 2 diabetes mellitus with other specified complication, unspecified whether long term insulin use (HCC)  E11.69 250.80 HEMOGLOBIN A1C WITH EAG      HEMOGLOBIN A1C WITH EAG      FRUCTOSAMINE     1. Frequency: likely 2/2 Bumex, however, has history of UTIs with elevated blood sugars. U/A w/ trace leuks, no nitrates  - Obtain UCx    2. Weight Gain: likely 2/2 HFpEF. Echo 6/2019 w/ EF 55-70%. Has been trying to control weight with extra Bumex dosages  - Advised patient to maintain weight around baseline as much as possible, but to be weary of over-diuresis because that can be harmful for his kidneys.  - Advised patient to follow up with Cardiologist as soon as possible to come up with good regimen for diuresis    3.  Plantar Fascitis: heel pain is likely 2/2 plantar fascitis based on history and physical examination  - Referral to [facility] for evaluation and management    4. T2DM: currently diet controlled, but has been having elevated blood sugars as of late. HbA1c (8/2020) 5.7  - Obtain repeat HbA1c     Labs, imaging and immunization ordered as above    Follow Up: 1 week for Shave Biopsy of Lesion on Chest    Pt was discussed with Dr. Delia Cervantes (attending physician). I have reviewed patient medical and social history and medications. I have reviewed pertinent labs results and other data. I have discussed the diagnosis with the patient and the intended plan as seen in the above orders. The patient has received an after-visit summary and questions were answered concerning future plans. I have discussed medication side effects and warnings with the patient as well.     Laxmi Acosta MD  Resident [facility]  02/21/21

## 2021-02-17 LAB
BACTERIA SPEC CULT: NORMAL
EST. AVERAGE GLUCOSE BLD GHB EST-MCNC: 154 MG/DL
FRUCTOSAMINE SERPL-SCNC: 305 UMOL/L (ref 0–285)
HBA1C MFR BLD: 7 % (ref 4–5.6)
SERVICE CMNT-IMP: NORMAL

## 2021-02-23 ENCOUNTER — OFFICE VISIT (OUTPATIENT)
Dept: FAMILY MEDICINE CLINIC | Age: 81
End: 2021-02-23
Payer: MEDICARE

## 2021-02-23 VITALS
TEMPERATURE: 98.1 F | OXYGEN SATURATION: 95 % | WEIGHT: 172.8 LBS | HEART RATE: 59 BPM | SYSTOLIC BLOOD PRESSURE: 127 MMHG | BODY MASS INDEX: 33.19 KG/M2 | DIASTOLIC BLOOD PRESSURE: 63 MMHG | RESPIRATION RATE: 16 BRPM

## 2021-02-23 DIAGNOSIS — L98.9 SKIN LESION: Primary | ICD-10-CM

## 2021-02-23 PROCEDURE — G8417 CALC BMI ABV UP PARAM F/U: HCPCS | Performed by: STUDENT IN AN ORGANIZED HEALTH CARE EDUCATION/TRAINING PROGRAM

## 2021-02-23 PROCEDURE — 1101F PT FALLS ASSESS-DOCD LE1/YR: CPT | Performed by: STUDENT IN AN ORGANIZED HEALTH CARE EDUCATION/TRAINING PROGRAM

## 2021-02-23 PROCEDURE — 99214 OFFICE O/P EST MOD 30 MIN: CPT | Performed by: STUDENT IN AN ORGANIZED HEALTH CARE EDUCATION/TRAINING PROGRAM

## 2021-02-23 PROCEDURE — G0463 HOSPITAL OUTPT CLINIC VISIT: HCPCS | Performed by: STUDENT IN AN ORGANIZED HEALTH CARE EDUCATION/TRAINING PROGRAM

## 2021-02-23 PROCEDURE — 11106 INCAL BX SKN SINGLE LES: CPT | Performed by: STUDENT IN AN ORGANIZED HEALTH CARE EDUCATION/TRAINING PROGRAM

## 2021-02-23 PROCEDURE — G8754 DIAS BP LESS 90: HCPCS | Performed by: STUDENT IN AN ORGANIZED HEALTH CARE EDUCATION/TRAINING PROGRAM

## 2021-02-23 PROCEDURE — G8427 DOCREV CUR MEDS BY ELIG CLIN: HCPCS | Performed by: STUDENT IN AN ORGANIZED HEALTH CARE EDUCATION/TRAINING PROGRAM

## 2021-02-23 PROCEDURE — G8536 NO DOC ELDER MAL SCRN: HCPCS | Performed by: STUDENT IN AN ORGANIZED HEALTH CARE EDUCATION/TRAINING PROGRAM

## 2021-02-23 PROCEDURE — G8752 SYS BP LESS 140: HCPCS | Performed by: STUDENT IN AN ORGANIZED HEALTH CARE EDUCATION/TRAINING PROGRAM

## 2021-02-23 PROCEDURE — G8432 DEP SCR NOT DOC, RNG: HCPCS | Performed by: STUDENT IN AN ORGANIZED HEALTH CARE EDUCATION/TRAINING PROGRAM

## 2021-02-23 NOTE — PROGRESS NOTES
[address] [place] [address]   Office [phone], Fax [phone]    Subjective:     Chief Complaint   Patient presents with    Skin Problem     Patient presents to have a shave biopsy of a skin lesion on chest area. History provided by patient     HPI:  Kirstin Luo is a 68 y.o. male with past medical history of COPD, CAD, CKD4, T2DM, Alzheimer's Dementia, Depression, HLD, Carotid Artery Stenosis, and BPH presents for   Chief Complaint   Patient presents with    Skin Problem     Patient presents to have a shave biopsy of a skin lesion on chest area. Mr. Jany Simon presents for shave biopsy of skin lesion on chest. This visit he is also complaining of a lesion on his abdomen that he had not complained of previously. He says the lesion has been there for as long as he can remember. He says it has not been growing or causing him any problems like itching, bleeding, draining, or any other problems. No fevers, chills, weight loss, or history of skin cancer.     Social History     Socioeconomic History    Marital status:      Spouse name: Not on file    Number of children: Not on file    Years of education: Not on file    Highest education level: Not on file   Occupational History    Not on file   Social Needs    Financial resource strain: Not on file    Food insecurity     Worry: Not on file     Inability: Not on file    Transportation needs     Medical: Not on file     Non-medical: Not on file   Tobacco Use    Smoking status: Former Smoker     Packs/day: 2.00     Years: 30.00     Pack years: 60.00    Smokeless tobacco: Never Used   Substance and Sexual Activity    Alcohol use: Never     Frequency: Never    Drug use: Never    Sexual activity: Not Currently   Lifestyle    Physical activity     Days per week: Not on file     Minutes per session: Not on file    Stress: Not on file   Relationships    Social connections     Talks on phone: Not on file     Gets together: Not on file     Attends Hindu service: Not on file     Active member of club or organization: Not on file     Attends meetings of clubs or organizations: Not on file     Relationship status: Not on file    Intimate partner violence     Fear of current or ex partner: Not on file     Emotionally abused: Not on file     Physically abused: Not on file     Forced sexual activity: Not on file   Other Topics Concern    Not on file   Social History Narrative    Not on file     Review of Systems   Constitutional: Negative for chills, fever and weight loss. Respiratory: Negative for shortness of breath. Cardiovascular: Negative for chest pain. Neurological: Negative for weakness and headaches. Objective:     Visit Vitals  /63   Pulse (!) 59   Temp 98.1 °F (36.7 °C) (Oral)   Resp 16   Wt 172 lb 12.8 oz (78.4 kg)   SpO2 95%   BMI 33.19 kg/m²      Physical Exam  Constitutional:       General: He is not in acute distress. Cardiovascular:      Rate and Rhythm: Normal rate and regular rhythm. Pulses: Normal pulses. Heart sounds: Normal heart sounds. Pulmonary:      Effort: Pulmonary effort is normal.      Breath sounds: Wheezing (Slight, better than previously) present. Abdominal:      General: Abdomen is flat. Bowel sounds are normal.      Palpations: Abdomen is soft. Skin:     General: Skin is warm and dry. Comments: Abdominal Lesion: Black to brown asymmetrical uneven border lesion    Neurological:      Mental Status: He is alert.        Assessment and orders:       ICD-10-CM ICD-9-CM    1. Skin lesion  L98.9 709.9 SURGICAL PATHOLOGY      MD INCISIONAL BIOPSY SKIN SINGLE LESION      SURGICAL PATHOLOGY      CANCELED: SURGICAL PATHOLOGY     1. Skin Lesions:  - Chest lesion: will likely need punch biopsy, do not have instrument big enough to biopsy lesion today, will order and bring patient back for punch biopsy of this lesion.  - Abdominal wall lesion: shave biopsy performed as below, sent for pathology, will await results.  Labs, imaging and immunization ordered as above    Follow Up: in 2 weeks for follow up of chronic issues and for punch biopsy of chest wall lesion, will call patient when we have the correct instrument to make this appointment. Pt was discussed with Dr. Driss Santiago (attending physician). I have reviewed patient medical and social history and medications. I have reviewed pertinent labs results and other data. I have discussed the diagnosis with the patient and the intended plan as seen in the above orders. The patient has received an after-visit summary and questions were answered concerning future plans. I have discussed medication side effects and warnings with the patient as well. Amna Sifuentes MD  Resident [facility]  02/24/21    [facility]  OFFICE PROCEDURE PROGRESS NOTE    Chart reviewed for the following:   Clarence Soliman MD, have reviewed the History, Physical and updated the Allergic reactions for [address] performed immediately prior to start of procedure:   Clarence Soliman MD, have performed the following reviews on John Boyd prior to the start of the procedure:            * Patient was identified by name and date of birth   * Agreement on procedure being performed was verified  * Risks and Benefits explained to the patient  * Procedure site verified and marked as necessary  * Patient was positioned for comfort  * Consent was signed and verified     Time: 4:40    Date of procedure: 2/24/2021    Procedure performed by:  Amna Sifuentes MD    Provider assisted by: Dr. Driss Santiago MD    Patient assisted by: spouse    How tolerated by patient: tolerated the procedure well with no complications    Post Procedural Pain Scale: 0 - No Hurt    Comments: none    PROCEDURE NOTE:   Informed consent obtained verbally and risks, benefits and alternatives discussed.   Time out performed, cross checking patient ID and procedure. The skin lesion over the abdominal wall was prepped with chloroprep x 3 and anesthetized with 1 ml of 1% lidocaine. The skin lesion was then removed with a razor blade via shave biopsy. Hemostasis obtained with direct pressure and silver nitrate sticks. The skin lesion was placed in a pathology specimen container. The patient tolerated the procedure well and there were no complications.

## 2021-02-24 ENCOUNTER — HOSPITAL ENCOUNTER (OUTPATIENT)
Dept: LAB | Age: 81
Discharge: HOME | End: 2021-02-24

## 2021-02-26 NOTE — PROGRESS NOTES
I saw and evaluated the patient, performing the key elements of the service. I discussed the findings, assessment and plan with the resident and agree with the resident's findings and plan as documented in the resident's note. I was present and supervised the entire procedure.

## 2021-03-15 NOTE — PROGRESS NOTES
Surgical pathology of shave biopsy of skin from abdomen with pigmented seborrheic keratosis. Results shared with patient. Will perform punch biopsy on chest lesion next week and follow other lesions on back which are similar to the one on the abdomen.

## 2021-03-18 ENCOUNTER — TELEPHONE (OUTPATIENT)
Dept: FAMILY MEDICINE CLINIC | Age: 81
End: 2021-03-18

## 2021-03-18 NOTE — TELEPHONE ENCOUNTER
Prescription Request:  Prescribed:BD UF bert pen needle [id number]  Dispensed: Ulticare Pen needles 4MM 32G  Qy: 100  Sig use as directed to inject insulin    Please see attached in media

## 2021-03-22 RX ORDER — BLOOD SUGAR DIAGNOSTIC
STRIP MISCELLANEOUS
Qty: 100 PEN NEEDLE | Refills: 3 | Status: SHIPPED | OUTPATIENT
Start: 2021-03-22 | End: 2022-02-24 | Stop reason: SDUPTHER

## 2021-03-23 ENCOUNTER — OFFICE VISIT (OUTPATIENT)
Dept: FAMILY MEDICINE CLINIC | Age: 81
End: 2021-03-23
Payer: MEDICARE

## 2021-03-23 ENCOUNTER — HOSPITAL ENCOUNTER (OUTPATIENT)
Dept: LAB | Age: 81
Discharge: HOME | End: 2021-03-23

## 2021-03-23 VITALS
WEIGHT: 176.6 LBS | BODY MASS INDEX: 33.92 KG/M2 | OXYGEN SATURATION: 95 % | TEMPERATURE: 97.3 F | SYSTOLIC BLOOD PRESSURE: 135 MMHG | HEART RATE: 54 BPM | DIASTOLIC BLOOD PRESSURE: 63 MMHG | RESPIRATION RATE: 16 BRPM

## 2021-03-23 DIAGNOSIS — L98.9 SKIN LESION: Primary | ICD-10-CM

## 2021-03-23 PROCEDURE — G8754 DIAS BP LESS 90: HCPCS | Performed by: STUDENT IN AN ORGANIZED HEALTH CARE EDUCATION/TRAINING PROGRAM

## 2021-03-23 PROCEDURE — 11104 PUNCH BX SKIN SINGLE LESION: CPT | Performed by: STUDENT IN AN ORGANIZED HEALTH CARE EDUCATION/TRAINING PROGRAM

## 2021-03-23 PROCEDURE — G8536 NO DOC ELDER MAL SCRN: HCPCS | Performed by: STUDENT IN AN ORGANIZED HEALTH CARE EDUCATION/TRAINING PROGRAM

## 2021-03-23 PROCEDURE — G0463 HOSPITAL OUTPT CLINIC VISIT: HCPCS | Performed by: STUDENT IN AN ORGANIZED HEALTH CARE EDUCATION/TRAINING PROGRAM

## 2021-03-23 PROCEDURE — G8752 SYS BP LESS 140: HCPCS | Performed by: STUDENT IN AN ORGANIZED HEALTH CARE EDUCATION/TRAINING PROGRAM

## 2021-03-23 PROCEDURE — G8427 DOCREV CUR MEDS BY ELIG CLIN: HCPCS | Performed by: STUDENT IN AN ORGANIZED HEALTH CARE EDUCATION/TRAINING PROGRAM

## 2021-03-23 PROCEDURE — G8432 DEP SCR NOT DOC, RNG: HCPCS | Performed by: STUDENT IN AN ORGANIZED HEALTH CARE EDUCATION/TRAINING PROGRAM

## 2021-03-23 PROCEDURE — G8417 CALC BMI ABV UP PARAM F/U: HCPCS | Performed by: STUDENT IN AN ORGANIZED HEALTH CARE EDUCATION/TRAINING PROGRAM

## 2021-03-23 PROCEDURE — 99213 OFFICE O/P EST LOW 20 MIN: CPT | Performed by: STUDENT IN AN ORGANIZED HEALTH CARE EDUCATION/TRAINING PROGRAM

## 2021-03-23 PROCEDURE — 1101F PT FALLS ASSESS-DOCD LE1/YR: CPT | Performed by: STUDENT IN AN ORGANIZED HEALTH CARE EDUCATION/TRAINING PROGRAM

## 2021-03-23 NOTE — PROGRESS NOTES
[facility]  OFFICE PROCEDURE PROGRESS NOTE    Chart reviewed for the following:   Jenean Cowden, MD, have reviewed the History, Physical and updated the Allergic reactions for [address] performed immediately prior to start of procedure:   Jenean Cowden, MD, have performed the following reviews on Kirstin Puls prior to the start of the procedure:            * Patient was identified by name and date of birth   * Agreement on procedure being performed was verified  * Risks and Benefits explained to the patient  * Procedure site verified and marked as necessary  * Patient was positioned for comfort  * Consent was signed and verified     Time: 1:30      Date of procedure: 3/23/2021    Procedure performed by:  Jesús Mueller MD    Provider assisted by: Katie Herndon MA    Patient assisted by: self and spouse    How tolerated by patient: tolerated the procedure well with no complications    Post Procedural Pain Scale: 0 - No Hurt    Comments: none      PROCEDURE NOTE:     Informed consent obtained verbally and risks, benefits and alternatives discussed. Time out performed, cross checking patient ID and procedure. The skin lesion over the chest was prepped with chloroprep x 3 and anesthetized with 3 ml of 1% lidocaine. The skin lesion was then removed with a biopsy punch via 6mm punch biopsy instrument. A single 4-0 vicryl stitch was placed and hemostasis was achieved with direct pressure. The skin lesion was placed in a pathology specimen container. The patient tolerated the procedure well and there were no complications.

## 2021-03-23 NOTE — PATIENT INSTRUCTIONS
Skin Lesions: Care Instructions  Your Care Instructions  A skin lesion is a general term used for the different types of bumps, spots, moles or other growths that may appear on your skin. Most skin lesions are harmless, but sometimes they can be a sign of skin cancer or other health problems. Depending on what type of lesion you have, your doctor may cut out all or a small area of the skin tissue and send it to a lab to be looked at under a microscope. This is called a biopsy. A biopsy may be done to figure out what the lesion is or to make sure it is not skin cancer. Follow-up care is a key part of your treatment and safety. Be sure to make and go to all appointments, and call your doctor if you are having problems. It's also a good idea to know your test results and keep a list of the medicines you take. How can you care for yourself at home? · If your doctor told you how to care for your wound, follow your doctor's instructions. If you did not get instructions, follow this general advice:  ? Keep the wound bandaged and dry for the first day. ? After the first day, wash around the wound with clean water 2 times a day. Don't use hydrogen peroxide or alcohol, which can slow healing. ? You may cover the wound with a thin layer of petroleum jelly, such as Vaseline, and a nonstick bandage. ? Apply more petroleum jelly and replace the bandage as needed. · If you have stitches, you may get other instructions. You will have to return to have the stitches removed. · If a scab forms, do not pull it off. Let it fall off on its own. Wounds heal faster if no scab forms. Washing the area every day and using petroleum jelly will help keep a scab from forming. · If the wound bleeds, put direct pressure on it with a clean cloth until the bleeding stops. · Take an over-the-counter pain medicine, such as acetaminophen (Tylenol), ibuprofen (Advil, Motrin), or naproxen (Aleve).  Read and follow all instructions on the label.  · Do not take two or more pain medicines at the same time unless the doctor told you to. Many pain medicines have acetaminophen, which is Tylenol. Too much acetaminophen (Tylenol) can be harmful. · If you had a growth \"frozen\" off with liquid nitrogen, you may get a blister. Do not break it. Let it dry up on its own. It is common for the blister to fill with blood. You do not need to do anything about this, but if it becomes too painful, call your doctor. When should you call for help? Call your doctor now or seek immediate medical care if:    · You have signs of infection, such as:  ? Increased pain, swelling, warmth, or redness. ? Red streaks leading from the wound. ? Pus draining from the wound. ? A fever. Watch closely for changes in your health, and be sure to contact your doctor if:    · The wound changes, bleeds, or gets worse.     · You do not get better after 2 weeks of home care. Where can you learn more? Go to http://www.gray.com/  Enter P887 in the search box to learn more about \"Skin Lesions: Care Instructions. \"  Current as of: July 2, 2020               Content Version: 12.6  © 5856-3708 [employer]. Care instructions adapted under license by [employer] (which disclaims liability or warranty for this information). If you have questions about a medical condition or this instruction, always ask your healthcare professional. [place] [address] any warranty or liability for your use of this information.

## 2021-03-23 NOTE — PROGRESS NOTES
Chief Complaint   Patient presents with    Skin Problem     Patient presents for a skin biopsy for 2 lesions. Visit Vitals  /63 (BP 1 Location: Left upper arm, BP Patient Position: Sitting, BP Cuff Size: Adult)   Pulse (!) 54   Temp 97.3 °F (36.3 °C) (Temporal)   Resp 16   Wt 176 lb 9.6 oz (80.1 kg)   SpO2 95%   BMI 33.92 kg/m²      1. Have you been to the ER, urgent care clinic since your last visit? Hospitalized since your last visit? No     2. Have you seen or consulted any other health care providers outside of the [address] [place] [address] since your last visit? Include any pap smears or colon screening.  No

## 2021-03-24 DIAGNOSIS — M81.0 OSTEOPOROSIS, UNSPECIFIED OSTEOPOROSIS TYPE, UNSPECIFIED PATHOLOGICAL FRACTURE PRESENCE: ICD-10-CM

## 2021-03-24 RX ORDER — MULTIVITAMIN
TABLET ORAL
Qty: 90 TAB | Refills: 3 | Status: SHIPPED | OUTPATIENT
Start: 2021-03-24

## 2021-03-26 DIAGNOSIS — I25.10 CORONARY ARTERY DISEASE INVOLVING NATIVE CORONARY ARTERY WITHOUT ANGINA PECTORIS, UNSPECIFIED WHETHER NATIVE OR TRANSPLANTED HEART: ICD-10-CM

## 2021-03-26 RX ORDER — CLOPIDOGREL BISULFATE 75 MG/1
TABLET ORAL
Qty: 30 TAB | Refills: 3 | Status: SHIPPED | OUTPATIENT
Start: 2021-03-26 | End: 2021-06-24

## 2021-04-01 NOTE — PROGRESS NOTES
[address] [facility] [address] [place] [address]   Office [phone], Fax [phone]    Subjective:     Chief Complaint   Patient presents with    Suture Removal     Patient presents for suture removal; also refill on albuterol. History provided by patient     HPI:  Araceli Gomez is a 68 y.o. DECLINED male presents for suture removal after a punch biopsy of a skin lesion on anterior R chest 3/23/21. He denies any discharge or pain at the site. Pathology report: Benign skin with epidermal inclusion cyst.     Medication reviewed. Allergy reviewed. ROS (bolded are positive): all negative except per HPI. Objective:   Vitals - reviewed  Visit Vitals  /61 (BP 1 Location: Left upper arm, BP Patient Position: Sitting, BP Cuff Size: Adult)   Pulse 63   Temp 97.5 °F (36.4 °C) (Temporal)   Resp 16   Wt 175 lb (79.4 kg)   SpO2 95%   BMI 33.61 kg/m²        Physical exam:   GEN: NAD. Alert. Well nourished. EYES:  Conjunctiva clear        LUNGS: Respirations unlabored; CTAB. no wheeze, rales, rhonchi   CARDIOVASCULAR: Regular, rate, and rhythm without murmurs, gallops or rubs   EXT: Well perfused. No edema. No erythema. PSYCH: appropriate mood and affect. Good insight and judgement. Cooperative. SKIN: small dime-sized area on anterior chest w/ suture in site. Suture removed *                    Assessment and orders:    68 y.o. male w/ PMH COPD, CAD, CKD4, T2DM, Alzheimer's Dementia, Depression, HLD, Carotid Artery Stenosis, and BPH   presents for suture removal after a punch biopsy of a skin lesion on anterior R chest 3/23/21. Epidermal inclusion cyst- per final on pathology report. Benign.   - Reassurance given  - Suture removed  - Advised to keep area clean and dry     COPD: stable. - Refilled pt's nebulizer     Pt was discussed with Dr Miguel Murrieta (attending physician). I have reviewed patient medical and social history and medications.   I have reviewed pertinent labs results and other data. I have discussed the diagnosis with the patient and the intended plan as seen in the above orders. The patient has received an after-visit summary and questions were answered concerning future plans. I have discussed medication side effects and warnings with the patient as well.     Miladys Mcmullen MD  Resident 01 [facility]  04/02/21

## 2021-04-02 ENCOUNTER — OFFICE VISIT (OUTPATIENT)
Dept: FAMILY MEDICINE CLINIC | Age: 81
End: 2021-04-02
Payer: MEDICARE

## 2021-04-02 ENCOUNTER — TELEPHONE (OUTPATIENT)
Dept: FAMILY MEDICINE CLINIC | Age: 81
End: 2021-04-02

## 2021-04-02 VITALS
DIASTOLIC BLOOD PRESSURE: 61 MMHG | WEIGHT: 175 LBS | SYSTOLIC BLOOD PRESSURE: 109 MMHG | BODY MASS INDEX: 33.61 KG/M2 | HEART RATE: 63 BPM | OXYGEN SATURATION: 95 % | TEMPERATURE: 97.5 F | RESPIRATION RATE: 16 BRPM

## 2021-04-02 DIAGNOSIS — J44.9 CHRONIC OBSTRUCTIVE PULMONARY DISEASE, UNSPECIFIED COPD TYPE (HCC): ICD-10-CM

## 2021-04-02 DIAGNOSIS — L72.0 EPIDERMAL INCLUSION CYST: Primary | ICD-10-CM

## 2021-04-02 PROCEDURE — 99213 OFFICE O/P EST LOW 20 MIN: CPT | Performed by: STUDENT IN AN ORGANIZED HEALTH CARE EDUCATION/TRAINING PROGRAM

## 2021-04-02 PROCEDURE — G0463 HOSPITAL OUTPT CLINIC VISIT: HCPCS | Performed by: STUDENT IN AN ORGANIZED HEALTH CARE EDUCATION/TRAINING PROGRAM

## 2021-04-02 RX ORDER — ALBUTEROL SULFATE 0.83 MG/ML
2.5 SOLUTION RESPIRATORY (INHALATION)
Qty: 30 NEBULE | Refills: 4 | Status: SHIPPED | OUTPATIENT
Start: 2021-04-02 | End: 2021-04-02 | Stop reason: SDUPTHER

## 2021-04-02 RX ORDER — ALBUTEROL SULFATE 0.83 MG/ML
2.5 SOLUTION RESPIRATORY (INHALATION)
Qty: 30 NEBULE | Refills: 4 | Status: SHIPPED | OUTPATIENT
Start: 2021-04-02 | End: 2022-07-14 | Stop reason: SDUPTHER

## 2021-04-02 RX ORDER — ALBUTEROL SULFATE 90 UG/1
2 AEROSOL, METERED RESPIRATORY (INHALATION)
Qty: 1 INHALER | Refills: 4 | Status: SHIPPED | OUTPATIENT
Start: 2021-04-02 | End: 2021-12-06 | Stop reason: SDUPTHER

## 2021-04-02 RX ORDER — ALBUTEROL SULFATE 0.83 MG/ML
2.5 SOLUTION RESPIRATORY (INHALATION)
Qty: 30 NEBULE | Refills: 4 | Status: CANCELLED | OUTPATIENT
Start: 2021-04-02

## 2021-04-02 NOTE — TELEPHONE ENCOUNTER
[employer] calling  placed order for pt albuterol it cannot be filled under her name and needs to be sent in under another dr name due to pt getting it through [facility] . I spoke with Dr Rohit Abdi and she gave nurse stuart the ok to  call and place it under her name .

## 2021-04-02 NOTE — PROGRESS NOTES
Chief Complaint   Patient presents with    Suture Removal     Patient presents for suture removal; also refill on albuterol. Vitals:    04/02/21 1308   BP: 109/61   BP 1 Location: Left upper arm   BP Patient Position: Sitting   BP Cuff Size: Adult   Pulse: 63   Resp: 16   Temp: 97.5 °F (36.4 °C)   TempSrc: Temporal   SpO2: 95%   Weight: 175 lb (79.4 kg)        1. Have you been to the ER, urgent care clinic since your last visit? Hospitalized since your last visit? No     2. Have you seen or consulted any other health care providers outside of the [address] [place] [address] since your last visit? Include any pap smears or colon screening.  No

## 2021-04-02 NOTE — TELEPHONE ENCOUNTER
Kaylyn called to say the albuterol ventolin nebulizer solution ordered in AUG 2020 the patient never got filled. Now the patient wants it filled and Medicare will not accept this resident physicians [employer] as not Medicare [address] [place] [address] certified. Please call with an attendings name.     [phone]

## 2021-04-02 NOTE — PATIENT INSTRUCTIONS
Epidermoid Cyst: Care Instructions  Your Care Instructions  An epidermoid (say \"yk-tzn-SHY-maritza\") cyst is a lump just under the skin. These cysts can form when a hair follicle becomes blocked. They are common in acne and may occur on the face, neck, back, and genitals. However, they can form anywhere on the body. These cysts are not cancer and do not lead to cancer. They tend not to hurt, but they can sometimes become swollen and painful. They also may break open (rupture) and cause scarring. These cysts sometimes do not cause problems and may not need treatment. If you have a cyst that is swollen and hurts, your doctor may inject it with a medicine to help it heal. But it is more likely that a painful cyst will need to be removed. Your doctor will give you a shot of numbing medicine and cut into the cyst to drain it or remove it. This makes the symptoms go away. Follow-up care is a key part of your treatment and safety. Be sure to make and go to all appointments, and call your doctor if you are having problems. It's also a good idea to know your test results and keep a list of the medicines you take. How can you care for yourself at home? · Do not squeeze the cyst or poke it with a needle to open it. This can cause swelling, redness, and infection. · Always have a doctor look at any new lumps you get to make sure that they are not serious. When should you call for help? Watch closely for changes in your health, and be sure to contact your doctor if:    · You have a fever, redness, or swelling after you get a shot of medicine in the cyst.     · You see or feel a new lump on your skin. Where can you learn more? Go to http://www.gray.com/  Enter B988 in the search box to learn more about \"Epidermoid Cyst: Care Instructions. \"  Current as of: July 2, 2020               Content Version: 12.8  © 2326-7846 [employer], [employer].    Care instructions adapted under license by [employer] (which disclaims liability or warranty for this information). If you have questions about a medical condition or this instruction, always ask your healthcare professional. [address] any warranty or liability for your use of this information.

## 2021-04-12 ENCOUNTER — OFFICE VISIT (OUTPATIENT)
Dept: FAMILY MEDICINE CLINIC | Age: 81
End: 2021-04-12
Payer: MEDICARE

## 2021-04-12 VITALS
SYSTOLIC BLOOD PRESSURE: 105 MMHG | BODY MASS INDEX: 33.46 KG/M2 | RESPIRATION RATE: 16 BRPM | DIASTOLIC BLOOD PRESSURE: 50 MMHG | TEMPERATURE: 97.5 F | OXYGEN SATURATION: 95 % | HEART RATE: 71 BPM | WEIGHT: 174.2 LBS

## 2021-04-12 DIAGNOSIS — J44.9 CHRONIC OBSTRUCTIVE PULMONARY DISEASE, UNSPECIFIED COPD TYPE (HCC): Primary | ICD-10-CM

## 2021-04-12 DIAGNOSIS — I25.10 CORONARY ARTERY DISEASE INVOLVING NATIVE CORONARY ARTERY WITHOUT ANGINA PECTORIS, UNSPECIFIED WHETHER NATIVE OR TRANSPLANTED HEART: ICD-10-CM

## 2021-04-12 DIAGNOSIS — I50.9 CHRONIC CONGESTIVE HEART FAILURE, UNSPECIFIED HEART FAILURE TYPE (HCC): ICD-10-CM

## 2021-04-12 DIAGNOSIS — E11.69 TYPE 2 DIABETES MELLITUS WITH OTHER SPECIFIED COMPLICATION, UNSPECIFIED WHETHER LONG TERM INSULIN USE (HCC): ICD-10-CM

## 2021-04-12 PROCEDURE — G8754 DIAS BP LESS 90: HCPCS | Performed by: STUDENT IN AN ORGANIZED HEALTH CARE EDUCATION/TRAINING PROGRAM

## 2021-04-12 PROCEDURE — G8752 SYS BP LESS 140: HCPCS | Performed by: STUDENT IN AN ORGANIZED HEALTH CARE EDUCATION/TRAINING PROGRAM

## 2021-04-12 PROCEDURE — G8427 DOCREV CUR MEDS BY ELIG CLIN: HCPCS | Performed by: STUDENT IN AN ORGANIZED HEALTH CARE EDUCATION/TRAINING PROGRAM

## 2021-04-12 PROCEDURE — G8432 DEP SCR NOT DOC, RNG: HCPCS | Performed by: STUDENT IN AN ORGANIZED HEALTH CARE EDUCATION/TRAINING PROGRAM

## 2021-04-12 PROCEDURE — 1101F PT FALLS ASSESS-DOCD LE1/YR: CPT | Performed by: STUDENT IN AN ORGANIZED HEALTH CARE EDUCATION/TRAINING PROGRAM

## 2021-04-12 PROCEDURE — G8536 NO DOC ELDER MAL SCRN: HCPCS | Performed by: STUDENT IN AN ORGANIZED HEALTH CARE EDUCATION/TRAINING PROGRAM

## 2021-04-12 PROCEDURE — G8417 CALC BMI ABV UP PARAM F/U: HCPCS | Performed by: STUDENT IN AN ORGANIZED HEALTH CARE EDUCATION/TRAINING PROGRAM

## 2021-04-12 PROCEDURE — 99214 OFFICE O/P EST MOD 30 MIN: CPT | Performed by: STUDENT IN AN ORGANIZED HEALTH CARE EDUCATION/TRAINING PROGRAM

## 2021-04-12 NOTE — PROGRESS NOTES
Chief Complaint   Patient presents with    Follow-up     Patient presents to follow up on removal of lesion on chest.      Vitals:    04/12/21 1321   BP: (!) 105/50   BP 1 Location: Left upper arm   BP Patient Position: Sitting   BP Cuff Size: Adult   Pulse: 71   Resp: 16   Temp: 97.5 °F (36.4 °C)   TempSrc: Temporal   SpO2: 95%   Weight: 174 lb 3.2 oz (79 kg)       1. Have you been to the ER, urgent care clinic since your last visit? Hospitalized since your last visit? No     2. Have you seen or consulted any other health care providers outside of the [facility] since your last visit? Include any pap smears or colon screening.  No

## 2021-04-12 NOTE — PATIENT INSTRUCTIONS
Using a Metered-Dose Inhaler: Care Instructions  Overview     A metered-dose inhaler lets you breathe medicine into your lungs quickly. Inhaled medicine works faster than the same medicine in a pill. An inhaler allows you to take less medicine than you would need if you took it as a pill. \"Metered-dose\" means that the inhaler gives a measured amount of medicine each time you use it. A metered-dose inhaler gives medicine in the form of a liquid mist.  Your doctor may want you to use a spacer with your inhaler. A spacer is a chamber that you attach to the inhaler. The chamber holds the medicine before you inhale it. That way, you can inhale the medicine in as many breaths as you need. Doctors recommend using a spacer with most metered-dose inhalers. This is even more important when using corticosteroid medicines. Follow-up care is a key part of your treatment and safety. Be sure to make and go to all appointments, and call your doctor if you are having problems. It's also a good idea to know your test results and keep a list of the medicines you take. How can you care for yourself at home? To get started  · Talk with your health care provider to be sure you are using your inhaler the right way. It might help if you practice using it in front of a mirror. Use the inhaler exactly as prescribed. · Check that you have the correct medicine. If you use more than one inhaler, put a label on each one. This will let you know which one to use at the right time. · Keep track of how much medicine is in the inhaler. Check the label to see how many doses are in the container. If you know how many puffs you can take, you can replace the inhaler before you run out. Ask your health care provider how you can keep track of how much medicine is left. · Talk to your health care provider about using a spacer with your inhaler. Spacers make it easier to get the medicine into your lungs.  You may need a spacer if you are using corticosteroid medicines. A spacer can also help if you have problems pressing the inhaler and breathing in at the same time. · If you are using a corticosteroid inhaler, gargle and rinse out your mouth with water after use. Do not swallow the water. Swallowing the water will increase the chance that the medicine will get into your bloodstream. This may make it more likely that you will have side effects. To use a spacer with an inhaler  1. Shake the inhaler. Remove the inhaler cap, and place the mouthpiece of the inhaler into the spacer. Check the inhaler instructions to see if you need to prime your inhaler before you use it. If it needs priming, follow the instructions on how to prime your inhaler. 2. Remove the cap from the spacer. 3. Hold the inhaler upright with the mouthpiece at the bottom. 4. Tilt your head back a little, and breathe out slowly and completely. 5. Place the spacer's mouthpiece in your mouth. 6. Press down on the inhaler to spray one puff of medicine into the spacer, and then start breathing in slowly. Wait to inhale until after you have pressed down on the inhaler. Some spacers have a whistle. If you hear it, you should breathe in more slowly. 7. Hold your breath for 10 seconds. This will let the medicine settle in your lungs. 8. If you need to take a second dose, wait 30 to 60 seconds to allow the inhaler valve to refill. To use an inhaler without a spacer  1. Shake the inhaler as directed. Remove the cap. Check the instructions to see if you need to prime your inhaler before you use it. If it needs priming, follow the instructions on how to prime your inhaler. 2. Hold the inhaler upright with the mouthpiece at the bottom. 3. Tilt your head back a little, and breathe out slowly and completely. 4. Position the inhaler in one of two ways:  ? You can place the inhaler in your mouth. This is easier for most people.  And it lowers the risk that any of the medicine will get into your eyes.  ? Or you can place the inhaler 1 to 2 inches in front of your open mouth, without closing your lips over it. Try to open your mouth as wide as you can. Placing the inhaler in front of your open mouth may be better for getting the medicine into your lungs. But some people may find this too hard to do. 5. Start taking slow, even breaths through your mouth. Press down on the inhaler once, then inhale fully. 6. Hold your breath for 10 seconds. This will let the medicine settle in your lungs. 7. If you need to take a second dose, wait 30 to 60 seconds to allow the inhaler valve to refill. Where can you learn more? Go to http://www.sarmiento.com/  Enter K111 in the search box to learn more about \"Using a Metered-Dose Inhaler: Care Instructions. \"  Current as of: October 26, 2020               Content Version: 12.8  © 6896-9138 [employer]. Care instructions adapted under license by [employer] (which disclaims liability or warranty for this information). If you have questions about a medical condition or this instruction, always ask your healthcare professional. [place] [address] any warranty or liability for your use of this information.

## 2021-04-12 NOTE — PROGRESS NOTES
[address] [facility] [address] [place] [address]   Office [phone], Fax [phone]    Subjective:     Chief Complaint   Patient presents with    Follow-up     Patient presents to follow up on removal of lesion on chest.      History provided by patient     HPI:  Maryuri Burr is a 68 y.o. DECLINED male with past medical history of COPD, CAD, HFpEF, CKD4, T2DM, Alzheimer's Dementia, Depression, HLD, Carotid Artery Stenosis, and BPH presents for   Chief Complaint   Patient presents with    Follow-up     Patient presents to follow up on removal of lesion on chest.        Mr. Arias Maravilla is here for follow up of his chronic conditions. He was recently seen by his cardiologist, Dr. Frances Tovar, who did not make any changes to his medications. For CAD he is on ASA 81mg daily, Plavix 75mg daily, Imdur 90mg BID, Coreg 12.5mg BID. For HFpEF he is on Bumex 1mg daily, Coreg 12.5mg BID, Metolazone as needed. For COPD he is on Combivent 20-100mcg QID, Airduo, Albuterol inhaler/neb prn, Flonase 50mcg prn. He has not been using his Airduo and only uses his other meds on a prn basis. For HLD he is on Atorvastatin 20mg daily. For T2DM he is on SSI. Most recent A1c on 2/16/2021 was 7.0, up from 5.7 on 8/13/2020.     Social History     Socioeconomic History    Marital status:      Spouse name: Not on file    Number of children: Not on file    Years of education: Not on file    Highest education level: Not on file   Occupational History    Not on file   Social Needs    Financial resource strain: Not on file    Food insecurity     Worry: Not on file     Inability: Not on file    Transportation needs     Medical: Not on file     Non-medical: Not on file   Tobacco Use    Smoking status: Former Smoker     Packs/day: 2.00     Years: 30.00     Pack years: 60.00    Smokeless tobacco: Never Used   Substance and Sexual Activity    Alcohol use: Never     Frequency: Never    Drug use: Never    Sexual activity: Not Currently Lifestyle    Physical activity     Days per week: Not on file     Minutes per session: Not on file    Stress: Not on file   Relationships    Social connections     Talks on phone: Not on file     Gets together: Not on file     Attends Confucianist service: Not on file     Active member of club or organization: Not on file     Attends meetings of clubs or organizations: Not on file     Relationship status: Not on file    Intimate partner violence     Fear of current or ex partner: Not on file     Emotionally abused: Not on file     Physically abused: Not on file     Forced sexual activity: Not on file   Other Topics Concern    Not on file   Social History Narrative    Not on file     Review of Systems   Constitutional: Negative for chills and fever. Respiratory: Positive for cough (unchanged from baseline), sputum production (unchanged from baseline. thin clear.) and shortness of breath (unchanged from baseline). Cardiovascular: Negative for chest pain and leg swelling. Genitourinary: Negative for dysuria and urgency. Objective:     Visit Vitals  BP (!) 105/50 (BP 1 Location: Left upper arm, BP Patient Position: Sitting, BP Cuff Size: Adult)   Pulse 71   Temp 97.5 °F (36.4 °C) (Temporal)   Resp 16   Wt 79 kg (174 lb 3.2 oz)   SpO2 95%   BMI 33.46 kg/m²        Physical Exam  Constitutional:       Appearance: Normal appearance. Cardiovascular:      Rate and Rhythm: Normal rate and regular rhythm. Pulses: Normal pulses. Heart sounds: Normal heart sounds. Pulmonary:      Effort: Pulmonary effort is normal. No respiratory distress. Breath sounds: Wheezing (bilaterally) present. Abdominal:      General: Abdomen is flat. Bowel sounds are normal.      Palpations: Abdomen is soft. Neurological:      Mental Status: He is alert. Assessment and orders:       ICD-10-CM ICD-9-CM    1.  Chronic obstructive pulmonary disease, unspecified COPD type ([facility]  J44.9 496 ipratropium-albuteroL (Combivent Respimat)  mcg/actuation inhaler   2. Coronary artery disease involving native coronary artery without angina pectoris, unspecified whether native or transplanted heart  I25.10 414.01    3. Chronic congestive heart failure, unspecified heart failure type (HCC)  I50.9 428.0    4. Type 2 diabetes mellitus with other specified complication, unspecified whether long term insulin use (HCC)  E11.69 250.80      1. COPD: stable, but has not been taking medications as prescribed. Has not been taking Airduo and has been using other meds on prn basis. - Will discontinue Airduo  - Advised patient to take Combivent twice daily  - Advised patient to take Flonase once daily  - Advised patient to use albuterol inh on a prn basis    2. CAD: stable, seen Dr. Estefania Taylor in the last few weeks, did not make any changes to meds. - Continue current meds as listed in HPI    3. CHF: stable, seen Dr. Estefania Taylor in the last few weeks, did not make any changes to meds. - Advised patient that his dry weight is probably 170-173 on his scale at home which reads 3lbs lighter than our scale in the clinic per the wife    4. T2DM: stable, SSI controlled, does not usually need much of the SSI. A1c 7.0 (2/16/2021). - Continue to monitor A1c every three months, will not make any medication changes currently     Labs, imaging and immunization ordered as above    Follow Up: In two weeks    Pt was discussed with Dr. Lexis Bhardwaj (attending physician). I have reviewed patient medical and social history and medications. I have reviewed pertinent labs results and other data. I have discussed the diagnosis with the patient and the intended plan as seen in the above orders. The patient has received an after-visit summary and questions were answered concerning future plans. I have discussed medication side effects and warnings with the patient as well.     Lindsay Alberto MD  Resident 01 [facility]  04/15/21

## 2021-04-22 ENCOUNTER — OFFICE VISIT (OUTPATIENT)
Dept: FAMILY MEDICINE CLINIC | Age: 81
End: 2021-04-22
Payer: MEDICARE

## 2021-04-22 VITALS
WEIGHT: 175.8 LBS | RESPIRATION RATE: 16 BRPM | BODY MASS INDEX: 33.77 KG/M2 | DIASTOLIC BLOOD PRESSURE: 56 MMHG | OXYGEN SATURATION: 96 % | TEMPERATURE: 97.8 F | SYSTOLIC BLOOD PRESSURE: 106 MMHG | HEART RATE: 58 BPM

## 2021-04-22 DIAGNOSIS — J44.9 CHRONIC OBSTRUCTIVE PULMONARY DISEASE, UNSPECIFIED COPD TYPE (HCC): Primary | ICD-10-CM

## 2021-04-22 DIAGNOSIS — I25.10 CORONARY ARTERY DISEASE WITHOUT ANGINA PECTORIS, UNSPECIFIED VESSEL OR LESION TYPE, UNSPECIFIED WHETHER NATIVE OR TRANSPLANTED HEART: ICD-10-CM

## 2021-04-22 DIAGNOSIS — Z79.899 POLYPHARMACY: ICD-10-CM

## 2021-04-22 DIAGNOSIS — E11.69 TYPE 2 DIABETES MELLITUS WITH OTHER SPECIFIED COMPLICATION, UNSPECIFIED WHETHER LONG TERM INSULIN USE (HCC): ICD-10-CM

## 2021-04-22 DIAGNOSIS — J30.89 ENVIRONMENTAL AND SEASONAL ALLERGIES: ICD-10-CM

## 2021-04-22 PROCEDURE — 3051F HG A1C>EQUAL 7.0%<8.0%: CPT | Performed by: STUDENT IN AN ORGANIZED HEALTH CARE EDUCATION/TRAINING PROGRAM

## 2021-04-22 PROCEDURE — G8427 DOCREV CUR MEDS BY ELIG CLIN: HCPCS | Performed by: STUDENT IN AN ORGANIZED HEALTH CARE EDUCATION/TRAINING PROGRAM

## 2021-04-22 PROCEDURE — G8754 DIAS BP LESS 90: HCPCS | Performed by: STUDENT IN AN ORGANIZED HEALTH CARE EDUCATION/TRAINING PROGRAM

## 2021-04-22 PROCEDURE — G0463 HOSPITAL OUTPT CLINIC VISIT: HCPCS | Performed by: STUDENT IN AN ORGANIZED HEALTH CARE EDUCATION/TRAINING PROGRAM

## 2021-04-22 PROCEDURE — 99213 OFFICE O/P EST LOW 20 MIN: CPT | Performed by: STUDENT IN AN ORGANIZED HEALTH CARE EDUCATION/TRAINING PROGRAM

## 2021-04-22 PROCEDURE — G8432 DEP SCR NOT DOC, RNG: HCPCS | Performed by: STUDENT IN AN ORGANIZED HEALTH CARE EDUCATION/TRAINING PROGRAM

## 2021-04-22 PROCEDURE — G8752 SYS BP LESS 140: HCPCS | Performed by: STUDENT IN AN ORGANIZED HEALTH CARE EDUCATION/TRAINING PROGRAM

## 2021-04-22 PROCEDURE — 1101F PT FALLS ASSESS-DOCD LE1/YR: CPT | Performed by: STUDENT IN AN ORGANIZED HEALTH CARE EDUCATION/TRAINING PROGRAM

## 2021-04-22 PROCEDURE — G8536 NO DOC ELDER MAL SCRN: HCPCS | Performed by: STUDENT IN AN ORGANIZED HEALTH CARE EDUCATION/TRAINING PROGRAM

## 2021-04-22 PROCEDURE — G8417 CALC BMI ABV UP PARAM F/U: HCPCS | Performed by: STUDENT IN AN ORGANIZED HEALTH CARE EDUCATION/TRAINING PROGRAM

## 2021-04-22 RX ORDER — CARVEDILOL 12.5 MG/1
12.5 TABLET ORAL 2 TIMES DAILY WITH MEALS
Qty: 60 TAB | Refills: 2 | Status: SHIPPED | OUTPATIENT
Start: 2021-04-22 | End: 2021-06-24

## 2021-04-22 RX ORDER — FLUTICASONE PROPIONATE 50 MCG
SPRAY, SUSPENSION (ML) NASAL
Qty: 48 G | Refills: 0 | Status: SHIPPED | OUTPATIENT
Start: 2021-04-22 | End: 2021-12-06 | Stop reason: SDUPTHER

## 2021-04-22 NOTE — PROGRESS NOTES
Chief Complaint   Patient presents with    Follow Up Chronic Condition     Patient presents to follow up on chronic conditions  & medications. Visit Vitals  BP (!) 106/56 (BP 1 Location: Left upper arm, BP Patient Position: Sitting, BP Cuff Size: Adult)   Pulse (!) 58   Temp 97.8 °F (36.6 °C) (Temporal)   Resp 16   Wt 175 lb 12.8 oz (79.7 kg)   SpO2 96%   BMI 33.77 kg/m²      1. Have you been to the ER, urgent care clinic since your last visit? Hospitalized since your last visit? No     2. Have you seen or consulted any other health care providers outside of the [address] [place] [address] since your last visit? Include any pap smears or colon screening.  No

## 2021-04-22 NOTE — PROGRESS NOTES
[address] [facility] [address] [place] [address]   Office [phone], Fax [phone]    Subjective:     Chief Complaint   Patient presents with    Follow Up Chronic Condition     Patient presents to follow up on chronic conditions  & medications. History provided by patient     HPI:  Maura Lopez is a 68 y.o. DECLINED male with past medical history of COPD, CAD, HFpEF, CKD4, T2DM, Alzheimer's Dementia, Depression, HLD, Carotid Artery Stenosis, and BPH presents for   Chief Complaint   Patient presents with    Follow Up Chronic Condition     Patient presents to follow up on chronic conditions  & medications. COPD: Mr. Ivania Rodríguez and his wife say that his breathing has been a lot better ever since he has been taking the Combivent twice daily. He has not been having any side-effects or any other concerns. Polypharmacy: Mr. Ivania Rodríguez and his wife have brought in all their medication bottles. Reviewed medications and dosages with both of them and they seem understanding on how to take the medications. Has not been taking Coreg because he has run out, but per records reviewed from Dr. Lavern Lobo he should still be on Coreg.     Social History     Socioeconomic History    Marital status:      Spouse name: Not on file    Number of children: Not on file    Years of education: Not on file    Highest education level: Not on file   Occupational History    Not on file   Social Needs    Financial resource strain: Not on file    Food insecurity     Worry: Not on file     Inability: Not on file    Transportation needs     Medical: Not on file     Non-medical: Not on file   Tobacco Use    Smoking status: Former Smoker     Packs/day: 2.00     Years: 30.00     Pack years: 60.00    Smokeless tobacco: Never Used   Substance and Sexual Activity    Alcohol use: Never     Frequency: Never    Drug use: Never    Sexual activity: Not Currently   Lifestyle    Physical activity     Days per week: Not on file Minutes per session: Not on file    Stress: Not on file   Relationships    Social connections     Talks on phone: Not on file     Gets together: Not on file     Attends Judaism service: Not on file     Active member of club or organization: Not on file     Attends meetings of clubs or organizations: Not on file     Relationship status: Not on file    Intimate partner violence     Fear of current or ex partner: Not on file     Emotionally abused: Not on file     Physically abused: Not on file     Forced sexual activity: Not on file   Other Topics Concern    Not on file   Social History Narrative    Not on file     Review of Systems   Constitutional: Negative for chills and fever. Respiratory: Negative for cough and shortness of breath. Cardiovascular: Negative for chest pain and leg swelling. Gastrointestinal: Negative for constipation, diarrhea, nausea and vomiting. Genitourinary: Negative for dysuria, frequency and urgency. Neurological: Negative for dizziness and headaches. Objective:     Visit Vitals  BP (!) 106/56 (BP 1 Location: Left upper arm, BP Patient Position: Sitting, BP Cuff Size: Adult)   Pulse (!) 58   Temp 97.8 °F (36.6 °C) (Temporal)   Resp 16   Wt 175 lb 12.8 oz (79.7 kg)   SpO2 96%   BMI 33.77 kg/m²      Physical Exam  Constitutional:       Appearance: Normal appearance. HENT:      Head: Normocephalic and atraumatic. Cardiovascular:      Rate and Rhythm: Normal rate and regular rhythm. Pulses: Normal pulses. Heart sounds: Normal heart sounds. Pulmonary:      Effort: Pulmonary effort is normal.      Breath sounds: Normal breath sounds. Abdominal:      General: Abdomen is flat. Bowel sounds are normal.      Palpations: Abdomen is soft. Skin:     General: Skin is warm and dry. Neurological:      General: No focal deficit present. Mental Status: He is alert and oriented to person, place, and time.        Assessment and orders:       ICD-10-CM ICD-9-CM 1. Chronic obstructive pulmonary disease, unspecified COPD type ([facility]  J44.9 496    2. Polypharmacy  Z79.899 V58.69    3. Environmental and seasonal allergies  J30.89 477.8 fluticasone propionate (FLONASE) 50 mcg/actuation nasal spray   4. Coronary artery disease without angina pectoris, unspecified vessel or lesion type, unspecified whether native or transplanted heart  I25.10 414.00 carvediloL (COREG) 12.5 mg tablet   5. Type 2 diabetes mellitus with other specified complication, unspecified whether long term insulin use (HCC)  E11.69 250.80 glucose blood VI test strips (ASCENSIA AUTODISC VI, ONE TOUCH ULTRA TEST VI) strip     COPD: doing well, no complaints, recently changed medications to taking Combivent twice daily, Flonase daily, and Albuterol inh prn.  - Continue medications as prescribed  - Refilled Flonase    Polypharmacy: updated complete med list  - Continue current medications  - Refilled Coreg, discussed with Dr. Moises Beal. BP today 106/56, HR today 59, has tolerated current Coreg dosage in the past and is protective for CAD. Will follow up in two weeks for BP and HR check. Advised patient to check BP and HR at home and to call if HR is below 45 and to stop medication if this is the case. - Refilled glucose test strips, discussed adjusting SSI since his most recent A1c was 7.0. Will discuss discontinuing Insulin at next visit in two weeks and starting oral glycemic medication for control.  Labs, imaging and immunization ordered as above    Follow Up: in 2 weeks for BP and HR check    Pt was discussed with Dr. Moises Beal (attending physician). I have reviewed patient medical and social history and medications. I have reviewed pertinent labs results and other data. I have discussed the diagnosis with the patient and the intended plan as seen in the above orders. The patient has received an after-visit summary and questions were answered concerning future plans.  I have discussed medication side effects and warnings with the patient as well.     Gopi Anderson MD  Resident [facility]  04/22/21

## 2021-04-22 NOTE — PATIENT INSTRUCTIONS
Chronic Obstructive Pulmonary Disease (COPD): Care Instructions  Your Care Instructions     Chronic obstructive pulmonary disease (COPD) is a general term for a group of lung diseases, including emphysema and chronic bronchitis. People with COPD have decreased airflow in and out of the lungs, which makes it hard to breathe. The airways also can get clogged with thick mucus. Cigarette smoking is a major cause of COPD. Although there is no cure for COPD, you can slow its progress. Following your treatment plan and taking care of yourself can help you feel better and live longer. Follow-up care is a key part of your treatment and safety. Be sure to make and go to all appointments, and call your doctor if you are having problems. It's also a good idea to know your test results and keep a list of the medicines you take. How can you care for yourself at home? Staying healthy    · Do not smoke. This is the most important step you can take to prevent more damage to your lungs. If you need help quitting, talk to your doctor about stop-smoking programs and medicines. These can increase your chances of quitting for good.     · Avoid colds and flu. Get a pneumococcal vaccine shot. If you have had one before, ask your doctor whether you need a second dose. Get the flu vaccine every fall. If you must be around people with colds or the flu, wash your hands often.     · Avoid secondhand smoke, air pollution, and high altitudes. Also avoid cold, dry air and hot, humid air. Stay at home with your windows closed when air pollution is bad. Medicines and oxygen therapy    · Take your medicines exactly as prescribed. Call your doctor if you think you are having a problem with your medicine. You may be taking medicines such as:  ? Bronchodilators. These help open your airways and make breathing easier. They are either short-acting (work for 6 to 9 hours) or long-acting (work for 24 hours).  You inhale most bronchodilators, so they start to act quickly. Always carry your quick-relief inhaler with you in case you need it while you are away from home. ? Corticosteroids (prednisone, budesonide). These reduce airway inflammation. They come in pill or inhaled form. You must take these medicines every day for them to work well.     · Ask your doctor or pharmacist if a spacer is right for you. A spacer may help you get more inhaled medicine to your lungs. If you use one, ask how to use it properly.     · Do not take any vitamins, over-the-counter medicine, or herbal products without talking to your doctor first.     · If your doctor prescribed antibiotics, take them as directed. Do not stop taking them just because you feel better. You need to take the full course of antibiotics.     · If you use oxygen therapy, use the flow rate your doctor has recommended. Don't change it without talking to your doctor first. Oxygen therapy boosts the amount of oxygen in your blood and helps you breathe easier. Activity    · Get regular exercise. Walking is an easy way to get exercise. Start out slowly, and walk a little more each day.     · Pay attention to your breathing. You are exercising too hard if you can't talk while you exercise.     · Take short rest breaks when doing household chores and other activities.     · Learn breathing methods--such as breathing through pursed lips--to help you become less short of breath.     · If your doctor has not set you up with a pulmonary rehabilitation program, ask if rehab is right for you. Rehab includes exercise programs, education about your disease and how to manage it, help with diet and other changes, and emotional support. Diet    · Eat regular, healthy meals. Use bronchodilators about 1 hour before you eat to make it easier to eat. Eat several small meals instead of three large ones.  Drink beverages at the end of the meal. Avoid foods that are hard to chew.     · Eat foods that contain protein so you don't lose muscle mass.     · Talk with your doctor if you gain too much weight or if you lose weight without trying. Mental health    · Talk to your family, friends, or a therapist about your feelings. Some people feel frightened, angry, hopeless, helpless, and even guilty. Talking openly about bad feelings can help you cope. If these feelings last, talk to your doctor. When should you call for help? Call 911 anytime you think you may need emergency care. For example, call if:    · You have severe trouble breathing. Call your doctor now or seek immediate medical care if:    · You have new or worse trouble breathing.     · You cough up blood.     · You have a fever. Watch closely for changes in your health, and be sure to contact your doctor if:    · You cough more deeply or more often, especially if you notice more mucus or a change in the color of your mucus.     · You have new or worse swelling in your legs or belly.     · You are not getting better as expected. Where can you learn more? Go to http://www.gray.com/  Enter A453 in the search box to learn more about \"Chronic Obstructive Pulmonary Disease (COPD): Care Instructions. \"  Current as of: October 26, 2020               Content Version: 12.8  © 2006-2021 Healthwise, Incorporated. Care instructions adapted under license by [employer] (which disclaims liability or warranty for this information). If you have questions about a medical condition or this instruction, always ask your healthcare professional. [place] [address] any warranty or liability for your use of this information.

## 2021-06-02 DIAGNOSIS — I25.10 CORONARY ARTERY DISEASE INVOLVING NATIVE CORONARY ARTERY WITHOUT ANGINA PECTORIS, UNSPECIFIED WHETHER NATIVE OR TRANSPLANTED HEART: ICD-10-CM

## 2021-06-02 DIAGNOSIS — J30.89 ENVIRONMENTAL AND SEASONAL ALLERGIES: ICD-10-CM

## 2021-06-03 RX ORDER — CETIRIZINE HCL 10 MG
TABLET ORAL
Qty: 90 TABLET | Refills: 2 | Status: SHIPPED | OUTPATIENT
Start: 2021-06-03 | End: 2022-02-24 | Stop reason: SDUPTHER

## 2021-06-14 ENCOUNTER — TELEPHONE (OUTPATIENT)
Dept: FAMILY MEDICINE CLINIC | Age: 81
End: 2021-06-14

## 2021-06-14 ENCOUNTER — OFFICE VISIT (OUTPATIENT)
Dept: FAMILY MEDICINE CLINIC | Age: 81
End: 2021-06-14
Payer: MEDICARE

## 2021-06-14 VITALS
WEIGHT: 173.8 LBS | BODY MASS INDEX: 34.12 KG/M2 | RESPIRATION RATE: 20 BRPM | SYSTOLIC BLOOD PRESSURE: 116 MMHG | OXYGEN SATURATION: 95 % | HEART RATE: 63 BPM | DIASTOLIC BLOOD PRESSURE: 59 MMHG | TEMPERATURE: 98.6 F | HEIGHT: 60 IN

## 2021-06-14 DIAGNOSIS — J44.1 COPD EXACERBATION (HCC): Primary | ICD-10-CM

## 2021-06-14 PROCEDURE — 1101F PT FALLS ASSESS-DOCD LE1/YR: CPT | Performed by: STUDENT IN AN ORGANIZED HEALTH CARE EDUCATION/TRAINING PROGRAM

## 2021-06-14 PROCEDURE — G8752 SYS BP LESS 140: HCPCS | Performed by: STUDENT IN AN ORGANIZED HEALTH CARE EDUCATION/TRAINING PROGRAM

## 2021-06-14 PROCEDURE — G0463 HOSPITAL OUTPT CLINIC VISIT: HCPCS | Performed by: STUDENT IN AN ORGANIZED HEALTH CARE EDUCATION/TRAINING PROGRAM

## 2021-06-14 PROCEDURE — G8427 DOCREV CUR MEDS BY ELIG CLIN: HCPCS | Performed by: STUDENT IN AN ORGANIZED HEALTH CARE EDUCATION/TRAINING PROGRAM

## 2021-06-14 PROCEDURE — 1123F ACP DISCUSS/DSCN MKR DOCD: CPT | Performed by: STUDENT IN AN ORGANIZED HEALTH CARE EDUCATION/TRAINING PROGRAM

## 2021-06-14 PROCEDURE — G8432 DEP SCR NOT DOC, RNG: HCPCS | Performed by: STUDENT IN AN ORGANIZED HEALTH CARE EDUCATION/TRAINING PROGRAM

## 2021-06-14 PROCEDURE — G8536 NO DOC ELDER MAL SCRN: HCPCS | Performed by: STUDENT IN AN ORGANIZED HEALTH CARE EDUCATION/TRAINING PROGRAM

## 2021-06-14 PROCEDURE — 99214 OFFICE O/P EST MOD 30 MIN: CPT | Performed by: STUDENT IN AN ORGANIZED HEALTH CARE EDUCATION/TRAINING PROGRAM

## 2021-06-14 PROCEDURE — G8417 CALC BMI ABV UP PARAM F/U: HCPCS | Performed by: STUDENT IN AN ORGANIZED HEALTH CARE EDUCATION/TRAINING PROGRAM

## 2021-06-14 PROCEDURE — G8754 DIAS BP LESS 90: HCPCS | Performed by: STUDENT IN AN ORGANIZED HEALTH CARE EDUCATION/TRAINING PROGRAM

## 2021-06-14 RX ORDER — FLUTICASONE PROPIONATE AND SALMETEROL 250; 50 UG/1; UG/1
1 POWDER RESPIRATORY (INHALATION) EVERY 12 HOURS
Qty: 1 INHALER | Refills: 0 | Status: SHIPPED | OUTPATIENT
Start: 2021-06-14 | End: 2021-08-09

## 2021-06-14 RX ORDER — LEVOFLOXACIN 750 MG/1
750 TABLET ORAL DAILY
Qty: 5 TABLET | Refills: 0 | Status: SHIPPED | OUTPATIENT
Start: 2021-06-14 | End: 2021-06-19

## 2021-06-14 RX ORDER — PREDNISONE 20 MG/1
40 TABLET ORAL
Qty: 10 TABLET | Refills: 0 | Status: SHIPPED | OUTPATIENT
Start: 2021-06-14 | End: 2021-06-19

## 2021-06-14 NOTE — PATIENT INSTRUCTIONS
COPD Exacerbation Plan: Care Instructions  Your Care Instructions     If you have chronic obstructive pulmonary disease (COPD), your usual shortness of breath could suddenly get worse. You may start coughing more and have more mucus. This flare-up is called a COPD exacerbation (say \"wn-SVS-to-BAY-fallon\"). A lung infection or air pollution could set off an exacerbation. Sometimes it can happen after a quick change in temperature or being around chemicals. Work with your doctor to make a plan for dealing with an exacerbation. You can better manage it if you plan ahead. Follow-up care is a key part of your treatment and safety. Be sure to make and go to all appointments, and call your doctor if you are having problems. It's also a good idea to know your test results and keep a list of the medicines you take. How can you care for yourself at home? During an exacerbation  · Do not panic if you start to have one. Quick treatment at home may help you prevent serious breathing problems. If you have a COPD exacerbation plan that you developed with your doctor, follow it. · Take your medicines exactly as your doctor tells you.  ? Use your inhaler as directed by your doctor. If your symptoms do not get better after you use your medicine, have someone take you to the emergency room. Call an ambulance if necessary. ? With inhaled medicines, a spacer or a nebulizer may help you get more medicine to your lungs. Ask your doctor or pharmacist how to use them properly. Practice using the spacer in front of a mirror before you have an exacerbation. This may help you get the medicine into your lungs quickly. ? If your doctor has given you steroid pills, take them as directed. ? Your doctor may have given you a prescription for antibiotics, which you can fill if you need it. ? Talk to your doctor if you have any problems with your medicine.  And call your doctor if you have to use your antibiotic or steroid pills.  Preventing an exacerbation  · Do not smoke. This is the most important step you can take to prevent more damage to your lungs and prevent problems. If you already smoke, it is never too late to stop. If you need help quitting, talk to your doctor about stop-smoking programs and medicines. These can increase your chances of quitting for good. · Take your daily medicines as prescribed. · Avoid colds and flu. ? Get a pneumococcal vaccine. ? Get a flu vaccine each year, as soon as it is available. Ask those you live or work with to do the same, so they will not get the flu and infect you. ? Try to stay away from people with colds or the flu. ? Wash your hands often. · Avoid secondhand smoke; air pollution; cold, dry air; hot, humid air; and high altitudes. Stay at home with your windows closed when air pollution is bad. · Learn breathing techniques for COPD, such as breathing through pursed lips. These techniques can help you breathe easier during an exacerbation. When should you call for help? Call 911 anytime you think you may need emergency care. For example, call if:    · You have severe trouble breathing.     · You have severe chest pain. Call your doctor now or seek immediate medical care if:    · You have new or worse shortness of breath.     · You develop new chest pain.     · You are coughing more deeply or more often, especially if you notice more mucus or a change in the color of your mucus.     · You cough up blood.     · You have new or increased swelling in your legs or belly.     · You have a fever. Watch closely for changes in your health, and be sure to contact your doctor if:    · You need to use your antibiotic or steroid pills.     · Your symptoms are getting worse. Where can you learn more? Go to http://www.gray.com/  Enter U536 in the search box to learn more about \"COPD Exacerbation Plan: Care Instructions. \"  Current as of: October 26, 2020               Content Version: 12.8  © 2698-5188 Healthwise, Incorporated. Care instructions adapted under license by [employer] (which disclaims liability or warranty for this information). If you have questions about a medical condition or this instruction, always ask your healthcare professional. [address] any warranty or liability for your use of this information.

## 2021-06-14 NOTE — TELEPHONE ENCOUNTER
[phone]  Wife, on lamin, called to say the patient has bad chest congestion which started last night and has worsened. Also said he is a COPD patient. Also does have a cough.

## 2021-06-14 NOTE — PROGRESS NOTES
[address] [facility] [address] [place] [address]   Office [phone]  Fax [phone]     6/14/2021   Chief Complaint   Patient presents with    Cold Symptoms     Patient presents complaining of congestion, cough, x 1 day. HPI:  Libertad Chilel is a 80 y.o. male s/p COVID vaccination with COPD who presents to clinic today for worsening of chronic cough and increased sputum production for one day. Sputum is slightly more yellow than usual. Also c/o wheezing. Using Albuterol inhaler four times per day - usually uses it sparely. Denies fever/chills/rhinorrhea/nasal congestion/diarrhea/abd pain. Denies sick contacts, wears mask when leaves home to grocery shop. Former smoker. History provided by patient and wife. Patients past medical, surgical and family histories were reviewed. Allergies and Medications reviewed. Review of Systems   Constitutional: Negative for chills and fever. HENT: Negative for congestion, ear discharge, ear pain and sinus pain. Respiratory: Positive for cough, sputum production and wheezing. Cardiovascular: Negative for leg swelling. Gastrointestinal: Negative for diarrhea. Skin: Negative for rash. Objective:  Vitals:    06/14/21 1530   BP: (!) 116/59   Pulse: 63   Resp: 20   Temp: 98.6 °F (37 °C)   TempSrc: Oral   SpO2: 95%   Weight: 173 lb 12.8 oz (78.8 kg)   Height: 5' (1.524 m)     Body mass index is 33.94 kg/m². Physical Exam  General: Patient alert and oriented and in NAD  HEENT: PER/EOMI, no conjunctival pallor or scleral icterus. No nasal discharge. Patient talking in full sentences during exam.   Heart: Regular rate and rhythm, No murmurs, rubs or gallops. Cap refill < 2 secs. Lungs: Scattered wheezing, decreased air movement bilaterally. No acute respiratory distress. No rales or rhonchi.    Abd: +BS, non-tender, non-distended, no rebound or guarding  Ext: No edema  Skin: No rashes noted on exposed skin,  Psych: Appropriate mood and affect    No results found for this or any previous visit (from the past 24 hour(s)). Assessment and Plan:  1. COPD exacerbation (HCC)  - Using combivent BID and flonase daily, increased use of albuterol prn   - Per chart review no previous sputum culture or pseudomonas isolate. Pt NOT on chronic CS  - predniSONE (DELTASONE) 20 mg tablet; Take 40 mg by mouth daily (with breakfast) for 5 days. Dispense: 10 Tablet; Refill: 0  - fluticasone propion-salmeteroL (ADVAIR/WIXELA) 250-50 mcg/dose diskus inhaler; Take 1 Puff by inhalation every twelve (12) hours. Dispense: 1 Inhaler; Refill: 0  - levoFLOXacin (LEVAQUIN) 750 mg tablet; Take 1 Tablet by mouth daily for 5 days. Dispense: 5 Tablet; Refill: 0  - Reviewed last EKG on file:    - If lack of improvement at scheduled visit obtain sputum gram stain     Future Appointments   Date Time Provider [facility]   6/17/2021 11:00 AM Cheryl Jones MD [facility] AMB         I have discussed the aforementioned diagnoses and plan with the patient in detail. I have provided information in person and/or in AVS. All questions answered prior to discharge.      I discussed this patient with Dr. Libby Tapia (Attending Physician)   Signed By:  Lexi Elias MD     Family Medicine Resident

## 2021-06-21 ENCOUNTER — TELEPHONE (OUTPATIENT)
Dept: FAMILY MEDICINE CLINIC | Age: 81
End: 2021-06-21

## 2021-06-21 NOTE — TELEPHONE ENCOUNTER
Pt wife calling back asking that provider please send pt's insulin in to pharmacy. She states she apologizes for last minute call as she's had a lot going on, notes her  had a heart attack on Tuesday in which he was seen at [facility]. She states his blood sugar when checked last night was 505. Attempt contact to provider cell without success. Spoke to nurse Lu lawrence to give details, she states provider is actually on office now. Dr. Bing Donovan will send insulin in for pt. The wide expresses gratitude .        Rosey Fitch () [phone]

## 2021-06-21 NOTE — TELEPHONE ENCOUNTER
Sent Humalog prescription in as requested due to patient needing medication urgently. Last seen by Dr. Althea Terry for diabetes.

## 2021-06-21 NOTE — TELEPHONE ENCOUNTER
----- Message from Ruby Roblero sent at 6/21/2021  1:38 PM EDT -----  Regarding: Dr. Heather Marvin first and last name: Kai Feldman; spouse      Reason for call: stated she still waiting for \"humalog quick pen, insulin\" to be sent to the pharmacy       Callback required yes/no and why: yes      Best contact number(s): [phone]      Details to clarify the request: stated that blood sugar was 500 last night; stated that pharmacy said they would send the request over a few mins ago    --------------------------------------------  Medication Refill      Name of medication and dosage if known:\"humalog quick pen, insulin\"       Is patient out of this medication (yes/no): yes  or the last 2 days      Pharmacy name: [facility]    Pharmacy listed in chart? (yes/no): yes    Pharmacy phone number: [phone]      Details to clarify the request: stated it was her fault that she forgot to call in this rx much sooner before the pt ran out of it      Ruby Roblero

## 2021-06-24 DIAGNOSIS — J44.1 COPD EXACERBATION (HCC): ICD-10-CM

## 2021-06-24 DIAGNOSIS — I25.10 CORONARY ARTERY DISEASE WITHOUT ANGINA PECTORIS, UNSPECIFIED VESSEL OR LESION TYPE, UNSPECIFIED WHETHER NATIVE OR TRANSPLANTED HEART: ICD-10-CM

## 2021-06-24 DIAGNOSIS — I10 ESSENTIAL HYPERTENSION: ICD-10-CM

## 2021-06-24 DIAGNOSIS — G30.9 ALZHEIMER'S DEMENTIA WITHOUT BEHAVIORAL DISTURBANCE, UNSPECIFIED TIMING OF DEMENTIA ONSET: ICD-10-CM

## 2021-06-24 DIAGNOSIS — F02.80 ALZHEIMER'S DEMENTIA WITHOUT BEHAVIORAL DISTURBANCE, UNSPECIFIED TIMING OF DEMENTIA ONSET: ICD-10-CM

## 2021-06-24 DIAGNOSIS — I25.10 CORONARY ARTERY DISEASE INVOLVING NATIVE CORONARY ARTERY WITHOUT ANGINA PECTORIS, UNSPECIFIED WHETHER NATIVE OR TRANSPLANTED HEART: ICD-10-CM

## 2021-06-24 RX ORDER — AMLODIPINE BESYLATE 5 MG/1
TABLET ORAL
Qty: 90 TABLET | Refills: 1 | Status: SHIPPED | OUTPATIENT
Start: 2021-06-24 | End: 2021-12-21

## 2021-06-24 RX ORDER — ISOSORBIDE MONONITRATE 30 MG/1
TABLET, EXTENDED RELEASE ORAL
Qty: 360 TABLET | Refills: 1 | Status: SHIPPED | OUTPATIENT
Start: 2021-06-24 | End: 2021-09-24

## 2021-06-24 RX ORDER — MEMANTINE HYDROCHLORIDE 5 MG/1
5 TABLET ORAL 2 TIMES DAILY
Qty: 120 TABLET | Refills: 0 | Status: SHIPPED | OUTPATIENT
Start: 2021-06-24 | End: 2021-08-23

## 2021-06-24 RX ORDER — TAMSULOSIN HYDROCHLORIDE 0.4 MG/1
0.4 CAPSULE ORAL DAILY
Qty: 90 CAPSULE | Refills: 2 | Status: SHIPPED | OUTPATIENT
Start: 2021-06-24 | End: 2022-03-23

## 2021-06-24 RX ORDER — FLUTICASONE PROPIONATE AND SALMETEROL 50; 250 UG/1; UG/1
POWDER RESPIRATORY (INHALATION)
Refills: 0 | OUTPATIENT
Start: 2021-06-24

## 2021-06-24 RX ORDER — CARVEDILOL 12.5 MG/1
TABLET ORAL
Qty: 60 TABLET | Refills: 2 | Status: SHIPPED | OUTPATIENT
Start: 2021-06-24 | End: 2021-07-27 | Stop reason: SDUPTHER

## 2021-06-24 RX ORDER — MEMANTINE HYDROCHLORIDE 10 MG/1
TABLET ORAL
Qty: 180 TABLET | Refills: 0 | OUTPATIENT
Start: 2021-06-24

## 2021-06-24 RX ORDER — CLOPIDOGREL BISULFATE 75 MG/1
TABLET ORAL
Qty: 30 TABLET | Refills: 3 | Status: SHIPPED | OUTPATIENT
Start: 2021-06-24 | End: 2021-09-24

## 2021-06-24 NOTE — TELEPHONE ENCOUNTER
Refilled requested too soon and patient missed scheduled follow up appointment for COPD exacerbation.  Please schedule follow up for refill

## 2021-06-24 NOTE — TELEPHONE ENCOUNTER
We have not seen this patient in about 1 1/2 years with regards to their dementia. Their renal function has declined and this medication dose needed to be reduced which I did. They are also due for a medicare wellness exam so please call the to schedule this appt and they also need to reassess his dementia and this medication, which should be done annually.  This should be in person with any provider as they also need a diabetic foot exam.

## 2021-06-28 NOTE — TELEPHONE ENCOUNTER
I tried calling patient to schedule him for a follow up with  on COPD and medication refills. He did not answer, but I left a voicemail stating for him to return our call to schedule this appointment.

## 2021-07-08 RX ORDER — POTASSIUM CHLORIDE 1500 MG/1
TABLET, EXTENDED RELEASE ORAL
Qty: 90 TABLET | Refills: 3 | OUTPATIENT
Start: 2021-07-08

## 2021-07-16 ENCOUNTER — TELEPHONE (OUTPATIENT)
Dept: FAMILY MEDICINE CLINIC | Age: 81
End: 2021-07-16

## 2021-07-16 NOTE — TELEPHONE ENCOUNTER
Appointment made for patient next Friday the 23rd at 1:40 PM - of note last time I spoke with patients wife I reviewed the information below. She told me not to worry about scheduling an appointment, that she would call Dr. Kitty Ocampo and get a refill.

## 2021-07-16 NOTE — TELEPHONE ENCOUNTER
Pts wife came by the office she is extremely upset as she said pt needs to take his medication today and it has been reduced by dr Abbey Goff she said he is supposed to take 10 mg in the morning and 10 mg in the evening and it has been reduced to 5 and 5 . She said this should of never been messed with as it was working fine and she said it needs to be fixed or \" he can come and take care of him\" . I spoke with nurse gayle who states she already spoke to pt wife and she was supposed to be following up with  which I do see in a previous message . I read dr Abbey Goff message to pt wife and explained dr Abbey Goff cannot prescribe a higher dose due to kidneys and that she needs to speak to dr esquivel  office pt wife said she just left there and  They told her to come here . I also advised her that he needs to be seen for his wellness visit and to be reevaluated in regards to his dementia per dr Abbey Goff note. she said \"ok that's fine\". She asked to speak to the dr who oversees the residents I explained that dr Abbey Goff was not here and that the other drs are in clinic with patients , but that I would send an urgent message to the dr . She stated ok but \"I know I wont get a call I never do but ok \"    Pt wife request a call today from the dr in regards to this . She said if she is not home and does not answer to leave a message .

## 2021-07-23 ENCOUNTER — OFFICE VISIT (OUTPATIENT)
Dept: FAMILY MEDICINE CLINIC | Age: 81
End: 2021-07-23
Payer: MEDICARE

## 2021-07-23 VITALS
HEART RATE: 57 BPM | TEMPERATURE: 97.9 F | HEIGHT: 60 IN | BODY MASS INDEX: 33.73 KG/M2 | RESPIRATION RATE: 16 BRPM | SYSTOLIC BLOOD PRESSURE: 114 MMHG | DIASTOLIC BLOOD PRESSURE: 59 MMHG | OXYGEN SATURATION: 97 % | WEIGHT: 171.8 LBS

## 2021-07-23 DIAGNOSIS — E11.21 TYPE 2 DIABETES WITH NEPHROPATHY (HCC): ICD-10-CM

## 2021-07-23 DIAGNOSIS — Z23 NEED FOR SHINGLES VACCINE: ICD-10-CM

## 2021-07-23 DIAGNOSIS — Z11.59 NEED FOR HEPATITIS C SCREENING TEST: ICD-10-CM

## 2021-07-23 DIAGNOSIS — Z00.00 ENCOUNTER FOR ANNUAL WELLNESS EXAM IN MEDICARE PATIENT: Primary | ICD-10-CM

## 2021-07-23 LAB
ALBUMIN UR QL STRIP: 10 MG/L
CREATININE, URINE POC: 50 MG/DL
MICROALBUMIN/CREAT RATIO POC: NORMAL MG/G

## 2021-07-23 PROCEDURE — 82044 UR ALBUMIN SEMIQUANTITATIVE: CPT | Performed by: STUDENT IN AN ORGANIZED HEALTH CARE EDUCATION/TRAINING PROGRAM

## 2021-07-23 PROCEDURE — G8432 DEP SCR NOT DOC, RNG: HCPCS | Performed by: STUDENT IN AN ORGANIZED HEALTH CARE EDUCATION/TRAINING PROGRAM

## 2021-07-23 PROCEDURE — G8427 DOCREV CUR MEDS BY ELIG CLIN: HCPCS | Performed by: STUDENT IN AN ORGANIZED HEALTH CARE EDUCATION/TRAINING PROGRAM

## 2021-07-23 PROCEDURE — 1101F PT FALLS ASSESS-DOCD LE1/YR: CPT | Performed by: STUDENT IN AN ORGANIZED HEALTH CARE EDUCATION/TRAINING PROGRAM

## 2021-07-23 PROCEDURE — G0439 PPPS, SUBSEQ VISIT: HCPCS | Performed by: STUDENT IN AN ORGANIZED HEALTH CARE EDUCATION/TRAINING PROGRAM

## 2021-07-23 PROCEDURE — G8754 DIAS BP LESS 90: HCPCS | Performed by: STUDENT IN AN ORGANIZED HEALTH CARE EDUCATION/TRAINING PROGRAM

## 2021-07-23 PROCEDURE — G8752 SYS BP LESS 140: HCPCS | Performed by: STUDENT IN AN ORGANIZED HEALTH CARE EDUCATION/TRAINING PROGRAM

## 2021-07-23 PROCEDURE — G8536 NO DOC ELDER MAL SCRN: HCPCS | Performed by: STUDENT IN AN ORGANIZED HEALTH CARE EDUCATION/TRAINING PROGRAM

## 2021-07-23 PROCEDURE — G8417 CALC BMI ABV UP PARAM F/U: HCPCS | Performed by: STUDENT IN AN ORGANIZED HEALTH CARE EDUCATION/TRAINING PROGRAM

## 2021-07-23 RX ORDER — ZOSTER VACCINE RECOMBINANT, ADJUVANTED 50 MCG/0.5
0.5 KIT INTRAMUSCULAR ONCE
Qty: 0.5 ML | Refills: 0 | Status: SHIPPED | OUTPATIENT
Start: 2021-07-23 | End: 2021-07-23

## 2021-07-23 NOTE — PROGRESS NOTES
Chief Complaint   Patient presents with    Follow-up     Follow-up dementia medication Memantine 5mg. Ilya Annual Wellness Visit     Annual Medicare Wellness. 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 Have you been to the ER, urgent care clinic since your last visit? Hospitalized since your last visit? Yes, [facility] 6/15/2021, Patient had MI per patient. 2. Have you seen or consulted any other health care providers outside of the [address] [place] [address] since your last visit? Include any pap smears or colon screening. NO      General Health Questions   -During the past 4 weeks:   -how would you rate your health in general? Good   -how often have you been bothered by feeling dizzy when standing up? Some days 1-2   -how much have you been bothered by bodily pain? moderately   -Have you noticed any hearing difficulties? yes   -has your physical and emotional health limited your social activities with family or friends? no    Emotional Health Questions   -Do you have a history of depression, anxiety, or emotional problems? yes  -Over the past 2 weeks, have you felt down, depressed or hopeless? no  -Over the past 2 weeks, have you felt little interest or pleasure in doing things? no    Health Habits   Please describe your diet habits:Cheerios for breakfast, Veg, meat, carbs some for every meal  Do you get 5 servings of fruits or vegetables daily? no  Do you exercise regularly? no    Activities of Daily Living and Functional Status   -Do you need help with eating, walking, dressing, bathing, toileting, the phone, transportation, shopping, preparing meals, housework, laundry, medications or managing money? yes  -In the past four weeks, was someone available to help you if you needed and wanted help with anything? yes  -Are you confident are you that you can control and manage most of your health problems?  yes  -Have you been given information to help you keep track of your medications? no  -How often do you have trouble taking your medications as prescribed? never    Fall Risk and Home Safety   Have you fallen 2 or more times in the past year? yes  Does your home have rugs in the hallways? no, Do you have grab bars in the bathrooms? yes, Does your home have handrails on the stairs? yes, Do you have adequate lighting in your home? yes  Do you have smoke detectors and check them regularly? yes  Do you have difficulties driving a car/vehicle?  Does not drive  Do you always fasten your seat belt when you are in a car? yes

## 2021-07-23 NOTE — PATIENT INSTRUCTIONS
Medicare Wellness Visit, Male    The best way to live healthy is to have a lifestyle where you eat a well-balanced diet, exercise regularly, limit alcohol use, and quit all forms of tobacco/nicotine, if applicable. Regular preventive services are another way to keep healthy. Preventive services (vaccines, screening tests, monitoring & exams) can help personalize your care plan, which helps you manage your own care. Screening tests can find health problems at the earliest stages, when they are easiest to treat. [facility] follows the current, evidence-based guidelines published by the [facility] Esteban Yong ([facility]) when recommending preventive services for our patients. Because we follow these guidelines, sometimes recommendations change over time as research supports it. (For example, a prostate screening blood test is no longer routinely recommended for men with no symptoms). Of course, you and your doctor may decide to screen more often for some diseases, based on your risk and co-morbidities (chronic disease you are already diagnosed with). Preventive services for you include:  - Medicare offers their members a free annual wellness visit, which is time for you and your primary care provider to discuss and plan for your preventive service needs. Take advantage of this benefit every year!  -All adults over age 72 should receive the recommended pneumonia vaccines. Current USPSTF guidelines recommend a series of two vaccines for the best pneumonia protection.   -All adults should have a flu vaccine yearly and tetanus vaccine every 10 years.  -All adults age 48 and older should receive the shingles vaccines (series of two vaccines).        -All adults age 38-68 who are overweight should have a diabetes screening test once every three years.   -Other screening tests & preventive services for persons with diabetes include: an eye exam to screen for diabetic retinopathy, a kidney function test, a foot exam, and stricter control over your cholesterol.   -Cardiovascular screening for adults with routine risk involves an electrocardiogram (ECG) at intervals determined by the provider.   -Colorectal cancer screening should be done for adults age 54-65 with no increased risk factors for colorectal cancer. There are a number of acceptable methods of screening for this type of cancer. Each test has its own benefits and drawbacks. Discuss with your provider what is most appropriate for you during your annual wellness visit. The different tests include: colonoscopy (considered the best screening method), a fecal occult blood test, a fecal DNA test, and sigmoidoscopy.  -All adults born between [facility] should be screened once for Hepatitis C.  -An Abdominal Aortic Aneurysm (AAA) Screening is recommended for men age 73-68 who has ever smoked in their lifetime. Here is a list of your current Health Maintenance items (your personalized list of preventive services) with a due date:  Health Maintenance Due   Topic Date Due    Hepatitis C Test  Never done    Albumin Urine Test  Never done    Eye Exam  Never done    Cholesterol Test   Never done    Shingles Vaccine (1 of 2) Never done    Pneumococcal Vaccine 65+ years at Risk (2 of 2 - PCV13) 06/04/2007    Annual Well Visit  06/14/2020    Diabetic Foot Care  02/24/2021          Advance Care Planning: Care Instructions  Your Care Instructions  It can be hard to live with an illness that cannot be cured. But if your health is getting worse, you may want to make decisions about end-of-life care. Planning for the end of your life does not mean that you are giving up. It is a way to make sure that your wishes are met. Clearly stating your wishes can make it easier for your loved ones. Making plans while you are still able may also ease your mind and make your final days less stressful and more meaningful.   Follow-up care is a key part of your treatment and safety. Be sure to make and go to all appointments, and call your doctor if you are having problems. It's also a good idea to know your test results and keep a list of the medicines you take. What can you do to plan for the end of life? · You can bring these issues up with your doctor. You do not need to wait until your doctor starts the conversation. You might start with \"I would not be willing to live with . Hunter Thrasher \" When you complete this sentence it helps your doctor understand your wishes. · Talk openly and honestly with your doctor. This is the best way to understand the decisions you will need to make as your health changes. Know that you can always change your mind. · Ask your doctor about commonly used life-support measures. These include tube feedings, breathing machines, and fluids given through a vein (IV). Understanding these treatments will help you decide whether you want them. · You may choose to have these life-supporting treatments for a limited time. This allows a trial period to see whether they will help you. You may also decide that you want your doctor to take only certain measures to keep you alive. It is important to spell out these conditions so that your doctor and family understand them. · Talk to your doctor about how long you are likely to live. He or she may be able to give you an idea of what usually happens with your specific illness. · Think about preparing papers that state your wishes. This way there will not be any confusion about what you want. You can change your instructions at any time. Which papers should you prepare? Advance directives are legal papers that tell doctors how you want to be cared for at the end of your life. You do not need a  to write these papers. Ask your doctor or your state health department for information on how to write your advance directives. They may have the forms for each of these types of papers.  Make sure your doctor has a copy of these on file, and give a copy to a family member or close friend. · Consider a do-not-resuscitate order (DNR). This order asks that no extra treatments be done if your heart stops or you stop breathing. Extra treatments may include cardiopulmonary resuscitation (CPR), electrical shock to restart your heart, or a machine to breathe for you. If you decide to have a DNR order, ask your doctor to explain and write it. Place the order in your home where everyone can easily see it. · Consider a living will. A living will explains your wishes about life support and other treatments at the end of your life if you become unable to speak for yourself. Living blair tell doctors to use or not use treatments that would keep you alive. You must have one or two witnesses or a notary present when you sign this form. · Consider a durable power of  for health care. This allows you to name a person to make decisions about your care if you are not able to. Most people ask a close friend or family member. Talk to this person about the kinds of treatments you want and those that you do not want. Make sure this person understands your wishes. These legal papers are simple to change. Tell your doctor what you want to change, and ask him or her to make a note in your medical file. Give your family updated copies of the papers. Where can you learn more? Go to http://www.gray.com/. Enter P184 in the search box to learn more about \"Advance Care Planning: Care Instructions. \"  Current as of: November 17, 2016  Content Version: 11.3  © 0652-0138 [employer]. Care instructions adapted under license by [employer] (which disclaims liability or warranty for this information).  If you have questions about a medical condition or this instruction, always ask your healthcare professional. [address] any warranty or liability for your use of this information.

## 2021-07-23 NOTE — PROGRESS NOTES
[address]  [address] [address] Sun[place] Lovett   [phone]             Date of visit: 7/23/2021       This is a Subsequent Medicare Annual Wellness Visit (AWV), (Performed more than 12 months after effective date of Medicare Part B enrollment and 12 months after last preventive visit)    I have reviewed the patient's medical history in detail and updated the computerized patient record. Cecille Ray is a 80 y.o. male   History obtained from: patient and spouse    Concerns today   Patient reports no acute concerns today. Patient's wife states that he is doing well and has routine appointments scheduled to follow up with specialists. History     Patient Active Problem List   Diagnosis Code    Urinary frequency R35.0    Acquired equinus deformity of foot M21.6X9    Diabetes mellitus ([facility] E11.9    Onychomycosis due to dermatophyte B35.1    Pes planus M21.40    Tinea pedis B35.3    Essential hypertension I10    Anemia of unknown etiology D64.9    Alzheimer's disease, unspecified ([facility] G30.9, F02.80    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    Benign prostatic hyperplasia with urinary frequency N40.1, R35.0    Easy bruising R23.8    History of COPD Z87.09    COPD (chronic obstructive pulmonary disease) ([facility]) J44.9    Pulmonary nodule R91.1    Type 2 diabetes with nephropathy ([facility]) E11.21     Past Medical History:   Diagnosis Date    Alzheimer's dementia ([facility]     BPH (benign prostatic hyperplasia)     CAD (coronary artery disease)     Carotid artery stenosis     CKD (chronic kidney disease)     COPD (chronic obstructive pulmonary disease) ([facility])     HLD (hyperlipidemia)     T2DM (type 2 diabetes mellitus) ([facility])       No past surgical history on file.   Allergies   Allergen Reactions    Adempas [Riociguat] Unknown (comments)    Ativan [Lorazepam] Other (comments)     Altered LOC  Patient able to tolerate 0.5mg    Codeine Unknown (comments)    Dilaudid [Hydromorphone] Unknown (comments)    Lisinopril Cough     Current Outpatient Medications   Medication Sig Dispense Refill    varicella-zoster recombinant, PF, (Shingrix, PF,) 50 mcg/0.5 mL susr injection 0.5 mL by IntraMUSCular route once for 1 dose. 0.5 mL 0    amLODIPine (NORVASC) 5 mg tablet TAKE ONE TABLET BY MOUTH ONE TIME DAILY 90 Tablet 1    carvediloL (COREG) 12.5 mg tablet TAKE ONE TABLET BY MOUTH TWICE A DAY WITH A MEAL 60 Tablet 2    tamsulosin (FLOMAX) 0.4 mg capsule Take 1 Capsule by mouth daily. 90 Capsule 2    memantine (Namenda) 5 mg tablet Take 1 Tablet by mouth two (2) times a day for 60 days. 120 Tablet 0    clopidogreL (PLAVIX) 75 mg tab TAKE ONE TABLET BY MOUTH ONE TIME DAILY 30 Tablet 3    isosorbide mononitrate ER (IMDUR) 30 mg tablet TAKE THREE TABLETS BY MOUTH TWICE A  Tablet 1    fluticasone propion-salmeteroL (ADVAIR/WIXELA) 250-50 mcg/dose diskus inhaler Take 1 Puff by inhalation every twelve (12) hours. 1 Inhaler 0    cetirizine (ZYRTEC) 10 mg tablet TAKE ONE TABLET BY MOUTH ONE TIME DAILY 90 Tablet 2    fluticasone propionate (FLONASE) 50 mcg/actuation nasal spray SHAKE LIQUID AND USE 1 SPRAY IN EACH NOSTRIL DAILY 48 g 0    glucose blood VI test strips (ASCENSIA AUTODISC VI, ONE TOUCH ULTRA TEST VI) strip Use 4 times a day as directed to check blood glucose levels. 100 Strip 3    ipratropium-albuteroL (Combivent Respimat)  mcg/actuation inhaler Take 1 Puff by inhalation two (2) times a day. 4 g 3    albuterol (PROVENTIL HFA, VENTOLIN HFA, PROAIR HFA) 90 mcg/actuation inhaler Take 2 Puffs by inhalation every four (4) hours as needed for Wheezing. 1 Inhaler 4    albuterol (PROVENTIL VENTOLIN) 2.5 mg /3 mL (0.083 %) nebu 3 mL by Nebulization route every four (4) hours as needed for Wheezing.  30 Nebule 4    calcium-cholecalciferol, D3, (CALTRATE 600+D) tablet TAKE ONE TABLET BY MOUTH ONE TIME DAILY 90 Tab 3    Insulin Needles, Disposable, (UltiCare Pen Needle) 32 gauge x 1/4\" ndle To check glucose as needed 3 times a day 100 Pen Needle 3    Nebulizer & Compressor machine 1 Each by Does Not Apply route as needed for Wheezing or Shortness of Breath. 1 Each 0    bumetanide (BUMEX) 1 mg tablet Take 1 Tab by mouth daily. 90 Tab 1    buPROPion (WELLBUTRIN) 100 mg tablet TAKE ONE TABLET BY MOUTH TWICE A  Tab 4    sertraline (ZOLOFT) 100 mg tablet TAKE ONE TABLET BY MOUTH ONE TIME DAILY 90 Tab 4    Nebulizer Accessories kit For albuterol q4-8hrs PRN 1 Kit 0    potassium chloride (K-DUR, KLOR-CON) 20 mEq tablet TAKE ONE TABLET BY MOUTH ONE TIME DAILY 90 Tab 3    Blood-Glucose Meter monitoring kit One Touch Ultra Meter 1 Kit 0    Blood-Glucose Meter monitoring kit Please check blood glucose once a day in morning 1 Kit 0    LORazepam (ATIVAN) 0.5 mg tablet Take 0.5 mg by mouth as needed for Anxiety.  EZFE 200 200 mg iron cap TAKE ONE CAPSULE BY MOUTH ONE TIME DAILY 90 Cap 1    atorvastatin (LIPITOR) 20 mg tablet TAKE ONE TABLET BY MOUTH ONE TIME DAILY  1    donepezil (ARICEPT) 10 mg tablet TAKE ONE TABLET BY MOUTH AT BEDTIME  6    QUEtiapine (SEROQUEL) 25 mg tablet TAKE ONE TABLET BY MOUTH EVERY MORNING, TAKE TWO TABLETS BY MOUTH ONE TIME DAILY AT LUNCH, TAKE THREE TABLETS BY MOUTH AT BEDTIME  6    aspirin delayed-release 81 mg tablet Take  by mouth daily. No family history on file. Social History     Tobacco Use    Smoking status: Former Smoker     Packs/day: 2.00     Years: 30.00     Pack years: 60.00    Smokeless tobacco: Never Used   Substance Use Topics    Alcohol use: Never       Specialists/Care Team   Hector Copeland has established care with the following healthcare providers:  Dr. Eduar Rolle, Neuropsychiatrist  Dr. Pretty Recio, Nephrologist   Dr. Cecilio Arreguin, [facility]     Demographics   male  80 y.o.     General Health Questions   -During the past 4 weeks:   -how would you rate your health in general? Good   -how often have you been bothered by feeling dizzy when standing up? once a month   -how much have you been bothered by bodily pain? Mildly. Reports chronic back pain and is following up with a pain specialist    -Have you noticed any hearing difficulties? yes   -has your physical and emotional health limited your social activities with family or friends? yes    Emotional Health Questions   -Do you have a history of depression, anxiety, or emotional problems? yes  -Over the past 2 weeks, have you felt down, depressed or hopeless? yes  -Over the past 2 weeks, have you felt little interest or pleasure in doing things? no    Health Habits   Please describe your diet habits: fruits, vegetables, meats, low carb   Do you get 5 servings of fruits or vegetables daily? no  Do you exercise regularly? no and limited by chronic pain and mobility    Activities of Daily Living and Functional Status   -Do you need help with eating, walking, dressing, bathing, toileting, the phone, transportation, shopping, preparing meals, housework, laundry, medications or managing money? Able to perform ADLs has some assistance from spouse  -In the past four weeks, was someone available to help you if you needed and wanted help with anything? yes  -Are you confident that you can control and manage most of your health problems? yes with some help from spouse   -Have you been given information to help you keep track of your medications? Yes   -How often do you have trouble taking your medications as prescribed? never    Fall Risk and Home Safety   Have you fallen 2 or more times in the past year? yes  Does your home have rugs in the hallway, lack grab bars in the bathroom, lack handrails on the stairs or have poor lighting? no  Do you have smoke detectors and check them regularly? yes  Do you have difficulties driving a car? Patient does not drive   Do you always fasten your seat belt when you are in a car? yes    Review of Systems (if indicated for problems addressed today)   ROS negative     Physical Examination     Vitals:    07/23/21 1346   BP: (!) 114/59   Pulse: (!) 57   Resp: 16   Temp: 97.9 °F (36.6 °C)   TempSrc: Oral   SpO2: 97%   Weight: 171 lb 12.8 oz (77.9 kg)   Height: 5' 0.04\" (1.525 m)     Body mass index is 33.51 kg/m². Was the patient's timed Up & Go test unsteady or longer than 30 seconds? no    Evaluation of Cognitive Function   Mood/affect: happy  Orientation: Person and Place. At baseline. Appearance: age appropriate and in no acute distress  Family member/caregiver input: reports occasional sundowning due to Alzheimer's but states feels like it has been more stable over the last year. Advice/Referrals/Counseling (as indicated)   Education and counseling provided for any problems identified above: Counseled on continuing with specialist follow up appointments as scheduled. Also counseled on increasing exercise and increasing intake of fruits of vegetables. Discussed fall safety precautions. Preventive Services     (Preventive care checklist to be included in patient instructions)  Discussed today Done Previously Not Needed     Yes   Pneumococcal vaccines    Yes  Flu vaccine     No  Hepatitis B vaccine (if at risk)   Yes    Shingles vaccine    Yes  TDAP vaccine      Digital rectal exam       PSA       Colorectal cancer screening       Low-dose CT for lung cancer screening       Bone density test       Glaucoma screening   Yes   Cholesterol test   Yes   Diabetes screening test        Diabetes self-management class      Nutritionist referral for diabetes or renal disease     Has also been vaccinated for COVID. Discussion of Advance Directive   Discussed with Marvin Harris his ability to prepare an advance directive in the case that an injury or illness causes him to be unable to make health care decisions. His spouse currently serves as his power of .  Would like to take more time to think about and discuss advance care plan with his spouse at home. Assessment/Plan   V70.0    ICD-10-CM ICD-9-CM    1. Encounter for annual wellness exam in Medicare patient  Z00.00 V70.0    2. Need for hepatitis C screening test  Z11.59 V73.89 HEPATITIS C AB      HEPATITIS C AB   3. Type 2 diabetes with nephropathy (HCC)  E11.21 250.40 HEMOGLOBIN A1C WITH EAG     583.81 AMB POC URINE, MICROALBUMIN, SEMIQUANT (3 RESULTS)      LIPID PANEL      METABOLIC PANEL, BASIC      CBC W/O DIFF      CBC W/O DIFF      METABOLIC PANEL, BASIC      LIPID PANEL      HEMOGLOBIN A1C WITH EAG   4. Need for shingles vaccine  Z23 V04.89 varicella-zoster recombinant, PF, (Shingrix, PF,) 50 mcg/0.5 mL susr injection       Orders Placed This Encounter    HEPATITIS C AB    HEMOGLOBIN A1C WITH EAG    LIPID PANEL    METABOLIC PANEL, BASIC    CBC W/O DIFF    AMB POC URINE, MICROALBUMIN, SEMIQUANT (3 RESULTS)    varicella-zoster recombinant, PF, (Shingrix, PF,) 50 mcg/0.5 mL susr injection           Victoria Sow MD  This is the Subsequent Medicare Annual Wellness Exam, performed 12 months or more after the Initial AWV or the last Subsequent AWV    I have reviewed the patient's medical history in detail and updated the computerized patient record. Assessment/Plan   Education and counseling provided:  Are appropriate based on today's review and evaluation    1. Encounter for annual wellness exam in Medicare patient  2. Need for hepatitis C screening test  -     HEPATITIS C AB; Future  3. Type 2 diabetes with nephropathy (HCC)  -     HEMOGLOBIN A1C WITH EAG; Future  -     AMB POC URINE, MICROALBUMIN, SEMIQUANT (3 RESULTS)  -     LIPID PANEL; Future  -     METABOLIC PANEL, BASIC; Future  -     CBC W/O DIFF; Future  4.  Need for shingles vaccine  -     varicella-zoster recombinant, PF, (Shingrix, PF,) 50 mcg/0.5 mL susr injection; 0.5 mL by IntraMUSCular route once for 1 dose., Normal, Disp-0.5 mL, R-0       Depression Risk Factor Screening     PHQ-2: 1 point (negative screen). Alcohol Risk Screen    Do you average more than 1 drink per night or more than 7 drinks a week: No    In the past three months have you have had more than 4 drinks containing alcohol on one occasion: No    Functional Ability and Level of Safety    Hearing: The patient wears hearing aids. Activities of Daily Living: The home contains: handrails and grab bars  Patient does total self care      Ambulation: with mild difficulty     Fall Risk:  Fall Risk Assessment, last 12 mths 6/14/2021   Able to walk? Yes   Fall in past 12 months? 1   Do you feel unsteady? 1   Are you worried about falling 1   Is the gait abnormal? 1   Number of falls in past 12 months 2   Fall with injury? 1      Abuse Screen:  Patient feels safe at home. Cognitive Screening    Has your family/caregiver stated any concerns about your memory: yes - Has h/o Alzheimer's and is following with Dr. Kitty Ocampo for management.     Health Maintenance Due     Health Maintenance Due   Topic Date Due    Hepatitis C Screening  Never done    Eye Exam Retinal or Dilated  Never done    Lipid Screen  Never done    Shingrix Vaccine Age 50> (1 of 2) Never done    Pneumococcal 65+ yrs at Risk Vaccine (2 of 2 - PCV13) 06/04/2007    Medicare Yearly Exam  06/14/2020    Foot Exam Q1  02/24/2021       Patient Care Team   Patient Care Team:  Hodan Mendoza DO as PCP - General (Family Medicine)  Ingrid Lopez MD as PCP - [facility] Miranda Provider    History     Patient Active Problem List   Diagnosis Code    Urinary frequency R35.0    Acquired equinus deformity of foot M21.6X9    Diabetes mellitus ([address] E11.9    Onychomycosis due to dermatophyte B35.1    Pes planus M21.40    Tinea pedis B35.3    Essential hypertension I10    Anemia of unknown etiology D64.9    Alzheimer's disease, unspecified ([address] G30.9, F02.80    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    Benign prostatic hyperplasia with urinary frequency N40.1, R35.0    Easy bruising R23.8    History of COPD Z87.09    COPD (chronic obstructive pulmonary disease) ([facility] J44.9    Pulmonary nodule R91.1    Type 2 diabetes with nephropathy ([facility]) E11.21     Past Medical History:   Diagnosis Date    Alzheimer's dementia ([facility]     BPH (benign prostatic hyperplasia)     CAD (coronary artery disease)     Carotid artery stenosis     CKD (chronic kidney disease)     COPD (chronic obstructive pulmonary disease) ([facility])     HLD (hyperlipidemia)     T2DM (type 2 diabetes mellitus) ([facility]       No past surgical history on file. Current Outpatient Medications   Medication Sig Dispense Refill    varicella-zoster recombinant, PF, (Shingrix, PF,) 50 mcg/0.5 mL susr injection 0.5 mL by IntraMUSCular route once for 1 dose. 0.5 mL 0    amLODIPine (NORVASC) 5 mg tablet TAKE ONE TABLET BY MOUTH ONE TIME DAILY 90 Tablet 1    carvediloL (COREG) 12.5 mg tablet TAKE ONE TABLET BY MOUTH TWICE A DAY WITH A MEAL 60 Tablet 2    tamsulosin (FLOMAX) 0.4 mg capsule Take 1 Capsule by mouth daily. 90 Capsule 2    memantine (Namenda) 5 mg tablet Take 1 Tablet by mouth two (2) times a day for 60 days. 120 Tablet 0    clopidogreL (PLAVIX) 75 mg tab TAKE ONE TABLET BY MOUTH ONE TIME DAILY 30 Tablet 3    isosorbide mononitrate ER (IMDUR) 30 mg tablet TAKE THREE TABLETS BY MOUTH TWICE A  Tablet 1    fluticasone propion-salmeteroL (ADVAIR/WIXELA) 250-50 mcg/dose diskus inhaler Take 1 Puff by inhalation every twelve (12) hours. 1 Inhaler 0    cetirizine (ZYRTEC) 10 mg tablet TAKE ONE TABLET BY MOUTH ONE TIME DAILY 90 Tablet 2    fluticasone propionate (FLONASE) 50 mcg/actuation nasal spray SHAKE LIQUID AND USE 1 SPRAY IN EACH NOSTRIL DAILY 48 g 0    glucose blood VI test strips (ASCENSIA AUTODISC VI, ONE TOUCH ULTRA TEST VI) strip Use 4 times a day as directed to check blood glucose levels.  100 Strip 3    ipratropium-albuteroL (Combivent Respimat)  mcg/actuation inhaler Take 1 Puff by inhalation two (2) times a day. 4 g 3    albuterol (PROVENTIL HFA, VENTOLIN HFA, PROAIR HFA) 90 mcg/actuation inhaler Take 2 Puffs by inhalation every four (4) hours as needed for Wheezing. 1 Inhaler 4    albuterol (PROVENTIL VENTOLIN) 2.5 mg /3 mL (0.083 %) nebu 3 mL by Nebulization route every four (4) hours as needed for Wheezing. 30 Nebule 4    calcium-cholecalciferol, D3, (CALTRATE 600+D) tablet TAKE ONE TABLET BY MOUTH ONE TIME DAILY 90 Tab 3    Insulin Needles, Disposable, (UltiCare Pen Needle) 32 gauge x 1/4\" ndle To check glucose as needed 3 times a day 100 Pen Needle 3    Nebulizer & Compressor machine 1 Each by Does Not Apply route as needed for Wheezing or Shortness of Breath. 1 Each 0    bumetanide (BUMEX) 1 mg tablet Take 1 Tab by mouth daily. 90 Tab 1    buPROPion (WELLBUTRIN) 100 mg tablet TAKE ONE TABLET BY MOUTH TWICE A  Tab 4    sertraline (ZOLOFT) 100 mg tablet TAKE ONE TABLET BY MOUTH ONE TIME DAILY 90 Tab 4    Nebulizer Accessories kit For albuterol q4-8hrs PRN 1 Kit 0    potassium chloride (K-DUR, KLOR-CON) 20 mEq tablet TAKE ONE TABLET BY MOUTH ONE TIME DAILY 90 Tab 3    Blood-Glucose Meter monitoring kit One Touch Ultra Meter 1 Kit 0    Blood-Glucose Meter monitoring kit Please check blood glucose once a day in morning 1 Kit 0    LORazepam (ATIVAN) 0.5 mg tablet Take 0.5 mg by mouth as needed for Anxiety.  EZFE 200 200 mg iron cap TAKE ONE CAPSULE BY MOUTH ONE TIME DAILY 90 Cap 1    atorvastatin (LIPITOR) 20 mg tablet TAKE ONE TABLET BY MOUTH ONE TIME DAILY  1    donepezil (ARICEPT) 10 mg tablet TAKE ONE TABLET BY MOUTH AT BEDTIME  6    QUEtiapine (SEROQUEL) 25 mg tablet TAKE ONE TABLET BY MOUTH EVERY MORNING, TAKE TWO TABLETS BY MOUTH ONE TIME DAILY AT LUNCH, TAKE THREE TABLETS BY MOUTH AT BEDTIME  6    aspirin delayed-release 81 mg tablet Take  by mouth daily.        Allergies   Allergen Reactions    Adempas [Riociguat] Unknown (comments)    Ativan [Lorazepam] Other (comments)     Altered LOC  Patient able to tolerate 0.5mg    Codeine Unknown (comments)    Dilaudid [Hydromorphone] Unknown (comments)    Lisinopril Cough       No family history on file.   Social History     Tobacco Use    Smoking status: Former Smoker     Packs/day: 2.00     Years: 30.00     Pack years: 60.00    Smokeless tobacco: Never Used   Substance Use Topics    Alcohol use: Never         Gisella Cha MD

## 2021-07-24 LAB
ANION GAP SERPL CALC-SCNC: 4 MMOL/L (ref 5–15)
BUN SERPL-MCNC: 22 MG/DL (ref 6–20)
BUN/CREAT SERPL: 11 (ref 12–20)
CALCIUM SERPL-MCNC: 9.7 MG/DL (ref 8.5–10.1)
CHLORIDE SERPL-SCNC: 109 MMOL/L (ref 97–108)
CHOLEST SERPL-MCNC: 200 MG/DL
CO2 SERPL-SCNC: 31 MMOL/L (ref 21–32)
CREAT SERPL-MCNC: 2 MG/DL (ref 0.7–1.3)
ERYTHROCYTE [DISTWIDTH] IN BLOOD BY AUTOMATED COUNT: 13.2 % (ref 11.5–14.5)
EST. AVERAGE GLUCOSE BLD GHB EST-MCNC: 140 MG/DL
GLUCOSE SERPL-MCNC: 102 MG/DL (ref 65–100)
HBA1C MFR BLD: 6.5 % (ref 4–5.6)
HCT VFR BLD AUTO: 40.4 % (ref 36.6–50.3)
HCV AB SERPL QL IA: NONREACTIVE
HCV COMMENT,HCGAC: NORMAL
HDLC SERPL-MCNC: 48 MG/DL
HDLC SERPL: 4.2 {RATIO} (ref 0–5)
HGB BLD-MCNC: 12.4 G/DL (ref 12.1–17)
LDLC SERPL CALC-MCNC: 115.4 MG/DL (ref 0–100)
MCH RBC QN AUTO: 29.8 PG (ref 26–34)
MCHC RBC AUTO-ENTMCNC: 30.7 G/DL (ref 30–36.5)
MCV RBC AUTO: 97.1 FL (ref 80–99)
NRBC # BLD: 0 K/UL (ref 0–0.01)
NRBC BLD-RTO: 0 PER 100 WBC
PLATELET # BLD AUTO: 189 K/UL (ref 150–400)
PMV BLD AUTO: 10.9 FL (ref 8.9–12.9)
POTASSIUM SERPL-SCNC: 4.8 MMOL/L (ref 3.5–5.1)
RBC # BLD AUTO: 4.16 M/UL (ref 4.1–5.7)
SODIUM SERPL-SCNC: 144 MMOL/L (ref 136–145)
TRIGL SERPL-MCNC: 183 MG/DL (ref ?–150)
VLDLC SERPL CALC-MCNC: 36.6 MG/DL
WBC # BLD AUTO: 11.2 K/UL (ref 4.1–11.1)

## 2021-07-27 ENCOUNTER — TELEPHONE (OUTPATIENT)
Dept: OBGYN | Age: 81
End: 2021-07-27

## 2021-07-27 DIAGNOSIS — I25.10 CORONARY ARTERY DISEASE WITHOUT ANGINA PECTORIS, UNSPECIFIED VESSEL OR LESION TYPE, UNSPECIFIED WHETHER NATIVE OR TRANSPLANTED HEART: ICD-10-CM

## 2021-07-27 DIAGNOSIS — I10 ESSENTIAL HYPERTENSION: ICD-10-CM

## 2021-07-27 RX ORDER — CARVEDILOL 12.5 MG/1
TABLET ORAL
Qty: 60 TABLET | Refills: 2 | Status: SHIPPED | OUTPATIENT
Start: 2021-07-27

## 2021-07-27 RX ORDER — POTASSIUM CHLORIDE 20 MEQ/1
TABLET, EXTENDED RELEASE ORAL
Qty: 30 TABLET | Refills: 2 | Status: SHIPPED | OUTPATIENT
Start: 2021-07-27 | End: 2021-09-27

## 2021-07-27 NOTE — TELEPHONE ENCOUNTER
Spoke with patient's wife, who serves as power of  for patient, along with patient over the phone yesterday (7/26) regarding recent lab results. Discussed elevated lipid panel and option to increase Atorvastatin dosing. Discussed evidence limited as to whether benefit might outweigh risk of side effects due to patient's age and other co-morbidities and medications. With shared decision making, plan to continue Atorvastatin at 20 mg dose and patient will follow up with cardiologist to weigh in on possibility of increasing to high dose therapy. Also discussed CKD stable with some mild improvement in BUN/Cr and GFR from previous labs and A1c stable at 6.5 improved from previous. Noted mild leukocytosis at 11.2. Discussed may be reactive secondary to chronic inflammation, stress, or obesity as patient is afebrile and asymptomatic. Plan to monitor for development of any new symptoms and follow up. Requested refill on Carvedilol and potassium supplement. Plan to continue current medical management and follow up with specialist providers as scheduled.

## 2021-08-05 ENCOUNTER — TELEPHONE (OUTPATIENT)
Dept: FAMILY MEDICINE CLINIC | Age: 81
End: 2021-08-05

## 2021-08-05 NOTE — TELEPHONE ENCOUNTER
Call rec'd from Prasad Rahman from [facility] on 8/3 to say a resident physician cannot sign the order. She refaxed it to me, it was handed to the doctors nurse who was informed an attending will need to sign it. It was completed by an attending as placed on my desk to refax. Faxed it back to them this morning ,8/6, with confirmation.

## 2021-08-06 DIAGNOSIS — J44.1 COPD EXACERBATION (HCC): ICD-10-CM

## 2021-08-09 RX ORDER — FLUTICASONE PROPIONATE AND SALMETEROL 50; 250 UG/1; UG/1
POWDER RESPIRATORY (INHALATION)
Qty: 1 INHALER | Refills: 1 | Status: SHIPPED | OUTPATIENT
Start: 2021-08-09 | End: 2021-10-22

## 2021-08-12 ENCOUNTER — TELEPHONE (OUTPATIENT)
Dept: FAMILY MEDICINE CLINIC | Age: 81
End: 2021-08-12

## 2021-08-18 ENCOUNTER — TELEPHONE (OUTPATIENT)
Dept: FAMILY MEDICINE CLINIC | Age: 81
End: 2021-08-18

## 2021-08-18 NOTE — TELEPHONE ENCOUNTER
----- Message from Grisel Kelly sent at 8/18/2021  2:26 PM EDT -----  Regarding: Dr. Sidney Mon Message/Vendor Calls    Caller's first and last name:Donovan Begum, Wife      Reason for call:Donovan advised that pt's Memantine medication was lowered to 5mg  dosage and it is causing erratic and belligerent behavior from him. Callback required yes/no and why:yes, to clarify       Best contact number(s):[phone]      Details to clarify the request:Donovan advised that Dr. Damari Page was the person who lowered the dosage from 10mg to 5mg with the reasoning being pt's kidney output, but she advised there are 20 other medications the pt is on that could be lowered. Donovan advised that the 10 mg dosage for him was working superbly and is wanting to get it switched back to 10mg.       Grisel Kelly

## 2021-08-20 NOTE — TELEPHONE ENCOUNTER
Called again and spoke to patient's wife, Irasema Stovall, who serves as patient's power of , regarding change in Memantine dose from 10mg to 5mg. Wife reports patient has been having episodes of agitation in the last couple of weeks since medication change. Discussed risk of further kidney injury with an increase in dose in the setting of patient's chronic kidney disease. Patient does not currently have appointment scheduled with neuropsychiatrist Dr. Rigo Cr to discuss possible alternatives but will work on getting appointment scheduled.

## 2021-08-25 RX ORDER — MEMANTINE HYDROCHLORIDE 5 MG/1
5 TABLET ORAL 2 TIMES DAILY
Qty: 120 TABLET | Refills: 0 | Status: SHIPPED | OUTPATIENT
Start: 2021-08-25 | End: 2021-09-27

## 2021-08-31 DIAGNOSIS — E11.69 TYPE 2 DIABETES MELLITUS WITH OTHER SPECIFIED COMPLICATION, UNSPECIFIED WHETHER LONG TERM INSULIN USE (HCC): ICD-10-CM

## 2021-09-03 ENCOUNTER — TELEPHONE (OUTPATIENT)
Dept: FAMILY MEDICINE CLINIC | Age: 81
End: 2021-09-03

## 2021-09-03 NOTE — TELEPHONE ENCOUNTER
Per call from [facility], notes that medicare is very picky. They are asking if only 1 product is listed. Asking if RX can be sent over for test strips (one touch ultra).     Questions call [phone]

## 2021-09-07 ENCOUNTER — TELEPHONE (OUTPATIENT)
Dept: FAMILY MEDICINE CLINIC | Age: 81
End: 2021-09-07

## 2021-09-07 DIAGNOSIS — E11.69 TYPE 2 DIABETES MELLITUS WITH OTHER SPECIFIED COMPLICATION, UNSPECIFIED WHETHER LONG TERM INSULIN USE (HCC): ICD-10-CM

## 2021-09-08 ENCOUNTER — TELEPHONE (OUTPATIENT)
Dept: FAMILY MEDICINE CLINIC | Age: 81
End: 2021-09-08

## 2021-09-08 NOTE — TELEPHONE ENCOUNTER
Scheduled appt with Mrs. Fitch for the patient next week with you. She wanted to let Dr. Guadarrama Ear know the patient was in so much pain last night that she almost started to take him back to the ER.

## 2021-09-08 NOTE — TELEPHONE ENCOUNTER
----- Message from [employer] sent at 9/8/2021  2:55 PM EDT -----  Regarding: Dr. Helder Mullen Message/Vendor Calls    Caller's first and last name: Baljinder Bennett)      Reason for call: schedule oscar appt      Callback required yes/no and why: yes/caller request      Best contact number(s): [phone]      Details to clarify the request: pt was diagnosed via cat scan with a kidney stone on 08/29/2021 and advised to follow up/pt also had some discomfort on 09/07/2021/please call to schedule      [employer]

## 2021-09-09 NOTE — TELEPHONE ENCOUNTER
[facility] calling again, the received another script for test strips. Provider is not medicare enrolled. Please have attending submit. Outpatient Medication Detail     Disp Refills Start End    glucose blood VI test strips (ASCENSIA AUTODISC VI, ONE TOUCH ULTRA TEST VI) strip 100 Strip 3 9/7/2021     Sig: Use 4 times a day as directed to check blood glucose levels.     Sent to pharmacy as: blood sugar diagnostic strips (ASCENSIA AUTODISC VI, ONE TOUCH ULTRA TEST VI)    Notes to Pharmacy: One touch ultra glucose test strips    E-Prescribing Status: Receipt confirmed by pharmacy (9/7/2021 10:22 AM EDT)

## 2021-09-20 DIAGNOSIS — E11.69 TYPE 2 DIABETES MELLITUS WITH OTHER SPECIFIED COMPLICATION, UNSPECIFIED WHETHER LONG TERM INSULIN USE (HCC): Primary | ICD-10-CM

## 2021-09-21 ENCOUNTER — TELEPHONE (OUTPATIENT)
Dept: FAMILY MEDICINE CLINIC | Age: 81
End: 2021-09-21

## 2021-09-21 NOTE — TELEPHONE ENCOUNTER
----- Message from Josh Varela sent at 9/21/2021 11:17 AM EDT -----  Regarding: /telephone  General Message/Vendor Calls    Caller's first and last name:Paula with [facility]. Reason for call:when they tried to refill test strips it says provider need to review PECOS status. Callback required yes/no and why:yes,when they tried to refill test strips it says provider need to review PECOS status. Best contact number(s):[phone]      Details to clarify the request:when they tried to refill test strips it says provider need to review PECOS status.  Please call [phone] (medicare)      Josh Trinidadjodi

## 2021-09-23 DIAGNOSIS — I10 ESSENTIAL HYPERTENSION: ICD-10-CM

## 2021-09-23 DIAGNOSIS — I25.10 CORONARY ARTERY DISEASE INVOLVING NATIVE CORONARY ARTERY WITHOUT ANGINA PECTORIS, UNSPECIFIED WHETHER NATIVE OR TRANSPLANTED HEART: ICD-10-CM

## 2021-09-24 ENCOUNTER — TELEPHONE (OUTPATIENT)
Dept: FAMILY MEDICINE CLINIC | Age: 81
End: 2021-09-24

## 2021-09-24 RX ORDER — ISOSORBIDE MONONITRATE 30 MG/1
TABLET, EXTENDED RELEASE ORAL
Qty: 360 TABLET | Refills: 1 | Status: SHIPPED | OUTPATIENT
Start: 2021-09-24 | End: 2021-12-10

## 2021-09-24 RX ORDER — CLOPIDOGREL BISULFATE 75 MG/1
TABLET ORAL
Qty: 30 TABLET | Refills: 3 | Status: SHIPPED | OUTPATIENT
Start: 2021-09-24 | End: 2022-02-24 | Stop reason: SDUPTHER

## 2021-09-24 NOTE — TELEPHONE ENCOUNTER
[address] [place] [address] called to verify pt PCP. Verified Dr. Shahnaz Ramesh. Ask for attending (verified) Dr. Sherri Calvillo. She thanked me for my help.

## 2021-09-27 RX ORDER — MEMANTINE HYDROCHLORIDE 5 MG/1
TABLET ORAL
Qty: 120 TABLET | Refills: 0 | Status: SHIPPED | OUTPATIENT
Start: 2021-09-27 | End: 2021-11-12 | Stop reason: SDUPTHER

## 2021-09-27 RX ORDER — POTASSIUM CHLORIDE 20 MEQ/1
TABLET, EXTENDED RELEASE ORAL
Qty: 30 TABLET | Refills: 2 | Status: SHIPPED | OUTPATIENT
Start: 2021-09-27 | End: 2022-01-10 | Stop reason: SDUPTHER

## 2021-10-22 DIAGNOSIS — J44.1 COPD EXACERBATION (HCC): ICD-10-CM

## 2021-10-22 RX ORDER — FLUTICASONE PROPIONATE AND SALMETEROL 50; 250 UG/1; UG/1
POWDER RESPIRATORY (INHALATION)
Qty: 60 EACH | Refills: 0 | Status: SHIPPED | OUTPATIENT
Start: 2021-10-22

## 2021-11-12 RX ORDER — MEMANTINE HYDROCHLORIDE 5 MG/1
5 TABLET ORAL 2 TIMES DAILY
Qty: 120 TABLET | Refills: 0 | Status: SHIPPED | OUTPATIENT
Start: 2021-11-12 | End: 2021-12-27

## 2021-11-12 NOTE — TELEPHONE ENCOUNTER
[phone]    Called to say the patient ran out of dementia medication yesterday. Said she knows it is her fault that she did not call for a refill but he needs this today.

## 2021-11-30 ENCOUNTER — TELEPHONE (OUTPATIENT)
Dept: FAMILY MEDICINE CLINIC | Age: 81
End: 2021-11-30

## 2021-11-30 NOTE — TELEPHONE ENCOUNTER
Haven't had the chance to fax paperwork due to being in clinic all week and busy.    Faxed over all the 5 orders that  signed

## 2021-11-30 NOTE — TELEPHONE ENCOUNTER
Hi! Good PM  Alexandrea, rep. From [facility] called about for an update regarding the 5 orders that they faxed last 11/26 for Dr. Theodore Thompson to sign it.  Thanks    -marisa

## 2021-12-06 ENCOUNTER — OFFICE VISIT (OUTPATIENT)
Dept: FAMILY MEDICINE CLINIC | Age: 81
End: 2021-12-06
Payer: MEDICARE

## 2021-12-06 ENCOUNTER — NURSE TRIAGE (OUTPATIENT)
Dept: OTHER | Facility: CLINIC | Age: 81
End: 2021-12-06

## 2021-12-06 VITALS
HEIGHT: 60 IN | DIASTOLIC BLOOD PRESSURE: 50 MMHG | SYSTOLIC BLOOD PRESSURE: 112 MMHG | BODY MASS INDEX: 32.67 KG/M2 | TEMPERATURE: 97.3 F | OXYGEN SATURATION: 91 % | WEIGHT: 166.4 LBS | HEART RATE: 50 BPM | RESPIRATION RATE: 16 BRPM

## 2021-12-06 DIAGNOSIS — I10 ESSENTIAL HYPERTENSION: ICD-10-CM

## 2021-12-06 DIAGNOSIS — J44.9 CHRONIC OBSTRUCTIVE PULMONARY DISEASE, UNSPECIFIED COPD TYPE (HCC): ICD-10-CM

## 2021-12-06 DIAGNOSIS — J30.89 ENVIRONMENTAL AND SEASONAL ALLERGIES: ICD-10-CM

## 2021-12-06 PROCEDURE — G8427 DOCREV CUR MEDS BY ELIG CLIN: HCPCS | Performed by: STUDENT IN AN ORGANIZED HEALTH CARE EDUCATION/TRAINING PROGRAM

## 2021-12-06 PROCEDURE — 99214 OFFICE O/P EST MOD 30 MIN: CPT | Performed by: STUDENT IN AN ORGANIZED HEALTH CARE EDUCATION/TRAINING PROGRAM

## 2021-12-06 PROCEDURE — G8536 NO DOC ELDER MAL SCRN: HCPCS | Performed by: STUDENT IN AN ORGANIZED HEALTH CARE EDUCATION/TRAINING PROGRAM

## 2021-12-06 PROCEDURE — G8754 DIAS BP LESS 90: HCPCS | Performed by: STUDENT IN AN ORGANIZED HEALTH CARE EDUCATION/TRAINING PROGRAM

## 2021-12-06 PROCEDURE — G0463 HOSPITAL OUTPT CLINIC VISIT: HCPCS | Performed by: STUDENT IN AN ORGANIZED HEALTH CARE EDUCATION/TRAINING PROGRAM

## 2021-12-06 PROCEDURE — G8752 SYS BP LESS 140: HCPCS | Performed by: STUDENT IN AN ORGANIZED HEALTH CARE EDUCATION/TRAINING PROGRAM

## 2021-12-06 PROCEDURE — 1101F PT FALLS ASSESS-DOCD LE1/YR: CPT | Performed by: STUDENT IN AN ORGANIZED HEALTH CARE EDUCATION/TRAINING PROGRAM

## 2021-12-06 PROCEDURE — G8417 CALC BMI ABV UP PARAM F/U: HCPCS | Performed by: STUDENT IN AN ORGANIZED HEALTH CARE EDUCATION/TRAINING PROGRAM

## 2021-12-06 PROCEDURE — G8432 DEP SCR NOT DOC, RNG: HCPCS | Performed by: STUDENT IN AN ORGANIZED HEALTH CARE EDUCATION/TRAINING PROGRAM

## 2021-12-06 RX ORDER — FLUTICASONE PROPIONATE 50 MCG
1 SPRAY, SUSPENSION (ML) NASAL DAILY
Qty: 3 EACH | Refills: 3 | Status: SHIPPED | OUTPATIENT
Start: 2021-12-06 | End: 2022-04-06 | Stop reason: SDUPTHER

## 2021-12-06 RX ORDER — LORAZEPAM 0.5 MG/1
0.5 TABLET ORAL AS NEEDED
Status: CANCELLED | OUTPATIENT
Start: 2021-12-06

## 2021-12-06 RX ORDER — ALBUTEROL SULFATE 90 UG/1
2 AEROSOL, METERED RESPIRATORY (INHALATION)
Qty: 3 EACH | Refills: 3 | Status: SHIPPED | OUTPATIENT
Start: 2021-12-06

## 2021-12-06 NOTE — PROGRESS NOTES
Subjective   Kt Kaiser is an 80 y.o. male presents for follow up of COPD. Needs some refills of flonase, combivent, and albuterol inhaler. Wife feels like his breathing is louder than normal and she reports she thinks he is wheezing. Patient reports he feels about the same as normal. No cough, sputum production fever, SOB, or LE edema. Patient seen with his wife 2/2 dementia. Review of Systems   Review of Systems   Constitutional: Negative for chills and fever. HENT: Negative for congestion. Respiratory: Positive for wheezing. Negative for cough and shortness of breath. Cardiovascular: Negative for chest pain, palpitations and leg swelling. Gastrointestinal: Negative for abdominal pain, nausea and vomiting. Skin: Negative for rash. Allergies   Allergies   Allergen Reactions    Adempas [Riociguat] Unknown (comments)    Ativan [Lorazepam] Other (comments)     Altered LOC  Patient able to tolerate 0.5mg    Codeine Unknown (comments)    Dilaudid [Hydromorphone] Unknown (comments)    Lisinopril Cough       Medications  Current Outpatient Medications   Medication Sig    ipratropium-albuteroL (Combivent Respimat)  mcg/actuation inhaler Take 1 Puff by inhalation two (2) times a day.  fluticasone propionate (FLONASE) 50 mcg/actuation nasal spray 1 [place] by Both Nostrils route daily. SHAKE LIQUID AND USE 1 SPRAY IN EACH NOSTRIL DAILY    albuterol (PROVENTIL HFA, VENTOLIN HFA, PROAIR HFA) 90 mcg/actuation inhaler Take 2 Puffs by inhalation every four (4) hours as needed for Wheezing.  memantine (NAMENDA) 5 mg tablet Take 1 Tablet by mouth two (2) times a day.     Advair Diskus 250-50 mcg/dose diskus inhaler INHALE ONE PUFF BY MOUTH EVERY 12 HOURS    potassium chloride (K-DUR, KLOR-CON) 20 mEq tablet TAKE ONE TABLET BY MOUTH ONE TIME DAILY    isosorbide mononitrate ER (IMDUR) 30 mg tablet TAKE THREE TABLETS BY MOUTH TWICE A DAY    clopidogreL (PLAVIX) 75 mg tab TAKE ONE TABLET BY MOUTH ONE TIME DAILY    glucose blood VI test strips (ASCENSIA AUTODISC VI, ONE TOUCH ULTRA TEST VI) strip Use 4 times a day as directed to check blood glucose levels.  carvediloL (COREG) 12.5 mg tablet TAKE ONE TABLET BY MOUTH TWICE A DAY WITH A MEAL    amLODIPine (NORVASC) 5 mg tablet TAKE ONE TABLET BY MOUTH ONE TIME DAILY    tamsulosin (FLOMAX) 0.4 mg capsule Take 1 Capsule by mouth daily.  cetirizine (ZYRTEC) 10 mg tablet TAKE ONE TABLET BY MOUTH ONE TIME DAILY    albuterol (PROVENTIL VENTOLIN) 2.5 mg /3 mL (0.083 %) nebu 3 mL by Nebulization route every four (4) hours as needed for Wheezing.  calcium-cholecalciferol, D3, (CALTRATE 600+D) tablet TAKE ONE TABLET BY MOUTH ONE TIME DAILY    Insulin Needles, Disposable, (UltiCare Pen Needle) 32 gauge x 1/4\" ndle To check glucose as needed 3 times a day    Nebulizer & Compressor machine 1 Each by Does Not Apply route as needed for Wheezing or Shortness of Breath.  bumetanide (BUMEX) 1 mg tablet Take 1 Tab by mouth daily.  buPROPion (WELLBUTRIN) 100 mg tablet TAKE ONE TABLET BY MOUTH TWICE A DAY    sertraline (ZOLOFT) 100 mg tablet TAKE ONE TABLET BY MOUTH ONE TIME DAILY    Nebulizer Accessories kit For albuterol q4-8hrs PRN    Blood-Glucose Meter monitoring kit One Touch Ultra Meter    Blood-Glucose Meter monitoring kit Please check blood glucose once a day in morning    atorvastatin (LIPITOR) 20 mg tablet TAKE ONE TABLET BY MOUTH ONE TIME DAILY    donepezil (ARICEPT) 10 mg tablet TAKE ONE TABLET BY MOUTH AT BEDTIME    QUEtiapine (SEROQUEL) 25 mg tablet TAKE ONE TABLET BY MOUTH EVERY MORNING, TAKE TWO TABLETS BY MOUTH ONE TIME DAILY AT LUNCH, TAKE THREE TABLETS BY MOUTH AT BEDTIME    aspirin delayed-release 81 mg tablet Take  by mouth daily.  LORazepam (ATIVAN) 0.5 mg tablet Take 0.5 mg by mouth as needed for Anxiety.     EZFE 200 200 mg iron cap TAKE ONE CAPSULE BY MOUTH ONE TIME DAILY     No current facility-administered medications for this visit. Medical History  Past Medical History:   Diagnosis Date    [facility]'s [facility]     BPH (benign prostatic hyperplasia)     CAD (coronary artery disease)     Carotid artery stenosis     CKD (chronic kidney disease)     COPD (chronic obstructive pulmonary disease) (HCC)     HLD (hyperlipidemia)     T2DM (type 2 diabetes mellitus) (HCC)        Immunizations   Immunization History   Administered Date(s) Administered    COVID-19, PFIZER, MRNA, LNP-S, PF, 30MCG/0.3ML DOSE 01/20/2021, 02/10/2021       Objective   Vital Signs  Visit Vitals  BP (!) 112/50 (BP 1 Location: Right arm, BP Patient Position: Sitting)   Pulse (!) 50   Temp 97.3 °F (36.3 °C) (Temporal)   Resp 16   Ht 5' 0.04\" (1.525 m)   Wt 166 lb 6.4 oz (75.5 kg)   SpO2 91%   BMI 32.46 kg/m²     BP and pulse repeated and about the same. Physical Examination  Physical Exam  Constitutional:       General: He is not in acute distress. Appearance: He is not ill-appearing. HENT:      Head: Normocephalic and atraumatic. Eyes:      Conjunctiva/sclera: Conjunctivae normal.   Cardiovascular:      Rate and Rhythm: Normal rate and regular rhythm. Heart sounds: Normal heart sounds. No murmur heard. Pulmonary:      Effort: Pulmonary effort is normal. No respiratory distress. Breath sounds: Normal breath sounds. No stridor. No wheezing, rhonchi or rales. Abdominal:      General: There is no distension. Palpations: Abdomen is soft. Tenderness: There is no abdominal tenderness. Musculoskeletal:      Cervical back: No tenderness. Right lower leg: No edema. Left lower leg: No edema. Lymphadenopathy:      Cervical: No cervical adenopathy. Skin:     General: Skin is warm. Findings: No rash. Neurological:      General: No focal deficit present. Mental Status: He is alert.           Assessment   Kirstin Luo is a 80 y.o. who presents for concern for wheezing, med refills. Plan   1. Chronic obstructive pulmonary disease, unspecified COPD type ([facility] [address] No wheezing or signs of exacerbation today. Will refill combivent and albuterol inhaler. - ipratropium-albuteroL (Combivent Respimat)  mcg/actuation inhaler; Take 1 Puff by inhalation two (2) times a day. Dispense: 1 Each; Refill: 3  - albuterol (PROVENTIL HFA, VENTOLIN HFA, PROAIR HFA) 90 mcg/actuation inhaler; Take 2 Puffs by inhalation every four (4) hours as needed for Wheezing. Dispense: 3 Each; Refill: 3    2. Environmental and seasonal allergies Suspect some of the loud breathing is 2/2 congestion. Has been out of flonase for a while now. Will refill.  - fluticasone propionate (FLONASE) 50 mcg/actuation nasal spray; 1 [place] by Both Nostrils route daily. SHAKE LIQUID AND USE 1 SPRAY IN EACH NOSTRIL DAILY  Dispense: 3 Each; Refill: 3        I have discussed the aforementioned diagnoses and plan with the patient in detail. I have provided information in person and/or in AVS. All questions answered prior to discharge.       I discussed this patient with Dr. Jaun Mares (Attending Physician)   Signed By:  Jigar Tellez DO    Family Medicine Resident

## 2021-12-06 NOTE — PROGRESS NOTES
Lee Ghosh is a 80 y.o. male    Chief Complaint   Patient presents with    Wheezing       1. Have you been to the ER, urgent care clinic since your last visit? Hospitalized since your last visit? No  2. Have you seen or consulted any other health care providers outside of the [address] [place] [address] since your last visit? Include any pap smears or colon screening.  No    Visit Vitals  BP (!) 112/50 (BP 1 Location: Right arm, BP Patient Position: Sitting)   Pulse (!) 50   Temp 97.3 °F (36.3 °C) (Temporal)   Resp 16   Ht 5' 0.04\" (1.525 m)   Wt 166 lb 6.4 oz (75.5 kg)   SpO2 91%   BMI 32.46 kg/m²       Health Maintenance Due   Topic Date Due    Eye Exam Retinal or Dilated  Never done    Shingrix Vaccine Age 50> (1 of 2) Never done    Pneumococcal 65+ yrs at Risk Vaccine (2 of 2 - PCV13) 08/01/2019    Foot Exam Q1  02/24/2021    COVID-19 Vaccine (3 - Booster for Pfizer series) 08/10/2021    Flu Vaccine (1) 09/01/2021

## 2021-12-06 NOTE — TELEPHONE ENCOUNTER
Received call from [address] at [facility] with Red Flag Complaint. Brief description of triage: increased difficulty breathing and wheezing, hx of COPD    Triage indicates for patient to see pcp now,   Pt's wife advised to utilize [facility] if unable to get an appmt. Care advice provided, patient verbalizes understanding; denies any other questions or concerns; instructed to call back for any new or worsening symptoms. Writer provided warm transfer to [employer] at [facility] for appointment scheduling. Attention Provider: Thank you for allowing me to participate in the care of your patient. The patient was connected to triage in response to information provided to the [facility]. Please do not respond through this encounter as the response is not directed to a shared pool. Reason for Disposition   Longstanding difficulty breathing (e.g., CHF, COPD, emphysema) and worse than normal    Answer Assessment - Initial Assessment Questions  1. RESPIRATORY STATUS: \"Describe your breathing? \" (e.g., wheezing, shortness of breath, unable to speak, severe coughing)       Wheezing, shortness of breath from walking from room to room    2. ONSET: \"When did this breathing problem begin? \"       About a week and a half    3. PATTERN \"Does the difficult breathing come and go, or has it been constant since it started? \"       SOB on exertion, wheezing when sitting    4. SEVERITY: \"How bad is your breathing? \" (e.g., mild, moderate, severe)     - MILD: No SOB at rest, mild SOB with walking, speaks normally in sentences, can lay down, no retractions, pulse < 100.     - MODERATE: SOB at rest, SOB with minimal exertion and prefers to sit, cannot lie down flat, speaks in phrases, mild retractions, audible wheezing, pulse 100-120.     - SEVERE: Very SOB at rest, speaks in single words, struggling to breathe, sitting hunched forward, retractions, pulse > 120       Moderate    5.  RECURRENT SYMPTOM: \"Have you had difficulty breathing before? \" If so, ask: \"When was the last time? \" and \"What happened that time? \"       Yes, pt has a hx of COPD    6. CARDIAC HISTORY: \"Do you have any history of heart disease? \" (e.g., heart attack, angina, bypass surgery, angioplasty)       Yes, CABG and stents    7. LUNG HISTORY: \"Do you have any history of lung disease? \"  (e.g., pulmonary embolus, asthma, emphysema)      Hx of COPD    8. CAUSE: \"What do you think is causing the breathing problem? \"       COPD exac    9. OTHER SYMPTOMS: \"Do you have any other symptoms? (e.g., dizziness, runny nose, cough, chest pain, fever)      Denies    10. PREGNANCY: \"Is there any chance you are pregnant? \" \"When was your last menstrual period? \"        Pt is a male    6. TRAVEL: \"Have you traveled out of the country in the last month? \" (e.g., travel history, exposures)        Denies    Protocols used: BREATHING DIFFICULTY-ADULT-OH

## 2021-12-10 RX ORDER — ISOSORBIDE MONONITRATE 30 MG/1
TABLET, EXTENDED RELEASE ORAL
Qty: 360 TABLET | Refills: 1 | Status: SHIPPED | OUTPATIENT
Start: 2021-12-10 | End: 2022-02-02 | Stop reason: SDUPTHER

## 2021-12-21 DIAGNOSIS — F32.A DEPRESSION, UNSPECIFIED DEPRESSION TYPE: ICD-10-CM

## 2021-12-21 DIAGNOSIS — I10 ESSENTIAL HYPERTENSION: ICD-10-CM

## 2021-12-21 RX ORDER — AMLODIPINE BESYLATE 5 MG/1
TABLET ORAL
Qty: 90 TABLET | Refills: 1 | Status: SHIPPED | OUTPATIENT
Start: 2021-12-21 | End: 2022-06-22 | Stop reason: SDUPTHER

## 2021-12-21 RX ORDER — BUPROPION HYDROCHLORIDE 100 MG/1
TABLET ORAL
Qty: 180 TABLET | Refills: 4 | Status: SHIPPED | OUTPATIENT
Start: 2021-12-21

## 2021-12-21 RX ORDER — SERTRALINE HYDROCHLORIDE 100 MG/1
TABLET, FILM COATED ORAL
Qty: 90 TABLET | Refills: 4 | Status: SHIPPED | OUTPATIENT
Start: 2021-12-21 | End: 2022-07-03

## 2021-12-27 RX ORDER — MEMANTINE HYDROCHLORIDE 5 MG/1
TABLET ORAL
Qty: 120 TABLET | Refills: 0 | Status: SHIPPED | OUTPATIENT
Start: 2021-12-27 | End: 2022-02-24 | Stop reason: SDUPTHER

## 2022-01-10 DIAGNOSIS — I10 ESSENTIAL HYPERTENSION: ICD-10-CM

## 2022-01-10 RX ORDER — POTASSIUM CHLORIDE 20 MEQ/1
20 TABLET, EXTENDED RELEASE ORAL DAILY
Qty: 30 TABLET | Refills: 2 | Status: SHIPPED | OUTPATIENT
Start: 2022-01-10 | End: 2022-02-24 | Stop reason: SDUPTHER

## 2022-01-12 DIAGNOSIS — I10 ESSENTIAL HYPERTENSION: ICD-10-CM

## 2022-01-21 ENCOUNTER — TELEPHONE (OUTPATIENT)
Dept: FAMILY MEDICINE CLINIC | Age: 82
End: 2022-01-21

## 2022-01-21 NOTE — TELEPHONE ENCOUNTER
Homecare delivered faxed over Certificate of medical necessity on 1/20/2022. Placed in the black box.

## 2022-02-07 DIAGNOSIS — I10 ESSENTIAL HYPERTENSION: ICD-10-CM

## 2022-02-07 RX ORDER — ISOSORBIDE MONONITRATE 30 MG/1
TABLET, EXTENDED RELEASE ORAL
Qty: 360 TABLET | Refills: 1 | Status: SHIPPED | OUTPATIENT
Start: 2022-02-07 | End: 2022-06-10 | Stop reason: SDUPTHER

## 2022-02-07 RX ORDER — ISOSORBIDE MONONITRATE 30 MG/1
TABLET, EXTENDED RELEASE ORAL
Qty: 360 TABLET | Refills: 1 | Status: CANCELLED | OUTPATIENT
Start: 2022-02-07

## 2022-02-07 RX ORDER — QUETIAPINE FUMARATE 25 MG/1
TABLET, FILM COATED ORAL
Refills: 6 | Status: CANCELLED | OUTPATIENT
Start: 2022-02-07

## 2022-02-07 RX ORDER — QUETIAPINE FUMARATE 25 MG/1
TABLET, FILM COATED ORAL
Qty: 90 TABLET | Refills: 3 | Status: SHIPPED | OUTPATIENT
Start: 2022-02-07 | End: 2022-02-24

## 2022-02-07 NOTE — TELEPHONE ENCOUNTER
Good PM  Pt wife called and requesting med refill for her , he is out of [address], ASAP  Please and thank you

## 2022-02-24 ENCOUNTER — OFFICE VISIT (OUTPATIENT)
Dept: FAMILY MEDICINE CLINIC | Age: 82
End: 2022-02-24
Payer: MEDICARE

## 2022-02-24 VITALS
WEIGHT: 166.2 LBS | HEART RATE: 53 BPM | HEIGHT: 60 IN | RESPIRATION RATE: 16 BRPM | SYSTOLIC BLOOD PRESSURE: 119 MMHG | BODY MASS INDEX: 32.63 KG/M2 | DIASTOLIC BLOOD PRESSURE: 54 MMHG

## 2022-02-24 DIAGNOSIS — G30.9 ALZHEIMER'S DEMENTIA WITHOUT BEHAVIORAL DISTURBANCE, UNSPECIFIED TIMING OF DEMENTIA ONSET: ICD-10-CM

## 2022-02-24 DIAGNOSIS — I10 ESSENTIAL HYPERTENSION: ICD-10-CM

## 2022-02-24 DIAGNOSIS — E11.40 CONTROLLED TYPE 2 DIABETES WITH NEUROPATHY (HCC): ICD-10-CM

## 2022-02-24 DIAGNOSIS — F02.80 ALZHEIMER'S DEMENTIA WITHOUT BEHAVIORAL DISTURBANCE, UNSPECIFIED TIMING OF DEMENTIA ONSET: ICD-10-CM

## 2022-02-24 DIAGNOSIS — I25.10 CORONARY ARTERY DISEASE INVOLVING NATIVE CORONARY ARTERY WITHOUT ANGINA PECTORIS, UNSPECIFIED WHETHER NATIVE OR TRANSPLANTED HEART: ICD-10-CM

## 2022-02-24 DIAGNOSIS — E11.69 TYPE 2 DIABETES MELLITUS WITH OTHER SPECIFIED COMPLICATION, UNSPECIFIED WHETHER LONG TERM INSULIN USE (HCC): ICD-10-CM

## 2022-02-24 DIAGNOSIS — J30.89 ENVIRONMENTAL AND SEASONAL ALLERGIES: ICD-10-CM

## 2022-02-24 PROCEDURE — G8427 DOCREV CUR MEDS BY ELIG CLIN: HCPCS | Performed by: STUDENT IN AN ORGANIZED HEALTH CARE EDUCATION/TRAINING PROGRAM

## 2022-02-24 PROCEDURE — 1101F PT FALLS ASSESS-DOCD LE1/YR: CPT | Performed by: STUDENT IN AN ORGANIZED HEALTH CARE EDUCATION/TRAINING PROGRAM

## 2022-02-24 PROCEDURE — G8536 NO DOC ELDER MAL SCRN: HCPCS | Performed by: STUDENT IN AN ORGANIZED HEALTH CARE EDUCATION/TRAINING PROGRAM

## 2022-02-24 PROCEDURE — G8417 CALC BMI ABV UP PARAM F/U: HCPCS | Performed by: STUDENT IN AN ORGANIZED HEALTH CARE EDUCATION/TRAINING PROGRAM

## 2022-02-24 PROCEDURE — G0463 HOSPITAL OUTPT CLINIC VISIT: HCPCS | Performed by: STUDENT IN AN ORGANIZED HEALTH CARE EDUCATION/TRAINING PROGRAM

## 2022-02-24 PROCEDURE — G8432 DEP SCR NOT DOC, RNG: HCPCS | Performed by: STUDENT IN AN ORGANIZED HEALTH CARE EDUCATION/TRAINING PROGRAM

## 2022-02-24 PROCEDURE — G8754 DIAS BP LESS 90: HCPCS | Performed by: STUDENT IN AN ORGANIZED HEALTH CARE EDUCATION/TRAINING PROGRAM

## 2022-02-24 PROCEDURE — 99214 OFFICE O/P EST MOD 30 MIN: CPT | Performed by: STUDENT IN AN ORGANIZED HEALTH CARE EDUCATION/TRAINING PROGRAM

## 2022-02-24 PROCEDURE — G8752 SYS BP LESS 140: HCPCS | Performed by: STUDENT IN AN ORGANIZED HEALTH CARE EDUCATION/TRAINING PROGRAM

## 2022-02-24 RX ORDER — POTASSIUM CHLORIDE 20 MEQ/1
20 TABLET, EXTENDED RELEASE ORAL DAILY
Qty: 90 TABLET | Refills: 3 | Status: SHIPPED | OUTPATIENT
Start: 2022-02-24 | End: 2022-06-19

## 2022-02-24 RX ORDER — CLOPIDOGREL BISULFATE 75 MG/1
75 TABLET ORAL DAILY
Qty: 30 TABLET | Refills: 3 | Status: SHIPPED | OUTPATIENT
Start: 2022-02-24 | End: 2022-03-22 | Stop reason: SDUPTHER

## 2022-02-24 RX ORDER — QUETIAPINE FUMARATE 25 MG/1
25 TABLET, FILM COATED ORAL 2 TIMES DAILY
Qty: 60 TABLET | Refills: 2 | Status: SHIPPED | OUTPATIENT
Start: 2022-02-24 | End: 2022-05-16

## 2022-02-24 RX ORDER — ALBUTEROL SULFATE 90 UG/1
2 AEROSOL, METERED RESPIRATORY (INHALATION)
Qty: 3 EACH | Refills: 3 | Status: CANCELLED | OUTPATIENT
Start: 2022-02-24

## 2022-02-24 RX ORDER — FLUTICASONE PROPIONATE AND SALMETEROL 250; 50 UG/1; UG/1
POWDER RESPIRATORY (INHALATION)
Qty: 60 EACH | Refills: 3 | Status: CANCELLED | OUTPATIENT
Start: 2022-02-24

## 2022-02-24 RX ORDER — CETIRIZINE HCL 10 MG
10 TABLET ORAL DAILY
Qty: 90 TABLET | Refills: 1 | Status: SHIPPED | OUTPATIENT
Start: 2022-02-24 | End: 2022-06-22 | Stop reason: SDUPTHER

## 2022-02-24 RX ORDER — MEMANTINE HYDROCHLORIDE 5 MG/1
5 TABLET ORAL 2 TIMES DAILY
Qty: 180 TABLET | Refills: 3 | Status: SHIPPED | OUTPATIENT
Start: 2022-02-24 | End: 2022-06-18

## 2022-02-24 RX ORDER — BLOOD SUGAR DIAGNOSTIC
STRIP MISCELLANEOUS
Qty: 100 PEN NEEDLE | Refills: 3 | Status: SHIPPED | OUTPATIENT
Start: 2022-02-24

## 2022-02-24 RX ORDER — QUETIAPINE FUMARATE 50 MG/1
50 TABLET, FILM COATED ORAL 2 TIMES DAILY
Qty: 60 TABLET | Refills: 2 | Status: SHIPPED | OUTPATIENT
Start: 2022-02-24 | End: 2022-05-16

## 2022-02-24 NOTE — PROGRESS NOTES
CC: diabetes follow up and medication refills   Subjective   Miguel Hernandez is an 80 y.o. male who presents for diabetes follow up and medication refills. DMII: Has been measuring blood sugar at home. BG has been ranging in the 120s. Has been cutting back on sweets. Currently using sliding scale Humalog, but rarely has to use. No abdominal pain, n/v, headaches, vision changes, or lower extremity numbness or tingling. Recently established with podiatry three weeks ago. Had ground level fall two days ago. Was seen by pain medicine doctor yesterday who sent to [facility] for evaluation. Reports workup and imaging unremarkable. Was advised to take Tylenol 650mg q3h and lidocaine patches prn. Medications providing some relief. Review of Systems   Review of Systems   Constitutional: Negative for chills and fever. HENT: Negative for postnasal drip, rhinorrhea and sinus pain. Eyes: Negative for pain and redness. Respiratory: Negative for cough and shortness of breath. Cardiovascular: Negative for chest pain and palpitations. Gastrointestinal: Negative for abdominal pain, diarrhea, nausea and vomiting. Genitourinary: Negative for dysuria, flank pain and frequency. Musculoskeletal: Negative for back pain and neck pain. Neurological: Negative for dizziness and headaches.         Allergies   Allergies   Allergen Reactions    Adempas [Riociguat] Unknown (comments)    Ativan [Lorazepam] Other (comments)     Altered LOC  Patient able to tolerate 0.5mg    Codeine Unknown (comments)    Dilaudid [Hydromorphone] Unknown (comments)    Lisinopril Cough       Medications  Current Outpatient Medications   Medication Sig    QUEtiapine (SEROquel) 25 mg tablet TAKE ONE TABLET BY MOUTH EVERY MORNING, TAKE TWO TABLETS BY MOUTH ONE TIME DAILY AT LUNCH, TAKE THREE TABLETS BY MOUTH AT BEDTIME    isosorbide mononitrate ER (IMDUR) 30 mg tablet TAKE THREE TABLETS BY MOUTH TWICE A DAY    potassium chloride (K-DUR, KLOR-CON M20) 20 mEq tablet Take 1 Tablet by mouth daily.  memantine (NAMENDA) 5 mg tablet TAKE ONE TABLET BY MOUTH TWICE A DAY    sertraline (ZOLOFT) 100 mg tablet TAKE ONE TABLET BY MOUTH ONE TIME DAILY    buPROPion (WELLBUTRIN) 100 mg tablet TAKE ONE TABLET BY MOUTH TWICE A DAY    amLODIPine (NORVASC) 5 mg tablet TAKE ONE TABLET BY MOUTH ONE TIME DAILY    ipratropium-albuteroL (Combivent Respimat)  mcg/actuation inhaler Take 1 Puff by inhalation two (2) times a day.  fluticasone propionate (FLONASE) 50 mcg/actuation nasal spray 1 [place] by Both Nostrils route daily. SHAKE LIQUID AND USE 1 SPRAY IN EACH NOSTRIL DAILY    albuterol (PROVENTIL HFA, VENTOLIN HFA, PROAIR HFA) 90 mcg/actuation inhaler Take 2 Puffs by inhalation every four (4) hours as needed for Wheezing.  Advair Diskus 250-50 mcg/dose diskus inhaler INHALE ONE PUFF BY MOUTH EVERY 12 HOURS    clopidogreL (PLAVIX) 75 mg tab TAKE ONE TABLET BY MOUTH ONE TIME DAILY    glucose blood VI test strips (ASCENSIA AUTODISC VI, ONE TOUCH ULTRA TEST VI) strip Use 4 times a day as directed to check blood glucose levels.  carvediloL (COREG) 12.5 mg tablet TAKE ONE TABLET BY MOUTH TWICE A DAY WITH A MEAL    tamsulosin (FLOMAX) 0.4 mg capsule Take 1 Capsule by mouth daily.  cetirizine (ZYRTEC) 10 mg tablet TAKE ONE TABLET BY MOUTH ONE TIME DAILY    albuterol (PROVENTIL VENTOLIN) 2.5 mg /3 mL (0.083 %) nebu 3 mL by Nebulization route every four (4) hours as needed for Wheezing.  calcium-cholecalciferol, D3, (CALTRATE 600+D) tablet TAKE ONE TABLET BY MOUTH ONE TIME DAILY    Insulin Needles, Disposable, (UltiCare Pen Needle) 32 gauge x 1/4\" ndle To check glucose as needed 3 times a day    Nebulizer & Compressor machine 1 Each by Does Not Apply route as needed for Wheezing or Shortness of Breath.  bumetanide (BUMEX) 1 mg tablet Take 1 Tab by mouth daily.     Nebulizer Accessories kit For albuterol q4-8hrs PRN    Blood-Glucose Meter monitoring kit One Touch Ultra Meter    LORazepam (ATIVAN) 0.5 mg tablet Take 0.5 mg by mouth as needed for Anxiety.  EZFE 200 200 mg iron cap TAKE ONE CAPSULE BY MOUTH ONE TIME DAILY    atorvastatin (LIPITOR) 20 mg tablet TAKE ONE TABLET BY MOUTH ONE TIME DAILY    donepezil (ARICEPT) 10 mg tablet TAKE ONE TABLET BY MOUTH AT BEDTIME    aspirin delayed-release 81 mg tablet Take  by mouth daily.  Blood-Glucose Meter monitoring kit Please check blood glucose once a day in morning (Patient not taking: Reported on 2/24/2022)     No current facility-administered medications for this visit. Medical History  Past Medical History:   Diagnosis Date    Alzheimer's dementia ([facility] [address]     BPH (benign prostatic hyperplasia)     CAD (coronary artery disease)     Carotid artery stenosis     CKD (chronic kidney disease)     COPD (chronic obstructive pulmonary disease) (HCC)     HLD (hyperlipidemia)     T2DM (type 2 diabetes mellitus) ([facility] [address]        Immunizations   Immunization History   Administered Date(s) Administered    COVID-19, Pfizer Purple top, DILUTE for use, 12+ yrs, 30mcg/0.3mL dose 01/20/2021, 02/10/2021       Objective   Vital Signs  Visit Vitals  BP (!) 119/54 (BP 1 Location: Left arm, BP Patient Position: Sitting, BP Cuff Size: Adult)   Pulse (!) 53   Resp 16   Ht 5' 0.04\" (1.525 m)   Wt 166 lb 3.2 oz (75.4 kg)   BMI 32.42 kg/m²       Physical Examination  Physical Exam  Vitals and nursing note reviewed. Constitutional:       General: He is not in acute distress. HENT:      Head: Normocephalic and atraumatic. Eyes:      Conjunctiva/sclera: Conjunctivae normal.      Pupils: Pupils are equal, round, and reactive to light. Cardiovascular:      Rate and Rhythm: Normal rate and regular rhythm. Pulses: Normal pulses. Heart sounds: No murmur heard. No friction rub. No gallop. Pulmonary:      Effort: Pulmonary effort is normal. No respiratory distress.       Breath sounds: Normal breath sounds. No wheezing, rhonchi or rales. Abdominal:      General: Abdomen is flat. Palpations: Abdomen is soft. Tenderness: There is no abdominal tenderness. There is no guarding or rebound. Musculoskeletal:         General: Normal range of motion. Right lower leg: No edema. Left lower leg: No edema. Skin:     General: Skin is warm and dry. Neurological:      General: No focal deficit present. Mental Status: He is alert. Psychiatric:         Mood and Affect: Mood normal.         Behavior: Behavior normal.     Diabetic foot exam: Nails thick with yellow discoloration. No noted deformity, ulcerations, or injury. DP and PT pulses 2+ bilaterally. Monofilament test positive for decreased sensation. Assessment and Plan   Michelle Oneal is a 80 y.o. who presents for     1. 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 Diabetic foot exam performed with monofilament test with decreased sensation. Will obtain repeat baseline labs. Follow up again in 3 months.   -  DIABETES FOOT EXAM  - HEMOGLOBIN A1C WITH EAG; Future  - CBC WITH AUTOMATED DIFF; Future  - METABOLIC PANEL, COMPREHENSIVE; Future  - glucose blood VI test strips (ASCENSIA AUTODISC VI, ONE TOUCH ULTRA TEST VI) strip; Needs One Touch Ultra test strips. Use 4 times a day as directed to check blood glucose levels. Dispense: 100 Strip; Refill: 3    2. Essential hypertension  This is a chronic problem. Requesting potassium supplement refill. Will obtain CMP to evaluate level. - potassium chloride (K-DUR, KLOR-CON M20) 20 mEq tablet; Take 1 Tablet by mouth daily. Dispense: 90 Tablet; Refill: 3    3. Environmental and seasonal allergies  This is a chronic problem. - cetirizine (ZYRTEC) 10 mg tablet; Take 1 Tablet by mouth daily. Dispense: 90 Tablet; Refill: 1    4.  Coronary artery disease involving native coronary artery without angina pectoris, unspecified whether native or transplanted heart  This is a chronic problem. Follows with Dr. Kris Giordano (cards). - clopidogreL (PLAVIX) 75 mg tab; Take 1 Tablet by mouth daily. Dispense: 30 Tablet; Refill: 3    5. Alzheimer's dementia without behavioral disturbance, unspecified timing of dementia onset ([facility]  Requested refills as noted below. Has h/o agitation with alzheimer's. Seroquel dosing currently 25mg in the morning, 50mg at noon, and 75mg at bedtime. Namenda 5mg BID   Seroquel 25mg, take 1 in morning and at bedtime  Seroquel 50mg, take 1 at noon and at bedtime     I have discussed the aforementioned diagnoses and plan with the patient in detail. I have provided information in person and/or in AVS. All questions answered prior to discharge.     I discussed this patient with Dr. Pooaj Patel (Attending Physician)   Signed By:  Viridiana Majano MD    Family Medicine Resident

## 2022-02-24 NOTE — PROGRESS NOTES
Chief Complaint   Patient presents with    Medication Refill     Patient is here with his wife today because he needs refills on his medication. He slipped on a wet floor at [facility] yesterday. He fell backwards and hit his head. He went to [facility] and was checked out.      Visit Vitals  BP (!) 119/54 (BP 1 Location: Left arm, BP Patient Position: Sitting, BP Cuff Size: Adult)   Pulse (!) 53   Resp 16   Ht 5' 0.04\" (1.525 m)   Wt 166 lb 3.2 oz (75.4 kg)   BMI 32.42 kg/m²

## 2022-02-26 LAB
ALBUMIN SERPL-MCNC: 4.5 G/DL (ref 3.7–4.7)
ALBUMIN/GLOB SERPL: 2.5 {RATIO} (ref 1.2–2.2)
ALP SERPL-CCNC: 108 IU/L (ref 44–121)
ALT SERPL-CCNC: 17 IU/L (ref 0–44)
AST SERPL-CCNC: 17 IU/L (ref 0–40)
BASOPHILS # BLD AUTO: 0.1 X10E3/UL (ref 0–0.2)
BASOPHILS NFR BLD AUTO: 1 %
BILIRUB SERPL-MCNC: 0.2 MG/DL (ref 0–1.2)
BUN SERPL-MCNC: 29 MG/DL (ref 8–27)
BUN/CREAT SERPL: 15 (ref 10–24)
CALCIUM SERPL-MCNC: 9 MG/DL (ref 8.6–10.2)
CHLORIDE SERPL-SCNC: 105 MMOL/L (ref 96–106)
CO2 SERPL-SCNC: 23 MMOL/L (ref 20–29)
CREAT SERPL-MCNC: 2 MG/DL (ref 0.76–1.27)
EOSINOPHIL # BLD AUTO: 0.4 X10E3/UL (ref 0–0.4)
EOSINOPHIL NFR BLD AUTO: 4 %
ERYTHROCYTE [DISTWIDTH] IN BLOOD BY AUTOMATED COUNT: 13.3 % (ref 11.6–15.4)
EST. AVERAGE GLUCOSE BLD GHB EST-MCNC: 117 MG/DL
GLOBULIN SER CALC-MCNC: 1.8 G/DL (ref 1.5–4.5)
GLUCOSE SERPL-MCNC: 121 MG/DL (ref 65–99)
HBA1C MFR BLD: 5.7 % (ref 4.8–5.6)
HCT VFR BLD AUTO: 39.2 % (ref 37.5–51)
HGB BLD-MCNC: 12.8 G/DL (ref 13–17.7)
IMM GRANULOCYTES # BLD AUTO: 0 X10E3/UL (ref 0–0.1)
IMM GRANULOCYTES NFR BLD AUTO: 0 %
INTERPRETATION: NORMAL
LYMPHOCYTES # BLD AUTO: 2.4 X10E3/UL (ref 0.7–3.1)
LYMPHOCYTES NFR BLD AUTO: 26 %
MCH RBC QN AUTO: 28.8 PG (ref 26.6–33)
MCHC RBC AUTO-ENTMCNC: 32.7 G/DL (ref 31.5–35.7)
MCV RBC AUTO: 88 FL (ref 79–97)
MONOCYTES # BLD AUTO: 0.9 X10E3/UL (ref 0.1–0.9)
MONOCYTES NFR BLD AUTO: 9 %
NEUTROPHILS # BLD AUTO: 5.5 X10E3/UL (ref 1.4–7)
NEUTROPHILS NFR BLD AUTO: 60 %
PLATELET # BLD AUTO: 167 X10E3/UL (ref 150–450)
POTASSIUM SERPL-SCNC: 4.6 MMOL/L (ref 3.5–5.2)
PROT SERPL-MCNC: 6.3 G/DL (ref 6–8.5)
RBC # BLD AUTO: 4.45 X10E6/UL (ref 4.14–5.8)
SODIUM SERPL-SCNC: 142 MMOL/L (ref 134–144)
WBC # BLD AUTO: 9.4 X10E3/UL (ref 3.4–10.8)

## 2022-03-01 ENCOUNTER — TELEPHONE (OUTPATIENT)
Dept: FAMILY MEDICINE CLINIC | Age: 82
End: 2022-03-01

## 2022-03-01 NOTE — TELEPHONE ENCOUNTER
Contacted by phone to discuss results of recent lab tests. Renal function stable. CBC with noted improvement in Hgb. Good diabetic control with A1c 5.7. Answered all questions at this time.      Debrah Schlatter, MD  PGY-1    [facility] Family Medicine Resident

## 2022-03-18 PROBLEM — R91.1 PULMONARY NODULE: Status: ACTIVE | Noted: 2019-12-06

## 2022-03-18 PROBLEM — E11.9 DIABETES MELLITUS (HCC): Status: ACTIVE | Noted: 2019-06-28

## 2022-03-18 PROBLEM — R35.0 BENIGN PROSTATIC HYPERPLASIA WITH URINARY FREQUENCY: Status: ACTIVE | Noted: 2019-06-28

## 2022-03-18 PROBLEM — B35.1 ONYCHOMYCOSIS DUE TO DERMATOPHYTE: Status: ACTIVE | Noted: 2019-06-28

## 2022-03-18 PROBLEM — N40.1 BENIGN PROSTATIC HYPERPLASIA WITH URINARY FREQUENCY: Status: ACTIVE | Noted: 2019-06-28

## 2022-03-19 DIAGNOSIS — E11.69 TYPE 2 DIABETES MELLITUS WITH OTHER SPECIFIED COMPLICATION, UNSPECIFIED WHETHER LONG TERM INSULIN USE (HCC): ICD-10-CM

## 2022-03-19 PROBLEM — M21.6X9 ACQUIRED EQUINUS DEFORMITY OF FOOT: Status: ACTIVE | Noted: 2019-06-28

## 2022-03-19 PROBLEM — Z87.09 HISTORY OF COPD: Status: ACTIVE | Noted: 2019-06-28

## 2022-03-19 PROBLEM — F02.80 ALZHEIMER'S DISEASE, UNSPECIFIED (HCC): Status: ACTIVE | Noted: 2019-06-28

## 2022-03-19 PROBLEM — B35.3 TINEA PEDIS: Status: ACTIVE | Noted: 2019-06-28

## 2022-03-19 PROBLEM — E11.21 TYPE 2 DIABETES WITH NEPHROPATHY (HCC): Status: ACTIVE | Noted: 2020-01-15

## 2022-03-19 PROBLEM — R23.3 EASY BRUISING: Status: ACTIVE | Noted: 2019-06-28

## 2022-03-19 PROBLEM — G30.9 ALZHEIMER'S DISEASE, UNSPECIFIED (HCC): Status: ACTIVE | Noted: 2019-06-28

## 2022-03-19 PROBLEM — M21.40 PES PLANUS: Status: ACTIVE | Noted: 2019-06-28

## 2022-03-19 PROBLEM — D64.9 ANEMIA OF UNKNOWN ETIOLOGY: Status: ACTIVE | Noted: 2019-06-28

## 2022-03-20 PROBLEM — J44.9 COPD (CHRONIC OBSTRUCTIVE PULMONARY DISEASE) (HCC): Status: ACTIVE | Noted: 2019-09-13

## 2022-03-20 PROBLEM — H91.13 PRESBYCUSIS OF BOTH EARS: Status: ACTIVE | Noted: 2019-06-28

## 2022-03-20 PROBLEM — I10 ESSENTIAL HYPERTENSION: Status: ACTIVE | Noted: 2019-06-28

## 2022-03-20 PROBLEM — R35.0 URINARY FREQUENCY: Status: ACTIVE | Noted: 2019-06-10

## 2022-03-20 RX ORDER — BLOOD SUGAR DIAGNOSTIC
STRIP MISCELLANEOUS
Qty: 400 STRIP | Refills: 4 | Status: SHIPPED | OUTPATIENT
Start: 2022-03-20

## 2022-03-22 DIAGNOSIS — I25.10 CORONARY ARTERY DISEASE INVOLVING NATIVE CORONARY ARTERY WITHOUT ANGINA PECTORIS, UNSPECIFIED WHETHER NATIVE OR TRANSPLANTED HEART: ICD-10-CM

## 2022-03-22 NOTE — TELEPHONE ENCOUNTER
Hi, pt wife called wanted to request refill for CLOPIDOGREL 75 mg. Pt wife stated, he is in his last pill today.    Please advice, thank you      -Marlon Puentes

## 2022-03-23 RX ORDER — TAMSULOSIN HYDROCHLORIDE 0.4 MG/1
CAPSULE ORAL
Qty: 90 CAPSULE | Refills: 2 | Status: SHIPPED | OUTPATIENT
Start: 2022-03-23

## 2022-03-28 RX ORDER — CLOPIDOGREL BISULFATE 75 MG/1
75 TABLET ORAL DAILY
Qty: 30 TABLET | Refills: 3 | Status: SHIPPED | OUTPATIENT
Start: 2022-03-28 | End: 2022-06-22 | Stop reason: SDUPTHER

## 2022-04-06 DIAGNOSIS — J30.89 ENVIRONMENTAL AND SEASONAL ALLERGIES: ICD-10-CM

## 2022-04-06 RX ORDER — FLUTICASONE PROPIONATE 50 MCG
1 SPRAY, SUSPENSION (ML) NASAL DAILY
Qty: 3 EACH | Refills: 3 | Status: SHIPPED | OUTPATIENT
Start: 2022-04-06

## 2022-05-05 ENCOUNTER — OFFICE VISIT (OUTPATIENT)
Dept: FAMILY MEDICINE CLINIC | Age: 82
End: 2022-05-05
Payer: MEDICARE

## 2022-05-05 VITALS
WEIGHT: 173 LBS | BODY MASS INDEX: 33.96 KG/M2 | HEART RATE: 54 BPM | DIASTOLIC BLOOD PRESSURE: 72 MMHG | HEIGHT: 60 IN | TEMPERATURE: 97 F | SYSTOLIC BLOOD PRESSURE: 134 MMHG | OXYGEN SATURATION: 95 %

## 2022-05-05 DIAGNOSIS — R35.0 INCREASED URINARY FREQUENCY: Primary | ICD-10-CM

## 2022-05-05 LAB
BILIRUB UR QL STRIP: NEGATIVE
GLUCOSE UR-MCNC: NEGATIVE MG/DL
KETONES P FAST UR STRIP-MCNC: NEGATIVE MG/DL
PH UR STRIP: 5.5 [PH] (ref 4.6–8)
PROT UR QL STRIP: NORMAL
SP GR UR STRIP: 1.03 (ref 1–1.03)
UA UROBILINOGEN AMB POC: NORMAL (ref 0.2–1)
URINALYSIS CLARITY POC: CLEAR
URINALYSIS COLOR POC: YELLOW
URINE BLOOD POC: NEGATIVE
URINE LEUKOCYTES POC: NORMAL
URINE NITRITES POC: NEGATIVE

## 2022-05-05 PROCEDURE — 81003 URINALYSIS AUTO W/O SCOPE: CPT | Performed by: STUDENT IN AN ORGANIZED HEALTH CARE EDUCATION/TRAINING PROGRAM

## 2022-05-05 PROCEDURE — G0463 HOSPITAL OUTPT CLINIC VISIT: HCPCS | Performed by: STUDENT IN AN ORGANIZED HEALTH CARE EDUCATION/TRAINING PROGRAM

## 2022-05-05 PROCEDURE — G8754 DIAS BP LESS 90: HCPCS | Performed by: STUDENT IN AN ORGANIZED HEALTH CARE EDUCATION/TRAINING PROGRAM

## 2022-05-05 PROCEDURE — G8417 CALC BMI ABV UP PARAM F/U: HCPCS | Performed by: STUDENT IN AN ORGANIZED HEALTH CARE EDUCATION/TRAINING PROGRAM

## 2022-05-05 PROCEDURE — 99213 OFFICE O/P EST LOW 20 MIN: CPT | Performed by: STUDENT IN AN ORGANIZED HEALTH CARE EDUCATION/TRAINING PROGRAM

## 2022-05-05 PROCEDURE — 1101F PT FALLS ASSESS-DOCD LE1/YR: CPT | Performed by: STUDENT IN AN ORGANIZED HEALTH CARE EDUCATION/TRAINING PROGRAM

## 2022-05-05 PROCEDURE — G8427 DOCREV CUR MEDS BY ELIG CLIN: HCPCS | Performed by: STUDENT IN AN ORGANIZED HEALTH CARE EDUCATION/TRAINING PROGRAM

## 2022-05-05 PROCEDURE — G8432 DEP SCR NOT DOC, RNG: HCPCS | Performed by: STUDENT IN AN ORGANIZED HEALTH CARE EDUCATION/TRAINING PROGRAM

## 2022-05-05 PROCEDURE — G8752 SYS BP LESS 140: HCPCS | Performed by: STUDENT IN AN ORGANIZED HEALTH CARE EDUCATION/TRAINING PROGRAM

## 2022-05-05 PROCEDURE — G8536 NO DOC ELDER MAL SCRN: HCPCS | Performed by: STUDENT IN AN ORGANIZED HEALTH CARE EDUCATION/TRAINING PROGRAM

## 2022-05-05 RX ORDER — HYDROCODONE BITARTRATE AND ACETAMINOPHEN 5; 325 MG/1; MG/1
TABLET ORAL
COMMUNITY
Start: 2022-04-23

## 2022-05-05 NOTE — PROGRESS NOTES
Identified pt with two pt identifiers(name and ). Reviewed record in preparation for visit and have obtained necessary documentation. Chief Complaint   Patient presents with    Bladder Infection    Urinary Frequency        Vitals:    22 1003   BP: 134/72   Pulse: (!) 54   Temp: 97 °F (36.1 °C)   TempSrc: Temporal   SpO2: 95%   Weight: 173 lb (78.5 kg)   Height: 5' 0.4\" (1.534 m)   PainSc:   0 - No pain       Health Maintenance Due   Topic    Eye Exam Retinal or Dilated     Shingrix Vaccine Age 50> (1 of 2)       Coordination of Care Questionnaire:  :   1) Have you been to an emergency room, urgent care, or hospitalized since your last visit? If yes, where when, and reason for visit? No      2. Have seen or consulted any other health care provider since your last visit? If yes, where when, and reason for visit? No      Patient is accompanied by wife I have received verbal consent from Jennie Salinas to discuss any/all medical information while they are present in the room.

## 2022-05-05 NOTE — PATIENT INSTRUCTIONS
Chronic Obstructive Pulmonary Disease (COPD): Care Instructions  Overview     Chronic obstructive pulmonary disease (COPD) is a lung disease that makes it hard to breathe. With COPD, the airways that lead to the lungs are narrowed, and the tiny air sacs in the lungs are damaged and lose their stretch. People with COPD have decreased airflow in and out of the lungs, which makes it hard to breathe. The airways also can get clogged with thick mucus. Cigarette smoking is a major cause of COPD. Although there is no cure for COPD, you can slow its progress. Following your treatment plan and taking care of yourself can help you feel better and live longer. Follow-up care is a key part of your treatment and safety. Be sure to make and go to all appointments, and call your doctor if you are having problems. It's also a good idea to know your test results and keep a list of the medicines you take. How can you care for yourself at home? Staying healthy    · Do not smoke. This is the most important step you can take to prevent more damage to your lungs. If you need help quitting, talk to your doctor about stop-smoking programs and medicines. These can increase your chances of quitting for good.     · Avoid colds and other infections. Get the pneumococcal and whooping cough (pertussis) vaccines. If you have had these vaccines before, ask your doctor if you need another dose. Get the flu vaccine every fall. If you must be around people with colds or the flu, wash your hands often.     · Avoid secondhand smoke and air pollution. Try to stay inside with your windows closed when air pollution is bad. Medicines and oxygen therapy    · Take your medicines exactly as prescribed. Call your doctor if you think you are having a problem with your medicine. You may be taking medicines such as:  ? Bronchodilators. These help open your airways and make breathing easier.  They are either short-acting (work for 4 to 9 hours) or long-acting (work for 12 to 24 hours). You inhale most bronchodilators, so they start to act quickly. Always carry your quick-relief inhaler with you in case you need it. ? Corticosteroids (prednisone, budesonide). These reduce airway inflammation. They come in inhaled or pill form.     · Ask your doctor or pharmacist if you are using each type of inhaler correctly. With correct use, the medicine is more likely to get to your lungs.     · See if a spacer is right for you. A spacer may also help you get more inhaled medicine to your lungs. If you use one, ask how to use it properly.     · Do not take any vitamins, over-the-counter medicine, or herbal products without talking to your doctor first.     · If your doctor prescribed antibiotics, take them as directed. Do not stop taking them just because you feel better. You need to take the full course of antibiotics.     · If you use oxygen therapy, use the flow rate your doctor has recommended. Don't change it without talking to your doctor first. Oxygen therapy boosts the amount of oxygen in your blood and helps you breathe easier. Activity    · Get regular exercise. Walking is an easy way to get exercise. Start out slowly, and walk a little more each day.     · Pay attention to your breathing. You are exercising too hard if you can't talk while you exercise.     · Take short rest breaks when doing household chores and other activities.     · Learn breathing methods--such as breathing through pursed lips--to help you become less short of breath.     · If your doctor has not set you up with a pulmonary rehabilitation program, ask if rehab is right for you. Rehab includes exercise programs, education about your disease and how to manage it, help with diet and other changes, and emotional support.    Diet    · Eat regular, healthy meals.     · Use bronchodilators about 1 hour before you eat to make it easier to eat.     · Eat several smaller, frequent meals to prevent getting too full. A full stomach can push on the muscle that helps you breathe (your diaphragm) and make it harder to breathe.     · Drink beverages at the end of the meal.     · Avoid foods that are hard to chew.     · Eat foods that contain protein so you don't lose muscle mass.     · Talk with your doctor if you gain too much weight or if you lose weight without trying. Mental health    · Talk to your family, friends, or a therapist about your feelings. Some people feel frightened, angry, hopeless, helpless, and even guilty. Talking openly about feelings may help you cope. If these feelings last, talk to your doctor. When should you call for help? Call 911 anytime you think you may need emergency care. For example, call if:    · You have severe trouble breathing.     · You are having chest pain that is different or worse than usual.   Call your doctor now or seek immediate medical care if:    · You have new or worse trouble breathing.     · You cough up blood.     · You have a fever.     · You have feelings of anxiety or depression. Watch closely for changes in your health, and be sure to contact your doctor if:    · You cough more deeply or more often, especially if you notice more mucus or a change in the color of your mucus.     · You have new or worse swelling in your legs or belly.     · You are not getting better as expected. Where can you learn more? Go to http://www.gray.com/  Enter D964 in the search box to learn more about \"Chronic Obstructive Pulmonary Disease (COPD): Care Instructions. \"  Current as of: July 6, 2021               Content Version: 13.2  © 2006-2022 [employer]. Care instructions adapted under license by [employer] (which disclaims liability or warranty for this information).  If you have questions about a medical condition or this instruction, always ask your healthcare professional. [place] [address] any warranty or liability for your use of this information.

## 2022-05-05 NOTE — PROGRESS NOTES
[address] [facility] [place]    Office [phone], Fax [phone]    Subjective:     Chief Complaint   Patient presents with    Bladder Infection    Urinary Frequency     History provided by patient and his wife as the patient has mild dementia. Prema Iniguez is a 80 y.o. male presents for increased urinary frequency starting last night. Denies dysuria, hematuria. His wife reports he was talking \"a lot of nonsense\" in his sleep last night so she was worried he was getting confused. He has a hx of UTIs and typically experiences confusion with them. Per the wife, he is at his baseline this morning. Denies N/V, abdominal pain. Medication reviewed. Current Outpatient Medications:     HYDROcodone-acetaminophen (NORCO) 5-325 mg per tablet, TAKE 1 TABLET BY MOUTH TWICE DAILY AS NEEDED, Disp: , Rfl:     Combivent Respimat  mcg/actuation inhaler, INHALE ONE PUFF BY MOUTH TWICE A DAY, Disp: 12 g, Rfl: 3    fluticasone propionate (FLONASE) 50 mcg/actuation nasal spray, 1 Sedan by Both Nostrils route daily. SHAKE LIQUID AND USE 1 SPRAY IN EACH NOSTRIL DAILY, Disp: 3 Each, Rfl: 3    clopidogreL (PLAVIX) 75 mg tab, Take 1 Tablet by mouth daily. , Disp: 30 Tablet, Rfl: 3    tamsulosin (FLOMAX) 0.4 mg capsule, TAKE ONE CAPSULE BY MOUTH ONE TIME DAILY, Disp: 90 Capsule, Rfl: 2    glucose blood VI test strips (OneTouch Ultra Test) strip, USE TO CHECK BLOOD SUGAR FOUR TIMES DAILY, Disp: 400 Strip, Rfl: 4    potassium chloride (K-DUR, KLOR-CON M20) 20 mEq tablet, Take 1 Tablet by mouth daily. , Disp: 90 Tablet, Rfl: 3    memantine (NAMENDA) 5 mg tablet, Take 1 Tablet by mouth two (2) times a day., Disp: 180 Tablet, Rfl: 3    cetirizine (ZYRTEC) 10 mg tablet, Take 1 Tablet by mouth daily. , Disp: 90 Tablet, Rfl: 1    Insulin Needles, Disposable, (UltiCare Pen Needle) 32 gauge x 1/4\" ndle, To check glucose as needed 3 times a day, Disp: 100 Pen Needle, Rfl: 3    QUEtiapine (SEROquel) 25 mg tablet, Take 1 Tablet by mouth two (2) times a day. Take 1 tablet by mouth two times a day (in morning and at bedtime). , Disp: 60 Tablet, Rfl: 2    QUEtiapine (SEROquel) 50 mg tablet, Take 1 Tablet by mouth two (2) times a day. Take 1 tablet by mouth at noon and take 1 tablet at bedtime. , Disp: 60 Tablet, Rfl: 2    isosorbide mononitrate ER (IMDUR) 30 mg tablet, TAKE THREE TABLETS BY MOUTH TWICE A DAY, Disp: 360 Tablet, Rfl: 1    sertraline (ZOLOFT) 100 mg tablet, TAKE ONE TABLET BY MOUTH ONE TIME DAILY, Disp: 90 Tablet, Rfl: 4    buPROPion (WELLBUTRIN) 100 mg tablet, TAKE ONE TABLET BY MOUTH TWICE A DAY, Disp: 180 Tablet, Rfl: 4    amLODIPine (NORVASC) 5 mg tablet, TAKE ONE TABLET BY MOUTH ONE TIME DAILY, Disp: 90 Tablet, Rfl: 1    albuterol (PROVENTIL HFA, VENTOLIN HFA, PROAIR HFA) 90 mcg/actuation inhaler, Take 2 Puffs by inhalation every four (4) hours as needed for Wheezing., Disp: 3 Each, Rfl: 3    Advair Diskus 250-50 mcg/dose diskus inhaler, INHALE ONE PUFF BY MOUTH EVERY 12 HOURS, Disp: 60 Each, Rfl: 0    carvediloL (COREG) 12.5 mg tablet, TAKE ONE TABLET BY MOUTH TWICE A DAY WITH A MEAL, Disp: 60 Tablet, Rfl: 2    albuterol (PROVENTIL VENTOLIN) 2.5 mg /3 mL (0.083 %) nebu, 3 mL by Nebulization route every four (4) hours as needed for Wheezing., Disp: 30 Nebule, Rfl: 4    calcium-cholecalciferol, D3, (CALTRATE 600+D) tablet, TAKE ONE TABLET BY MOUTH ONE TIME DAILY, Disp: 90 Tab, Rfl: 3    Nebulizer & Compressor machine, 1 Each by Does Not Apply route as needed for Wheezing or Shortness of Breath., Disp: 1 Each, Rfl: 0    bumetanide (BUMEX) 1 mg tablet, Take 1 Tab by mouth daily. , Disp: 90 Tab, Rfl: 1    Nebulizer Accessories kit, For albuterol q4-8hrs PRN, Disp: 1 Kit, Rfl: 0    Blood-Glucose Meter monitoring kit, One Touch Ultra Meter, Disp: 1 Kit, Rfl: 0    Blood-Glucose Meter monitoring kit, Please check blood glucose once a day in morning, Disp: 1 Kit, Rfl: 0    LORazepam (ATIVAN) 0.5 mg tablet, Take 0.5 mg by mouth as needed for Anxiety. , Disp: , Rfl:     EZFE 200 200 mg iron cap, TAKE ONE CAPSULE BY MOUTH ONE TIME DAILY, Disp: 90 Cap, Rfl: 1    atorvastatin (LIPITOR) 20 mg tablet, TAKE ONE TABLET BY MOUTH ONE TIME DAILY, Disp: , Rfl: 1    donepezil (ARICEPT) 10 mg tablet, TAKE ONE TABLET BY MOUTH AT BEDTIME, Disp: , Rfl: 6    aspirin delayed-release 81 mg tablet, Take  by mouth daily. , Disp: , Rfl:      Allergy reviewed. Allergies   Allergen Reactions    Adempas [Riociguat] Unknown (comments)    Ativan [Lorazepam] Other (comments)     Altered LOC  Patient able to tolerate 0.5mg    Codeine Unknown (comments)    Dilaudid [Hydromorphone] Unknown (comments)    Lisinopril Cough        Past Medical History:   Diagnosis Date    Alzheimer's dementia ([facility]     BPH (benign prostatic hyperplasia)     CAD (coronary artery disease)     Carotid artery stenosis     CKD (chronic kidney disease)     COPD (chronic obstructive pulmonary disease) (HCC)     HLD (hyperlipidemia)     T2DM (type 2 diabetes mellitus) ([facility]        Social History     Socioeconomic History    Marital status:    Tobacco Use    Smoking status: Former Smoker     Packs/day: 2.00     Years: 30.00     Pack years: 60.00    Smokeless tobacco: Never Used   Vaping Use    Vaping Use: Never used   Substance and Sexual Activity    Alcohol use: Never    Drug use: Never    Sexual activity: Not Currently       Review of Systems   Constitutional: Negative for chills and fever. HENT: Negative for congestion and sore throat. Respiratory: Positive for wheezing. Negative for cough and shortness of breath. Cardiovascular: Negative for chest pain and palpitations. Gastrointestinal: Negative for abdominal pain, nausea and vomiting. Genitourinary: Positive for frequency. Negative for dysuria, flank pain, hematuria and urgency. Skin: Negative for rash. Neurological: Negative for dizziness and headaches. Objective:   Vitals - reviewed  Visit Vitals  /72 (BP 1 Location: Left upper arm, BP Patient Position: Sitting, BP Cuff Size: Large adult)   Pulse (!) 54   Temp 97 °F (36.1 °C) (Temporal)   Ht 5' 0.4\" (1.534 m)   Wt 173 lb (78.5 kg)   SpO2 95%   BMI 33.34 kg/m²        Physical exam:   GEN: NAD. Alert. Well nourished. EYES:  Conjunctiva clear    LUNGS: Respirations unlabored; + generalized expiratory wheezing  CARDIOVASCULAR: Regular, rate, and rhythm without murmurs, gallops or rubs   ABDOMEN: Soft; NT, ND. +bowel sounds; no rebound tenderness, no guarding. no masses or organomegaly  NEUROLOGIC:  No focal neurologic deficits. Strength and sensation grossly intact. MSK: FROM in all extremities (both passive and active). Good tone. No vertebral tenderness. EXT: Well perfused. No edema. No erythema. PSYCH: appropriate mood and affect. Good insight and judgement. Cooperative. SKIN: No obvious rashes or lesions. Pertinent Labs/Studies:   - UA: no nitrites, trace LE, no blood    Assessment and orders:       ICD-10-CM ICD-9-CM    1. Increased urinary frequency  R35.0 788.41 AMB POC URINALYSIS DIP STICK AUTO W/O MICRO      CULTURE, URINE     Increased urinary frequency: x1 day, UA unremarkable for infection  - Send urine culture, will call if returns positive and start Abx  - Discussed monitoring for symptoms of dysuria, hematuria, fevers/chills abdominal pain, confusion          Pt was discussed with Dr Jenni Severino (attending physician). I have reviewed patient medical and social history and medications. I have reviewed pertinent labs results and other data. I have discussed the diagnosis with the patient and the intended plan as seen in the above orders. The patient has received an after-visit summary and questions were answered concerning future plans. I have discussed medication side effects and warnings with the patient as well.     Sherren Chambers, DO  [address] [facility]  05/05/22

## 2022-05-16 RX ORDER — QUETIAPINE FUMARATE 25 MG/1
TABLET, FILM COATED ORAL
Qty: 60 TABLET | Refills: 2 | Status: SHIPPED | OUTPATIENT
Start: 2022-05-16 | End: 2022-07-03 | Stop reason: DRUGHIGH

## 2022-05-16 RX ORDER — QUETIAPINE FUMARATE 50 MG/1
TABLET, FILM COATED ORAL
Qty: 60 TABLET | Refills: 2 | Status: SHIPPED | OUTPATIENT
Start: 2022-05-16 | End: 2022-07-03 | Stop reason: DRUGHIGH

## 2022-05-25 DIAGNOSIS — I25.10 CORONARY ARTERY DISEASE INVOLVING NATIVE CORONARY ARTERY WITHOUT ANGINA PECTORIS, UNSPECIFIED WHETHER NATIVE OR TRANSPLANTED HEART: ICD-10-CM

## 2022-05-26 RX ORDER — CLOPIDOGREL BISULFATE 75 MG/1
75 TABLET ORAL DAILY
Qty: 30 TABLET | Refills: 3 | OUTPATIENT
Start: 2022-05-26

## 2022-06-10 DIAGNOSIS — I10 ESSENTIAL HYPERTENSION: ICD-10-CM

## 2022-06-10 RX ORDER — ISOSORBIDE MONONITRATE 30 MG/1
TABLET, EXTENDED RELEASE ORAL
Qty: 360 TABLET | Refills: 2 | Status: SHIPPED | OUTPATIENT
Start: 2022-06-10 | End: 2022-06-18

## 2022-06-18 DIAGNOSIS — I10 ESSENTIAL HYPERTENSION: ICD-10-CM

## 2022-06-18 RX ORDER — ISOSORBIDE MONONITRATE 30 MG/1
TABLET, EXTENDED RELEASE ORAL
Qty: 360 TABLET | Refills: 1 | Status: SHIPPED | OUTPATIENT
Start: 2022-06-18

## 2022-06-18 RX ORDER — MEMANTINE HYDROCHLORIDE 5 MG/1
TABLET ORAL
Qty: 120 TABLET | Refills: 0 | Status: SHIPPED | OUTPATIENT
Start: 2022-06-18 | End: 2022-08-17 | Stop reason: SDUPTHER

## 2022-06-19 DIAGNOSIS — I10 ESSENTIAL HYPERTENSION: ICD-10-CM

## 2022-06-19 RX ORDER — POTASSIUM CHLORIDE 20 MEQ/1
TABLET, EXTENDED RELEASE ORAL
Qty: 30 TABLET | Refills: 2 | Status: SHIPPED | OUTPATIENT
Start: 2022-06-19 | End: 2022-07-01

## 2022-06-22 DIAGNOSIS — I10 ESSENTIAL HYPERTENSION: ICD-10-CM

## 2022-06-22 DIAGNOSIS — I25.10 CORONARY ARTERY DISEASE INVOLVING NATIVE CORONARY ARTERY WITHOUT ANGINA PECTORIS, UNSPECIFIED WHETHER NATIVE OR TRANSPLANTED HEART: ICD-10-CM

## 2022-06-22 DIAGNOSIS — J30.89 ENVIRONMENTAL AND SEASONAL ALLERGIES: ICD-10-CM

## 2022-06-22 RX ORDER — CLOPIDOGREL BISULFATE 75 MG/1
75 TABLET ORAL DAILY
Qty: 30 TABLET | Refills: 3 | Status: SHIPPED | OUTPATIENT
Start: 2022-06-22 | End: 2022-07-19

## 2022-06-22 RX ORDER — AMLODIPINE BESYLATE 5 MG/1
5 TABLET ORAL DAILY
Qty: 90 TABLET | Refills: 1 | Status: SHIPPED | OUTPATIENT
Start: 2022-06-22 | End: 2022-09-30 | Stop reason: SINTOL

## 2022-06-22 RX ORDER — CETIRIZINE HCL 10 MG
10 TABLET ORAL DAILY
Qty: 90 TABLET | Refills: 1 | Status: SHIPPED | OUTPATIENT
Start: 2022-06-22

## 2022-06-29 ENCOUNTER — TELEPHONE (OUTPATIENT)
Dept: FAMILY MEDICINE CLINIC | Age: 82
End: 2022-06-29

## 2022-06-29 NOTE — TELEPHONE ENCOUNTER
Tramaine Cutler : [phone], the Pt's Son, called to let us know that they are in the process of looking for a nursing home for the Pt. They need the following information in order to get him in:    TB test or chest scan w/i last 5 yrs  Recent Physical and medical history    Please let me know asap is the PT needs an appt for these findings. They ideally would like this information in the next day or 2. Barb Santiago would like to be contacted.

## 2022-06-30 ENCOUNTER — TELEPHONE (OUTPATIENT)
Dept: FAMILY MEDICINE CLINIC | Age: 82
End: 2022-06-30

## 2022-06-30 NOTE — TELEPHONE ENCOUNTER
I scheduled this patient with Dr. Franklin Glasgow for July 1st. Mrs. Varsha Fitch said Mr. Varsha Fitch is very agitated and was wondering if he could be prescribed ativan since he has used it in the past from another doctor. I did see a message from Dr. Roseline Millan that said he would need a visit before he could have that prescription.

## 2022-07-01 ENCOUNTER — OFFICE VISIT (OUTPATIENT)
Dept: FAMILY MEDICINE CLINIC | Age: 82
End: 2022-07-01
Payer: MEDICARE

## 2022-07-01 VITALS
DIASTOLIC BLOOD PRESSURE: 61 MMHG | WEIGHT: 164.8 LBS | HEIGHT: 60 IN | BODY MASS INDEX: 32.35 KG/M2 | SYSTOLIC BLOOD PRESSURE: 129 MMHG | HEART RATE: 54 BPM | OXYGEN SATURATION: 94 %

## 2022-07-01 DIAGNOSIS — F02.80 ALZHEIMER'S DEMENTIA WITHOUT BEHAVIORAL DISTURBANCE, UNSPECIFIED TIMING OF DEMENTIA ONSET: ICD-10-CM

## 2022-07-01 DIAGNOSIS — F32.A DEPRESSION, UNSPECIFIED DEPRESSION TYPE: ICD-10-CM

## 2022-07-01 DIAGNOSIS — R45.1 AGITATION: Primary | ICD-10-CM

## 2022-07-01 DIAGNOSIS — M25.561 ACUTE PAIN OF RIGHT KNEE: ICD-10-CM

## 2022-07-01 DIAGNOSIS — G30.9 ALZHEIMER'S DEMENTIA WITHOUT BEHAVIORAL DISTURBANCE, UNSPECIFIED TIMING OF DEMENTIA ONSET: ICD-10-CM

## 2022-07-01 PROCEDURE — G8432 DEP SCR NOT DOC, RNG: HCPCS | Performed by: STUDENT IN AN ORGANIZED HEALTH CARE EDUCATION/TRAINING PROGRAM

## 2022-07-01 PROCEDURE — G8536 NO DOC ELDER MAL SCRN: HCPCS | Performed by: STUDENT IN AN ORGANIZED HEALTH CARE EDUCATION/TRAINING PROGRAM

## 2022-07-01 PROCEDURE — G8417 CALC BMI ABV UP PARAM F/U: HCPCS | Performed by: STUDENT IN AN ORGANIZED HEALTH CARE EDUCATION/TRAINING PROGRAM

## 2022-07-01 PROCEDURE — 1101F PT FALLS ASSESS-DOCD LE1/YR: CPT | Performed by: STUDENT IN AN ORGANIZED HEALTH CARE EDUCATION/TRAINING PROGRAM

## 2022-07-01 PROCEDURE — 99214 OFFICE O/P EST MOD 30 MIN: CPT | Performed by: STUDENT IN AN ORGANIZED HEALTH CARE EDUCATION/TRAINING PROGRAM

## 2022-07-01 PROCEDURE — G8752 SYS BP LESS 140: HCPCS | Performed by: STUDENT IN AN ORGANIZED HEALTH CARE EDUCATION/TRAINING PROGRAM

## 2022-07-01 PROCEDURE — 1123F ACP DISCUSS/DSCN MKR DOCD: CPT | Performed by: STUDENT IN AN ORGANIZED HEALTH CARE EDUCATION/TRAINING PROGRAM

## 2022-07-01 PROCEDURE — G8428 CUR MEDS NOT DOCUMENT: HCPCS | Performed by: STUDENT IN AN ORGANIZED HEALTH CARE EDUCATION/TRAINING PROGRAM

## 2022-07-01 PROCEDURE — G8754 DIAS BP LESS 90: HCPCS | Performed by: STUDENT IN AN ORGANIZED HEALTH CARE EDUCATION/TRAINING PROGRAM

## 2022-07-01 PROCEDURE — G0463 HOSPITAL OUTPT CLINIC VISIT: HCPCS | Performed by: STUDENT IN AN ORGANIZED HEALTH CARE EDUCATION/TRAINING PROGRAM

## 2022-07-01 RX ORDER — DICLOFENAC SODIUM 10 MG/G
2 GEL TOPICAL 4 TIMES DAILY
Qty: 300 G | Refills: 0 | Status: CANCELLED | OUTPATIENT
Start: 2022-07-01

## 2022-07-01 NOTE — PROGRESS NOTES
Kirstin Luo is a [de-identified] y.o. male    Chief Complaint   Patient presents with    Agitation     Per wife pt has always had agitation, was given dementia medication. neurophsych prescribed antivan 1mg and started given half. But only month supply but was able tostretch it over 8-9 months. Hasn't had it in a while     Multiple times he trips up on the carpet   1. Have you been to the ER, urgent care clinic since your last visit? Hospitalized since your last visit? No  2. Have you seen or consulted any other health care providers outside of the [address] [place] [address] since your last visit? Include any pap smears or colon screening. No    There were no vitals taken for this visit.   3 most recent PHQ Screens 5/5/2022   PHQ Not Done Functional capacity motivation limits accuracy   Little interest or pleasure in doing things -   Feeling down, depressed, irritable, or hopeless -   Total Score PHQ 2 -   Trouble falling or staying asleep, or sleeping too much -   Feeling tired or having little energy -   Poor appetite, weight loss, or overeating -   Feeling bad about yourself - or that you are a failure or have let yourself or your family down -   Trouble concentrating on things such as school, work, reading, or watching TV -   Moving or speaking so slowly that other people could have noticed; or the opposite being so fidgety that others notice -   Thoughts of being better off dead, or hurting yourself in some way -   PHQ 9 Score -   How difficult have these problems made it for you to do your work, take care of your home and get along with others -     Health Maintenance Due   Topic Date Due    Eye Exam Retinal or Dilated  Never done    Shingrix Vaccine Age 50> (1 of 2) Never done    Depression Screen  06/14/2022    MICROALBUMIN Q1  07/23/2022    Medicare Yearly Exam  07/24/2022

## 2022-07-01 NOTE — PROGRESS NOTES
[address] [place] [address]   Office [phone], Fax [phone]    Subjective:     Chief Complaint   Patient presents with    Agitation     History provided by patient, wife, and son    HPI:  Grisel Madera is a [de-identified] y.o. DECLINED male with medical history of COPD, CAD, HFpEF, CKD4, T2DM, Alzheimer's Dementia, Depression, HLD, Carotid Artery Stenosis, and BPH presents for   Chief Complaint   Patient presents with    Agitation       Mr. Carlita Lomeli presents to clinic with his son, history provided by son and wife who joined by phone. Family says that he has been having increased episodes of agitation as of late for the past two to four weeks. They say that he has been having more episodes during the afternoon when he has been more confused and gets easily annoyed and agitated especially when told what to do despite of the advice being in his best interest. He has also been more forgetful in the same time. He does not have any complaints, he does not have any chest pain, shortness of breath, abdominal pain, urinary changes, or changes in bowel habits. He says that he is having some knee pain. He says that it started after he fell on his knee twice. He says that he had a mechanical fall when he tripped over his carpet and landed on his right knee. He did not hit his head or have any loss of consciousness. No presyncopal symptoms, no dizziness, no light-headedness, no blurry vision.     Social History     Socioeconomic History    Marital status:      Spouse name: Not on file    Number of children: Not on file    Years of education: Not on file    Highest education level: Not on file   Occupational History    Not on file   Tobacco Use    Smoking status: Former Smoker     Packs/day: 2.00     Years: 30.00     Pack years: 60.00    Smokeless tobacco: Never Used   Vaping Use    Vaping Use: Never used   Substance and Sexual Activity    Alcohol use: Never    Drug use: Never    Sexual activity: Not Currently   Other Topics Concern    Not on file   Social History Narrative    Not on file     Social Determinants of Health     Financial Resource Strain:     Difficulty of Paying Living Expenses: Not on file   Food Insecurity:     Worried About Running Out of Food in the Last Year: Not on file    Rufina of Food in the Last Year: Not on file   Transportation Needs:     Lack of Transportation (Medical): Not on file    Lack of Transportation (Non-Medical): Not on file   Physical Activity:     Days of Exercise per Week: Not on file    Minutes of Exercise per Session: Not on file   Stress:     Feeling of Stress : Not on file   Social Connections:     Frequency of Communication with Friends and Family: Not on file    Frequency of Social Gatherings with Friends and Family: Not on file    Attends [employer] Services: Not on file    Active Member of [address] [place] [address] [employer] or Organizations: Not on file    Attends Club or Organization Meetings: Not on file    Marital Status: Not on file   Intimate Partner Violence:     Fear of Current or Ex-Partner: Not on file    Emotionally Abused: Not on file    Physically Abused: Not on file    Sexually Abused: Not on file   Housing Stability:     Unable to Pay for Housing in the Last Year: Not on file    Number of [place] in the Last Year: Not on file    Unstable Housing in the Last Year: Not on file     Review of Systems   Constitutional: Negative for chills and fever. Respiratory: Negative for cough and shortness of breath. Cardiovascular: Negative for chest pain and leg swelling. Gastrointestinal: Negative for abdominal pain, constipation, diarrhea, nausea and vomiting. Genitourinary: Negative for dysuria, frequency and urgency. Musculoskeletal: Positive for joint pain (right knee pain). Neurological: Negative for dizziness and headaches. Psychiatric/Behavioral: The patient is not nervous/anxious.          Increased agitation per family Objective:     Visit Vitals  /61 (BP 1 Location: Right arm, BP Patient Position: Sitting)   Pulse (!) 54   Ht 5' 0.04\" (1.525 m)   Wt 164 lb 12.8 oz (74.8 kg)   SpO2 94%   BMI 32.14 kg/m²      Physical Exam  Constitutional:       Appearance: Normal appearance. HENT:      Head: Normocephalic and atraumatic. Cardiovascular:      Rate and Rhythm: Normal rate and regular rhythm. Pulses: Normal pulses. Heart sounds: Normal heart sounds. Pulmonary:      Effort: Pulmonary effort is normal.      Breath sounds: Normal breath sounds. Abdominal:      General: Abdomen is flat. Bowel sounds are normal.      Palpations: Abdomen is soft. Musculoskeletal:         General: Tenderness (tenderness to palpation along medial joint space) present. No swelling. Normal range of motion. Skin:     General: Skin is warm and dry. Neurological:      General: No focal deficit present. Mental Status: He is alert and oriented to person, place, and time. Assessment and orders:       ICD-10-CM ICD-9-CM    1. Agitation  R45.1 307.9 QUEtiapine (SEROquel) 25 mg tablet      QUEtiapine (SEROquel) 50 mg tablet      sertraline (ZOLOFT) 100 mg tablet   2. Alzheimer's dementia without behavioral disturbance, unspecified timing of dementia onset ([facility]  G30.9 331.0     F02.80 294.10    3. Acute pain of right knee  M25.561 719.46 XR KNEE RT MAX 2 VWS   4. Depression, unspecified depression type  F32. A 311 sertraline (ZOLOFT) 100 mg tablet     1. Agitation: worsening memory issues with worsening agitation mostly in the afternoons to evenings, does not have problems sleeping, does not have any symptoms in the morning. Especially having a tough time currently since wife has been hospitalized and has not been with him over the last couple of weeks.   - Since he has been having issues mostly in the afternoon to evening and he has been waking up later in the mornings will adjust timings of Seroquel to 75mg after lunch and 75mg after dinner.  - Will increase Zoloft to 150mg daily in an attempt to control symptoms long-term  - Will avoid Ativan at this time due to chronic opiate use, PDMP reviewed and has been filling Norco frequently, would prefer to avoid benzo opiate combination especially given age    3. 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 - Discussed conservative management including lidocaine patches and Voltaren gel  - If continues to have symptoms or continues to have falls will consider referral to physical therapy to help with gait and knee pain     Labs, imaging and immunization ordered as above    Follow Up: in 4 weeks for follow up of Agitation    Pt was discussed with Dr. Anjum Harvey (attending physician). I have reviewed patient medical and social history and medications. I have reviewed pertinent labs results and other data. I have discussed the diagnosis with the patient and the intended plan as seen in the above orders. The patient has received an after-visit summary and questions were answered concerning future plans. I have discussed medication side effects and warnings with the patient as well.     Paul Thompson MD  [facility]  07/01/22

## 2022-07-03 RX ORDER — QUETIAPINE FUMARATE 50 MG/1
75 TABLET, FILM COATED ORAL
Qty: 1 TABLET | Refills: 0
Start: 2022-07-03 | End: 2022-08-16 | Stop reason: SDUPTHER

## 2022-07-03 RX ORDER — QUETIAPINE FUMARATE 25 MG/1
75 TABLET, FILM COATED ORAL
Qty: 1 TABLET | Refills: 0
Start: 2022-07-03 | End: 2022-08-16 | Stop reason: SDUPTHER

## 2022-07-03 RX ORDER — SERTRALINE HYDROCHLORIDE 100 MG/1
150 TABLET, FILM COATED ORAL DAILY
Qty: 1 TABLET | Refills: 0
Start: 2022-07-03

## 2022-07-14 DIAGNOSIS — J44.9 CHRONIC OBSTRUCTIVE PULMONARY DISEASE, UNSPECIFIED COPD TYPE (HCC): ICD-10-CM

## 2022-07-15 RX ORDER — ALBUTEROL SULFATE 0.83 MG/ML
2.5 SOLUTION RESPIRATORY (INHALATION)
Qty: 30 NEBULE | Refills: 4 | Status: SHIPPED | OUTPATIENT
Start: 2022-07-15

## 2022-07-18 DIAGNOSIS — I25.10 CORONARY ARTERY DISEASE INVOLVING NATIVE CORONARY ARTERY WITHOUT ANGINA PECTORIS, UNSPECIFIED WHETHER NATIVE OR TRANSPLANTED HEART: ICD-10-CM

## 2022-07-18 NOTE — PROGRESS NOTES
Novolog dosing:   Breakfast- cheerios- 1/4.5 (was 1/4g)  Lunch- sometimes leftovers 1/6g  Dinner- usually low carb. 1/5 (was 1/4)g.      Let me know if you have cortisone injection.  We will need to adjust your insulin.      Please let me know if you are having low blood sugars less than 70 or over 250 mg/dL.      If you have concerns, please send me a Justinmind message M-Th, call the clinic at [phone], or call [phone] after hours/weekends and ask to speak with the endocrinologist on call.       A1c 5.7. Cbc wnl. cmp ok.

## 2022-07-19 ENCOUNTER — TELEPHONE (OUTPATIENT)
Dept: FAMILY MEDICINE CLINIC | Age: 82
End: 2022-07-19

## 2022-07-19 RX ORDER — CLOPIDOGREL BISULFATE 75 MG/1
75 TABLET ORAL DAILY
Qty: 30 TABLET | Refills: 3 | Status: SHIPPED | OUTPATIENT
Start: 2022-07-19

## 2022-07-19 NOTE — TELEPHONE ENCOUNTER
Hello pt wife called requesting med refill for CLOPIDOGREL 75 MG.  Send to [facility]  Please and thank you.      -marisa

## 2022-07-29 ENCOUNTER — HOSPITAL ENCOUNTER (EMERGENCY)
Age: 82
Discharge: HOME | End: 2022-07-29
Attending: EMERGENCY MEDICINE
Payer: MEDICARE

## 2022-07-29 ENCOUNTER — NURSE TRIAGE (OUTPATIENT)
Dept: OTHER | Facility: CLINIC | Age: 82
End: 2022-07-29

## 2022-07-29 ENCOUNTER — APPOINTMENT (OUTPATIENT)
Dept: GENERAL RADIOLOGY | Age: 82
End: 2022-07-29
Attending: EMERGENCY MEDICINE
Payer: MEDICARE

## 2022-07-29 ENCOUNTER — OFFICE VISIT (OUTPATIENT)
Dept: FAMILY MEDICINE CLINIC | Age: 82
End: 2022-07-29
Payer: MEDICARE

## 2022-07-29 ENCOUNTER — APPOINTMENT (OUTPATIENT)
Dept: CT IMAGING | Age: 82
End: 2022-07-29
Attending: EMERGENCY MEDICINE
Payer: MEDICARE

## 2022-07-29 VITALS
SYSTOLIC BLOOD PRESSURE: 89 MMHG | OXYGEN SATURATION: 90 % | RESPIRATION RATE: 28 BRPM | BODY MASS INDEX: 32.79 KG/M2 | WEIGHT: 167 LBS | TEMPERATURE: 97.8 F | HEART RATE: 52 BPM | DIASTOLIC BLOOD PRESSURE: 47 MMHG | HEIGHT: 60 IN

## 2022-07-29 VITALS
TEMPERATURE: 97.5 F | HEART RATE: 59 BPM | RESPIRATION RATE: 20 BRPM | DIASTOLIC BLOOD PRESSURE: 50 MMHG | OXYGEN SATURATION: 98 % | SYSTOLIC BLOOD PRESSURE: 137 MMHG

## 2022-07-29 DIAGNOSIS — R06.82 TACHYPNEA: ICD-10-CM

## 2022-07-29 DIAGNOSIS — R06.03 ACUTE RESPIRATORY DISTRESS: Primary | ICD-10-CM

## 2022-07-29 DIAGNOSIS — I95.9 HYPOTENSION, UNSPECIFIED HYPOTENSION TYPE: ICD-10-CM

## 2022-07-29 DIAGNOSIS — K62.5 RECTAL BLEEDING: Primary | ICD-10-CM

## 2022-07-29 DIAGNOSIS — D64.9 ANEMIA, UNSPECIFIED TYPE: ICD-10-CM

## 2022-07-29 DIAGNOSIS — R06.2 WHEEZING: ICD-10-CM

## 2022-07-29 LAB
ALBUMIN SERPL-MCNC: 3.6 G/DL (ref 3.5–5)
ALBUMIN/GLOB SERPL: 1.1 {RATIO} (ref 1.1–2.2)
ALP SERPL-CCNC: 90 U/L (ref 45–117)
ALT SERPL-CCNC: 26 U/L (ref 12–78)
ANION GAP SERPL CALC-SCNC: 3 MMOL/L (ref 5–15)
APPEARANCE UR: CLEAR
AST SERPL-CCNC: 19 U/L (ref 15–37)
BACTERIA URNS QL MICRO: NEGATIVE /HPF
BASOPHILS # BLD: 0.1 K/UL (ref 0–0.1)
BASOPHILS NFR BLD: 1 % (ref 0–1)
BILIRUB SERPL-MCNC: 0.2 MG/DL (ref 0.2–1)
BILIRUB UR QL: NEGATIVE
BUN SERPL-MCNC: 34 MG/DL (ref 6–20)
BUN/CREAT SERPL: 16 (ref 12–20)
CALCIUM SERPL-MCNC: 8.5 MG/DL (ref 8.5–10.1)
CHLORIDE SERPL-SCNC: 109 MMOL/L (ref 97–108)
CO2 SERPL-SCNC: 28 MMOL/L (ref 21–32)
COLOR UR: ABNORMAL
COMMENT, HOLDF: NORMAL
CREAT SERPL-MCNC: 2.07 MG/DL (ref 0.7–1.3)
DIFFERENTIAL METHOD BLD: ABNORMAL
EOSINOPHIL # BLD: 0.4 K/UL (ref 0–0.4)
EOSINOPHIL NFR BLD: 4 % (ref 0–7)
EPITH CASTS URNS QL MICRO: ABNORMAL /LPF
ERYTHROCYTE [DISTWIDTH] IN BLOOD BY AUTOMATED COUNT: 13.5 % (ref 11.5–14.5)
GLOBULIN SER CALC-MCNC: 3.3 G/DL (ref 2–4)
GLUCOSE BLD STRIP.AUTO-MCNC: 98 MG/DL (ref 65–117)
GLUCOSE SERPL-MCNC: 95 MG/DL (ref 65–100)
GLUCOSE UR STRIP.AUTO-MCNC: NEGATIVE MG/DL
HCT VFR BLD AUTO: 30.8 % (ref 36.6–50.3)
HEMOCCULT STL QL: POSITIVE
HGB BLD-MCNC: 9.5 G/DL (ref 12.1–17)
HGB UR QL STRIP: NEGATIVE
IMM GRANULOCYTES # BLD AUTO: 0 K/UL (ref 0–0.04)
IMM GRANULOCYTES NFR BLD AUTO: 0 % (ref 0–0.5)
INR PPP: 1 (ref 0.9–1.1)
KETONES UR QL STRIP.AUTO: NEGATIVE MG/DL
LEUKOCYTE ESTERASE UR QL STRIP.AUTO: ABNORMAL
LYMPHOCYTES # BLD: 2 K/UL (ref 0.8–3.5)
LYMPHOCYTES NFR BLD: 22 % (ref 12–49)
MCH RBC QN AUTO: 28.6 PG (ref 26–34)
MCHC RBC AUTO-ENTMCNC: 30.8 G/DL (ref 30–36.5)
MCV RBC AUTO: 92.8 FL (ref 80–99)
MONOCYTES # BLD: 0.9 K/UL (ref 0–1)
MONOCYTES NFR BLD: 9 % (ref 5–13)
NEUTS SEG # BLD: 5.9 K/UL (ref 1.8–8)
NEUTS SEG NFR BLD: 64 % (ref 32–75)
NITRITE UR QL STRIP.AUTO: NEGATIVE
NRBC # BLD: 0 K/UL (ref 0–0.01)
NRBC BLD-RTO: 0 PER 100 WBC
PH UR STRIP: 5 [PH] (ref 5–8)
PLATELET # BLD AUTO: 153 K/UL (ref 150–400)
PMV BLD AUTO: 10.4 FL (ref 8.9–12.9)
POTASSIUM SERPL-SCNC: 4.2 MMOL/L (ref 3.5–5.1)
PROT SERPL-MCNC: 6.9 G/DL (ref 6.4–8.2)
PROT UR STRIP-MCNC: NEGATIVE MG/DL
PROTHROMBIN TIME: 10.9 SEC (ref 9–11.1)
RBC # BLD AUTO: 3.32 M/UL (ref 4.1–5.7)
RBC #/AREA URNS HPF: ABNORMAL /HPF (ref 0–5)
SAMPLES BEING HELD,HOLD: NORMAL
SERVICE CMNT-IMP: NORMAL
SODIUM SERPL-SCNC: 140 MMOL/L (ref 136–145)
SP GR UR REFRACTOMETRY: 1 (ref 1–1.03)
TSH SERPL DL<=0.05 MIU/L-ACNC: 5.94 UIU/ML (ref 0.36–3.74)
UR CULT HOLD, URHOLD: NORMAL
UROBILINOGEN UR QL STRIP.AUTO: 0.2 EU/DL (ref 0.2–1)
WBC # BLD AUTO: 9.3 K/UL (ref 4.1–11.1)
WBC URNS QL MICRO: ABNORMAL /HPF (ref 0–4)

## 2022-07-29 PROCEDURE — 1101F PT FALLS ASSESS-DOCD LE1/YR: CPT

## 2022-07-29 PROCEDURE — G8754 DIAS BP LESS 90: HCPCS

## 2022-07-29 PROCEDURE — G8536 NO DOC ELDER MAL SCRN: HCPCS

## 2022-07-29 PROCEDURE — 80053 COMPREHEN METABOLIC PANEL: CPT

## 2022-07-29 PROCEDURE — G0463 HOSPITAL OUTPT CLINIC VISIT: HCPCS

## 2022-07-29 PROCEDURE — G8432 DEP SCR NOT DOC, RNG: HCPCS

## 2022-07-29 PROCEDURE — 96372 THER/PROPH/DIAG INJ SC/IM: CPT

## 2022-07-29 PROCEDURE — 70450 CT HEAD/BRAIN W/O DYE: CPT

## 2022-07-29 PROCEDURE — 96372 THER/PROPH/DIAG INJ SC/IM: CPT | Performed by: FAMILY MEDICINE

## 2022-07-29 PROCEDURE — 36415 COLL VENOUS BLD VENIPUNCTURE: CPT

## 2022-07-29 PROCEDURE — G8427 DOCREV CUR MEDS BY ELIG CLIN: HCPCS

## 2022-07-29 PROCEDURE — 71045 X-RAY EXAM CHEST 1 VIEW: CPT

## 2022-07-29 PROCEDURE — 93005 ELECTROCARDIOGRAM TRACING: CPT

## 2022-07-29 PROCEDURE — 94761 N-INVAS EAR/PLS OXIMETRY MLT: CPT

## 2022-07-29 PROCEDURE — 82272 OCCULT BLD FECES 1-3 TESTS: CPT

## 2022-07-29 PROCEDURE — 81001 URINALYSIS AUTO W/SCOPE: CPT

## 2022-07-29 PROCEDURE — G8752 SYS BP LESS 140: HCPCS

## 2022-07-29 PROCEDURE — 84443 ASSAY THYROID STIM HORMONE: CPT

## 2022-07-29 PROCEDURE — 99285 EMERGENCY DEPT VISIT HI MDM: CPT

## 2022-07-29 PROCEDURE — 82962 GLUCOSE BLOOD TEST: CPT

## 2022-07-29 PROCEDURE — G8417 CALC BMI ABV UP PARAM F/U: HCPCS

## 2022-07-29 PROCEDURE — 1123F ACP DISCUSS/DSCN MKR DOCD: CPT

## 2022-07-29 PROCEDURE — 85025 COMPLETE CBC W/AUTO DIFF WBC: CPT

## 2022-07-29 PROCEDURE — 85610 PROTHROMBIN TIME: CPT

## 2022-07-29 PROCEDURE — 99215 OFFICE O/P EST HI 40 MIN: CPT

## 2022-07-29 RX ORDER — CEFTRIAXONE 1 G/1
1 INJECTION, POWDER, FOR SOLUTION INTRAMUSCULAR; INTRAVENOUS ONCE
Status: DISCONTINUED | OUTPATIENT
Start: 2022-07-29 | End: 2022-07-29

## 2022-07-29 RX ORDER — CEFTRIAXONE 1 G/1
1 INJECTION, POWDER, FOR SOLUTION INTRAMUSCULAR; INTRAVENOUS ONCE
Status: COMPLETED | OUTPATIENT
Start: 2022-07-29 | End: 2022-07-29

## 2022-07-29 RX ORDER — CEFTRIAXONE 1 G/1
1 INJECTION, POWDER, FOR SOLUTION INTRAMUSCULAR; INTRAVENOUS ONCE
Qty: 1 EACH | Refills: 0
Start: 2022-07-29 | End: 2022-07-29

## 2022-07-29 RX ADMIN — CEFTRIAXONE SODIUM 1 G: 1 INJECTION, POWDER, FOR SOLUTION INTRAMUSCULAR; INTRAVENOUS at 13:40

## 2022-07-29 NOTE — PROGRESS NOTES
02/07/2020  Felipe Lim    Current provider:  Ino Schmitt MD      I, Felipe Lim, rounded on Tae Delgado to ensure all mechanical assist device settings (IABP or VAD) were appropriate and all parameters were within limits.  I was able to ensure all back up equipment was present, the staff had no issues, and the Perfusion Department daily rounding was complete.    9:38 AM       Responded to RRT for CODE STROKE VAN score: Negative and Signs & Symptoms: generalized weakness and changes in gait per wife    Provider at bedside: yes    Interventions ordered: CT    Sepsis Suspected: no    Transfer to Higher Level of Care: no    Blood Glucose: 98     Visit Vitals  BP (!) 153/54   Pulse (!) 55   Resp 16   SpO2 97%        Susy Jiménez    RRT RN responded to Code Stroke level 1. Took pt to CT scan and returned to room. Assisted teleneuro with exam due to difficulty hearing. NIHSS 0. IV started and labs obtained. EKG performed. Managed care while primary nurse in with critical pt. Pt in no acute distress. No further interventions needed by RRT at this time.

## 2022-07-29 NOTE — PROGRESS NOTES
Chief Complaint:     Chief Complaint   Patient presents with    Wheezing     Patient started with worsening wheezing since yesterday (patient has COPD). Subjective:   HPI:  Prema Patiño is a [de-identified] y.o. male that presents for: dyspnea. History provided by spouse at bedside due to patient demented status. # Dyspnea / Wheezing:  - Onset: Yesterday  - Cough: No coughing  - Symptoms: Worsened wheezing, not improved with inhalers (albuterol). Nebulizer helped slightly. - COVID-19: testing not completed     - Vaccinated x 3  - Pertinent Hx: COPD  - Denies: CP, lightheadedness, dizziness, fever, chills, recent sick contacts, prior COVID testing. ROS:   Review of Systems   Constitutional: Negative. Negative for chills and fever. HENT: Negative. Eyes: Negative. Respiratory:  Positive for shortness of breath and wheezing. Negative for cough and sputum production. Cardiovascular: Negative. Negative for chest pain. Gastrointestinal: Negative. Negative for abdominal pain, diarrhea, nausea and vomiting. Skin: Negative. All other systems reviewed and are negative. Health Maintenance:  Health Maintenance Due   Topic Date Due    Eye Exam Retinal or Dilated  Never done    Shingrix Vaccine Age 50> (1 of 2) Never done    Depression Monitoring  06/14/2022    MICROALBUMIN Q1  07/23/2022    Lipid Screen  07/23/2022    Medicare Yearly Exam  07/24/2022        Past medical history, social history, and medications personally reviewed. Past Medical History:   Diagnosis Date    Alzheimer's dementia ([facility]     BPH (benign prostatic hyperplasia)     CAD (coronary artery disease)     Carotid artery stenosis     CKD (chronic kidney disease)     COPD (chronic obstructive pulmonary disease) (HCC)     HLD (hyperlipidemia)     T2DM (type 2 diabetes mellitus) ([facility]         Allergies personally reviewed.   Allergies   Allergen Reactions    Adempas [Riociguat] Unknown (comments)    Ativan [Lorazepam] Other (comments)     Altered LOC  Patient able to tolerate 0.5mg    Codeine Unknown (comments)    Dilaudid [Hydromorphone] Unknown (comments)    Lisinopril Cough          Objective:   Vitals reviewed. Visit Vitals  BP (!) 89/47 (BP 1 Location: Right arm, BP Patient Position: Sitting, BP Cuff Size: Small adult)   Pulse (!) 52   Temp 97.8 °F (36.6 °C) (Oral)   Resp 28   Ht 5' 0.04\" (1.525 m)   Wt 167 lb (75.8 kg)   SpO2 90%   BMI 32.57 kg/m²        Physical Exam    General appearance - alert, elderly and frail appearing, and in no distress  Mouth - dry membranes moist, pharynx normal without lesions  Chest - RR 24. Expiratory wheezing appreciated diffusely throughout R > L with mild basilar crackles (R > L) appreciated. Heart - bradycardic rate, regular rhythm  Skin-  Musculoskeletal - grossly normal joint and motor function. Assessment/Plan:   I personally reviewed the following Pertinent Labs/Studies:   - Encounter Notes/Labs/Imaging from 2022    Diagnoses and all orders for this visit:    1. Acute respiratory distress: CURB 65 at least 2. Hemodynamically unstable with MAP's less than 65. Tachypneic to mid 20's. He is high risk for further decompensation. Advised immediate hospitalization. Physician transport en route -- will go to [address] [place] [address] ED for further evaluation and treatment. Patient was given 1g IM Rocephin prior to transport. 2. Wheezing: No coughing, COPD exacerbation on differential, but less likely. Patient will need supportive care in addition to possible IV abx pending imaging/lab studies. 3. Hypotension, unspecified hypotension type: off from patient's baseline. His carvedilol was reduced yesterday to 6.25 mg by his cardiologist per spouse. Patient DID take his AM dose of carvedilol. 4. Tachypnea: Likely 2/2 acute infection. Further work-up required. Consider supplemental O2. Follow up:          Pt was seen and discussed with Dr Carole Gentile (attending physician).     I have reviewed pertinent labs results and other data. I have discussed the diagnosis with the patient and the intended plan as seen in the above orders. The patient has received an after-visit summary and questions were answered concerning future plans. I have discussed medication side effects and warnings with the patient as well.     Griffin Jean MD  Resident [id number]  07/29/22

## 2022-07-29 NOTE — TELEPHONE ENCOUNTER
Received call from [place] at [facility] with [employer] Complaint. Subjective: Caller states \"Wheezing\"     Current Symptoms: SOB, Wheezing   Albuterol nebulizer currently     Able to get up and ambulate, unable to speak in full sentences. Onset: Yesterday, worse this morning    Associated Symptoms: reduced activity    Pain Severity: Denies    Temperature: Denies    What has been tried: Nebulizer    Recommended disposition: Go to ED Now. Caller declines, stating that pt just needs a CXR and maybe antibiotics. Katerin Mclean reinforced disposition to caller, caller continues to decline. Care advice provided, patient verbalizes understanding; denies any other questions or concerns; instructed to call back for any new or worsening symptoms. Writer provided warm transfer to Colt Braga at [facility] for Refusal of urgent disposition. Attention Provider: Thank you for allowing me to participate in the care of your patient. The patient was connected to triage in response to information provided to the ECC. Please do not respond through this encounter as the response is not directed to a shared pool.       Reason for Disposition   Wheezing can be heard across the room    Protocols used: Breathing Difficulty-ADULT-[place]

## 2022-07-29 NOTE — PROGRESS NOTES
Identified Patient with two Patient identifiers (Name and ). Two Patient Identifiers confirmed. Reviewed record in preparation for visit and have obtained necessary documentation. Chief Complaint   Patient presents with    Wheezing     Patient started with worsening wheezing since yesterday (patient has COPD). Vitals:    22 1112 22 1118   BP: (!) 86/45 (!) 89/47   BP 1 Location: Right arm Right arm   BP Patient Position: Sitting Sitting   BP Cuff Size: Small adult Small adult   Pulse: (!) 54 (!) 52   Temp: 97.8 °F (36.6 °C)    TempSrc: Oral    Resp: 28    Height: 5' 0.04\" (1.525 m)    Weight: 167 lb (75.8 kg)    SpO2: 91% 90%          1. Have you been to the ER, urgent care clinic since your last visit? Hospitalized since your last visit? No    2. Have you seen or consulted any other health care providers outside of the [address] [place] [address] since your last visit? Include any pap smears or colon screening. No    Patient given 1g for ceftriaxone in right glute per verbal order from Dr. Fuad Bello.   LOT: [id number]  EXP: 23

## 2022-07-29 NOTE — ED NOTES
MD Glover reviewed discharge instructions with the patient and spouse. The patient and spouse verbalized understanding. Pt confirmed understanding of need for follow up with primary care provider. Important sections of discharge instructions highlighted for patient. Pt is not in any current distress and shows no evidence of clinical instability. Pt is hemodynamically/respiratorily stable. Paperwork given by provider and reviewed with patient and their spouse, opportunity for questions/clarification given. Pt was given work note if applicable/requested. Pt denies any additional complaints. Pt walked out of ED. Patient Vitals for the past 4 hrs:   Temp Pulse Resp BP SpO2   07/29/22 1753 -- (!) 59 20 -- 98 %   07/29/22 1738 -- 60 21 (!) 137/50 95 %   07/29/22 1723 -- 61 19 (!) 144/55 96 %   07/29/22 1708 -- 62 17 (!) 134/45 94 %   07/29/22 1653 -- (!) 59 21 (!) 157/56 94 %   07/29/22 1639 97.5 °F (36.4 °C) -- -- -- --   07/29/22 1638 -- (!) 55 18 (!) 162/60 98 %   07/29/22 1630 -- 60 20 (!) 156/66 98 %   07/29/22 1621 -- (!) 59 17 (!) 171/67 95 %   07/29/22 1606 -- 67 16 (!) 160/53 (!) 78 %   07/29/22 1551 -- (!) 56 25 (!) 146/57 96 %   07/29/22 1536 -- (!) 55 27 (!) 142/55 98 %   07/29/22 1521 -- (!) 57 26 (!) 157/61 96 %   07/29/22 1506 -- (!) 52 18 (!) 145/49 98 %   07/29/22 1451 -- (!) 53 24 (!) 155/58 96 %   07/29/22 1436 -- (!) 56 15 (!) 138/58 96 %   07/29/22 1415 -- (!) 55 16 (!) 153/54 97 %   07/29/22 1400 -- (!) 53 16 (!) 151/56 97 %     78% SPO2 erroneous. Deleted from flowsheets.     Claudeen Athens, RN

## 2022-07-29 NOTE — ED TRIAGE NOTES
Pt was seen at PCP this am for COPD and was found to be hypotensive. Pt was not hypotensive en route with EMS. Pt states he was having more problems walking this am when at PCP associated with dizziness. Hanh Ro -. CODE S Level 1 called in triage.   Dr Dinorah Arora in triage at [phone]

## 2022-07-29 NOTE — ED PROVIDER NOTES
Patient seen as a code stroke level 1 alert called in triage. 24-year-old male with a history of COPD, CAD, CKD, diabetes presents with ataxia from his doctor's office. Is unclear what is new and what is old. His wife states that he is having more difficulty walking and his dizziness has increased. Wife states that he has dementia and cannot really give history. She states that he was wheezing starting yesterday. They made an appointment for the primary care doctor. They brought him in today and his blood pressure was 50 systolic. They brought him to the emergency department. She states he is acting normal at his baseline. Patient denies any pain. He denies any complaints. Fatigue       Past Medical History:   Diagnosis Date    [facility]     BPH (benign prostatic hyperplasia)     CAD (coronary artery disease)     Carotid artery stenosis     CKD (chronic kidney disease)     COPD (chronic obstructive pulmonary disease) (HCC)     HLD (hyperlipidemia)     T2DM (type 2 diabetes mellitus) ([facility]        No past surgical history on file. No family history on file.     Social History     Socioeconomic History    Marital status:      Spouse name: Not on file    Number of children: Not on file    Years of education: Not on file    Highest education level: Not on file   Occupational History    Not on file   Tobacco Use    Smoking status: Former     Packs/day: 2.00     Years: 30.00     Pack years: 60.00     Types: Cigarettes    Smokeless tobacco: Never   Vaping Use    Vaping Use: Never used   Substance and Sexual Activity    Alcohol use: Never    Drug use: Never    Sexual activity: Not Currently   Other Topics Concern    Not on file   Social History Narrative    Not on file     Social Determinants of Health     Financial Resource Strain: Not on file   Food Insecurity: Not on file   Transportation Needs: Not on file   Physical Activity: Not on file   Stress: Not on file   Social Connections: Not on file   Intimate Partner Violence: Not on file   Housing Stability: Not on file         ALLERGIES: Adempas [riociguat], Ativan [lorazepam], Codeine, Dilaudid [hydromorphone], and Lisinopril    Review of Systems   Unable to perform ROS: Dementia   Constitutional:  Positive for fatigue. Vitals:    07/29/22 1310   BP: 113/62   Pulse: (!) 53   Resp: 16   SpO2: 97%            Physical Exam  Vitals and nursing note reviewed. Constitutional:       General: He is not in acute distress. HENT:      Head: Normocephalic and atraumatic. Eyes:      General: No scleral icterus. Conjunctiva/sclera: Conjunctivae normal.      Pupils: Pupils are equal, round, and reactive to light. Neck:      Trachea: No tracheal deviation. Cardiovascular:      Rate and Rhythm: Normal rate and regular rhythm. Pulmonary:      Effort: Pulmonary effort is normal. No respiratory distress. Breath sounds: Normal breath sounds. No stridor. Abdominal:      General: There is no distension. Palpations: Abdomen is soft. Tenderness: There is no abdominal tenderness. Genitourinary:     Comments: deferred  Musculoskeletal:         General: No deformity. Cervical back: No rigidity. Skin:     General: Skin is warm and dry. Neurological:      General: No focal deficit present. Mental Status: He is alert. Psychiatric:         Mood and Affect: Mood normal.         Behavior: Behavior normal.        MDM     Amount and/or Complexity of Data Reviewed  Decide to obtain previous medical records or to obtain history from someone other than the patient: yes      ED Course as of 07/29/22 2204   Fri Jul 29, 2022   1356 Spoke to tele-neurologist Mo: nothing left to do from stroke standpoint [TT]   1443 Creatinine(!): 2.07  Creatinine at baseline [TT]   1548 HGB(!): 9.5  Hemoglobin decreased 3 points. [TT]   1731 Discussed admission for rectal bleeding and anemia.   Patient does not meet criteria for transfusion. He has not noticed any bright red blood. His Hemoccult is positive. He declined admission. His wife wants to take him home. We will follow-up with GI. Given return precautions.  [TT]      ED Course User Index  [TT] Kylie Brown., MD       Procedures

## 2022-07-29 NOTE — ED NOTES
Pt resting w/ wife @ bedside. Pt has no new complaints. Pt passed WILLEM swallow screen, given water & crackers- OK per MD Glover.

## 2022-07-30 NOTE — PROGRESS NOTES
[address] Medicine Residency Attending Addendum:  I saw and evaluated the patient on the day of the encounter with Jagruti Niño MD  , performing the key elements of the service. I discussed the findings, assessment and plan with the resident and agree with the resident's findings and plan as documented in the resident's note.       Kain Kruger MD, CAQSM, RMSK

## 2022-07-31 LAB
ATRIAL RATE: 53 BPM
CALCULATED R AXIS, ECG10: 122 DEGREES
CALCULATED T AXIS, ECG11: 32 DEGREES
DIAGNOSIS, 93000: NORMAL
P-R INTERVAL, ECG05: 174 MS
Q-T INTERVAL, ECG07: 432 MS
QRS DURATION, ECG06: 94 MS
QTC CALCULATION (BEZET), ECG08: 405 MS
VENTRICULAR RATE, ECG03: 53 BPM

## 2022-08-16 DIAGNOSIS — R45.1 AGITATION: ICD-10-CM

## 2022-08-16 RX ORDER — QUETIAPINE FUMARATE 50 MG/1
50 TABLET, FILM COATED ORAL 2 TIMES DAILY
Qty: 180 TABLET | Refills: 0 | Status: SHIPPED | OUTPATIENT
Start: 2022-08-16

## 2022-08-16 RX ORDER — QUETIAPINE FUMARATE 25 MG/1
50 TABLET, FILM COATED ORAL 2 TIMES DAILY
Qty: 180 TABLET | Refills: 0 | Status: SHIPPED | OUTPATIENT
Start: 2022-08-16

## 2022-08-16 NOTE — TELEPHONE ENCOUNTER
Patient's wife called office requesting a refill on his quetiapine. She states he is completely out. When I read back the SIG to her she corrected me by stating he takes 75mg twice a day, and that the prescriptions have to be split like this for her insurance to cover the medication. I approved a 90 day supply today.

## 2022-08-17 DIAGNOSIS — I10 ESSENTIAL HYPERTENSION: ICD-10-CM

## 2022-08-18 RX ORDER — ISOSORBIDE MONONITRATE 30 MG/1
TABLET, EXTENDED RELEASE ORAL
Qty: 360 TABLET | Refills: 1 | OUTPATIENT
Start: 2022-08-18

## 2022-08-18 RX ORDER — MEMANTINE HYDROCHLORIDE 5 MG/1
5 TABLET ORAL 2 TIMES DAILY
Qty: 120 TABLET | Refills: 0 | Status: SHIPPED | OUTPATIENT
Start: 2022-08-18 | End: 2022-10-19

## 2022-09-12 ENCOUNTER — OFFICE VISIT (OUTPATIENT)
Dept: FAMILY MEDICINE CLINIC | Age: 82
End: 2022-09-12
Payer: MEDICARE

## 2022-09-12 VITALS
BODY MASS INDEX: 32.98 KG/M2 | RESPIRATION RATE: 18 BRPM | TEMPERATURE: 98 F | OXYGEN SATURATION: 96 % | SYSTOLIC BLOOD PRESSURE: 132 MMHG | HEART RATE: 68 BPM | WEIGHT: 168 LBS | HEIGHT: 60 IN | DIASTOLIC BLOOD PRESSURE: 70 MMHG

## 2022-09-12 DIAGNOSIS — D64.9 ANEMIA, UNSPECIFIED TYPE: Primary | ICD-10-CM

## 2022-09-12 DIAGNOSIS — I95.9 HYPOTENSION, UNSPECIFIED HYPOTENSION TYPE: ICD-10-CM

## 2022-09-12 DIAGNOSIS — M25.561 RIGHT KNEE PAIN, UNSPECIFIED CHRONICITY: ICD-10-CM

## 2022-09-12 PROCEDURE — G8427 DOCREV CUR MEDS BY ELIG CLIN: HCPCS | Performed by: STUDENT IN AN ORGANIZED HEALTH CARE EDUCATION/TRAINING PROGRAM

## 2022-09-12 PROCEDURE — G8752 SYS BP LESS 140: HCPCS | Performed by: STUDENT IN AN ORGANIZED HEALTH CARE EDUCATION/TRAINING PROGRAM

## 2022-09-12 PROCEDURE — 99214 OFFICE O/P EST MOD 30 MIN: CPT | Performed by: STUDENT IN AN ORGANIZED HEALTH CARE EDUCATION/TRAINING PROGRAM

## 2022-09-12 PROCEDURE — 1101F PT FALLS ASSESS-DOCD LE1/YR: CPT | Performed by: STUDENT IN AN ORGANIZED HEALTH CARE EDUCATION/TRAINING PROGRAM

## 2022-09-12 PROCEDURE — 1123F ACP DISCUSS/DSCN MKR DOCD: CPT | Performed by: STUDENT IN AN ORGANIZED HEALTH CARE EDUCATION/TRAINING PROGRAM

## 2022-09-12 PROCEDURE — G0463 HOSPITAL OUTPT CLINIC VISIT: HCPCS | Performed by: STUDENT IN AN ORGANIZED HEALTH CARE EDUCATION/TRAINING PROGRAM

## 2022-09-12 PROCEDURE — G8417 CALC BMI ABV UP PARAM F/U: HCPCS | Performed by: STUDENT IN AN ORGANIZED HEALTH CARE EDUCATION/TRAINING PROGRAM

## 2022-09-12 PROCEDURE — G8536 NO DOC ELDER MAL SCRN: HCPCS | Performed by: STUDENT IN AN ORGANIZED HEALTH CARE EDUCATION/TRAINING PROGRAM

## 2022-09-12 PROCEDURE — G8754 DIAS BP LESS 90: HCPCS | Performed by: STUDENT IN AN ORGANIZED HEALTH CARE EDUCATION/TRAINING PROGRAM

## 2022-09-12 PROCEDURE — G8432 DEP SCR NOT DOC, RNG: HCPCS | Performed by: STUDENT IN AN ORGANIZED HEALTH CARE EDUCATION/TRAINING PROGRAM

## 2022-09-12 NOTE — PROGRESS NOTES
Dorothy Prasad is a [de-identified] y.o. male   Chief Complaint   Patient presents with    Follow Up Chronic Condition     Follow up     ASSESSMENT AND PLAN:    1. Anemia, unspecified type  Last Hgb 9.5 with positive occult test. Currently asymptomatic.   - Follow up for colonoscopy Wednesday as scheduled    2. Right knee pain, unspecified chronicity  Acute on chronic. 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 Unchanged from previous. No obvious fracture or dislocation.   - Alternate heat/ice   - Advised home PT exercises as able. Provided handout   - Voltaren gel prn   - discussed follow up with sports medicine for possible injection but patient defers right now   - XR KNEE RT 3 V; Future    3. Hypotension   Resolved s/p medication changes as listed in HPI. VS stable. Patient asymptomatic.   - Follow up with cardiology as scheduled     SUBJECTIVE:    HPI:  Bijan Montiel. Henrry Herndon is a [de-identified] y.o. male who presents for BP follow up. Low BP:   - follow up after evaluation in ED, has had low BP and low HR   - seen by cards (Dr. Henrietta Adan (Cardiology)) who discontinued carvedilol and plavix. Not taking bumex now either, plan to ask about restarting at next cardiology visit   - BP ranging 130s-140s/80s   - no chest pain, increased shortness of breath, headaches, dizziness, fevers/chills     Anemia:   - Last Hgb 9.5 with positive occult blood during past ER visit   - holding plavix and ASA   - scheduled for colonoscopy Wednesday     Fall:   - occurred three weeks ago   - tripped over trash can lid   - was seen at [facility] ED and received stitches. Had CT scan at that time which showed no acute changes. - still has persistent right knee pain, exacerbated with movement. Was not imaged at time of injury. - has been using tylenol and Norco (prescribed by pain specialist) with relief      ROS   General: No fevers. No chills. HENT: No congestion. No rhinorrhea. No sore throat.    Eyes: No eye pain. No eye redness. Respiratory: No cough. No shortness of breath. Cardio: No chest pain. No leg swelling. No palpitations. GI: No abd pain. No n/v. No diarrhea. No constipation. : No frequency. No dysuria. No urgency. MSK: No back pain. No neck pain. Neuro: No headaches. No dizziness. Current Outpatient Medications:     memantine (NAMENDA) 5 mg tablet, Take 1 Tablet by mouth two (2) times a day., Disp: 120 Tablet, Rfl: 0    QUEtiapine (SEROquel) 25 mg tablet, Take 2 Tablets by mouth two (2) times a day., Disp: 180 Tablet, Rfl: 0    QUEtiapine (SEROquel) 50 mg tablet, Take 1 Tablet by mouth two (2) times a day., Disp: 180 Tablet, Rfl: 0    albuterol (PROVENTIL VENTOLIN) 2.5 mg /3 mL (0.083 %) nebu, 3 mL by Nebulization route every four (4) hours as needed for Wheezing., Disp: 30 Nebule, Rfl: 4    sertraline (ZOLOFT) 100 mg tablet, Take 1.5 Tablets by mouth daily. , Disp: 1 Tablet, Rfl: 0    amLODIPine (NORVASC) 5 mg tablet, Take 1 Tablet by mouth daily. , Disp: 90 Tablet, Rfl: 1    cetirizine (ZYRTEC) 10 mg tablet, Take 1 Tablet by mouth daily. , Disp: 90 Tablet, Rfl: 1    isosorbide mononitrate ER (IMDUR) 30 mg tablet, TAKE THREE TABLETS BY MOUTH TWICE A DAY, Disp: 360 Tablet, Rfl: 1    HYDROcodone-acetaminophen (NORCO) 5-325 mg per tablet, TAKE 1 TABLET BY MOUTH TWICE DAILY AS NEEDED, Disp: , Rfl:     Combivent Respimat  mcg/actuation inhaler, INHALE ONE PUFF BY MOUTH TWICE A DAY, Disp: 12 g, Rfl: 3    fluticasone propionate (FLONASE) 50 mcg/actuation nasal spray, 1 [place] by Both Nostrils route daily.  SHAKE LIQUID AND USE 1 SPRAY IN EACH NOSTRIL DAILY, Disp: 3 Each, Rfl: 3    tamsulosin (FLOMAX) 0.4 mg capsule, TAKE ONE CAPSULE BY MOUTH ONE TIME DAILY, Disp: 90 Capsule, Rfl: 2    glucose blood VI test strips (OneTouch Ultra Test) strip, USE TO CHECK BLOOD SUGAR FOUR TIMES DAILY, Disp: 400 Strip, Rfl: 4    Insulin Needles, Disposable, (UltiCare Pen Needle) 32 gauge x 1/4\" ndle, To check glucose as needed 3 times a day, Disp: 100 Pen Needle, Rfl: 3    buPROPion (WELLBUTRIN) 100 mg tablet, TAKE ONE TABLET BY MOUTH TWICE A DAY, Disp: 180 Tablet, Rfl: 4    albuterol (PROVENTIL HFA, VENTOLIN HFA, PROAIR HFA) 90 mcg/actuation inhaler, Take 2 Puffs by inhalation every four (4) hours as needed for Wheezing., Disp: 3 Each, Rfl: 3    Advair Diskus 250-50 mcg/dose diskus inhaler, INHALE ONE PUFF BY MOUTH EVERY 12 HOURS, Disp: 60 Each, Rfl: 0    calcium-cholecalciferol, D3, (CALTRATE 600+D) tablet, TAKE ONE TABLET BY MOUTH ONE TIME DAILY, Disp: 90 Tab, Rfl: 3    Nebulizer & Compressor machine, 1 Each by Does Not Apply route as needed for Wheezing or Shortness of Breath., Disp: 1 Each, Rfl: 0    Nebulizer Accessories kit, For albuterol q4-8hrs PRN, Disp: 1 Kit, Rfl: 0    Blood-Glucose Meter monitoring kit, One Touch Ultra Meter, Disp: 1 Kit, Rfl: 0    Blood-Glucose Meter monitoring kit, Please check blood glucose once a day in morning, Disp: 1 Kit, Rfl: 0    LORazepam (ATIVAN) 0.5 mg tablet, Take 0.5 mg by mouth as needed for Anxiety. , Disp: , Rfl:     atorvastatin (LIPITOR) 20 mg tablet, TAKE ONE TABLET BY MOUTH ONE TIME DAILY, Disp: , Rfl: 1    donepezil (ARICEPT) 10 mg tablet, TAKE ONE TABLET BY MOUTH AT BEDTIME, Disp: , Rfl: 6    clopidogreL (PLAVIX) 75 mg tab, TAKE 1 TABLET BY MOUTH DAILY (Patient not taking: Reported on 9/12/2022), Disp: 30 Tablet, Rfl: 3    carvediloL (COREG) 12.5 mg tablet, TAKE ONE TABLET BY MOUTH TWICE A DAY WITH A MEAL (Patient not taking: Reported on 9/12/2022), Disp: 60 Tablet, Rfl: 2    bumetanide (BUMEX) 1 mg tablet, Take 1 Tab by mouth daily. (Patient not taking: Reported on 9/12/2022), Disp: 90 Tab, Rfl: 1    aspirin delayed-release 81 mg tablet, Take  by mouth daily.  (Patient not taking: Reported on 9/12/2022), Disp: , Rfl:     Past Medical History:   Diagnosis Date    Al[facility]'s dementia ([facility]     BPH (benign prostatic hyperplasia)     CAD (coronary artery disease) Carotid artery stenosis     CKD (chronic kidney disease)     COPD (chronic obstructive pulmonary disease) ([facility])     HLD (hyperlipidemia)     T[facility]DM (type 2 diabetes mellitus) [facility]        History reviewed. No pertinent surgical history. Social History     Socioeconomic History    Marital status:      Spouse name: Not on file    Number of children: Not on file    Years of education: Not on file    Highest education level: Not on file   Occupational History    Not on file   Tobacco Use    Smoking status: Former     Packs/day: 2.00     Years: 30.00     Pack years: 60.00     Types: Cigarettes    Smokeless tobacco: Never   Vaping Use    Vaping Use: Never used   Substance and Sexual Activity    Alcohol use: Never    Drug use: Never    Sexual activity: Not Currently   Other Topics Concern    Not on file   Social History Narrative    Not on file     Social Determinants of Health     Financial Resource Strain: Not on file   Food Insecurity: Not on file   Transportation Needs: Not on file   Physical Activity: Not on file   Stress: Not on file   Social Connections: Not on file   Intimate Partner Violence: Not on file   Housing Stability: Not on file       OBJECTIVE:  /70 (BP 1 Location: Right arm, BP Patient Position: Sitting, BP Cuff Size: Adult)   Pulse 68   Temp 98 °F (36.7 °C) (Oral)   Resp 18   Ht 5' (1.524 m)   Wt 168 lb (76.2 kg)   SpO2 96%   BMI 32.81 kg/m²     Physical Exam  Vitals and nursing note reviewed. Constitutional:       General: He is not in acute distress. HENT:      Head: Normocephalic and atraumatic. Mouth/Throat:      Mouth: Mucous membranes are moist.      Pharynx: Oropharynx is clear. Eyes:      Conjunctiva/sclera: Conjunctivae normal.      Pupils: Pupils are equal, round, and reactive to light. Cardiovascular:      Rate and Rhythm: Normal rate and regular rhythm. Pulses: Normal pulses. Heart sounds: No murmur heard. No friction rub. No gallop.    Pulmonary: Effort: Pulmonary effort is normal. No respiratory distress. Breath sounds: Normal breath sounds. No wheezing, rhonchi or rales. Musculoskeletal:         General: Normal range of motion. Comments: +mild right knee effusion. +medial and lateral right knee joint line tenderness. Skin:     General: Skin is warm and dry. Capillary Refill: Capillary refill takes less than 2 seconds. Neurological:      Mental Status: He is alert.

## 2022-09-12 NOTE — PROGRESS NOTES
Identified Patient with two Patient identifiers (Name and ). Two Patient Identifiers confirmed. Reviewed record in preparation for visit and have obtained necessary documentation. Chief Complaint   Patient presents with    Follow Up Chronic Condition     Follow up       Visit Vitals  /70 (BP 1 Location: Right arm, BP Patient Position: Sitting, BP Cuff Size: Adult)   Pulse 68   Temp 98 °F (36.7 °C) (Oral)   Resp 18   Ht 5' (1.524 m)   Wt 168 lb (76.2 kg)   SpO2 96%   BMI 32.81 kg/m²       1. Have you been to the ER, urgent care clinic since your last visit? Hospitalized since your last visit? No    2. Have you seen or consulted any other health care providers outside of the [address] [place] [address] since your last visit? Include any pap smears or colon screening.  No

## 2022-09-14 ENCOUNTER — HOSPITAL ENCOUNTER (OUTPATIENT)
Age: 82
Setting detail: OUTPATIENT SURGERY
Discharge: HOME | End: 2022-09-14
Attending: INTERNAL MEDICINE | Admitting: INTERNAL MEDICINE
Payer: MEDICARE

## 2022-09-14 ENCOUNTER — ANESTHESIA EVENT (OUTPATIENT)
Dept: ENDOSCOPY | Age: 82
End: 2022-09-14
Payer: MEDICARE

## 2022-09-14 ENCOUNTER — ANESTHESIA (OUTPATIENT)
Dept: ENDOSCOPY | Age: 82
End: 2022-09-14
Payer: MEDICARE

## 2022-09-14 VITALS
RESPIRATION RATE: 19 BRPM | WEIGHT: 162.7 LBS | HEART RATE: 77 BPM | TEMPERATURE: 98.1 F | SYSTOLIC BLOOD PRESSURE: 150 MMHG | HEIGHT: 64 IN | DIASTOLIC BLOOD PRESSURE: 67 MMHG | BODY MASS INDEX: 27.78 KG/M2 | OXYGEN SATURATION: 99 %

## 2022-09-14 LAB
GLUCOSE BLD STRIP.AUTO-MCNC: 84 MG/DL (ref 65–117)
SERVICE CMNT-IMP: NORMAL

## 2022-09-14 PROCEDURE — 82962 GLUCOSE BLOOD TEST: CPT

## 2022-09-14 PROCEDURE — 77030013992 HC SNR POLYP ENDOSC BSC -B: Performed by: INTERNAL MEDICINE

## 2022-09-14 PROCEDURE — 76040000007: Performed by: INTERNAL MEDICINE

## 2022-09-14 PROCEDURE — 74011250636 HC RX REV CODE- 250/636: Performed by: NURSE ANESTHETIST, CERTIFIED REGISTERED

## 2022-09-14 PROCEDURE — 88305 TISSUE EXAM BY PATHOLOGIST: CPT

## 2022-09-14 PROCEDURE — 74011000250 HC RX REV CODE- 250: Performed by: NURSE ANESTHETIST, CERTIFIED REGISTERED

## 2022-09-14 PROCEDURE — 2709999900 HC NON-CHARGEABLE SUPPLY: Performed by: INTERNAL MEDICINE

## 2022-09-14 PROCEDURE — 74011250636 HC RX REV CODE- 250/636: Performed by: INTERNAL MEDICINE

## 2022-09-14 PROCEDURE — 77030021593 HC FCPS BIOP ENDOSC BSC -A: Performed by: INTERNAL MEDICINE

## 2022-09-14 PROCEDURE — 76060000032 HC ANESTHESIA 0.5 TO 1 HR: Performed by: INTERNAL MEDICINE

## 2022-09-14 RX ORDER — EPINEPHRINE 0.1 MG/ML
1 INJECTION INTRACARDIAC; INTRAVENOUS
Status: DISCONTINUED | OUTPATIENT
Start: 2022-09-14 | End: 2022-09-14 | Stop reason: HOSPADM

## 2022-09-14 RX ORDER — PROPOFOL 10 MG/ML
INJECTION, EMULSION INTRAVENOUS AS NEEDED
Status: DISCONTINUED | OUTPATIENT
Start: 2022-09-14 | End: 2022-09-14 | Stop reason: HOSPADM

## 2022-09-14 RX ORDER — LIDOCAINE HYDROCHLORIDE 20 MG/ML
INJECTION, SOLUTION EPIDURAL; INFILTRATION; INTRACAUDAL; PERINEURAL AS NEEDED
Status: DISCONTINUED | OUTPATIENT
Start: 2022-09-14 | End: 2022-09-14 | Stop reason: HOSPADM

## 2022-09-14 RX ORDER — ATROPINE SULFATE 0.1 MG/ML
0.4 INJECTION INTRAVENOUS
Status: DISCONTINUED | OUTPATIENT
Start: 2022-09-14 | End: 2022-09-14 | Stop reason: HOSPADM

## 2022-09-14 RX ORDER — SODIUM CHLORIDE 9 MG/ML
50 INJECTION, SOLUTION INTRAVENOUS CONTINUOUS
Status: DISCONTINUED | OUTPATIENT
Start: 2022-09-14 | End: 2022-09-14 | Stop reason: HOSPADM

## 2022-09-14 RX ORDER — DEXTROMETHORPHAN/PSEUDOEPHED 2.5-7.5/.8
1.2 DROPS ORAL
Status: DISCONTINUED | OUTPATIENT
Start: 2022-09-14 | End: 2022-09-14 | Stop reason: HOSPADM

## 2022-09-14 RX ORDER — PROPOFOL 10 MG/ML
INJECTION, EMULSION INTRAVENOUS
Status: DISCONTINUED | OUTPATIENT
Start: 2022-09-14 | End: 2022-09-14 | Stop reason: HOSPADM

## 2022-09-14 RX ORDER — FLUMAZENIL 0.1 MG/ML
0.2 INJECTION INTRAVENOUS
Status: DISCONTINUED | OUTPATIENT
Start: 2022-09-14 | End: 2022-09-14 | Stop reason: HOSPADM

## 2022-09-14 RX ADMIN — SODIUM CHLORIDE: 9 INJECTION, SOLUTION INTRAVENOUS at 15:04

## 2022-09-14 RX ADMIN — PROPOFOL 30 MG: 10 INJECTION, EMULSION INTRAVENOUS at 15:34

## 2022-09-14 RX ADMIN — LIDOCAINE HYDROCHLORIDE 60 MG: 20 INJECTION, SOLUTION EPIDURAL; INFILTRATION; INTRACAUDAL; PERINEURAL at 15:12

## 2022-09-14 RX ADMIN — PROPOFOL 80 MCG/KG/MIN: 10 INJECTION, EMULSION INTRAVENOUS at 15:12

## 2022-09-14 RX ADMIN — PROPOFOL 50 MG: 10 INJECTION, EMULSION INTRAVENOUS at 15:12

## 2022-09-14 NOTE — DISCHARGE INSTRUCTIONS
[facility]. Trupti Aguero M.D.  [phone]                 COLON and EGD DISCHARGE INSTRUCTIONS    2022    Adi Hunt  :  1940  [facility] Medical Record Number:  [id number]      DISCOMFORT:  Sore throat- throat lozenges or warm salt water gargle  Redness at IV site- apply warm compress to area; if redness or soreness persist- contact your physician  There may be a slight amount of blood passed from the rectum  Gaseous discomfort- walking, belching will help relieve any discomfort  You may not operate a vehicle for 12 hours  You may not engage in an occupation involving machinery or appliances for rest of today  You may not drink alcoholic beverages for at least 12 hours  Avoid making any critical decisions for at least 24 hour  DIET:   High fiber diet. - however -  remember your colon is empty and a heavy meal will produce gas. Avoid these foods:  vegetables, fried / greasy foods, carbonated drinks for today     ACTIVITY:  You may  resume your normal daily activities it is recommended that you spend the remainder of the day resting -  avoid any strenuous activity and driving. CALL M.D. ANY SIGN OF:   Increasing pain, nausea, vomiting  Abdominal distension (swelling)  New increased bleeding (oral or rectal)  Fever (chills)  Pain in chest area  Bloody discharge from nose or mouth  Shortness of breath      Follow-up Instructions:   Call Dr. Leong Hearing if any questions at [phone]. Results of procedure / biopsy in 7 to 10 days, we will call you with these results.   Your endoscopy showed irregular z-line   Your colonoscopy showed7 polyps and diverticulosis

## 2022-09-14 NOTE — H&P
Shanna Osman M.D.  [phone]            History and Physical       NAME:  Liliya Ignacio   :   1940   MRN:   [id number]       Referring Physician:  Nawaf Poe MD      Consult Date: 2022 1:17 PM    Chief Complaint:  FREYA    History of Present Illness:  Bryon Man is an [de-identified]year old male who presents with anemia and blood ins tool. Seen at [facility] ER  for wheezing and low blood pressure. Last colonoscopy was in  with 5 year recall. He had routine lab work while in the ER and reportedly had Hgb of 9. Per his wife, he had positive FOBT. He denies black/tarry or bloody stool. No abdominal pain or change in bowel habits. Has 2-3 BM's a day. No unintentional weight loss. He denies nausea, vomiting, heartburn or acid reflux. Per his wife he has coughing spells with food. This happens with every meal. Feels like the foods are getting stuck in his upper esophagus. History of CAD s/p stent placement. On Plavix daily. Reportedly has had \"many heart attacks\"      PMH:  Past Medical History:   Diagnosis Date    Alzheimer's dementia ([facility] [address]     BPH (benign prostatic hyperplasia)     CAD (coronary artery disease)     Carotid artery stenosis     CKD (chronic kidney disease)     COPD (chronic obstructive pulmonary disease) ([facility])     HLD (hyperlipidemia)     T2DM (type 2 diabetes mellitus) ([facility])        PSH:  Past Surgical History:   Procedure Laterality Date    HX LAP CHOLECYSTECTOMY      HX ROTATOR CUFF REPAIR      AL CARDIAC SURG PROCEDURE UNLIST  2010    CABG and stents       Allergies:   Allergies   Allergen Reactions    Ativan [Lorazepam] Other (comments)     Altered LOC  Patient able to tolerate 0.5mg    Dilaudid [Hydromorphone] Unknown (comments)    Lisinopril Cough    Adempas [Riociguat] Unknown (comments)    Codeine Unknown (comments)     Mental state= confusion and combative       Home Medications:  Prior to Admission Medications   Prescriptions Last Dose Informant Patient Reported? Taking? Advair Diskus 250-50 mcg/dose diskus inhaler 2022  No Yes   Sig: INHALE ONE PUFF BY MOUTH EVERY 12 HOURS   Blood-Glucose Meter monitoring kit 2022  No Yes   Sig: Please check blood glucose once a day in morning   Blood-Glucose Meter monitoring kit 2022  No Yes   Sig: One Touch Ultra Meter   Combivent Respimat  mcg/actuation inhaler 2022  No Yes   Sig: INHALE ONE PUFF BY MOUTH TWICE A DAY   HYDROcodone-acetaminophen (NORCO) 5-325 mg per tablet 2022  Yes No   Sig: TAKE 1 TABLET BY MOUTH TWICE DAILY AS NEEDED   Insulin Needles, Disposable, (UltiCare Pen Needle) 32 gauge x \" ndle 2022  No Yes   Sig: To check glucose as needed 3 times a day   LORazepam (ATIVAN) 0.5 mg tablet Not Taking  Yes No   Sig: Take 0.5 mg by mouth as needed for Anxiety. Patient not taking: Reported on 2022   Nebulizer & Compressor machine 2022  No Yes   Si Each by Does Not Apply route as needed for Wheezing or Shortness of Breath. Nebulizer Accessories kit 2022  No Yes   Sig: For albuterol q4-8hrs PRN   QUEtiapine (SEROquel) 25 mg tablet 2022  No Yes   Sig: Take 2 Tablets by mouth two (2) times a day. QUEtiapine (SEROquel) 50 mg tablet 2022  No Yes   Sig: Take 1 Tablet by mouth two (2) times a day. albuterol (PROVENTIL HFA, VENTOLIN HFA, PROAIR HFA) 90 mcg/actuation inhaler 2022  No Yes   Sig: Take 2 Puffs by inhalation every four (4) hours as needed for Wheezing. albuterol (PROVENTIL VENTOLIN) 2.5 mg /3 mL (0.083 %) nebu 2022  No No   Sig: 3 mL by Nebulization route every four (4) hours as needed for Wheezing. amLODIPine (NORVASC) 5 mg tablet 2022  No Yes   Sig: Take 1 Tablet by mouth daily. aspirin delayed-release 81 mg tablet 2022  Yes No   Sig: Take  by mouth daily.    atorvastatin (LIPITOR) 20 mg tablet 2022  Yes Yes   Sig: TAKE ONE TABLET BY MOUTH ONE TIME DAILY   buPROPion (WELLBUTRIN) 100 mg tablet 2022  No Yes   Sig: TAKE ONE TABLET BY MOUTH TWICE A DAY   bumetanide (BUMEX) 1 mg tablet Not Taking  No No   Sig: Take 1 Tab by mouth daily. Patient not taking: No sig reported   calcium-cholecalciferol, D3, (CALTRATE 600+D) tablet 2022  No No   Sig: TAKE ONE TABLET BY MOUTH ONE TIME DAILY   carvediloL (COREG) 12.5 mg tablet Not Taking  No No   Sig: TAKE ONE TABLET BY MOUTH TWICE A DAY WITH A MEAL   Patient not taking: No sig reported   cetirizine (ZYRTEC) 10 mg tablet 2022  No Yes   Sig: Take 1 Tablet by mouth daily. clopidogreL (PLAVIX) 75 mg tab 2022  No No   Sig: TAKE 1 TABLET BY MOUTH DAILY   donepezil (ARICEPT) 10 mg tablet 2022  Yes Yes   Sig: TAKE ONE TABLET BY MOUTH AT BEDTIME   fluticasone propionate (FLONASE) 50 mcg/actuation nasal spray 2022  No Yes   Si Bowie by Both Nostrils route daily. SHAKE LIQUID AND USE 1 SPRAY IN EACH NOSTRIL DAILY   glucose blood VI test strips (OneTouch Ultra Test) strip 2022  No Yes   Sig: USE TO CHECK BLOOD SUGAR FOUR TIMES DAILY   isosorbide mononitrate ER (IMDUR) 30 mg tablet 2022  No Yes   Sig: TAKE THREE TABLETS BY MOUTH TWICE A DAY   memantine (NAMENDA) 5 mg tablet 2022  No Yes   Sig: Take 1 Tablet by mouth two (2) times a day. sertraline (ZOLOFT) 100 mg tablet 2022  No Yes   Sig: Take 1.5 Tablets by mouth daily. tamsulosin (FLOMAX) 0.4 mg capsule 2022  No Yes   Sig: TAKE ONE CAPSULE BY MOUTH ONE TIME DAILY      Facility-Administered Medications: None       Hospital Medications:  No current facility-administered medications for this encounter. Social History:  Social History     Tobacco Use    Smoking status: Former     Packs/day: 2.00     Years: 30.00     Pack years: 60.00     Types: Cigarettes     Quit date:      Years since quittin.7     Passive exposure: Never    Smokeless tobacco: Never   Substance Use Topics    Alcohol use: Never       Family History:  History reviewed.  No pertinent family history. Review of Systems:      Constitutional: negative fever, negative chills, negative weight loss  Eyes:   negative visual changes  ENT:   negative sore throat, tongue or lip swelling  Respiratory:  negative cough, negative dyspnea  Cards:  negative for chest pain, palpitations, lower extremity edema  GI:   See HPI  :  negative for frequency, dysuria  Integument:  negative for rash and pruritus  Heme:  negative for easy bruising and gum/nose bleeding  Musculoskel: negative for myalgias,  back pain and muscle weakness  Neuro: negative for headaches, dizziness, vertigo  Psych:  negative for feelings of anxiety, depression       Objective:   Patient Vitals for the past 8 hrs:   BP Temp Pulse Resp SpO2 Height Weight   09/14/22 1309 134/71 98 °F (36.7 °C) 80 15 100 % 5' 4\" (1.626 m) 73.8 kg (162 lb 11.2 oz)     No intake/output data recorded. No intake/output data recorded. EXAM:     NEURO-a&o   HEENT-wnl   LUNGS-clear    COR-regular rate and rhythym     ABD-soft , no tenderness, no rebound, good bs     EXT-no edema     Data Review     No results for input(s): WBC, HGB, HCT, PLT, HGBEXT, HCTEXT, PLTEXT in the last 72 hours. No results for input(s): NA, K, CL, CO2, BUN, CREA, GLU, PHOS, CA in the last 72 hours. No results for input(s): AP, TBIL, TP, ALB, GLOB, GGT, AML, LPSE in the last 72 hours. No lab exists for component: SGOT, GPT, AMYP, HLPSE  No results for input(s): INR, PTP, APTT, INREXT in the last 72 hours.     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      Signed By: Arely Viera MD     9/14/2022  1:17 PM

## 2022-09-14 NOTE — PROCEDURES
Hailee Echavarria M.D.  [phone]           2022                EGD Operative Report  Vikki Galvan  :  1940  [address] [place] [address] Record Number:  [id number]      Indication:  FREYA      : Brian Arndt MD    Assistant -- None  Implants -- None    Referring Provider:  Ash Diana MD      Anesthesia/Sedation:  MAC anesthesia Propofol    Airway assessment: No airway problems anticipated    Pre-Procedural Exam:      Airway: clear, no airway problems anticipated  Heart: RRR, without gallops or rubs  Lungs: clear bilaterally without wheezes, crackles, or rhonchi  Abdomen: soft, nontender, nondistended, bowel sounds present  Mental Status: awake, alert and oriented to person, place and time       Procedure Details     After infomed consent was obtained for the procedure, with all risks and benefits of procedure explained the patient was taken to the endoscopy suite and placed in the left lateral decubitus position. Following sequential administration of sedation as per above, the endoscope was inserted into the mouth and advanced under direct vision to second portion of the duodenum. A careful inspection was made as the gastroscope was withdrawn, including a retroflexed view of the proximal stomach; findings and interventions are described below. Findings:   Esophagus:slightly irregular z-line noted  Stomach: normal   Duodenum/jejunum: normal    Therapies:  biopsy of GEJ  biopsy of stomach   biopsy of duodenal     Specimens: as above           Complications:   None; patient tolerated the procedure well. EBL:  None. Impression:    irregular Z-line otherwise normal exam    Recommendations:    -Await pathology.     Brian Arndt MD

## 2022-09-14 NOTE — PROGRESS NOTES
Endoscopy discharge instructions have been reviewed and given to patient. The patient verbalized understanding and acceptance of instructions. Dr. Sergio Dobbs discussed with patient and spouse procedure findings and next steps.

## 2022-09-14 NOTE — ANESTHESIA POSTPROCEDURE EVALUATION
Procedure(s):  COLONOSCOPY/EGD  ESOPHAGOGASTRODUODENOSCOPY (EGD)  ESOPHAGOGASTRODUODENAL (EGD) BIOPSY  ENDOSCOPIC POLYPECTOMY  COLON BIOPSY. MAC    Anesthesia Post Evaluation      Multimodal analgesia: multimodal analgesia not used between 6 hours prior to anesthesia start to PACU discharge  Patient location during evaluation: PACU  Patient participation: complete - patient participated  Level of consciousness: awake  Pain management: adequate  Airway patency: patent  Anesthetic complications: no  Cardiovascular status: acceptable, blood pressure returned to baseline and hemodynamically stable  Respiratory status: acceptable  Hydration status: acceptable  Post anesthesia nausea and vomiting:  controlled  Final Post Anesthesia Temperature Assessment:  Normothermia (36.0-37.5 degrees C)      INITIAL Post-op Vital signs:   Vitals Value Taken Time   /79 09/14/22 1609   Temp     Pulse 76 09/14/22 1610   Resp 19 09/14/22 1610   SpO2 100 % 09/14/22 1610   Vitals shown include unvalidated device data.

## 2022-09-14 NOTE — PROCEDURES
Anthony Guido M.D.  [phone]            2022          Colonoscopy Operative Report  Adi Hunt  :  1940  [facility] Medical Record Number:  [id number]      Indications:   FREYA      :  Tracey Velez MD    Assistant -- None  Implants -- None    Referring Provider: Osvaldo Huffman MD    Sedation:  MAC anesthesia Propofol    Pre-Procedural Exam:      Airway: clear,  No airway problems anticipated  Heart: RRR, without gallops or rubs  Lungs: clear bilaterally without wheezes, crackles, or rhonchi  Abdomen: soft, nontender, nondistended, bowel sounds present  Mental Status: awake, alert and oriented to person, place and time     Procedure Details:  After informed consent was obtained with all risks and benefits of procedure explained and preoperative exam completed, the patient was taken to the endoscopy suite and placed in the left lateral decubitus position. Upon sequential sedation as per above, a digital rectal exam was performed. The Olympus videocolonoscope  was inserted in the rectum and carefully advanced to the cecum, which was identified by the ileocecal valve. The quality of preparation was good. The colonoscope was slowly withdrawn with careful inspection and evaluation between folds. Retroflexion in the rectum was performed. Findings:     Cecum: 1  Sessile polyp(s), the largest 8 mm in size; Inflammation noted at the appendix. Biopsies obtained  Ascending Colon:  6  Sessile polyp(s), the largest 8 mm in size;  Transverse Colon:     - Diverticulosis  Descending Colon:     - Diverticulosis  Sigmoid:     - Diverticulosis  Rectum: normal    Interventions:  biopsy of colon appendiceal orifice  6 complete polypectomy were performed using cold snare  and the polyps were  retrieved    Specimen Removed:  specimen #1, 8 mm in size, located in the cecum and the ascending colon removed by cold snare and retrieved for pathology    Complications: None. EBL:  None. Impression:  7 polyps removed as above                        Inflammation noted at the appendix. Biopsies obtained - unknown significance    Recommendations:  -Await pathology. -Repeat colonoscopy in 3 years. Discharge Disposition:  Home in the company of a  when able to ambulate.     Bing Martinez MD  9/14/2022  3:45 PM

## 2022-09-14 NOTE — PERIOP NOTES
Received recovery report from Anesthesia team, see anesthesia note. ABD remains soft and non-tender post procedure. Pt has no complaints at this time and tolerated the procedure well. Endoscope was pre-cleaned at bedside immediately following procedure by Francisco Sims RN. Post recovery report given to Yeimy Jerez RN.

## 2022-09-14 NOTE — PROGRESS NOTES
Darrion Carnes  1940  [id number]    Situation:  Verbal report received from: Larry Campos RN  Procedure: Procedure(s):  COLONOSCOPY/EGD  ESOPHAGOGASTRODUODENOSCOPY (EGD)  ESOPHAGOGASTRODUODENAL (EGD) BIOPSY  ENDOSCOPIC POLYPECTOMY  COLON BIOPSY    Background:    Preoperative diagnosis: ANEMIA  Postoperative diagnosis: Diverticulosis  Normal EGD  Colon Polyps    :  Dr. Michelle Lala  Assistant(s): Endoscopy Technician-1: Kaylin Rivas  Endoscopy RN-1: Giancarlo Busby RN    Specimens:   ID Type Source Tests Collected by Time Destination   1 : Duodenal Biopsies Preservative Duodenum  Liat Xie MD 9/14/2022 1516 Pathology   2 : Gastric Biopsies Preservative Gastric  Liat Xie MD 9/14/2022 1516 Pathology   3 : [place]   Liat Xie MD 9/14/2022 1518 Pathology   4 : Ascending Colon Polyps Preservative Colon, Ascending  Liat Xie MD 9/14/2022 1524 Pathology   5 : Cecal Polyp Preservative Cecum  Liat Xie MD 9/14/2022 1541 Pathology   6 : Appendiceal Orrifice Biopsy Preservative   Liat Xie MD 9/14/2022 1542 Pathology     H. Pylori  no    Assessment:  Intra-procedure medications   Anesthesia gave intra-procedure sedation and medications, see anesthesia flow sheet yes    Intravenous fluids: NS@ KVO     Vital signs stable yes    Abdominal assessment: round and soft yes    Recommendation:  Discharge patient per MD order yes.   Family or Friend wife  Permission to share finding with family or friend yes

## 2022-09-28 NOTE — TELEPHONE ENCOUNTER
----- Message from Martha Scott sent at 6/10/2020  4:56 PM EDT -----  Regarding: Dr. Ulysses Hernandez first and last name:  Leonardo from [employer] delivered  Reason for call:  f/up on forms for certificate of medical necessity to receive incontinence supplies  Callback required yes/no and why: yes   Best contact number(s): [phone]  Details to clarify the request: done

## 2022-09-30 ENCOUNTER — HOSPITAL ENCOUNTER (OUTPATIENT)
Dept: LAB | Age: 82
Discharge: HOME | End: 2022-09-30

## 2022-09-30 ENCOUNTER — OFFICE VISIT (OUTPATIENT)
Dept: FAMILY MEDICINE CLINIC | Age: 82
End: 2022-09-30
Payer: MEDICARE

## 2022-09-30 VITALS
HEIGHT: 64 IN | HEART RATE: 83 BPM | DIASTOLIC BLOOD PRESSURE: 63 MMHG | WEIGHT: 170 LBS | OXYGEN SATURATION: 95 % | BODY MASS INDEX: 29.02 KG/M2 | RESPIRATION RATE: 17 BRPM | SYSTOLIC BLOOD PRESSURE: 114 MMHG | TEMPERATURE: 97.8 F

## 2022-09-30 DIAGNOSIS — E03.8 SUBCLINICAL HYPOTHYROIDISM: ICD-10-CM

## 2022-09-30 DIAGNOSIS — R79.89 ELEVATED TSH: ICD-10-CM

## 2022-09-30 DIAGNOSIS — D64.9 ANEMIA, UNSPECIFIED TYPE: Primary | ICD-10-CM

## 2022-09-30 DIAGNOSIS — R22.9 SKIN MASS: ICD-10-CM

## 2022-09-30 PROCEDURE — 11200 RMVL SKIN TAGS UP TO&INC 15: CPT

## 2022-09-30 PROCEDURE — G8536 NO DOC ELDER MAL SCRN: HCPCS

## 2022-09-30 PROCEDURE — G8752 SYS BP LESS 140: HCPCS

## 2022-09-30 PROCEDURE — 1101F PT FALLS ASSESS-DOCD LE1/YR: CPT

## 2022-09-30 PROCEDURE — G8510 SCR DEP NEG, NO PLAN REQD: HCPCS

## 2022-09-30 PROCEDURE — G8417 CALC BMI ABV UP PARAM F/U: HCPCS

## 2022-09-30 PROCEDURE — 99214 OFFICE O/P EST MOD 30 MIN: CPT

## 2022-09-30 PROCEDURE — G8427 DOCREV CUR MEDS BY ELIG CLIN: HCPCS

## 2022-09-30 PROCEDURE — 1123F ACP DISCUSS/DSCN MKR DOCD: CPT

## 2022-09-30 PROCEDURE — G0463 HOSPITAL OUTPT CLINIC VISIT: HCPCS

## 2022-09-30 PROCEDURE — G8754 DIAS BP LESS 90: HCPCS

## 2022-09-30 RX ORDER — LANOLIN ALCOHOL/MO/W.PET/CERES
CREAM (GRAM) TOPICAL
COMMUNITY

## 2022-09-30 NOTE — PROGRESS NOTES
[address] [facility] [address] [address]   Office [phone], Fax [phone]    Subjective:     Chief Complaint   Patient presents with    Mole     On upper back         HPI:  Ivy Ham is a [de-identified] y.o. male with a history of HTN, COPD, T2DM that presents for: Upper Back Skin Mass. Patient with Dementia, majority of history collected from wife at bedside. Skin Mass:  - on back, has been present for years but with significant growth noted over the last week. Is painful to the touch, was aggravated last week with a back scratcher.   - No history of skin cancer.   - Denies any bleeding or discharge  - Patient denies any systemic symptoms, denies fever   - Patient states it significantly bothers him, painful just to get dressed. Hypotension:   - recently seen for positive FOBT, folloewd up with GI two weeks ago and removed polyps. Patient never saw any blood at that time, and has not noted any active bleed or melena since then. However patient has had increasing dizziness. - Last CBC, Hgb was 9.5  Taking iron daily. - acting dizzy per wife, no falls or passing out. Health Maintenance:  Health Maintenance Due   Topic Date Due    Eye Exam Retinal or Dilated  Never done    Shingrix Vaccine Age 50> (1 of 2) Never done    Low dose CT lung screening  Never done    MICROALBUMIN Q1  07/23/2022    Lipid Screen  07/23/2022    Medicare Yearly Exam  07/24/2022    Flu Vaccine (1) 08/01/2022          Past Medical Hx  I personally reviewed. Past Medical History:   Diagnosis Date    Alzheimer's dementia ([facility] [address]     BPH (benign prostatic hyperplasia)     CAD (coronary artery disease)     Carotid artery stenosis     CKD (chronic kidney disease)     COPD (chronic obstructive pulmonary disease) (HCC)     HLD (hyperlipidemia)     T2DM (type 2 diabetes mellitus) ([facility] [address]         SocHx   I personally reviewed.   Social History     Socioeconomic History    Marital status:      Spouse name: Not on file Number of children: Not on file    Years of education: Not on file    Highest education level: Not on file   Occupational History    Not on file   Tobacco Use    Smoking status: Former     Packs/day: 2.00     Years: 30.00     Pack years: 60.00     Types: Cigarettes     Quit date:      Years since quittin.7     Passive exposure: Never    Smokeless tobacco: Never   Vaping Use    Vaping Use: Never used   Substance and Sexual Activity    Alcohol use: Never    Drug use: Never    Sexual activity: Not Currently   Other Topics Concern    Not on file   Social History Narrative    Not on file     Social Determinants of Health     Financial Resource Strain: Not on file   Food Insecurity: Not on file   Transportation Needs: Not on file   Physical Activity: Not on file   Stress: Not on file   Social Connections: Not on file   Intimate Partner Violence: Not on file   Housing Stability: Not on file        Allergies  I personally reviewed. Allergies   Allergen Reactions    Ativan [Lorazepam] Other (comments)     Altered LOC  Patient able to tolerate 0.5mg    Dilaudid [Hydromorphone] Unknown (comments)    Lisinopril Cough    Adempas [Riociguat] Unknown (comments)    Codeine Unknown (comments)     Mental state= confusion and combative        Medications  I personally reviewed. Current Outpatient Medications on File Prior to Visit   Medication Sig Dispense Refill    ferrous sulfate (Iron) 325 mg (65 mg iron) tablet Take  by mouth Daily (before breakfast). memantine (NAMENDA) 5 mg tablet Take 1 Tablet by mouth two (2) times a day. 120 Tablet 0    QUEtiapine (SEROquel) 25 mg tablet Take 2 Tablets by mouth two (2) times a day. 180 Tablet 0    QUEtiapine (SEROquel) 50 mg tablet Take 1 Tablet by mouth two (2) times a day. 180 Tablet 0    albuterol (PROVENTIL VENTOLIN) 2.5 mg /3 mL (0.083 %) nebu 3 mL by Nebulization route every four (4) hours as needed for Wheezing.  30 Nebule 4    sertraline (ZOLOFT) 100 mg tablet Take 1.5 Tablets by mouth daily. 1 Tablet 0    cetirizine (ZYRTEC) 10 mg tablet Take 1 Tablet by mouth daily. 90 Tablet 1    isosorbide mononitrate ER (IMDUR) 30 mg tablet TAKE THREE TABLETS BY MOUTH TWICE A  Tablet 1    HYDROcodone-acetaminophen (NORCO) 5-325 mg per tablet TAKE 1 TABLET BY MOUTH TWICE DAILY AS NEEDED      Combivent Respimat  mcg/actuation inhaler INHALE ONE PUFF BY MOUTH TWICE A DAY 12 g 3    fluticasone propionate (FLONASE) 50 mcg/actuation nasal spray 1 Iselin by Both Nostrils route daily. SHAKE LIQUID AND USE 1 SPRAY IN EACH NOSTRIL DAILY 3 Each 3    tamsulosin (FLOMAX) 0.4 mg capsule TAKE ONE CAPSULE BY MOUTH ONE TIME DAILY 90 Capsule 2    glucose blood VI test strips (OneTouch Ultra Test) strip USE TO CHECK BLOOD SUGAR FOUR TIMES DAILY 400 Strip 4    Insulin Needles, Disposable, (UltiCare Pen Needle) 32 gauge x 1/4\" ndle To check glucose as needed 3 times a day 100 Pen Needle 3    buPROPion (WELLBUTRIN) 100 mg tablet TAKE ONE TABLET BY MOUTH TWICE A  Tablet 4    albuterol (PROVENTIL HFA, VENTOLIN HFA, PROAIR HFA) 90 mcg/actuation inhaler Take 2 Puffs by inhalation every four (4) hours as needed for Wheezing. 3 Each 3    Advair Diskus 250-50 mcg/dose diskus inhaler INHALE ONE PUFF BY MOUTH EVERY 12 HOURS 60 Each 0    calcium-cholecalciferol, D3, (CALTRATE 600+D) tablet TAKE ONE TABLET BY MOUTH ONE TIME DAILY 90 Tab 3    Nebulizer & Compressor machine 1 Each by Does Not Apply route as needed for Wheezing or Shortness of Breath. 1 Each 0    bumetanide (BUMEX) 1 mg tablet Take 1 Tab by mouth daily.  90 Tab 1    Nebulizer Accessories kit For albuterol q4-8hrs PRN 1 Kit 0    Blood-Glucose Meter monitoring kit One Touch Ultra Meter 1 Kit 0    Blood-Glucose Meter monitoring kit Please check blood glucose once a day in morning 1 Kit 0    atorvastatin (LIPITOR) 20 mg tablet TAKE ONE TABLET BY MOUTH ONE TIME DAILY  1    donepezil (ARICEPT) 10 mg tablet TAKE ONE TABLET BY MOUTH AT BEDTIME  6 aspirin delayed-release 81 mg tablet Take  by mouth daily. clopidogreL (PLAVIX) 75 mg tab TAKE 1 TABLET BY MOUTH DAILY (Patient not taking: Reported on 9/30/2022) 30 Tablet 3    carvediloL (COREG) 12.5 mg tablet TAKE ONE TABLET BY MOUTH TWICE A DAY WITH A MEAL (Patient not taking: No sig reported) 60 Tablet 2    LORazepam (ATIVAN) 0.5 mg tablet Take 0.5 mg by mouth as needed for Anxiety. (Patient not taking: No sig reported)       No current facility-administered medications on file prior to visit. ROS:   Review of Systems   Constitutional:  Negative for chills and fever. HENT:  Negative for congestion, ear pain, hearing loss and sore throat. Eyes:  Negative for blurred vision, double vision and pain. Respiratory:  Negative for cough, hemoptysis and shortness of breath. Cardiovascular:  Negative for chest pain, palpitations and leg swelling. Gastrointestinal:  Negative for abdominal pain, blood in stool, diarrhea, melena, nausea and vomiting. Genitourinary:  Negative for dysuria. Musculoskeletal:  Negative for back pain. Skin:  Negative for rash. Neurological:  Positive for dizziness and weakness. Negative for sensory change, focal weakness, loss of consciousness and headaches. Endo/Heme/Allergies:  Does not bruise/bleed easily. Psychiatric/Behavioral:  Positive for memory loss. Negative for depression and suicidal ideas. Objective:   Vitals  I personally reviewed. Visit Vitals  /63   Pulse 83   Temp 97.8 °F (36.6 °C) (Temporal)   Resp 17   Ht 5' 4\" (1.626 m)   Wt 170 lb (77.1 kg)   SpO2 95%   BMI 29.18 kg/m²        Physical Exam:   Physical Exam  Constitutional:       Appearance: Normal appearance. HENT:      Head: Normocephalic and atraumatic. Right Ear: External ear normal.      Left Ear: External ear normal.   Cardiovascular:      Rate and Rhythm: Normal rate and regular rhythm. Pulses: Normal pulses. Heart sounds: Normal heart sounds.  No murmur heard.  Pulmonary:      Effort: Pulmonary effort is normal. No respiratory distress. Breath sounds: Normal breath sounds. No stridor. No wheezing. Abdominal:      General: Abdomen is flat. Palpations: Abdomen is soft. Skin:     General: Skin is warm and dry. Findings: No rash. Comments: 4mm pedunculated skin mass noted over center back, non pigmented with no signs of discharge. No erythema or streaking, not warm. Neurological:      General: No focal deficit present. Mental Status: He is alert and oriented to person, place, and time. PROCEDURE NOTE:     Procedure: Skin Mass Removal    Informed consent obtained verbally and risks, benefits and alternatives discussed. Time out performed, cross checking patient ID and procedure. Time 5:00pm     The area was cleaned and prepped using alcohol and chlorhexidine. Procedure performed using 1cc lido with epi, pick ups and scissors used to excise mass from the base. Hemostasis quickly achieved with silver nitrate. The patient tolerated the procedure well and there were no complications. Mary Kay Casey MD       Supervised by: Dr. Carmelita Castro     Assessment/Plan:     [de-identified] male with history of dementia presenting for skin mass on back. Mass enlarging and painful over the last week, however no pigmentation or bleeding. Removal today which patient tolerated well, will send for pathology. Of note, patient also with hypotension noted today in clinic, and wife endorsing patient has been acting dizzy at home, no falls or LOC. Check CBC given recent GI bleed (multiple colon polyps removed, no active bleed at this time, and anemia). Also following up on TSH. Patient instructed to stop Amlodipine 5mg today due to hypotension. Patient to keep home log and follow up in 2 weeks for hypotension. Diagnoses and all orders for this visit:    1. Anemia, unspecified type  -     CBC W/O DIFF; Future    2.  Elevated TSH  -     TSH 3RD GENERATION; Future  -     T4, FREE; Future    3. Subclinical hypothyroidism  -     TSH 3RD GENERATION; Future  -     T4, FREE; Future    4. Skin mass  -     SURGICAL PATHOLOGY; Future       Follow-up and Dispositions    Return in about 2 weeks (around 10/14/2022) for [facility] Follow Up for Medicare Wellness . Pt was discussed with Dr Juan Pablo Jernigan (attending physician). I have reviewed patient medical and social history and medications. I have reviewed pertinent labs results and other data. I have discussed the diagnosis with the patient and the intended plan as seen in the above orders. The patient has received an after-visit summary and questions were answered concerning future plans. I have discussed medication side effects and warnings with the patient as well.     Hussain Perez MD  Resident [facility]  10/03/22

## 2022-09-30 NOTE — PROGRESS NOTES
Chief Complaint   Patient presents with    Mole     On upper back     1. Have you been to the ER, urgent care clinic since your last visit? Hospitalized since your last visit? No    2. Have you seen or consulted any other health care providers outside of the [address] [place] [address] since your last visit? Include any pap smears or colon screening.  No

## 2022-10-01 LAB
ERYTHROCYTE [DISTWIDTH] IN BLOOD BY AUTOMATED COUNT: 13.8 % (ref 11.5–14.5)
HCT VFR BLD AUTO: 34.6 % (ref 36.6–50.3)
HGB BLD-MCNC: 10.7 G/DL (ref 12.1–17)
MCH RBC QN AUTO: 29.2 PG (ref 26–34)
MCHC RBC AUTO-ENTMCNC: 30.9 G/DL (ref 30–36.5)
MCV RBC AUTO: 94.3 FL (ref 80–99)
NRBC # BLD: 0 K/UL (ref 0–0.01)
NRBC BLD-RTO: 0 PER 100 WBC
PLATELET # BLD AUTO: 187 K/UL (ref 150–400)
PMV BLD AUTO: 10.7 FL (ref 8.9–12.9)
RBC # BLD AUTO: 3.67 M/UL (ref 4.1–5.7)
T4 FREE SERPL-MCNC: 0.8 NG/DL (ref 0.8–1.5)
TSH SERPL DL<=0.05 MIU/L-ACNC: 3.44 UIU/ML (ref 0.36–3.74)
WBC # BLD AUTO: 10 K/UL (ref 4.1–11.1)

## 2022-10-03 NOTE — PROGRESS NOTES
[address] Medicine Residency Attending Addendum:  I saw and evaluated the patient on the day of the encounter with Stanley Muir MD  , performing the key elements of the service. I discussed the findings, assessment and plan with the resident and agree with the resident's findings and plan as documented in the resident's note.       George Cedeno MD, CAQSM, RMSK

## 2022-10-04 NOTE — PROGRESS NOTES
CBC recheck for anemia after GI bleed, slightly increased and now appears to be near baseline. Continue po iron.   TSH normal.

## 2022-10-10 ENCOUNTER — TELEPHONE (OUTPATIENT)
Dept: FAMILY MEDICINE CLINIC | Age: 82
End: 2022-10-10

## 2022-10-10 NOTE — TELEPHONE ENCOUNTER
Pt spouse would like to receive a call to hear back on lab report and biopsy that was done. Pt spouse also mentioned if she isn't able to take the call that it is okay to leave the report through a vm.  Please advise  Make sure to call house number instead of cell

## 2022-10-12 NOTE — TELEPHONE ENCOUNTER
Was able to print of patient results and give it to him. They are also concern because he did get a biopsy and never was called about that. Wife said you can leave a detailed message on their phone if you can not get a hold of them.

## 2022-10-19 RX ORDER — MEMANTINE HYDROCHLORIDE 5 MG/1
TABLET ORAL
Qty: 120 TABLET | Refills: 0 | Status: SHIPPED | OUTPATIENT
Start: 2022-10-19

## 2022-11-09 ENCOUNTER — OFFICE VISIT (OUTPATIENT)
Dept: FAMILY MEDICINE CLINIC | Age: 82
End: 2022-11-09
Payer: MEDICARE

## 2022-11-09 VITALS
DIASTOLIC BLOOD PRESSURE: 67 MMHG | HEIGHT: 64 IN | RESPIRATION RATE: 18 BRPM | OXYGEN SATURATION: 95 % | SYSTOLIC BLOOD PRESSURE: 132 MMHG | WEIGHT: 168.8 LBS | TEMPERATURE: 97.4 F | HEART RATE: 74 BPM | BODY MASS INDEX: 28.82 KG/M2

## 2022-11-09 DIAGNOSIS — F02.80 ALZHEIMER'S DISEASE, UNSPECIFIED (HCC): ICD-10-CM

## 2022-11-09 DIAGNOSIS — R41.82 ALTERED MENTAL STATUS, UNSPECIFIED ALTERED MENTAL STATUS TYPE: Primary | ICD-10-CM

## 2022-11-09 DIAGNOSIS — R35.0 FREQUENCY OF URINATION: ICD-10-CM

## 2022-11-09 DIAGNOSIS — G30.9 ALZHEIMER'S DISEASE, UNSPECIFIED (HCC): ICD-10-CM

## 2022-11-09 LAB
BILIRUB UR QL STRIP: NEGATIVE
GLUCOSE UR-MCNC: NEGATIVE MG/DL
KETONES P FAST UR STRIP-MCNC: NEGATIVE MG/DL
PH UR STRIP: 5.5 [PH] (ref 4.6–8)
PROT UR QL STRIP: NEGATIVE
SP GR UR STRIP: 1.02 (ref 1–1.03)
UA UROBILINOGEN AMB POC: NORMAL (ref 0.2–1)
URINALYSIS CLARITY POC: NORMAL
URINALYSIS COLOR POC: YELLOW
URINE BLOOD POC: NEGATIVE
URINE LEUKOCYTES POC: NORMAL
URINE NITRITES POC: NEGATIVE

## 2022-11-09 PROCEDURE — 81003 URINALYSIS AUTO W/O SCOPE: CPT | Performed by: STUDENT IN AN ORGANIZED HEALTH CARE EDUCATION/TRAINING PROGRAM

## 2022-11-09 PROCEDURE — 1123F ACP DISCUSS/DSCN MKR DOCD: CPT | Performed by: STUDENT IN AN ORGANIZED HEALTH CARE EDUCATION/TRAINING PROGRAM

## 2022-11-09 PROCEDURE — G8432 DEP SCR NOT DOC, RNG: HCPCS | Performed by: STUDENT IN AN ORGANIZED HEALTH CARE EDUCATION/TRAINING PROGRAM

## 2022-11-09 PROCEDURE — 99213 OFFICE O/P EST LOW 20 MIN: CPT | Performed by: STUDENT IN AN ORGANIZED HEALTH CARE EDUCATION/TRAINING PROGRAM

## 2022-11-09 PROCEDURE — G8536 NO DOC ELDER MAL SCRN: HCPCS | Performed by: STUDENT IN AN ORGANIZED HEALTH CARE EDUCATION/TRAINING PROGRAM

## 2022-11-09 PROCEDURE — G8427 DOCREV CUR MEDS BY ELIG CLIN: HCPCS | Performed by: STUDENT IN AN ORGANIZED HEALTH CARE EDUCATION/TRAINING PROGRAM

## 2022-11-09 PROCEDURE — 3078F DIAST BP <80 MM HG: CPT | Performed by: STUDENT IN AN ORGANIZED HEALTH CARE EDUCATION/TRAINING PROGRAM

## 2022-11-09 PROCEDURE — G0463 HOSPITAL OUTPT CLINIC VISIT: HCPCS | Performed by: STUDENT IN AN ORGANIZED HEALTH CARE EDUCATION/TRAINING PROGRAM

## 2022-11-09 PROCEDURE — G8754 DIAS BP LESS 90: HCPCS | Performed by: STUDENT IN AN ORGANIZED HEALTH CARE EDUCATION/TRAINING PROGRAM

## 2022-11-09 PROCEDURE — G8752 SYS BP LESS 140: HCPCS | Performed by: STUDENT IN AN ORGANIZED HEALTH CARE EDUCATION/TRAINING PROGRAM

## 2022-11-09 PROCEDURE — 1101F PT FALLS ASSESS-DOCD LE1/YR: CPT | Performed by: STUDENT IN AN ORGANIZED HEALTH CARE EDUCATION/TRAINING PROGRAM

## 2022-11-09 PROCEDURE — 3074F SYST BP LT 130 MM HG: CPT | Performed by: STUDENT IN AN ORGANIZED HEALTH CARE EDUCATION/TRAINING PROGRAM

## 2022-11-09 PROCEDURE — G8417 CALC BMI ABV UP PARAM F/U: HCPCS | Performed by: STUDENT IN AN ORGANIZED HEALTH CARE EDUCATION/TRAINING PROGRAM

## 2022-11-09 RX ORDER — CEPHALEXIN 500 MG/1
500 CAPSULE ORAL 4 TIMES DAILY
COMMUNITY

## 2022-11-09 NOTE — PROGRESS NOTES
[address] [address]   Office [phone]  Fax [phone]     11/11/2022   Chief Complaint   Patient presents with    Recurrent UTI       HPI:  Danielle Lauren is a [de-identified] y.o. male who presents to clinic today for acute sick visit - continuing waxing and waning AMS    [de-identified] yo M with advanced dementia with AMS waxing and waning since 11/1.   - Neuro made med changes on 10/19. Increased Namenda from 5mg BID to 10mg BID. Scheduled Seroquel  - R toenail routine trim on 11/1 and the tech had cut the toe. - Seen at [facility] ER on 11/4. Prescribed Keflex 500mg BID for UTI. CT scan, CXR were done and not remarkable per wife   - Spouse reports patient continuing to acting altered like he usually does when he has UTI despite being on antibiotics   - Spouse called [facility] ER and urine culture did not grow anything  - Podiatrist saw patient again yesterday, been following podiatrist instructions. Surrounding erythema has stayed the same since toes was cut. Podiatrist rx'd topical antibiotic  - Spouse provides most of the history due to patent's dementia  - Recently lost home aid. Trying to find another one    Patients past medical, surgical and family histories were reviewed. Allergies and Medications reviewed and updated. Review of Systems   Constitutional:  Negative for fever. HENT:  Negative for congestion. Respiratory:  Negative for cough and shortness of breath. Gastrointestinal:  Negative for abdominal pain, constipation and vomiting. Objective:  Vitals:    11/09/22 1803   BP: 132/67   Pulse: 74   Resp: 18   Temp: 97.4 °F (36.3 °C)   TempSrc: Oral   SpO2: 95%   Weight: 168 lb 12.8 oz (76.6 kg)   Height: 5' 4\" (1.626 m)     Body mass index is 28.97 kg/m². Physical Exam  General: Patient alert and oriented and in NAD  HEENT: PER/EOMI, no conjunctival pallor or scleral icterus. Heart: Regular rate and rhythm, No murmurs, rubs or gallops.   2+ peripheral pulses  Lungs: Clear to auscultation bilaterally, no wheezing, rales or rhonchi  Abd: +BS, non-tender, non-distended  Ext: No edema. R toe cut with surrounding erythema. No streaking. Tenderness only over cut. No increased warmth  Skin: No rashes or lesions noted on exposed skin,  Psych: Alert. Answering simple questions appropriately    No results found for this or any previous visit (from the past 24 hour(s)). Assessment and Plan:  Diagnoses and all orders for this visit:    1. Altered mental status, unspecified altered mental status type  Assessment & Plan:  Acute. Waxing and waning. Ddx less likely UTI given UA with only sm LE and prev UCx negative. Ddx includes medication side effect, progressing dementia, other cause such as time change  - Continue to reorient patient when possible  - Decrease dose of Namenda back to 5mg BID  - UCx to rule out UTI  - Follow up 2 weeks, sooner if worsening   - Gave ER precautions      2. Alzheimer's disease, unspecified ([facility] [address]  Assessment & Plan:  Chronic. Follows with Neuro  - Decreased dose of namenda to previous rx 5mg BID  - Follow up with neuro  - Referral to social work for help with home resources, eg. satinder eaid    Orders:  -     REFERRAL TO SOCIAL WORK    3. Frequency of urination  -     AMB POC URINALYSIS DIP STICK AUTO W/O MICRO  -     CULTURE, URINE; Future        Follow-up and Dispositions    Return in about 2 weeks (around 11/23/2022) for AMS. Then Yearly medicare wellness visit. I have discussed the aforementioned diagnoses and plan with the patient in detail. I have provided information in person and/or in AVS. All questions answered prior to discharge.     Patient not seen by attending physician due to COVID pandemic     I discussed this patient with Dr. Rena Beckham (Attending Physician)   Signed By:  Caterina Al MD     Family Medicine Resident

## 2022-11-09 NOTE — PROGRESS NOTES
Identified pt with two pt identifiers(name and ). Reviewed record in preparation for visit and have obtained necessary documentation. Chief Complaint   Patient presents with    Recurrent UTI        Health Maintenance Due   Topic    Eye Exam Retinal or Dilated     Shingrix Vaccine Age 50> (1 of 2)    Low dose CT lung screening     COVID-19 Vaccine (5 - Booster for Torres Peter series)    MICROALBUMIN Q1     Lipid Screen     Medicare Yearly Exam     Flu Vaccine (1)       Visit Vitals  /67 (BP 1 Location: Right upper arm, BP Patient Position: Sitting, BP Cuff Size: Adult)   Pulse 74   Temp 97.4 °F (36.3 °C) (Oral)   Resp 18   Ht 5' 4\" (1.626 m)   Wt 168 lb 12.8 oz (76.6 kg)   SpO2 95%   BMI 28.97 kg/m²         Coordination of Care Questionnaire:  :   1) Have you been to an emergency room, urgent care, or hospitalized since your last visit? If yes, where when, and reason for visit? Yes, [address], [address], UTI      2. Have seen or consulted any other health care provider since your last visit? If yes, where when, and reason for visit? NO        Patient is accompanied by spouse I have received verbal consent from Danielle Lauren to discuss any/all medical information while they are present in the room.

## 2022-11-11 PROBLEM — R41.82 ALTERED MENTAL STATUS: Status: ACTIVE | Noted: 2022-11-11

## 2022-11-11 NOTE — ASSESSMENT & PLAN NOTE
Acute. Waxing and waning. Ddx less likely UTI given UA with only sm LE and prev UCx negative.  Ddx includes medication side effect, progressing dementia, other cause such as time change  - Continue to reorient patient when possible  - Decrease dose of Namenda back to 5mg BID  - UCx to rule out UTI  - Follow up 2 weeks, sooner if worsening   - Gave ER precautions

## 2022-11-13 LAB
BACTERIA SPEC CULT: ABNORMAL
CC UR VC: ABNORMAL
SERVICE CMNT-IMP: ABNORMAL

## 2022-11-14 ENCOUNTER — PATIENT OUTREACH (OUTPATIENT)
Dept: CASE MANAGEMENT | Age: 82
End: 2022-11-14

## 2022-11-15 NOTE — PROGRESS NOTES
Initial Contact Social Work Note - Ambulatory  11/15/2022    Referral received from: Dr. Jb Celis   Reason for referral: resources for personal care aides    Previous SW Referral: No      Two Identifiers Verified: Yes    Insurance Provider: [employer]     Support System: Spouse/Partner      Status:  Not a      [facility] Providers: Medicaid [facility]: [address] phn: [phone]     ADL Assistance: Bathing and Dressing    Housing/Living Concerns or Home Modification Needs: No concerns reported    Transportation Concern: Spouse provides transportation and also has Medicaid transportation benefit    Medication Cost Concern: None reported    Medication Education or Adherence Concern: Medication management provided by providers    Financial Concern(s): None reported    Income (only if applicable): SSI     Ability to Read/Write:  limited due to cognitive impairment   dx: Alzheimer's Disease    Advance Care Plan:   Review in follow up call      Identified Needs:     Personal care agencies       Social Work Plan:    Next Steps: See SW goals for plan     Method of Communication with Provider (if appropriate): chart routing        Goals Addressed                   This Visit's Progress     Supportive resources in place to maintain patient in the community (ie., home health, equipment, DME, refer to, etc.)          11/15/2022  Follow up  SW received referral from pt's provider Jb Celis requesting follow up with pt/spouse to discuss resources for personal care aides. SW contacted pt/spouse introduced self and explained reason for call. Spouse stated that she is not satisfied with current personal care agency and will like to receive a list of personal care agencies that accept Medicaid insurance. Pt has cognitive impairment and generalized weakness which impacts his balance and his ability to safely perform ADLs.   Spouse does not have agency preferences and is willing to review a list for further contact. Plan  SW made spouse are of the current challenges of identifying an agency that has availability. SW explained to spouse the process of switching from one personal care agency to another, which involves ending services with current provider and providing a copy of pt's Uniform Assessment Instrument screening to the new agency once identified. SW will complete research to identify agencies that are accepting new Medicaid cases and follow up with spouse.     Elian Kahn  Ambulatory Care Coordination Team  Phone: [phone]

## 2022-11-17 DIAGNOSIS — E11.69 TYPE 2 DIABETES MELLITUS WITH OTHER SPECIFIED COMPLICATION, UNSPECIFIED WHETHER LONG TERM INSULIN USE (HCC): ICD-10-CM

## 2022-11-18 RX ORDER — BLOOD SUGAR DIAGNOSTIC
STRIP MISCELLANEOUS
Qty: 400 STRIP | Refills: 4 | Status: SHIPPED | OUTPATIENT
Start: 2022-11-18

## 2022-11-21 DIAGNOSIS — R45.1 AGITATION: ICD-10-CM

## 2022-11-21 NOTE — TELEPHONE ENCOUNTER
Peter Nguyen  Pt wife called wanted to request a med refill for Seroquel 50 mg. She stated their insurance will pay in a certain mg and that's 50mg. Her  have dementia taking seroquel 3x a day and they're out of medication. Needed ASAP.     Please and thank you      -marisa

## 2022-11-22 RX ORDER — QUETIAPINE FUMARATE 50 MG/1
50 TABLET, FILM COATED ORAL 3 TIMES DAILY
Qty: 270 TABLET | Refills: 3 | Status: SHIPPED | OUTPATIENT
Start: 2022-11-22 | End: 2022-11-29 | Stop reason: SDUPTHER

## 2022-11-23 ENCOUNTER — PATIENT OUTREACH (OUTPATIENT)
Dept: CASE MANAGEMENT | Age: 82
End: 2022-11-23

## 2022-11-23 NOTE — PROGRESS NOTES
Goals Addressed                   This Visit's Progress     Supportive resources in place to maintain patient in the community (ie., home health, equipment, DME, refer to, etc.)          11/23/2022  Follow up  SW left voicemail with pt's spouse requesting call back to discuss update with personal care search. SW will follow up at later date. Elian Holcomb Ambulatory Care Coordination Team  Phone: [phone]     11/15/2022  Follow up  SW received referral from pt's provider Sheila Correa requesting follow up with pt/spouse to discuss resources for personal care aides. SW contacted pt/spouse introduced self and explained reason for call. Spouse stated that she is not satisfied with current personal care agency and will like to receive a list of personal care agencies that accept Medicaid insurance. Pt has cognitive impairment and generalized weakness which impacts his balance and his ability to safely perform ADLs. Spouse does not have agency preferences and is willing to review a list for further contact. Plan  SW made spouse are of the current challenges of identifying an agency that has availability. SW explained to spouse the process of switching from one personal care agency to another, which involves ending services with current provider and providing a copy of pt's Uniform Assessment Instrument screening to the new agency once identified. SW will complete research to identify agencies that are accepting new Medicaid cases and follow up with spouse.     Elian Holcomb  Ambulatory Care Coordination Team  Phone: [phone]

## 2022-11-29 ENCOUNTER — TELEPHONE (OUTPATIENT)
Dept: FAMILY MEDICINE CLINIC | Age: 82
End: 2022-11-29

## 2022-11-29 ENCOUNTER — PATIENT OUTREACH (OUTPATIENT)
Dept: CASE MANAGEMENT | Age: 82
End: 2022-11-29

## 2022-11-29 ENCOUNTER — OFFICE VISIT (OUTPATIENT)
Dept: FAMILY MEDICINE CLINIC | Age: 82
End: 2022-11-29
Payer: MEDICARE

## 2022-11-29 VITALS
BODY MASS INDEX: 29.11 KG/M2 | DIASTOLIC BLOOD PRESSURE: 65 MMHG | HEIGHT: 64 IN | HEART RATE: 71 BPM | RESPIRATION RATE: 18 BRPM | SYSTOLIC BLOOD PRESSURE: 143 MMHG | WEIGHT: 170.5 LBS | OXYGEN SATURATION: 95 % | TEMPERATURE: 97.5 F

## 2022-11-29 DIAGNOSIS — R45.1 AGITATION: ICD-10-CM

## 2022-11-29 DIAGNOSIS — G30.9 ALZHEIMER'S DISEASE, UNSPECIFIED (HCC): Primary | ICD-10-CM

## 2022-11-29 DIAGNOSIS — N30.00 ACUTE CYSTITIS WITHOUT HEMATURIA: ICD-10-CM

## 2022-11-29 DIAGNOSIS — F02.80 ALZHEIMER'S DISEASE, UNSPECIFIED (HCC): Primary | ICD-10-CM

## 2022-11-29 PROCEDURE — G0463 HOSPITAL OUTPT CLINIC VISIT: HCPCS | Performed by: STUDENT IN AN ORGANIZED HEALTH CARE EDUCATION/TRAINING PROGRAM

## 2022-11-29 RX ORDER — LEVOFLOXACIN 750 MG/1
750 TABLET ORAL EVERY OTHER DAY
Qty: 3 TABLET | Refills: 0 | Status: SHIPPED | OUTPATIENT
Start: 2022-11-29

## 2022-11-29 RX ORDER — QUETIAPINE FUMARATE 50 MG/1
TABLET, FILM COATED ORAL
Qty: 270 TABLET | Refills: 3
Start: 2022-11-29

## 2022-11-29 NOTE — ASSESSMENT & PLAN NOTE
Dg at [address] [facility]. Had just been on course of Keflex earlier this month. - Adjusted medication based on previous cultures in our office: Stop Keflex.  Start Levquin 750mg every other day  - Follow up urine culture at ER

## 2022-11-29 NOTE — ASSESSMENT & PLAN NOTE
Worsening with agitation.  No change since decreasing Namenda dose  - Increase Seroquel to 100mg at bedtime  - Follow up with neurology  - Sundowning precautions discussed

## 2022-11-29 NOTE — PROGRESS NOTES
[address] [place] [address]   Office [phone]  Fax [phone]     11/29/2022   Chief Complaint   Patient presents with    Follow-up       HPI:  Luigi Nix is a [de-identified] y.o. male who presents to clinic today for follow up    - Went to [facility] ER on Sunday. Was diagnosed with UTI     Alzheimer:   - Agitated some nights. Pacing. Could not calm down. Posing danger to himself at times. Keeps wife awake   - Easily irritated during day  - Thinks wife is his mother  - Neurology appt in Feb  - No change since dropping down the Memantine    CKD  - Sees Dr. Alcon Lassiter Nephrology    Patients past medical, surgical and family histories were reviewed. Allergies and Medications reviewed and updated. ROS: See HPI    Objective:  Vitals:    11/29/22 1135   BP: (!) 143/65   Pulse: 71   Resp: 18   Temp: 97.5 °F (36.4 °C)   TempSrc: Temporal   SpO2: 95%   Weight: 170 lb 8 oz (77.3 kg)   Height: 5' 4\" (1.626 m)     Body mass index is 29.27 kg/m². Physical Exam  General: Patient alert and oriented and in NAD  HEENT: PER/EOMI, no conjunctival pallor or scleral icterus. Heart: Regular rate and rhythm, No murmurs, rubs or gallops. 2+ peripheral pulses  Lungs: Clear to auscultation bilaterally, no wheezing, rales or rhonchi  Abd: +BS, non-tender, non-distended  Ext: No edema  Skin: No rashes or lesions noted on exposed skin,  Psych: Appropriate mood and affect    Last kidney fnx:   Estimated Creatinine Clearance: 26.7 mL/min (A) (by C-G formula based on SCr of 2.07 mg/dL (H)).   Lab Results   Component Value Date/Time    Sodium 140 07/29/2022 01:46 PM    Potassium 4.2 07/29/2022 01:46 PM    Chloride 109 (H) 07/29/2022 01:46 PM    CO2 28 07/29/2022 01:46 PM    Anion gap 3 (L) 07/29/2022 01:46 PM    Glucose 95 07/29/2022 01:46 PM    BUN 34 (H) 07/29/2022 01:46 PM    Creatinine 2.07 (H) 07/29/2022 01:46 PM    BUN/Creatinine ratio 16 07/29/2022 01:46 PM    GFR est AA 38 (L) 07/29/2022 01:46 PM    GFR est non-AA 31 (L) 07/29/2022 01:46 PM    Calcium 8.5 07/29/2022 01:46 PM       Assessment and Plan:  Diagnoses and all orders for this visit:    1. Alzheimer's disease, unspecified ([facility] [address]  Assessment & Plan:  Worsening with agitation. No change since decreasing Namenda dose  - Increase Seroquel to 100mg at bedtime  - Follow up with neurology  - Sundowning precautions discussed      2. Agitation  -     QUEtiapine (SEROquel) 50 mg tablet; Take 50mg (1 tablet) in the morning and lunch time. Take 100mg (2 tablets) at bedtime    3. Acute cystitis without hematuria  Assessment & Plan:  Dg at [address] [facility]. Had just been on course of Keflex earlier this month. - Adjusted medication based on previous cultures in our office: Stop Keflex. Start Levquin 750mg every other day  - Follow up urine culture at ER    Orders:  -     levoFLOXacin (LEVAQUIN) 750 mg tablet; Take 1 Tablet by mouth every other day. Follow-up and Dispositions    Return in about 4 weeks (around 12/27/2022) for Alzheimers . I have discussed the aforementioned diagnoses and plan with the patient in detail. I have provided information in person and/or in AVS. All questions answered prior to discharge.      I discussed this patient with Dr. Pooja Patel (Attending Physician)   Signed By:  Madi Olivas MD     Family Medicine Resident

## 2022-11-29 NOTE — PROGRESS NOTES
Chief Complaint   Patient presents with    Follow-up     Visit Vitals  BP (!) 143/65 (BP 1 Location: Right upper arm, BP Patient Position: Sitting, BP Cuff Size: Large adult)   Pulse 71   Temp 97.5 °F (36.4 °C) (Temporal)   Resp 18   Ht 5' 4\" (1.626 m)   Wt 170 lb 8 oz (77.3 kg)   SpO2 95%   BMI 29.27 kg/m²     1. Have you been to the ER, urgent care clinic since your last visit? Hospitalized since your last visit? Yes. On 11/26/2022 went to [facility] ER for a UTI. 2. Have you seen or consulted any other health care providers outside of the [address] [place] [address] since your last visit? Include any pap smears or colon screening.  No

## 2022-11-30 NOTE — PROGRESS NOTES
Goals Addressed                   This Visit's Progress     Supportive resources in place to maintain patient in the community (ie., home health, equipment, DME, refer to, etc.)          11/29/22  SW received a list of personal care agencies to provide to spouse for outreach. SW left voicemail with spouse requesting call back. Rhiannon Caldwell [place] [address] Ambulatory Care Coordination Team  Phone: [phone]     11/23/2022  Follow up  SW left voicemail with pt's spouse requesting call back to discuss update with personal care search. SW will follow up at later date. [facility] [place] [address] Ambulatory Care Coordination Team  Phone: [phone]     11/15/2022  Follow up  SW received referral from pt's provider Teddy Richardson requesting follow up with pt/spouse to discuss resources for personal care aides. SW contacted pt/spouse introduced self and explained reason for call. Spouse stated that she is not satisfied with current personal care agency and will like to receive a list of personal care agencies that accept Medicaid insurance. Pt has cognitive impairment and generalized weakness which impacts his balance and his ability to safely perform ADLs. Spouse does not have agency preferences and is willing to review a list for further contact. Plan  SW made spouse are of the current challenges of identifying an agency that has availability. SW explained to spouse the process of switching from one personal care agency to another, which involves ending services with current provider and providing a copy of pt's Uniform Assessment Instrument screening to the new agency once identified. SW will complete research to identify agencies that are accepting new Medicaid cases and follow up with spouse.     [facility] Caldwell [place] [address] Ambulatory Care Coordination Team  Phone: [phone]

## 2022-12-06 ENCOUNTER — DOCUMENTATION ONLY (OUTPATIENT)
Dept: FAMILY MEDICINE CLINIC | Age: 82
End: 2022-12-06

## 2022-12-06 NOTE — PROGRESS NOTES
Patient seen by cardiology 11/30/22. Reviewed visit report. Changes made include discontinuation of Atorvastatin and Rx written for Zetia 10mg qhs. Has cardiology follow up in 3 months 2/2023.  Updated medication list.

## 2022-12-07 ENCOUNTER — PATIENT OUTREACH (OUTPATIENT)
Dept: CASE MANAGEMENT | Age: 82
End: 2022-12-07

## 2022-12-08 DIAGNOSIS — F02.80 ALZHEIMER'S DISEASE, UNSPECIFIED (HCC): Primary | ICD-10-CM

## 2022-12-08 DIAGNOSIS — G30.9 ALZHEIMER'S DISEASE, UNSPECIFIED (HCC): Primary | ICD-10-CM

## 2022-12-08 RX ORDER — MEMANTINE HYDROCHLORIDE 5 MG/1
5 TABLET ORAL 2 TIMES DAILY
Qty: 120 TABLET | Refills: 3 | Status: SHIPPED | OUTPATIENT
Start: 2022-12-08

## 2022-12-08 NOTE — TELEPHONE ENCOUNTER
Dr. Corrine Roman has the forms for the briefs it was given to her yesterday. She was just wondering if it was signed and faxed off yet.

## 2022-12-08 NOTE — TELEPHONE ENCOUNTER
Hi Dr. Esther Maki,    Mrs. Aldair Le called and said they did not receive the forms for Mr. Richard Rawls. Were you able to get an attending to sign the form? Also, Mr. Aldair Le need a new prescription for Namenda. She said that a doctor (she doesn't remember who) told him to take two pills twice a day and he was doing this for 3 weeks. He had an appointment with Dr. Cyn Bonilla who told him to only take 1 pill twice a day like it was originally  written and now he's running low on pills. Mrs. Aldair Le stated that she has spoken to the pharmacy and they informed her that they need the doctor to send a new script for the prescription.

## 2022-12-08 NOTE — PROGRESS NOTES
Goals Addressed                   This Visit's Progress     Supportive resources in place to maintain patient in the community (ie., home health, equipment, DME, refer to, etc.)          12/8/2022  SW left voicemail with spouse requesting call back for follow up.    11/29/22  SW received a list of personal care agencies to provide to spouse for outreach. SW left voicemail with spouse requesting call back. Elian Flores  Ambulatory Care Coordination Team  Phone: [phone]     11/23/2022  Follow up  SW left voicemail with pt's spouse requesting call back to discuss update with personal care search. SW will follow up at later date. Elian Flores Ambulatory Care Coordination Team  Phone: [phone]     11/15/2022  Follow up  SW received referral from pt's provider Roopa Portillo requesting follow up with pt/spouse to discuss resources for personal care aides. SW contacted pt/spouse introduced self and explained reason for call. Spouse stated that she is not satisfied with current personal care agency and will like to receive a list of personal care agencies that accept Medicaid insurance. Pt has cognitive impairment and generalized weakness which impacts his balance and his ability to safely perform ADLs. Spouse does not have agency preferences and is willing to review a list for further contact. Plan  SW made spouse are of the current challenges of identifying an agency that has availability. SW explained to spouse the process of switching from one personal care agency to another, which involves ending services with current provider and providing a copy of pt's Uniform Assessment Instrument screening to the new agency once identified. SW will complete research to identify agencies that are accepting new Medicaid cases and follow up with spouse.     JOSEPH Flores [facility] Coordination Team  Phone: [phone]

## 2022-12-15 DIAGNOSIS — F02.80 ALZHEIMER'S DISEASE, UNSPECIFIED (HCC): ICD-10-CM

## 2022-12-15 DIAGNOSIS — G30.9 ALZHEIMER'S DISEASE, UNSPECIFIED (HCC): ICD-10-CM

## 2022-12-19 ENCOUNTER — PATIENT OUTREACH (OUTPATIENT)
Dept: CASE MANAGEMENT | Age: 82
End: 2022-12-19

## 2022-12-19 DIAGNOSIS — I10 ESSENTIAL HYPERTENSION: ICD-10-CM

## 2022-12-19 NOTE — PROGRESS NOTES
Goals Addressed                   This Visit's Progress     Supportive resources in place to maintain patient in the community (ie., home health, equipment, DME, refer to, etc.)          12/19/2022  Follow up  SW spoke with pt's spouse for follow up. Spouse reported that pt was admitted to [facility] on 12/15 d/t having a brain bleed resulting from a fall. Pt may discharge to an inpatient rehabilitation center for skilled care but spouse stated that she feels pt will do/adjust better at home due to dx of Dementia. Pt continues to receive personal care services through [facility] but was provided with a new care aide. SW will follow up with pt/spouse a later date to determine d/c plans and additional care in the home. Elian Mandujano  Ambulatory Care Coordination Team  Phone: [phone]       12/8/2022  SW left voicemail with spouse requesting call back for follow up.    11/29/22  SW received a list of personal care agencies to provide to spouse for outreach. SW left voicemail with spouse requesting call back. Elian Mandujano  Ambulatory Care Coordination Team  Phone: [phone]     11/23/2022  Follow up  SW left voicemail with pt's spouse requesting call back to discuss update with personal care search. SW will follow up at later date. Elian Mandujano  Ambulatory Care Coordination Team  Phone: [phone]     11/15/2022  Follow up  SW received referral from pt's provider Michelle Knight requesting follow up with pt/spouse to discuss resources for personal care aides. SW contacted pt/spouse introduced self and explained reason for call. Spouse stated that she is not satisfied with current personal care agency and will like to receive a list of personal care agencies that accept Medicaid insurance.   Pt has cognitive impairment and generalized weakness which impacts his balance and his ability to safely perform ADLs. Spouse does not have agency preferences and is willing to review a list for further contact. Plan  SW made spouse are of the current challenges of identifying an agency that has availability. SW explained to spouse the process of switching from one personal care agency to another, which involves ending services with current provider and providing a copy of pt's Uniform Assessment Instrument screening to the new agency once identified. SW will complete research to identify agencies that are accepting new Medicaid cases and follow up with spouse.     Elian Kahn  Ambulatory Care Coordination Team  Phone: [phone]

## 2022-12-21 ENCOUNTER — TELEPHONE (OUTPATIENT)
Dept: FAMILY MEDICINE CLINIC | Age: 82
End: 2022-12-21

## 2022-12-21 NOTE — TELEPHONE ENCOUNTER
Pt's home health care, [facility], called  and stated that recently discharged form [facility] and will need physical therapy and would like know if you will follow his care. She is asking to be contacted at your earliest convenience [phone].      Thank you

## 2022-12-22 DIAGNOSIS — I25.10 CORONARY ARTERY DISEASE INVOLVING NATIVE CORONARY ARTERY WITHOUT ANGINA PECTORIS, UNSPECIFIED WHETHER NATIVE OR TRANSPLANTED HEART: ICD-10-CM

## 2022-12-22 DIAGNOSIS — J30.89 ENVIRONMENTAL AND SEASONAL ALLERGIES: ICD-10-CM

## 2022-12-22 RX ORDER — CETIRIZINE HYDROCHLORIDE 10 MG/1
10 TABLET ORAL DAILY
Qty: 90 TABLET | Refills: 3 | Status: SHIPPED | OUTPATIENT
Start: 2022-12-22

## 2022-12-22 NOTE — TELEPHONE ENCOUNTER
Home health agency called back. They will fax over home health orders attn: Dr. Kenneth Brantley and Dr. Carmen Feldman - please place in my inbox so I can have Dr. Kenneth Brantley sign the papers tomorrow.       Dulce Maria Tenorio MD

## 2022-12-22 NOTE — TELEPHONE ENCOUNTER
Returned call.  No answer, left message asking for call back with information on what is needed    Ludivina Collier MD

## 2022-12-23 ENCOUNTER — OFFICE VISIT (OUTPATIENT)
Dept: FAMILY MEDICINE CLINIC | Age: 82
End: 2022-12-23
Payer: MEDICARE

## 2022-12-23 VITALS
HEART RATE: 71 BPM | SYSTOLIC BLOOD PRESSURE: 160 MMHG | OXYGEN SATURATION: 94 % | DIASTOLIC BLOOD PRESSURE: 75 MMHG | RESPIRATION RATE: 16 BRPM | WEIGHT: 170.6 LBS | BODY MASS INDEX: 29.12 KG/M2 | HEIGHT: 64 IN | TEMPERATURE: 97 F

## 2022-12-23 DIAGNOSIS — Z23 ENCOUNTER FOR IMMUNIZATION: ICD-10-CM

## 2022-12-23 DIAGNOSIS — N18.4 CKD (CHRONIC KIDNEY DISEASE) STAGE 4, GFR 15-29 ML/MIN (HCC): ICD-10-CM

## 2022-12-23 DIAGNOSIS — I50.9 CHRONIC CONGESTIVE HEART FAILURE, UNSPECIFIED HEART FAILURE TYPE (HCC): ICD-10-CM

## 2022-12-23 DIAGNOSIS — J44.9 CHRONIC OBSTRUCTIVE PULMONARY DISEASE, UNSPECIFIED COPD TYPE (HCC): ICD-10-CM

## 2022-12-23 DIAGNOSIS — I10 HYPERTENSION, UNSPECIFIED TYPE: ICD-10-CM

## 2022-12-23 DIAGNOSIS — Z00.00 MEDICARE ANNUAL WELLNESS VISIT, SUBSEQUENT: Primary | ICD-10-CM

## 2022-12-23 RX ORDER — ZOSTER VACCINE RECOMBINANT, ADJUVANTED 50 MCG/0.5
KIT INTRAMUSCULAR
Qty: 0.5 ML | Refills: 1 | Status: SHIPPED | OUTPATIENT
Start: 2022-12-23

## 2022-12-23 RX ORDER — AMLODIPINE BESYLATE 2.5 MG/1
2.5 TABLET ORAL DAILY
Qty: 60 TABLET | Refills: 0 | Status: SHIPPED | OUTPATIENT
Start: 2022-12-23 | End: 2022-12-23

## 2022-12-23 NOTE — ASSESSMENT & PLAN NOTE
Chronic, uncontrolled currently.  Managed by Dr. Kelin Porras cardiology but recently several medications stopped while he was in the hospital  - Start Norvasc 2.5mg daily   - Follow up in 4 weeks

## 2022-12-23 NOTE — PATIENT INSTRUCTIONS
Medicare Wellness Visit, Male    The best way to live healthy is to have a lifestyle where you eat a well-balanced diet, exercise regularly, limit alcohol use, and quit all forms of tobacco/nicotine, if applicable. Regular preventive services are another way to keep healthy. Preventive services (vaccines, screening tests, monitoring & exams) can help personalize your care plan, which helps you manage your own care. Screening tests can find health problems at the earliest stages, when they are easiest to treat. [facility] follows the current, evidence-based guidelines published by the [facility] Yong ([facility]) when recommending preventive services for our patients. Because we follow these guidelines, sometimes recommendations change over time as research supports it. (For example, a prostate screening blood test is no longer routinely recommended for men with no symptoms). Of course, you and your doctor may decide to screen more often for some diseases, based on your risk and co-morbidities (chronic disease you are already diagnosed with). Preventive services for you include:  - Medicare offers their members a free annual wellness visit, which is time for you and your primary care provider to discuss and plan for your preventive service needs.  Take advantage of this benefit every year!    -Over the age of 72 should receive the recommended pneumonia vaccines.   -All adults should have a flu vaccine yearly.  -All adults should receive a tetanus vaccine every 10 years.  -Over the age of 48 should receive the shingles vaccines.    -All adults should be screened once for Hepatitis C.  -All adults age 38-68 who are overweight should have a diabetes screening test once every three years.   -Other screening tests & preventive services for persons with diabetes include: an eye exam to screen for diabetic retinopathy, a kidney function test, a foot exam, and stricter control over your cholesterol.   -Cardiovascular screening for adults with routine risk involves an electrocardiogram (ECG) at intervals determined by the provider.     -Colorectal cancer screening should be done for adults age 43-69 with no increased risk factors for colorectal cancer. There are a number of acceptable methods of screening for this type of cancer. Each test has its own benefits and drawbacks. Discuss with your provider what is most appropriate for you during your annual wellness visit. The different tests include: colonoscopy (considered the best screening method), a fecal occult blood test, a fecal DNA test, and sigmoidoscopy.    -Lung cancer screening is recommended annually with a low dose CT scan for adults between age 54 and 68, who have smoked at least 30 pack years (equivalent of 1 pack per day for 30 days), and who is a current smoker or quit less than 15 years ago. -An Abdominal Aortic Aneurysm (AAA) Screening is recommended for men age 73-68 who has ever smoked in their lifetime.      Here is a list of your current Health Maintenance items (your personalized list of preventive services) with a due date:  Health Maintenance Due   Topic Date Due    Eye Exam  Never done    Shingles Vaccine (1 of 2) Never done    COVID-19 Vaccine (5 - Booster for Torres Peter series) 06/10/2022    Albumin Urine Test  07/23/2022    Annual Well Visit  07/24/2022    Yearly Flu Vaccine (1) 08/01/2022

## 2022-12-23 NOTE — ACP (ADVANCE CARE PLANNING)
Advance Care Planning     Advance Care Planning (ACP) Physician/NP/PA Conversation      Date of Conversation: 12/23/2022  Conducted with: Patient with Decision Making Capacity and Healthcare Decision Maker: Next of Kin by law (only applies in absence of a Healthcare Power of  or Legal Guardian)    Healthcare Decision Maker:   No healthcare decision makers have been documented. Click here to complete [employer] including selection of the Healthcare Decision Maker Relationship (ie \"Primary\")          Care Preferences:    Hospitalization: \"If your health worsens and it becomes clear that your chance of recovery is unlikely, what would be your preference regarding hospitalization? \"  The patient is unsure. Ventilation: \"If you were unable to breathe on your own and your chance of recovery was unlikely, what would be your preference about the use of a ventilator (breathing machine) if it was available to you? \"   The patient would desire the use of a ventilator. Resuscitation: \"In the event your heart stopped as a result of an underlying serious health condition, would you want attempts to be made to restart your heart, or would you prefer a natural death? \"   Yes, attempt to resuscitate.         Conversation Outcomes / Follow-Up Plan:   ACP complete - no further action today  Reviewed DNR/DNI and patient elects Full Code (Attempt Resuscitation)     Length of Voluntary ACP Conversation in minutes:  <16 minutes (Non-Billable)    Vadim Negro MD

## 2022-12-23 NOTE — PROGRESS NOTES
This is the Subsequent Medicare Annual Wellness Exam, performed 12 months or more after the Initial AWV or the last Subsequent AWV    I have reviewed the patient's medical history in detail and updated the computerized patient record. Assessment/Plan   Education and counseling provided:  Are appropriate based on today's review and evaluation  End-of-Life planning (with patient's consent)    1. Medicare annual wellness visit, subsequent  2. Hypertension, unspecified type  Assessment & Plan:  Chronic, uncontrolled currently. Managed by Dr. Kelin Porras cardiology but recently several medications stopped while he was in the hospital  - Start Norvasc 2.5mg daily   - Follow up in 4 weeks  Orders:  -     amLODIPine (NORVASC) 2.5 mg tablet; Take 1 Tablet by mouth daily. , Normal, Disp-60 Tablet, R-0  3. Encounter for immunization  -     varicella-zoster recombinant, PF, (Shingrix, PF,) 50 mcg/0.5 mL susr injection; 0.5mL by IntraMUSCular route once now and then repeat in 2-6 months, Print, Disp-0.5 mL, R-1  4. Chronic congestive heart failure, unspecified heart failure type [facility])  Assessment & Plan:  Follows with Dr. Kelin Porras  5. CKD (chronic kidney disease) stage 4, GFR 15-29 ml/min ([facility])  Assessment & Plan:  Follows with Dr. nAgelica Yost    6.  Chronic obstructive pulmonary disease, unspecified [facility]  Assessment & Plan:  Not seeing pulmonologist. Managed by PCP  - On Advair   - Albuterol as needed     Depression Risk Factor Screening     3 most recent PHQ Screens 11/29/2022   PHQ Not Done -   Little interest or pleasure in doing things Not at all   Feeling down, depressed, irritable, or hopeless Not at all   Total Score PHQ 2 0   Trouble falling or staying asleep, or sleeping too much -   Feeling tired or having little energy -   Poor appetite, weight loss, or overeating -   Feeling bad about yourself - or that you are a failure or have let yourself or your family down -   Trouble concentrating on things such as school, work, reading, or watching TV -   Moving or speaking so slowly that other people could have noticed; or the opposite being so fidgety that others notice -   Thoughts of being better off dead, or hurting yourself in some way -   PHQ 9 Score -   How difficult have these problems made it for you to do your work, take care of your home and get along with others -       Alcohol & Drug Abuse Risk Screen    Do you average more than 1 drink per night or more than 7 drinks a week: No    In the past three months have you have had more than 4 drinks containing alcohol on one occasion: No       Opioid Risk: (Low risk score <55, High risk score =55)  Opioid risk score: 39      Click here to complete the Controlled Substance Monitoring SmartForm    Last PDMP Shawn as Reviewed:  Review User Review Instant Review Result   Kyle Luo 7/1/2022  4:40 PM Reviewed PDMP [1]       Functional Ability and Level of Safety    Hearing: The patient needs further evaluation. Hearing aid evaluation is in process      Activities of Daily Living: The home contains: handrails and grab bars  Patient needs help with:  phone, transportation, shopping, preparing meals, managing medications, and managing money      Ambulation: with difficulty, uses a walker     Fall Risk:  Fall Risk Assessment, last 12 mths 11/9/2022   Able to walk? Yes   Fall in past 12 months? 1   Do you feel unsteady? 1   Are you worried about falling 1   Is the gait abnormal? 1   Number of falls in past 12 months 2   Fall with injury?  1      Abuse Screen:  Patient is not abused       Cognitive Screening    Has your family/caregiver stated any concerns about your memory: yes - has advanced Alzheimers       Health Maintenance Due     Health Maintenance Due   Topic Date Due    Eye Exam Retinal or Dilated  Never done    Shingles Vaccine (1 of 2) Never done    COVID-19 Vaccine (5 - Booster for [place] series) 06/10/2022    Flu Vaccine (1) 08/01/2022 Patient Care Team   Patient Care Team:  Hamlet Arboleda MD as PCP - General (Family Medicine)  Tammy Brock MD as PCP - [facility]  Jose J Adam LMSW as     History     Patient Active Problem List   Diagnosis Code    Urinary frequency R35.0    Acquired equinus deformity of foot M21.6X9    Diabetes mellitus ([address] E11.9    Onychomycosis due to dermatophyte B35.1    Pes planus M21.40    Tinea pedis B35.3    Hypertension I10    Anemia of unknown etiology D64.9    Alzheimer's disease, unspecified ([address] G30.9, F02.80    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    Benign prostatic hyperplasia with urinary frequency N40.1, R35.0    Easy bruising R23.3    History of COPD Z87.09    COPD (chronic obstructive pulmonary disease) ([address] J44.9    Pulmonary nodule R91.1    Type 2 diabetes with nephropathy ([address] E11.21    Altered mental status R41.82    Agitation R45.1    Acute cystitis without hematuria N30.00    Chronic congestive heart failure, unspecified heart failure type ([address] I50.9    CKD (chronic kidney disease) stage 4, GFR 15-29 ml/min ([address] N18.4     Past Medical History:   Diagnosis Date    Alzheimer's dementia ([address]     BPH (benign prostatic hyperplasia)     CAD (coronary artery disease)     Carotid artery stenosis     CKD (chronic kidney disease)     COPD (chronic obstructive pulmonary disease) ([address]     HLD (hyperlipidemia)     T2DM (type 2 diabetes mellitus) ([address]       Past Surgical History:   Procedure Laterality Date    COLONOSCOPY N/A 9/14/2022    COLONOSCOPY[facility]    HX LAP CHOLECYSTECTOMY      HX ROTATOR CUFF REPAIR      OR CARDIAC SURG PROCEDURE UNLIST  2010    CABG and stents     Current Outpatient Medications   Medication Sig Dispense Refill    varicella-zoster recombinant, PF, (Shingrix, PF,) 50 mcg/0.5 mL susr injection 0.5mL by IntraMUSCular route once now and then repeat in 2-6 months 0.5 mL 1    amLODIPine (NORVASC) 2.5 mg tablet Take 1 Tablet by mouth daily. 60 Tablet 0    cetirizine (ZYRTEC) 10 mg tablet Take 1 Tablet by mouth daily. 90 Tablet 3    glucose blood VI test strips (OneTouch Ultra Test) strip USE TO CHECK BLOOD SUGAR FOUR TIMES DAILY 400 Strip 4    Insulin Needles, Disposable, (UltiCare Pen Needle) 32 gauge x 1/4\" ndle To check glucose as needed 3 times a day 100 Pen Needle 3    Blood-Glucose Meter monitoring kit One Touch Ultra Meter 1 Kit 0    memantine (NAMENDA) 5 mg tablet Take 1 Tablet by mouth two (2) times a day. 120 Tablet 3    levoFLOXacin (LEVAQUIN) 750 mg tablet Take 1 Tablet by mouth every other day. 3 Tablet 0    QUEtiapine (SEROquel) 50 mg tablet Take 50mg (1 tablet) in the morning and lunch time. Take 100mg (2 tablets) at bedtime 270 Tablet 3    cephALEXin (KEFLEX) 500 mg capsule Take 500 mg by mouth four (4) times daily.  ferrous sulfate 325 mg (65 mg iron) tablet Take  by mouth Daily (before breakfast).  clopidogreL (PLAVIX) 75 mg tab TAKE 1 TABLET BY MOUTH DAILY (Patient not taking: Reported on 12/23/2022) 30 Tablet 3    albuterol (PROVENTIL VENTOLIN) 2.5 mg /3 mL (0.083 %) nebu 3 mL by Nebulization route every four (4) hours as needed for Wheezing. 30 Nebule 4    sertraline (ZOLOFT) 100 mg tablet Take 1.5 Tablets by mouth daily. 1 Tablet 0    isosorbide mononitrate ER (IMDUR) 30 mg tablet TAKE THREE TABLETS BY MOUTH TWICE A  Tablet 1    HYDROcodone-acetaminophen (NORCO) 5-325 mg per tablet TAKE 1 TABLET BY MOUTH TWICE DAILY AS NEEDED      Combivent Respimat  mcg/actuation inhaler INHALE ONE PUFF BY MOUTH TWICE A DAY 12 g 3    fluticasone propionate (FLONASE) 50 mcg/actuation nasal spray 1 [place] by Both Nostrils route daily.  SHAKE LIQUID AND USE 1 SPRAY IN EACH NOSTRIL DAILY 3 Each 3    tamsulosin (FLOMAX) 0.4 mg capsule TAKE ONE CAPSULE BY MOUTH ONE TIME DAILY 90 Capsule 2    buPROPion (WELLBUTRIN) 100 mg tablet TAKE ONE TABLET BY MOUTH TWICE A  Tablet 4    albuterol (PROVENTIL HFA, VENTOLIN HFA, PROAIR HFA) 90 mcg/actuation inhaler Take 2 Puffs by inhalation every four (4) hours as needed for Wheezing. 3 Each 3    Advair Diskus 250-50 mcg/dose diskus inhaler INHALE ONE PUFF BY MOUTH EVERY 12 HOURS 60 Each 0    calcium-cholecalciferol, D3, (CALTRATE 600+D) tablet TAKE ONE TABLET BY MOUTH ONE TIME DAILY 90 Tab 3    Nebulizer & Compressor machine 1 Each by Does Not Apply route as needed for Wheezing or Shortness of Breath. 1 Each 0    bumetanide (BUMEX) 1 mg tablet Take 1 Tab by mouth daily. 90 Tab 1    Nebulizer Accessories kit For albuterol q4-8hrs PRN 1 Kit 0    donepezil (ARICEPT) 10 mg tablet TAKE ONE TABLET BY MOUTH AT BEDTIME  6     Allergies   Allergen Reactions    Ativan [Lorazepam] Other (comments)     Altered LOC  Patient able to tolerate 0.5mg    Dilaudid [Hydromorphone] Unknown (comments)    Lisinopril Cough    Adempas [Riociguat] Unknown (comments)    Codeine Unknown (comments)     Mental state= confusion and combative       History reviewed. No pertinent family history. Social History     Tobacco Use    Smoking status: Former     Packs/day: 2.00     Years: 30.00     Pack years: 60.00     Types: Cigarettes     Quit date:      Years since quittin.9     Passive exposure: Never    Smokeless tobacco: Never   Substance Use Topics    Alcohol use: Never         Maria Ines Alcala MD       [address] [place] [address] [address] [place] [address]   Office [phone]  Fax [phone]     2022   Chief Complaint   Patient presents with    Annual Wellness Visit       HPI:  Boaz Lu is a [de-identified] y.o. male who presents to clinic today for medicare wellness and blood pressure    HTN:  Reviewed home BP loM systolic with home health today  Meds: Hospital stopped Coreg due to HR getting low  Exercise: None  Tobacco use:  Former  Alcohol use: None  NSAID use: None      Patients past medical, surgical and family histories were reviewed. Allergies and Medications reviewed and updated. Review of Systems   Unable to perform ROS: Dementia     Objective:  Vitals:    12/23/22 1428   BP: (!) 160/75   Pulse: 71   Resp: 16   Temp: 97 °F (36.1 °C)   TempSrc: Temporal   SpO2: 94%   Weight: 170 lb 9.6 oz (77.4 kg)   Height: 5' 4\" (1.626 m)     Body mass index is 29.28 kg/m². Physical Exam  General: Patient alert and oriented and in NAD  HEENT: PER/EOMI, no conjunctival pallor or scleral icterus. Heart: Regular rate and rhythm, No murmurs, rubs or gallops. 2+ peripheral pulses  Lungs: Clear to auscultation bilaterally, no wheezing, rales or rhonchi  Abd: +BS, non-tender, non-distended  Ext: No edema  Skin: No rashes or lesions noted on exposed skin,  Psych: Baseline mood and affect    No results found for this or any previous visit (from the past 24 hour(s)). Assessment and Plan:  Diagnoses and all orders for this visit:    1. Medicare annual wellness visit, subsequent    2. Hypertension, unspecified type  Assessment & Plan:  Chronic, uncontrolled currently. Managed by Dr. Murray Myrick cardiology but recently several medications stopped while he was in the hospital  - Start Norvasc 2.5mg daily   - Follow up in 4 weeks    Orders:  -     amLODIPine (NORVASC) 2.5 mg tablet; Take 1 Tablet by mouth daily. 3. Encounter for immunization  -     varicella-zoster recombinant, PF, (Shingrix, PF,) 50 mcg/0.5 mL susr injection; 0.5mL by IntraMUSCular route once now and then repeat in 2-6 months    4. Chronic congestive heart failure, unspecified heart failure type [facility])  Assessment & Plan:  Follows with Dr. Murray Myrick      5. CKD (chronic kidney disease) stage 4, GFR 15-29 ml/min ([facility])  Assessment & Plan:  Follows with Dr. Eliu Whitmore      6.  Chronic obstructive pulmonary disease, unspecified COPD type ([facility]  Assessment & Plan:  Not seeing pulmonologist. Managed by PCP  - On Advair   - Albuterol as needed        Follow-up and Dispositions    Return in about 4 weeks (around 1/20/2023), or if symptoms worsen or fail to improve, for Blood pressure. I have discussed the aforementioned diagnoses and plan with the patient in detail. I have provided information in person and/or in AVS. All questions answered prior to discharge.      I discussed this patient with Dr. Dede Mauricio (Attending Physician)   Signed By:  Teddy Richardson MD     Family Medicine Resident

## 2022-12-23 NOTE — PROGRESS NOTES
Medicare Annual Wellness Visit    Gregory Momin is a [de-identified] y.o. male    Chief Complaint   Patient presents with    Annual Wellness Visit         1. Have you been to the ER, urgent care clinic since your last visit? Hospitalized since your last visit? Yes When: 12/15-12/20 Reason for visit: had a fall, admitted for brain bleed  2. Have you seen or consulted any other health care providers outside of the [address] [place] [address] since your last visit? Include any pap smears or colon screening. No    Visit Vitals  BP (!) 160/75 (BP 1 Location: Right arm, BP Patient Position: Sitting)   Pulse 71   Temp 97 °F (36.1 °C) (Temporal)   Resp 16   Ht 5' 4\" (1.626 m)   Wt 170 lb 9.6 oz (77.4 kg)   SpO2 94%   BMI 29.28 kg/m²         General Health Questions   -During the past 4 weeks:   -How would you rate your health in general? Fair   -How often have you been bothered by feeling dizzy when standing up? occasionally  -How much have you been bothered by bodily pain? moderately  -Has your physical and emotional health limited your social activities with family or friends? yes    Emotional Health Questions   -Do you have a history of depression, anxiety, or emotional problems? yes  -Over the past 2 weeks, have you felt down, depressed or hopeless? yes  -Over the past 2 weeks, have you felt little interest or pleasure in doing things?  yes    Depression Risk Factor Screening     3 most recent PHQ Screens 11/29/2022   PHQ Not Done -   Little interest or pleasure in doing things Not at all   Feeling down, depressed, irritable, or hopeless Not at all   Total Score PHQ 2 0   Trouble falling or staying asleep, or sleeping too much -   Feeling tired or having little energy -   Poor appetite, weight loss, or overeating -   Feeling bad about yourself - or that you are a failure or have let yourself or your family down -   Trouble concentrating on things such as school, work, reading, or watching TV -   Moving or speaking so slowly that other people could have noticed; or the opposite being so fidgety that others notice -   Thoughts of being better off dead, or hurting yourself in some way -   PHQ 9 Score -   How difficult have these problems made it for you to do your work, take care of your home and get along with others -       Health Habits   -Please describe your diet habits:    -Do you get 5 servings of fruits or vegetables daily? no  -Do you exercise regularly? yes    Alcohol & Drug Abuse Risk Screen    Do you average more than 1 drink per night or more than 7 drinks a week: No    In the past three months have you have had more than 4 drinks containing alcohol on one occasion: No       Opioid Risk: (Low risk score <55, High risk score ?55)  Opioid risk score: 39      Click here to complete the Controlled Substance Monitoring SmartForm    Last PDMP Shawn as Reviewed:  Review User Review Instant Review Result   Shima Wong 7/1/2022  4:40 PM Reviewed PDMP [1]       Activities of Daily Living    -Do you need help with eating, walking, dressing, bathing, toileting, the phone, transportation, shopping, preparing meals, housework, laundry, medications or managing money? yes  -In the past four weeks, was someone available to help you if you needed and wanted help with anything? yes  -Are you confident are you that you can control and manage most of your health problems? no  -Have you been given information to help you keep track of your medications? yes  -How often do you have trouble taking your medications as prescribed? never  Patient does total self care   Hearing: The patient wears hearing aids. Fall Risk and Home Safety   -Have you fallen 2 or more times in the past year? yes  -Does your home have rugs in the hallways? no,   -Do you have grab bars in the bathrooms? yes,   -Does your home have handrails on the stairs? yes,   -Do you have adequate lighting in your home? yes  -Do you have smoke detectors and check them regularly? yes  -Do you have difficulties driving a car/vehicle? yes  -Do you always fasten your seat belt when you are in a car? yes  -The home contains: handrails, grab bars, and rugs  Fall Risk:  Fall Risk Assessment, last 12 mths 11/9/2022   Able to walk? Yes   Fall in past 12 months? 1   Do you feel unsteady? 1   Are you worried about falling 1   Is the gait abnormal? 1   Number of falls in past 12 months 2   Fall with injury?  1       Abuse Screen:  Patient is not abused    Cognitive Screening    Has your family/caregiver stated any concerns about your memory: yes -        Mini Cog Score (65+): not completed    Health Maintenance Due     Health Maintenance Due   Topic Date Due    Eye Exam Retinal or Dilated  Never done    Shingles Vaccine (1 of 2) Never done    Low dose CT lung screening  Never done    COVID-19 Vaccine (5 - Booster for Pfizer series) 06/10/2022    MICROALBUMIN Q1  07/23/2022    Medicare Yearly Exam  07/24/2022    Flu Vaccine (1) 08/01/2022       Patient Care Team   Patient Care Team:  Roman Levine MD as PCP - General (Family Medicine)  Jean Pierre Oliver MD as PCP - [facility]  Kasie Beal LMSW as     History     Patient Active Problem List   Diagnosis Code    Urinary frequency R35.0    Acquired equinus deformity of foot M21.6X9    Diabetes mellitus ([address] E11.9    Onychomycosis due to dermatophyte B35.1    Pes planus M21.40    Tinea pedis B35.3    Essential hypertension I10    Anemia of unknown etiology D64.9    Alzheimer's disease, unspecified ([address] G30.9, F02.80    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    Benign prostatic hyperplasia with urinary frequency N40.1, R35.0    Easy bruising R23.3    History of COPD Z87.09    COPD (chronic obstructive pulmonary disease) ([address] J44.9    Pulmonary nodule R91.1    Type 2 diabetes with nephropathy ([address] E11.21    Altered mental status R41.82    Agitation R45.1    Acute cystitis without hematuria N30.00     Past Medical History: Diagnosis Date    Al[facility]'s dementia [facility]     BPH (benign prostatic hyperplasia)     CAD (coronary artery disease)     Carotid artery stenosis     CKD (chronic kidney disease)     COPD (chronic obstructive pulmonary disease) ([facility])     HLD (hyperlipidemia)     T2DM (type 2 diabetes mellitus) ([facility]       Past Surgical History:   Procedure Laterality Date    COLONOSCOPY N/A 9/14/2022    COLONOSCOPY/EGD performed by Ny Estrada MD [facility]    HX LAP CHOLECYSTECTOMY       [address] SURG PROCEDURE UNLIST  2010    CABG and stents     Current Outpatient Medications   Medication Sig Dispense Refill    cetirizine (ZYRTEC) 10 mg tablet Take 1 Tablet by mouth daily. 90 Tablet 3    glucose blood VI test strips (OneTouch Ultra Test) strip USE TO CHECK BLOOD SUGAR FOUR TIMES DAILY 400 Strip 4    Insulin Needles, Disposable, (UltiCare Pen Needle) 32 gauge x 1/4\" ndle To check glucose as needed 3 times a day 100 Pen Needle 3    Blood-Glucose Meter monitoring kit One Touch Ultra Meter 1 Kit 0    memantine (NAMENDA) 5 mg tablet Take 1 Tablet by mouth two (2) times a day. 120 Tablet 3    levoFLOXacin (LEVAQUIN) 750 mg tablet Take 1 Tablet by mouth every other day. 3 Tablet 0    QUEtiapine (SEROquel) 50 mg tablet Take 50mg (1 tablet) in the morning and lunch time. Take 100mg (2 tablets) at bedtime 270 Tablet 3    cephALEXin (KEFLEX) 500 mg capsule Take 500 mg by mouth four (4) times daily. ferrous sulfate 325 mg (65 mg iron) tablet Take  by mouth Daily (before breakfast). clopidogreL (PLAVIX) 75 mg tab TAKE 1 TABLET BY MOUTH DAILY (Patient not taking: Reported on 12/23/2022) 30 Tablet 3    albuterol (PROVENTIL VENTOLIN) 2.5 mg /3 mL (0.083 %) nebu 3 mL by Nebulization route every four (4) hours as needed for Wheezing. 30 Nebule 4    sertraline (ZOLOFT) 100 mg tablet Take 1.5 Tablets by mouth daily.  1 Tablet 0    isosorbide mononitrate ER (IMDUR) 30 mg tablet TAKE THREE TABLETS BY MOUTH TWICE A  Tablet 1    HYDROcodone-acetaminophen (NORCO) 5-325 mg per tablet TAKE 1 TABLET BY MOUTH TWICE DAILY AS NEEDED      Combivent Respimat  mcg/actuation inhaler INHALE ONE PUFF BY MOUTH TWICE A DAY 12 g 3    fluticasone propionate (FLONASE) 50 mcg/actuation nasal spray 1 [place] by Both Nostrils route daily. SHAKE LIQUID AND USE 1 SPRAY IN EACH NOSTRIL DAILY 3 Each 3    tamsulosin (FLOMAX) 0.4 mg capsule TAKE ONE CAPSULE BY MOUTH ONE TIME DAILY 90 Capsule 2    buPROPion (WELLBUTRIN) 100 mg tablet TAKE ONE TABLET BY MOUTH TWICE A  Tablet 4    albuterol (PROVENTIL HFA, VENTOLIN HFA, PROAIR HFA) 90 mcg/actuation inhaler Take 2 Puffs by inhalation every four (4) hours as needed for Wheezing. 3 Each 3    Advair Diskus 250-50 mcg/dose diskus inhaler INHALE ONE PUFF BY MOUTH EVERY 12 HOURS 60 Each 0    calcium-cholecalciferol, D3, (CALTRATE 600+D) tablet TAKE ONE TABLET BY MOUTH ONE TIME DAILY 90 Tab 3    Nebulizer & Compressor machine 1 Each by Does Not Apply route as needed for Wheezing or Shortness of Breath. 1 Each 0    bumetanide (BUMEX) 1 mg tablet Take 1 Tab by mouth daily. 90 Tab 1    Nebulizer Accessories kit For albuterol q4-8hrs PRN 1 Kit 0    donepezil (ARICEPT) 10 mg tablet TAKE ONE TABLET BY MOUTH AT BEDTIME  6    aspirin delayed-release 81 mg tablet Take  by mouth daily. (Patient not taking: Reported on 2022)       Allergies   Allergen Reactions    Ativan [Lorazepam] Other (comments)     Altered LOC  Patient able to tolerate 0.5mg    Dilaudid [Hydromorphone] Unknown (comments)    Lisinopril Cough    Adempas [Riociguat] Unknown (comments)    Codeine Unknown (comments)     Mental state= confusion and combative       No family history on file.   Social History     Tobacco Use    Smoking status: Former     Packs/day: 2.00     Years: 30.00     Pack years: 60.00     Types: Cigarettes     Quit date:      Years since quittin.9     Passive exposure: Never    Smokeless tobacco: Never   Substance Use Topics    Alcohol use: Never         Health Maintenance Due   Topic Date Due    Eye Exam Retinal or Dilated  Never done    Shingles Vaccine (1 of 2) Never done    Low dose CT lung screening  Never done    COVID-19 Vaccine (5 - Booster for Pfizer series) 06/10/2022    MICROALBUMIN Q1  07/23/2022    Medicare Yearly Exam  07/24/2022    Flu Vaccine (1) 08/01/2022         Cindy Risk

## 2022-12-24 RX ORDER — MEMANTINE HYDROCHLORIDE 5 MG/1
TABLET ORAL
Qty: 120 TABLET | Refills: 0 | Status: SHIPPED | OUTPATIENT
Start: 2022-12-24

## 2022-12-24 RX ORDER — ISOSORBIDE MONONITRATE 30 MG/1
TABLET, EXTENDED RELEASE ORAL
Qty: 360 TABLET | Refills: 1 | OUTPATIENT
Start: 2022-12-24

## 2022-12-27 ENCOUNTER — TELEPHONE (OUTPATIENT)
Dept: FAMILY MEDICINE CLINIC | Age: 82
End: 2022-12-27

## 2022-12-27 RX ORDER — TAMSULOSIN HYDROCHLORIDE 0.4 MG/1
0.4 CAPSULE ORAL DAILY
Qty: 90 CAPSULE | Refills: 2 | Status: SHIPPED | OUTPATIENT
Start: 2022-12-27

## 2022-12-27 NOTE — TELEPHONE ENCOUNTER
I called the patient's wife and informed that I had talked to [facility]. I told her that based on his symptoms and recently brain bleed the Doctor recommended her to go to the ER., She states that she tried going to [facility] but they told her that they were not equipped for head trauma. She states that she would try [facility]. I told her if she goes to a different ER that is not [facility] she will need to bring us the records. Patient's wife understood.

## 2022-12-27 NOTE — TELEPHONE ENCOUNTER
Patient's wife called in regards to him having a headache and elevated bp. He recently was in the hospital for a brain bleed and his wife was a little concerned. Would like to be contacted asa. Thanks!

## 2022-12-28 DIAGNOSIS — F32.A DEPRESSION, UNSPECIFIED DEPRESSION TYPE: ICD-10-CM

## 2022-12-28 DIAGNOSIS — R45.1 AGITATION: ICD-10-CM

## 2022-12-29 RX ORDER — SERTRALINE HYDROCHLORIDE 100 MG/1
150 TABLET, FILM COATED ORAL DAILY
Qty: 180 TABLET | Refills: 2 | Status: SHIPPED | OUTPATIENT
Start: 2022-12-29

## 2022-12-30 ENCOUNTER — PATIENT OUTREACH (OUTPATIENT)
Dept: CASE MANAGEMENT | Age: 82
End: 2022-12-30

## 2022-12-30 NOTE — PROGRESS NOTES
Goals Addressed                   This Visit's Progress     Supportive resources in place to maintain patient in the community (ie., home health, equipment, DME, refer to, etc.)          12/30/2022  Follow up  SW completed chart review. SW left voicemail with spouse requesting a call back. SW will follow up once call is returned. [facility]ie Cruz [facility]  Phone: [phone]     12/19/2022  Follow up  SW spoke with pt's spouse for follow up. Spouse reported that pt was admitted to [facility] on 12/15 d/t having a brain bleed resulting from a fall. Pt may discharge to an inpatient rehabilitation center for skilled care but spouse stated that she feels pt will do/adjust better at home due to dx of Dementia. Pt continues to receive personal care services through [facility] but was provided with a new care aide. SW will follow up with pt/spouse a later date to determine d/c plans and additional care in the home. [facility]marie Cruz [facility]  Phone: [phone]       12/8/2022  SW left voicemail with spouse requesting call back for follow up.    11/29/22  SW received a list of personal care agencies to provide to spouse for outreach. SW left voicemail with spouse requesting call back. Rosemarie Cruz [place] [address] Ambulatory Care Coordination Team  Phone: [phone]     11/23/2022  Follow up  SW left voicemail with pt's spouse requesting call back to discuss update with personal care search. SW will follow up at later date. Rosemarie Cruz [place] [address] Ambulatory Care Coordination Team  Phone: [phone]     11/15/2022  Follow up  SW received referral from pt's provider Maria Ines Alcala requesting follow up with pt/spouse to discuss resources for personal care aides. SW contacted pt/spouse introduced self and explained reason for call.   Spouse stated that she is not satisfied with current personal care agency and will like to receive a list of personal care agencies that accept Medicaid insurance. Pt has cognitive impairment and generalized weakness which impacts his balance and his ability to safely perform ADLs. Spouse does not have agency preferences and is willing to review a list for further contact. Plan  SW made spouse are of the current challenges of identifying an agency that has availability. SW explained to spouse the process of switching from one personal care agency to another, which involves ending services with current provider and providing a copy of pt's Uniform Assessment Instrument screening to the new agency once identified. SW will complete research to identify agencies that are accepting new Medicaid cases and follow up with spouse.     Pretty Maldonado [place] [address] Ambulatory Care Coordination Team  Phone: [phone]

## 2023-01-01 RX ORDER — BLOOD SUGAR DIAGNOSTIC
STRIP MISCELLANEOUS
Qty: 400 STRIP | Status: CANCELLED | OUTPATIENT
Start: 2023-01-01

## 2023-01-06 ENCOUNTER — PATIENT OUTREACH (OUTPATIENT)
Dept: CASE MANAGEMENT | Age: 83
End: 2023-01-06

## 2023-01-06 NOTE — PROGRESS NOTES
Goals Addressed                   This Visit's Progress     Supportive resources in place to maintain patient in the community (ie., home health, equipment, DME, refer to, etc.)          1/6/2023  Follow up  SW contacted spouse for update from previous outreach. Pt was discharged home from [facility] with [facility] receiving PT&OT and nursing. Pt continues to receive personal care with [facility] 7hrs/day 5 days a week from 10am to 5pm.  Spouse continues to express interest about receiving pc services through a different agency if available. Spouse isn't totally satisfied with the care aides with current agency. Spouse also inquired about the option and process for respite care hours. SW left voicemail with [facility] rep requesting call back to discuss respite care. Also left voicemail with Sb Liz    Provided spouse contact for [facility] to contact and inquire about available aides. Elian Rosen  Ambulatory Care Coordination Team  Phone: [phone]       12/30/2022  Follow up  SW completed chart review. SW left voicemail with spouse requesting a call back. SW will follow up once call is returned. Elian Rosen  Ambulatory Care Coordination Team  Phone: [phone]     12/19/2022  Follow up  SW spoke with pt's spouse for follow up. Spouse reported that pt was admitted to [facility] on 12/15 d/t having a brain bleed resulting from a fall. Pt may discharge to an inpatient rehabilitation center for skilled care but spouse stated that she feels pt will do/adjust better at home due to dx of Dementia. Pt continues to receive personal care services through [facility] but was provided with a new care aide. SW will follow up with pt/spouse a later date to determine d/c plans and additional care in the home.   Joseph Lei LCSW  [facility] Coordination Team  Phone: [phone]       12/8/2022  SW left voicemail with spouse requesting call back for follow up.    11/29/22  SW received a list of personal care agencies to provide to spouse for outreach. SW left voicemail with spouse requesting call back. Marciehernando Uli [place] [address] Ambulatory Care Coordination Team  Phone: [phone]     11/23/2022  Follow up  SW left voicemail with pt's spouse requesting call back to discuss update with personal care search. SW will follow up at later date. Morenitacathy Uli [place] [address] Ambulatory Care Coordination Team  Phone: [phone]     11/15/2022  Follow up  SW received referral from pt's provider Lupillo Emanuel requesting follow up with pt/spouse to discuss resources for personal care aides. SW contacted pt/spouse introduced self and explained reason for call. Spouse stated that she is not satisfied with current personal care agency and will like to receive a list of personal care agencies that accept Medicaid insurance. Pt has cognitive impairment and generalized weakness which impacts his balance and his ability to safely perform ADLs. Spouse does not have agency preferences and is willing to review a list for further contact. Plan  SW made spouse are of the current challenges of identifying an agency that has availability. SW explained to spouse the process of switching from one personal care agency to another, which involves ending services with current provider and providing a copy of pt's Uniform Assessment Instrument screening to the new agency once identified. SW will complete research to identify agencies that are accepting new Medicaid cases and follow up with spouse.     Ct Mcnally [place] [address] Ambulatory Care Coordination Team  Phone: [phone]

## 2023-01-10 ENCOUNTER — PATIENT OUTREACH (OUTPATIENT)
Dept: CASE MANAGEMENT | Age: 83
End: 2023-01-10

## 2023-01-10 NOTE — PROGRESS NOTES
Goals Addressed                   This Visit's Progress     Supportive resources in place to maintain patient in the community (ie., home health, equipment, DME, refer to, etc.)          1/10/2023  Follow up  SW contacted [facility] Luis Fernando Jamescathy to inquire about availability for personal care services. Amrit Neil reported that they attempted to contact spouse a few weeks ago to discuss services and assign a care aide but spouse never returned their call and did not follow up. SW left voicemail with spouse requesting a call back for further discussion. Elian Rider  Ambulatory Care Coordination Team  Phone: [phone]     1/6/2023  Follow up  SW contacted spouse for update from previous outreach. Pt was discharged home from [facility] [place] with [facility] receiving PT&OT and nursing. Pt continues to receive personal care with [facility] 7hrs/day 5 days a week from 10am to 5pm.  Spouse continues to express interest about receiving pc services through a different agency if available. Spouse isn't totally satisfied with the care aides with current agency. Spouse also inquired about the option and process for respite care hours. SW left voicemail with DSS rep requesting call back to discuss respite care. Also left voicemail with Sb Liz    Provided spouse contact for [facility] to contact and inquire about available aides. Elian Rider  Ambulatory Care Coordination Team  Phone: [phone]       12/30/2022  Follow up  SW completed chart review. SW left voicemail with spouse requesting a call back. SW will follow up once call is returned. Elian Rider  Ambulatory Care Coordination Team  Phone: [phone]     12/19/2022  Follow up  SW spoke with pt's spouse for follow up.   Spouse reported that pt was admitted to [facility] [place] on 12/15 d/t having a brain bleed resulting from a fall. Pt may discharge to an inpatient rehabilitation center for skilled care but spouse stated that she feels pt will do/adjust better at home due to dx of Dementia. Pt continues to receive personal care services through [facility] but was provided with a new care aide. SW will follow up with pt/spouse a later date to determine d/c plans and additional care in the home. Rosa Smiley [facility]  Ambulatory Care Coordination Team  Phone: [phone]       12/8/2022  SW left voicemail with spouse requesting call back for follow up.    11/29/22  SW received a list of personal care agencies to provide to spouse for outreach. SW left voicemail with spouse requesting call back. Rosa Smiley [employer]  Ambulatory Care Coordination Team  Phone: [phone]     11/23/2022  Follow up  SW left voicemail with pt's spouse requesting call back to discuss update with personal care search. SW will follow up at later date. Rosa Smiley [employer]  Ambulatory Care Coordination Team  Phone: [phone]     11/15/2022  Follow up  SW received referral from pt's provider Jb Celis requesting follow up with pt/spouse to discuss resources for personal care aides. SW contacted pt/spouse introduced self and explained reason for call. Spouse stated that she is not satisfied with current personal care agency and will like to receive a list of personal care agencies that accept Medicaid insurance. Pt has cognitive impairment and generalized weakness which impacts his balance and his ability to safely perform ADLs. Spouse does not have agency preferences and is willing to review a list for further contact. Plan  SW made spouse are of the current challenges of identifying an agency that has availability.   SW explained to spouse the process of switching from one personal care agency to another, which involves ending services with current provider and providing a copy of pt's Uniform Assessment Instrument screening to the new agency once identified. SW will complete research to identify agencies that are accepting new Medicaid cases and follow up with spouse.     Hobart Ormond, [address]  Ambulatory Care Coordination Team  Phone: [phone]

## 2023-01-12 ENCOUNTER — OFFICE VISIT (OUTPATIENT)
Dept: FAMILY MEDICINE CLINIC | Age: 83
End: 2023-01-12
Payer: MEDICARE

## 2023-01-12 VITALS
HEIGHT: 64 IN | DIASTOLIC BLOOD PRESSURE: 73 MMHG | TEMPERATURE: 97.8 F | BODY MASS INDEX: 28.34 KG/M2 | RESPIRATION RATE: 18 BRPM | OXYGEN SATURATION: 94 % | HEART RATE: 82 BPM | WEIGHT: 166 LBS | SYSTOLIC BLOOD PRESSURE: 133 MMHG

## 2023-01-12 DIAGNOSIS — N39.0 URINARY TRACT INFECTION WITH HEMATURIA, SITE UNSPECIFIED: Primary | ICD-10-CM

## 2023-01-12 DIAGNOSIS — I10 HYPERTENSION, UNSPECIFIED TYPE: ICD-10-CM

## 2023-01-12 DIAGNOSIS — F44.89 CONFUSION STATE: ICD-10-CM

## 2023-01-12 DIAGNOSIS — R35.0 URINARY FREQUENCY: ICD-10-CM

## 2023-01-12 DIAGNOSIS — R31.9 URINARY TRACT INFECTION WITH HEMATURIA, SITE UNSPECIFIED: Primary | ICD-10-CM

## 2023-01-12 LAB
BILIRUB UR QL STRIP: NEGATIVE
GLUCOSE UR-MCNC: NEGATIVE MG/DL
KETONES P FAST UR STRIP-MCNC: NEGATIVE MG/DL
PH UR STRIP: 5.5 [PH] (ref 4.6–8)
PROT UR QL STRIP: NEGATIVE
SP GR UR STRIP: 1.02 (ref 1–1.03)
UA UROBILINOGEN AMB POC: NORMAL (ref 0.2–1)
URINALYSIS CLARITY POC: CLEAR
URINALYSIS COLOR POC: YELLOW
URINE BLOOD POC: NORMAL
URINE LEUKOCYTES POC: NORMAL
URINE NITRITES POC: NEGATIVE

## 2023-01-12 PROCEDURE — 3075F SYST BP GE 130 - 139MM HG: CPT | Performed by: FAMILY MEDICINE

## 2023-01-12 PROCEDURE — 81003 URINALYSIS AUTO W/O SCOPE: CPT | Performed by: FAMILY MEDICINE

## 2023-01-12 PROCEDURE — G0463 HOSPITAL OUTPT CLINIC VISIT: HCPCS | Performed by: FAMILY MEDICINE

## 2023-01-12 PROCEDURE — G8417 CALC BMI ABV UP PARAM F/U: HCPCS | Performed by: FAMILY MEDICINE

## 2023-01-12 PROCEDURE — 3078F DIAST BP <80 MM HG: CPT | Performed by: FAMILY MEDICINE

## 2023-01-12 PROCEDURE — G8536 NO DOC ELDER MAL SCRN: HCPCS | Performed by: FAMILY MEDICINE

## 2023-01-12 PROCEDURE — 1101F PT FALLS ASSESS-DOCD LE1/YR: CPT | Performed by: FAMILY MEDICINE

## 2023-01-12 PROCEDURE — G8427 DOCREV CUR MEDS BY ELIG CLIN: HCPCS | Performed by: FAMILY MEDICINE

## 2023-01-12 PROCEDURE — 99214 OFFICE O/P EST MOD 30 MIN: CPT | Performed by: FAMILY MEDICINE

## 2023-01-12 PROCEDURE — 1123F ACP DISCUSS/DSCN MKR DOCD: CPT | Performed by: FAMILY MEDICINE

## 2023-01-12 PROCEDURE — G8432 DEP SCR NOT DOC, RNG: HCPCS | Performed by: FAMILY MEDICINE

## 2023-01-12 RX ORDER — CEFDINIR 300 MG/1
300 CAPSULE ORAL 2 TIMES DAILY
Qty: 10 CAPSULE | Refills: 0 | Status: SHIPPED | OUTPATIENT
Start: 2023-01-12 | End: 2023-01-12

## 2023-01-12 RX ORDER — CEFDINIR 300 MG/1
300 CAPSULE ORAL 2 TIMES DAILY
Qty: 10 CAPSULE | Refills: 0 | Status: SHIPPED | OUTPATIENT
Start: 2023-01-12 | End: 2023-01-17

## 2023-01-12 NOTE — PROGRESS NOTES
Sary Ruvalcaba is a [de-identified] y.o. male here today to address the following issues:  Chief Complaint   Patient presents with    Dementia     Patient with hx of dementia - today patient hallucinations/confusion got acutely worse around 4pm. Wife would like to get urine sample because he typically gets more confused with infections. Patient presents with urinary frequency, hallucinations and worsening confusion. No fevers. No flank pain. No pain at the tip of the penis, perineal region or at the end of voids.     Past Medical History:   Diagnosis Date    Alzheimer's dementia ([facility] [address]     BPH (benign prostatic hyperplasia)     CAD (coronary artery disease)     Carotid artery stenosis     CKD (chronic kidney disease)     COPD (chronic obstructive pulmonary disease) (HCC)     HLD (hyperlipidemia)     T2DM (type 2 diabetes mellitus) ([facility] [address]      Past Surgical History:   Procedure Laterality Date    COLONOSCOPY N/A 2022    COLONOSCOPY/EGD performed by Christal Fuentes MD at [facility] ENDOSCOPY    HX LAP CHOLECYSTECTOMY      HX ROTATOR CUFF REPAIR      SD UNLISTED PROCEDURE CARDIAC SURGERY  2010    CABG and stents     Social History     Socioeconomic History    Marital status:      Spouse name: Not on file    Number of children: Not on file    Years of education: Not on file    Highest education level: Not on file   Occupational History    Not on file   Tobacco Use    Smoking status: Former     Packs/day: 2.00     Years: 30.00     Pack years: 60.00     Types: Cigarettes     Quit date:      Years since quittin.0     Passive exposure: Never    Smokeless tobacco: Never   Vaping Use    Vaping Use: Never used   Substance and Sexual Activity    Alcohol use: Never    Drug use: Never    Sexual activity: Not Currently   Other Topics Concern    Not on file   Social History Narrative    Not on file     Social Determinants of Health     Financial Resource Strain: Low Risk     Difficulty of Paying Living Expenses: Not hard at all Food Insecurity: No Food Insecurity    Worried About Running Out of Food in the Last Year: Never true    Ran Out of Food in the Last Year: Never true   Transportation Needs: No Transportation Needs    Lack of Transportation (Medical): No    Lack of Transportation (Non-Medical): No   Physical Activity: Insufficiently Active    Days of Exercise per Week: 3 days    Minutes of Exercise per Session: 20 min   Stress: Not on file   Social Connections: Socially Integrated    Frequency of Communication with Friends and Family: More than three times a week    Frequency of Social Gatherings with Friends and Family: More than three times a week    Attends Temple Services: More than 4 times per year    Active Member of [employer] or Organizations: Yes    Attends Club or Organization Meetings: Never    Marital Status:    Intimate Partner Violence: Not At Risk    Fear of Current or Ex-Partner: No    Emotionally Abused: No    Physically Abused: No    Sexually Abused: No   Housing Stability: Unknown    Unable to Pay for Housing in the Last Year: No    Number of [place] in the Last Year: Not on file    Unstable Housing in the Last Year: No       Allergies   Allergen Reactions    Ativan [Lorazepam] Other (comments)     Altered LOC  Patient able to tolerate 0.5mg    Dilaudid [Hydromorphone] Unknown (comments)    Lisinopril Cough    Adempas [Riociguat] Unknown (comments)    Codeine Unknown (comments)     Mental state= confusion and combative       Current Outpatient Medications   Medication Sig    cefdinir (OMNICEF) 300 mg capsule Take 1 Capsule by mouth two (2) times a day for 5 days. sertraline (ZOLOFT) 100 mg tablet Take 1.5 Tablets by mouth daily. tamsulosin (FLOMAX) 0.4 mg capsule Take 1 Capsule by mouth daily.     memantine (NAMENDA) 5 mg tablet TAKE ONE TABLET BY MOUTH TWICE A DAY    isosorbide mononitrate ER (IMDUR) 30 mg tablet TAKE THREE TABLETS BY MOUTH TWICE A DAY    amLODIPine (NORVASC) 2.5 mg tablet TAKE 1 TABLET BY MOUTH DAILY (Patient taking differently: Take 2.5 mg by mouth daily. 5 mg a day now)    cetirizine (ZYRTEC) 10 mg tablet Take 1 Tablet by mouth daily. QUEtiapine (SEROquel) 50 mg tablet Take 50mg (1 tablet) in the morning and lunch time. Take 100mg (2 tablets) at bedtime    glucose blood VI test strips (OneTouch Ultra Test) strip USE TO CHECK BLOOD SUGAR FOUR TIMES DAILY    ferrous sulfate 325 mg (65 mg iron) tablet Take  by mouth Daily (before breakfast). albuterol (PROVENTIL VENTOLIN) 2.5 mg /3 mL (0.083 %) nebu 3 mL by Nebulization route every four (4) hours as needed for Wheezing. HYDROcodone-acetaminophen (NORCO) 5-325 mg per tablet TAKE 1 TABLET BY MOUTH TWICE DAILY AS NEEDED    Combivent Respimat  mcg/actuation inhaler INHALE ONE PUFF BY MOUTH TWICE A DAY    fluticasone propionate (FLONASE) 50 mcg/actuation nasal spray 1 [place] by Both Nostrils route daily. SHAKE LIQUID AND USE 1 SPRAY IN EACH NOSTRIL DAILY    Insulin Needles, Disposable, (UltiCare Pen Needle) 32 gauge x 1/4\" ndle To check glucose as needed 3 times a day    buPROPion (WELLBUTRIN) 100 mg tablet TAKE ONE TABLET BY MOUTH TWICE A DAY    albuterol (PROVENTIL HFA, VENTOLIN HFA, PROAIR HFA) 90 mcg/actuation inhaler Take 2 Puffs by inhalation every four (4) hours as needed for Wheezing. Advair Diskus 250-50 mcg/dose diskus inhaler INHALE ONE PUFF BY MOUTH EVERY 12 HOURS    calcium-cholecalciferol, D3, (CALTRATE 600+D) tablet TAKE ONE TABLET BY MOUTH ONE TIME DAILY    Nebulizer & Compressor machine 1 Each by Does Not Apply route as needed for Wheezing or Shortness of Breath. bumetanide (BUMEX) 1 mg tablet Take 1 Tab by mouth daily.     Nebulizer Accessories kit For albuterol q4-8hrs PRN    Blood-Glucose Meter monitoring kit One Touch Ultra Meter    donepezil (ARICEPT) 10 mg tablet TAKE ONE TABLET BY MOUTH AT BEDTIME    varicella-zoster recombinant, PF, (Shingrix, PF,) 50 mcg/0.5 mL susr injection 0.5mL by IntraMUSCular route once now and then repeat in 2-6 months (Patient not taking: Reported on 1/12/2023)     No current facility-administered medications for this visit. ROS    Visit Vitals  /73 (BP 1 Location: Right arm, BP Patient Position: Sitting, BP Cuff Size: Adult)   Pulse 82   Temp 97.8 °F (36.6 °C) (Oral)   Resp 18   Ht 5' 4\" (1.626 m)   Wt 166 lb (75.3 kg)   SpO2 94%   BMI 28.49 kg/m²       Physical Exam  Vitals and nursing note reviewed. Constitutional:       General: He is not in acute distress. Appearance: He is not diaphoretic. Comments: Oriented to person and place. Was not oriented to the year and the president was [employer]. Eyes:      General: No scleral icterus. Pulmonary:      Effort: Pulmonary effort is normal.   Abdominal:      Palpations: Abdomen is soft. Tenderness: There is no abdominal tenderness. Skin:     General: Skin is warm. Neurological:      Mental Status: He is alert. Psychiatric:         Mood and Affect: Affect normal.       Recent Results (from the past 12 hour(s))   AMB POC URINALYSIS DIP STICK AUTO W/O MICRO    Collection Time: 01/12/23  6:16 PM   Result Value Ref Range    Color (UA POC) Yellow     Clarity (UA POC) Clear     Glucose (UA POC) Negative Negative    Bilirubin (UA POC) Negative Negative    Ketones (UA POC) Negative Negative    Specific gravity (UA POC) 1.020 1.001 - 1.035    Blood (UA POC) Trace Negative    pH (UA POC) 5.5 4.6 - 8.0    Protein (UA POC) Negative Negative    Urobilinogen (UA POC) 0.2 mg/dL 0.2 - 1    Nitrites (UA POC) Negative Negative    Leukocyte esterase (UA POC) 1+ Negative       1. Confusion state  - AMB POC URINALYSIS DIP STICK AUTO W/O MICRO  - CULTURE, URINE; Future    2. Urinary tract infection with hematuria, site unspecified  - CULTURE, URINE; Future  - cefdinir (OMNICEF) 300 mg capsule; Take 1 Capsule by mouth two (2) times a day for 5 days. Dispense: 10 Capsule; Refill: 0    3. Urinary frequency    4. Hypertension, unspecified type    Blood pressure improved. Wife states blood pressures at home are occasionally high. Continue current regimen and follow-up with Dr. Herson Hearn in one month. Start antibiotics for UTI. Sent for cultures. May be contributing to his confusion. If any worsening symptoms, fevers, flank pain, informed to seek care immediately. Wife verbalized understanding. Follow-up and Dispositions    Return in about 1 month (around 2/12/2023) for Chronic care and HTN with Dr. Herson Hearn .            Gabriella Campos MD, CAQSM, RMSK

## 2023-01-12 NOTE — PROGRESS NOTES
Identified Patient with two Patient identifiers (Name and ). Two Patient Identifiers confirmed. Reviewed record in preparation for visit and have obtained necessary documentation. Chief Complaint   Patient presents with    Dementia     Patient with hx of dementia - today patient hallucinations/confusion got acutely worse around 4pm. Wife would like to get urine sample because he typically gets more confused with infections. Visit Vitals  /73 (BP 1 Location: Right arm, BP Patient Position: Sitting, BP Cuff Size: Adult)   Pulse 82   Temp 97.8 °F (36.6 °C) (Oral)   Resp 18   Ht 5' 4\" (1.626 m)   Wt 166 lb (75.3 kg)   SpO2 94%   BMI 28.49 kg/m²       1. Have you been to the ER, urgent care clinic since your last visit? Hospitalized since your last visit? No    2. Have you seen or consulted any other health care providers outside of the [address] [place] [address] since your last visit? Include any pap smears or colon screening.  No

## 2023-01-13 ENCOUNTER — PATIENT OUTREACH (OUTPATIENT)
Dept: CASE MANAGEMENT | Age: 83
End: 2023-01-13

## 2023-01-13 RX ORDER — BLOOD SUGAR DIAGNOSTIC
STRIP MISCELLANEOUS
Qty: 100 PEN NEEDLE | Refills: 5 | Status: SHIPPED | OUTPATIENT
Start: 2023-01-13

## 2023-01-13 NOTE — PROGRESS NOTES
Goals Addressed                   This Visit's Progress     Supportive resources in place to maintain patient in the community (ie., home health, equipment, DME, refer to, etc.)          1/13/2023  Follow up  SW contacted spouse to update her on information received regarding respite hours and personal care agency. Spouse was advised to contact pt's Medicaid care coordinator to inquire about hours. Spouse reported that she received notification from [address] [place] [address] explaining the details for respite hours etc.  Spouse also mentioned that things have approved with the care aide, so she will like to remain with current personal care agency. SW provided her contact for [facility] for future follow up if needed. SW will follow up at later date. Lindy Fox [place] [address] Ambulatory Care Coordination Team  Phone: [phone]     1/10/2023  Follow up  SW contacted [facility] to inquire about availability for personal care services. Rosy Fierro reported that they attempted to contact spouse a few weeks ago to discuss services and assign a care aide but spouse never returned their call and did not follow up. JUDY left voicemail with spouse requesting a call back for further discussion. Lindy Fox [place] [address] Ambulatory Care Coordination Team  Phone: [phone]     1/6/2023  Follow up  SW contacted spouse for update from previous outreach. Pt was discharged home from [facility] with [facility] [place] receiving PT&OT and nursing. Pt continues to receive personal care with [facility] 7hrs/day 5 days a week from 10am to 5pm.  Spouse continues to express interest about receiving pc services through a different agency if available. Spouse isn't totally satisfied with the care aides with current agency. Spouse also inquired about the option and process for respite care hours. JUDY left voicemail with DSS rep requesting call back to discuss respite care. Also left voicemail with Sb Liz    Provided spouse contact for [facility] to contact and inquire about available aides. Renee Franco [place] [address] Ambulatory Care Coordination Team  Phone: [phone]       12/30/2022  Follow up  SW completed chart review. SW left voicemail with spouse requesting a call back. SW will follow up once call is returned. Renee Franco [place] [address] Ambulatory Care Coordination Team  Phone: [phone]     12/19/2022  Follow up  SW spoke with pt's spouse for follow up. Spouse reported that pt was admitted to [facility] on 12/15 d/t having a brain bleed resulting from a fall. Pt may discharge to an inpatient rehabilitation center for skilled care but spouse stated that she feels pt will do/adjust better at home due to dx of Dementia. Pt continues to receive personal care services through [facility] but was provided with a new care aide. SW will follow up with pt/spouse a later date to determine d/c plans and additional care in the home. Renee Franco [place] [address] Ambulatory Care Coordination Team  Phone: [phone]       12/8/2022  SW left voicemail with spouse requesting call back for follow up.    11/29/22  SW received a list of personal care agencies to provide to spouse for outreach. SW left voicemail with spouse requesting call back. Renee Franco [place] [address] Ambulatory Care Coordination Team  Phone: [phone]     11/23/2022  Follow up  SW left voicemail with pt's spouse requesting call back to discuss update with personal care search. SW will follow up at later date. Renee Franco [place] [address] Ambulatory Care Coordination Team  Phone: [phone]     11/15/2022  Follow up  SW received referral from pt's provider Cornelius Kramer requesting follow up with pt/spouse to discuss resources for personal care aides.   SW contacted pt/spouse introduced self and explained reason for call. Spouse stated that she is not satisfied with current personal care agency and will like to receive a list of personal care agencies that accept Medicaid insurance. Pt has cognitive impairment and generalized weakness which impacts his balance and his ability to safely perform ADLs. Spouse does not have agency preferences and is willing to review a list for further contact. Plan  SW made spouse are of the current challenges of identifying an agency that has availability. SW explained to spouse the process of switching from one personal care agency to another, which involves ending services with current provider and providing a copy of pt's Uniform Assessment Instrument screening to the new agency once identified. SW will complete research to identify agencies that are accepting new Medicaid cases and follow up with spouse.     Elian Rios  Ambulatory Care Coordination Team  Phone: [phone]

## 2023-01-18 DIAGNOSIS — I10 ESSENTIAL HYPERTENSION: ICD-10-CM

## 2023-01-18 NOTE — TELEPHONE ENCOUNTER
Patient is out of medication for tonight. Would like for it to be sent to the [facility] on [address]. Thanks!

## 2023-01-20 ENCOUNTER — OFFICE VISIT (OUTPATIENT)
Dept: FAMILY MEDICINE CLINIC | Age: 83
End: 2023-01-20
Payer: MEDICARE

## 2023-01-20 VITALS
HEART RATE: 67 BPM | DIASTOLIC BLOOD PRESSURE: 64 MMHG | BODY MASS INDEX: 28.17 KG/M2 | HEIGHT: 64 IN | SYSTOLIC BLOOD PRESSURE: 131 MMHG | TEMPERATURE: 97.5 F | OXYGEN SATURATION: 97 % | WEIGHT: 165 LBS | RESPIRATION RATE: 18 BRPM

## 2023-01-20 DIAGNOSIS — G30.9 ALZHEIMER'S DISEASE, UNSPECIFIED (HCC): ICD-10-CM

## 2023-01-20 DIAGNOSIS — R35.0 URINE FREQUENCY: ICD-10-CM

## 2023-01-20 DIAGNOSIS — D64.9 ANEMIA, UNSPECIFIED TYPE: ICD-10-CM

## 2023-01-20 DIAGNOSIS — E11.40 CONTROLLED TYPE 2 DIABETES WITH NEUROPATHY (HCC): ICD-10-CM

## 2023-01-20 DIAGNOSIS — I50.9 CHRONIC CONGESTIVE HEART FAILURE, UNSPECIFIED HEART FAILURE TYPE (HCC): ICD-10-CM

## 2023-01-20 DIAGNOSIS — F02.80 ALZHEIMER'S DISEASE, UNSPECIFIED (HCC): ICD-10-CM

## 2023-01-20 DIAGNOSIS — J44.9 CHRONIC OBSTRUCTIVE PULMONARY DISEASE, UNSPECIFIED COPD TYPE (HCC): ICD-10-CM

## 2023-01-20 DIAGNOSIS — R35.0 URINARY FREQUENCY: ICD-10-CM

## 2023-01-20 DIAGNOSIS — I10 HYPERTENSION, UNSPECIFIED TYPE: Primary | ICD-10-CM

## 2023-01-20 DIAGNOSIS — N18.4 CKD (CHRONIC KIDNEY DISEASE) STAGE 4, GFR 15-29 ML/MIN (HCC): ICD-10-CM

## 2023-01-20 RX ORDER — LANOLIN ALCOHOL/MO/W.PET/CERES
CREAM (GRAM) TOPICAL DAILY
COMMUNITY

## 2023-01-20 RX ORDER — INSULIN ASPART 100 [IU]/ML
INJECTION, SOLUTION INTRAVENOUS; SUBCUTANEOUS
COMMUNITY

## 2023-01-20 RX ORDER — EZETIMIBE 10 MG/1
TABLET ORAL
COMMUNITY
Start: 2022-11-30

## 2023-01-20 RX ORDER — AMLODIPINE BESYLATE 5 MG/1
5 TABLET ORAL DAILY
Qty: 90 TABLET | Refills: 3 | Status: SHIPPED | OUTPATIENT
Start: 2023-01-20

## 2023-01-20 NOTE — PROGRESS NOTES
[address] [place] [address]   Office [phone]  Fax [phone]     1/20/2023   Chief Complaint   Patient presents with    Hypertension    Labs     results       HPI:  Moreno Mcfarland is a [de-identified] y.o. male who presents to clinic today for Blood pressure    Dementia, chronic care updates:  - Pt doing a little better overall currently per wife  - Doing home PT at home for balance. Is helping  - Home aid is also helping  - Had fall last week due to getting up on stool. Seen in ER recently - xrays were ok    HTN:  Reviewed home BP log:  Meds: Norvasc 5mg daily   ROS: unable to provide d/t dementia   Diet: wife makes meals  Exercise: home PT  Tobacco use: No  Alcohol use: No  NSAID use: No    Anemia  - Taking daily at night  - No blood in stool & urine. No melena  Lab Results   Component Value Date/Time    WBC 10.0 09/30/2022 11:57 AM    HGB 10.7 (L) 09/30/2022 11:57 AM    HCT 34.6 (L) 09/30/2022 11:57 AM    PLATELET 557 45/91/4612 11:57 AM    MCV 94.3 09/30/2022 11:57 AM     Due for DM lab check       Patients past medical, surgical and family histories were reviewed. Allergies and Medications reviewed and updated. Review of Systems   Unable to perform ROS: Dementia     Objective:  Vitals:    01/20/23 1548   BP: 131/64   Pulse: 67   Resp: 18   Temp: 97.5 °F (36.4 °C)   TempSrc: Temporal   SpO2: 97%   Weight: 165 lb (74.8 kg)   Height: 5' 4\" (1.626 m)     Body mass index is 28.32 kg/m². Physical Exam  General: Patient alert and oriented and in NAD  HEENT: PER/EOMI, no conjunctival pallor or scleral icterus. Heart: Regular rate and rhythm, No murmurs, rubs or gallops. 2+ peripheral pulses  Lungs: Clear to auscultation bilaterally, no wheezing, rales or rhonchi  Abd: +BS, non-tender, non-distended  Ext: No edema  Skin: No rashes or lesions noted on exposed skin,  Psych: Appropriate mood and affect    No results found for this or any previous visit (from the past 24 hour(s)).     Assessment and Plan:  Diagnoses and all orders for this visit:    1. Hypertension, unspecified type  Assessment & Plan:  Chronic, at goal today in clinic  - Continue Norvasc 5mg daily  - Recheck kidney function    Orders:  -     amLODIPine (NORVASC) 5 mg tablet; Take 1 Tablet by mouth daily.  -     CBC W/O DIFF; Future  -     METABOLIC PANEL, BASIC; Future    2. Alzheimer's disease, unspecified ([facility]  Assessment & Plan:  Chronic  - Follows with Neurology  - On Seroquel qHS due to agitation      3. Chronic obstructive pulmonary disease, unspecified COPD type ([facility]  Assessment & Plan:  Managed by PCP. Not seeing pulm  - On advair scheduled. Albuterol prn      4. Chronic congestive heart failure, unspecified heart failure type [facility])  Assessment & Plan:  Follows with Dr. Estefania Carrillo      5. Controlled type 2 diabetes with neuropathy ([facility])  -     HEMOGLOBIN A1C WITH EAG; Future  -     MICROALBUMIN, UR, RAND W/ MICROALB/CREAT RATIO; Future  -     HEMOGLOBIN A1C WITH EAG; Future    6. CKD (chronic kidney disease) stage 4, GFR 15-29 ml/min ([facility])  Assessment & Plan:  Follows with Dr. Mary Garza      7. Anemia, unspecified type  Assessment & Plan:  Chronic, on daily iron supplement  - recheck CBC    Orders:  -     CBC W/O DIFF; Future  -     IRON PROFILE; Future  -     FERRITIN; Future    8. Urine frequency  -     CULTURE, URINE; Future    9. Urinary frequency  Assessment & Plan:  Recently completed antibiotics for UTI, however patient sx or urine frequency and urinating on oneself have returned  - Recheck UCx as was not sent last time        Follow-up and Dispositions    Return in about 6 weeks (around 3/3/2023) for Diabetes and chronic conditions. I have discussed the aforementioned diagnoses and plan with the patient in detail. I have provided information in person and/or in AVS. All questions answered prior to discharge.      I discussed this patient with Dr. Valdemar Almonte (Attending Physician)   Signed By:  Cielo Barragan MD Family Medicine Resident

## 2023-01-20 NOTE — ASSESSMENT & PLAN NOTE
Recently completed antibiotics for UTI, however patient sx or urine frequency and urinating on oneself have returned  - Recheck UCx as was not sent last time

## 2023-01-20 NOTE — ASSESSMENT & PLAN NOTE
Chronic, at goal today in clinic  - Continue Norvasc 5mg daily  - Recheck kidney function EXAM note      History    Santosh Rahman is a 59 year old male who presents for overdue follow up of his hypertension, last visit September of 2018.  He travels a lot and has returned to [facility].  He is to be taking Lisinopril-HTCZ 20-12.5mg, 2 tabs daily.  He is only taking one and has actually been without medication for a week.  He denies any new health concerns since last visit.  His pre-clinic CMP with normal electrolytes, renal and liver function.  He reports he has quit drinking alcohol completely for past 3 months.      CBC reviewed and is WNL, has chronic low platelet count for years that has been evaluated by hematology in past with no cause.  Current platelet count highest it has been. Denies any easy bruising or bleeding.   Lipid panel reviewed LDL 75.          MEDICATIONS:  Current Outpatient Medications   Medication Sig   • lisinopril-hydroCHLOROthiazide (PRINZIDE,ZESTORETIC) 20-12.5 MG per tablet Take 1 tablet by mouth daily.     No current facility-administered medications for this visit.        ALLERGIES:  ALLERGIES:   Allergen Reactions   • Keflex [Cephalexin] GI UPSET       I have reviewed the past medical, family, and social history sections including the medications and allergies listed in the above medical record as well as the nursing notes.     Review of systems    Constitutional:  Patient denies fever, chills, tiredness, malaise.    Eyes:  Denies change in visual acuity, pain, burning, diplopia.   Immunologic:  Denies hives, seasonal allergies.    HENT:  Denies sinus problems, ear infections, nasal congestion, sore throat.    Respiratory:  Denies cough, shortness of breath and hemoptysis.  Cardiovascular:  Denies chest pain, edema, palpitations.   GI:  Denies abdominal pain, nausea, vomiting, bloody stools, diarrhea.    :  Denies urine retention, dysuria, urinary frequency, hematuria, nocturia.    Musculoskeletal:  Denies back pain, neck pain, joint pain, leg swelling.    Integument:  Denies  rash, itching, bruising.  Neurologic:  Denies headache, focal weakness, sensory changes.    Endocrine:  Denies polyuria, polydipsia, temperature intolerance.    Lymphatic:  Denies swollen glands, weight changes.        Physical Exam    Vital Signs:    Vitals:    12/20/19 1037   BP: (!) 154/100   Pulse: 72   Resp: 16   Weight: 78 kg   Height: 5' 8\" (1.727 m)     Constitutional:  Pleasant, alert, oriented to person, place, date; In no acute distress.  Integument:  Warm, Dry, No erythema, No rash. No distal cyanosis.  HENT:  Normocephalic, Atraumatic   Bilateral external ears normal, normal TMs bilaterally.  Oropharynx moist, No oral exudates.   Nose patent, clear with normal appearing mucus.   Neck- Normal range of motion, supple, No tenderness, lymphadenopathy or thyromegaly.   Eyes:  PERRL, EOMI, Conjunctiva normal, No discharge. Normal Fundoscopic exam.  Cardiovascular:  Normal heart rate, Normal rhythm, No murmurs, No rubs, No gallops.  No edema.  Respiratory:  Clear to auscultations bilaterally, No wheezing, rales, or rhonchus.   GI:  Bowel sounds normal, Soft, No tenderness,  No masses, rebound or guarding,   No hepatic enlargement.  Musculoskeletal: No joint swelling, erythema or synovitis ; No palpable crepitus.   Neurologic:  Alert & oriented x 3, Normal motor function, Normal sensory function, No focal deficits noted.      Assessment & Plan    Santosh Rahman is a 59 year old male who presents today with :  1.HYPERTENSION:  Uncontrolled, not on medication   Restart Lisinopril-HCTZ 20-12.5mg one tablet daily for now.  Return for nurse visit for B/P check in 1-2 weeks.    Return in 6 months  2. Thrombocytopenia:  Stable, has had prior evaluation by HEME/[facility], certainly can be result of prior heavy alcohol use.  Aware to call or return if issues with easy bruising or bleeding.      Over the last 2 weeks, how often have you been bothered by the following problems?          PHQ2 Score:  0  1. Little interest or  pleasure in doing things?:  0  2. Feeling down, depressed, or hopeless?:  0         DEPRESSION ASSESSMENT/PLAN:  Depression screening is negative no further plan needed.    Declines flu and pneumonia vaccine.    ELIDA Chou

## 2023-01-20 NOTE — PROGRESS NOTES
Chief Complaint   Patient presents with    Hypertension    Labs     results     1. Have you been to the ER, urgent care clinic since your last visit? Hospitalized since your last visit? Yes. [facility] for pain in thigh. 2. Have you seen or consulted any other health care providers outside of the [address] [place] [address] since your last visit? Include any pap smears or colon screening.   No

## 2023-01-21 LAB
ANION GAP SERPL CALC-SCNC: 5 MMOL/L (ref 5–15)
BUN SERPL-MCNC: 31 MG/DL (ref 6–20)
BUN/CREAT SERPL: 16 (ref 12–20)
CALCIUM SERPL-MCNC: 9.1 MG/DL (ref 8.5–10.1)
CHLORIDE SERPL-SCNC: 111 MMOL/L (ref 97–108)
CO2 SERPL-SCNC: 26 MMOL/L (ref 21–32)
CREAT SERPL-MCNC: 1.98 MG/DL (ref 0.7–1.3)
CREAT UR-MCNC: 139 MG/DL
ERYTHROCYTE [DISTWIDTH] IN BLOOD BY AUTOMATED COUNT: 13.8 % (ref 11.5–14.5)
EST. AVERAGE GLUCOSE BLD GHB EST-MCNC: 105 MG/DL
FERRITIN SERPL-MCNC: 55 NG/ML (ref 26–388)
GLUCOSE SERPL-MCNC: 108 MG/DL (ref 65–100)
HBA1C MFR BLD: 5.3 % (ref 4–5.6)
HCT VFR BLD AUTO: 36.5 % (ref 36.6–50.3)
HGB BLD-MCNC: 11.3 G/DL (ref 12.1–17)
IRON SATN MFR SERPL: 12 % (ref 20–50)
IRON SERPL-MCNC: 42 UG/DL (ref 35–150)
MCH RBC QN AUTO: 27.8 PG (ref 26–34)
MCHC RBC AUTO-ENTMCNC: 31 G/DL (ref 30–36.5)
MCV RBC AUTO: 89.9 FL (ref 80–99)
MICROALBUMIN UR-MCNC: 2.79 MG/DL
MICROALBUMIN/CREAT UR-RTO: 20 MG/G (ref 0–30)
NRBC # BLD: 0 K/UL (ref 0–0.01)
NRBC BLD-RTO: 0 PER 100 WBC
PLATELET # BLD AUTO: 182 K/UL (ref 150–400)
PMV BLD AUTO: 11 FL (ref 8.9–12.9)
POTASSIUM SERPL-SCNC: 4.3 MMOL/L (ref 3.5–5.1)
RBC # BLD AUTO: 4.06 M/UL (ref 4.1–5.7)
SODIUM SERPL-SCNC: 142 MMOL/L (ref 136–145)
TIBC SERPL-MCNC: 344 UG/DL (ref 250–450)
WBC # BLD AUTO: 9.5 K/UL (ref 4.1–11.1)

## 2023-01-22 LAB
BACTERIA SPEC CULT: NORMAL
CC UR VC: NORMAL
SERVICE CMNT-IMP: NORMAL

## 2023-01-23 ENCOUNTER — TELEPHONE (OUTPATIENT)
Dept: FAMILY MEDICINE CLINIC | Age: 83
End: 2023-01-23

## 2023-01-23 NOTE — TELEPHONE ENCOUNTER
Called wife about lab results, discussed findings. Pt had EGD and colonoscopy in 9/2022 and was rec'd repeat colonoscopy in 3 years. Answered all questions.      Sheri Peters MD

## 2023-01-24 RX ORDER — ISOSORBIDE MONONITRATE 30 MG/1
TABLET, EXTENDED RELEASE ORAL
Qty: 360 TABLET | Refills: 1 | Status: SHIPPED | OUTPATIENT
Start: 2023-01-24

## 2023-02-06 ENCOUNTER — PATIENT OUTREACH (OUTPATIENT)
Dept: CASE MANAGEMENT | Age: 83
End: 2023-02-06

## 2023-02-07 NOTE — PROGRESS NOTES
Goals Addressed                   This Visit's Progress     Supportive resources in place to maintain patient in the community (ie., home health, equipment, DME, refer to, etc.)          2/6/2023  Follow up  SW left voicemail with spouse requesting call back. SW will follow up once call is returned. 1/13/2023  Follow up  SW contacted spouse to update her on information received regarding respite hours and personal care agency. Spouse was advised to contact pt's Medicaid care coordinator to inquire about hours. Spouse reported that she received notification from Anand Porter explaining the details for respite hours etc.  Spouse also mentioned that things have approved with the care aide, so she will like to remain with current personal care agency. SW provided her contact for [facility] for future follow up if needed. SW will follow up at later date. Kannan Ramirez [place] [address] Ambulatory Care Coordination Team  Phone: [phone]     1/10/2023  Follow up  SW contacted [facility] to inquire about availability for personal care services. Jami Berrios reported that they attempted to contact spouse a few weeks ago to discuss services and assign a care aide but spouse never returned their call and did not follow up. SW left voicemail with spouse requesting a call back for further discussion. Kannan Ramirez [place] [address] Ambulatory Care Coordination Team  Phone: [phone]     1/6/2023  Follow up  SW contacted spouse for update from previous outreach. Pt was discharged home from [facility] with [facility] receiving PT&OT and nursing. Pt continues to receive personal care with [facility] 7hrs/day 5 days a week from 10am to 5pm.  Spouse continues to express interest about receiving pc services through a different agency if available. Spouse isn't totally satisfied with the care aides with current agency.   Spouse also inquired about the option and process for respite care hours. SW left voicemail with DSS rep requesting call back to discuss respite care. Also left voicemail with Sb Liz    Provided spouse contact for [facility] to contact and inquire about available aides. Neena Faulkner [place] [address] Ambulatory Care Coordination Team  Phone: [phone]       12/30/2022  Follow up  SW completed chart review. SW left voicemail with spouse requesting a call back. SW will follow up once call is returned. [facility]ulk[facility] [address] [facility] Coord[facility]  Phone: [phone]     12/19/2022  Follow up  SW spoke with pt's spouse for follow up. Spouse reported that pt was admitted to [facility] on 12/15 d/t having a brain bleed resulting from a fall. Pt may discharge to an inpatient rehabilitation center for skilled care but spouse stated that she feels pt will do/adjust better at home due to dx of Dementia. Pt continues to receive personal care services through [facility] but was provided with a new care aide. SW will follow up with pt/spouse a later date to determine d/c plans and additional care in the home. [facility]ulk[facility] [address] [facility]  Phone: [phone]       12/8/2022  SW left voicemail with spouse requesting call back for follow up.    11/29/22  SW received a list of personal care agencies to provide to spouse for outreach. SW left voicemail with spouse requesting call back. Neena Faulkner [facility] [address] [facility] Care Coordination Team  Phone: [phone]     11/23/2022  Follow up  SW left voicemail with pt's spouse requesting call back to discuss update with personal care search. SW will follow up at later date.   Neena Faulkner [place] [address] Ambulatory Care Coordination Team  Phone: [phone]     11/15/2022  Follow up  SW received referral from pt's provider Alfredo Bryant requesting follow up with pt/spouse to discuss resources for personal care aides. SW contacted pt/spouse introduced self and explained reason for call. Spouse stated that she is not satisfied with current personal care agency and will like to receive a list of personal care agencies that accept Medicaid insurance. Pt has cognitive impairment and generalized weakness which impacts his balance and his ability to safely perform ADLs. Spouse does not have agency preferences and is willing to review a list for further contact. Plan  SW made spouse are of the current challenges of identifying an agency that has availability. SW explained to spouse the process of switching from one personal care agency to another, which involves ending services with current provider and providing a copy of pt's Uniform Assessment Instrument screening to the new agency once identified. SW will complete research to identify agencies that are accepting new Medicaid cases and follow up with spouse.     Elian Uriostegui  Ambulatory Care Coordination Team  Phone: [phone]

## 2023-02-10 ENCOUNTER — OFFICE VISIT (OUTPATIENT)
Dept: FAMILY MEDICINE CLINIC | Age: 83
End: 2023-02-10
Payer: MEDICAID

## 2023-02-10 VITALS
TEMPERATURE: 97.6 F | HEART RATE: 69 BPM | DIASTOLIC BLOOD PRESSURE: 71 MMHG | BODY MASS INDEX: 28.84 KG/M2 | SYSTOLIC BLOOD PRESSURE: 127 MMHG | OXYGEN SATURATION: 94 % | WEIGHT: 168 LBS

## 2023-02-10 DIAGNOSIS — G30.9 ALZHEIMER'S DISEASE, UNSPECIFIED (HCC): ICD-10-CM

## 2023-02-10 DIAGNOSIS — R35.0 FREQUENCY OF URINATION: Primary | ICD-10-CM

## 2023-02-10 DIAGNOSIS — J30.89 ENVIRONMENTAL AND SEASONAL ALLERGIES: ICD-10-CM

## 2023-02-10 DIAGNOSIS — R45.1 AGITATION: ICD-10-CM

## 2023-02-10 DIAGNOSIS — F32.A DEPRESSION, UNSPECIFIED DEPRESSION TYPE: ICD-10-CM

## 2023-02-10 DIAGNOSIS — F02.80 ALZHEIMER'S DISEASE, UNSPECIFIED (HCC): ICD-10-CM

## 2023-02-10 LAB
BILIRUB UR QL STRIP: NEGATIVE
GLUCOSE UR-MCNC: NEGATIVE MG/DL
KETONES P FAST UR STRIP-MCNC: NEGATIVE MG/DL
PH UR STRIP: 5.5 [PH] (ref 4.6–8)
PROT UR QL STRIP: NEGATIVE
SP GR UR STRIP: 1.01 (ref 1–1.03)
UA UROBILINOGEN AMB POC: NORMAL (ref 0.2–1)
URINALYSIS CLARITY POC: CLEAR
URINALYSIS COLOR POC: YELLOW
URINE BLOOD POC: NEGATIVE
URINE LEUKOCYTES POC: NORMAL
URINE NITRITES POC: NEGATIVE

## 2023-02-10 RX ORDER — SERTRALINE HYDROCHLORIDE 100 MG/1
150 TABLET, FILM COATED ORAL DAILY
Qty: 180 TABLET | Refills: 2 | Status: SHIPPED | OUTPATIENT
Start: 2023-02-10

## 2023-02-10 RX ORDER — TRAZODONE HYDROCHLORIDE 50 MG/1
25 TABLET ORAL DAILY
Qty: 30 TABLET | Refills: 0 | Status: SHIPPED | OUTPATIENT
Start: 2023-02-10

## 2023-02-10 RX ORDER — FLUTICASONE PROPIONATE 50 MCG
1 SPRAY, SUSPENSION (ML) NASAL DAILY
Qty: 3 EACH | Refills: 3 | Status: SHIPPED | OUTPATIENT
Start: 2023-02-10

## 2023-02-10 RX ORDER — ASPIRIN 81 MG/1
81 TABLET ORAL DAILY
COMMUNITY

## 2023-02-10 NOTE — PROGRESS NOTES
Identified pt with two pt identifiers(name and ). Reviewed record in preparation for visit and have obtained necessary documentation. All patient medications has been reviewed. Chief Complaint   Patient presents with    Urinary Frequency     Urinary frequency for 3 days now c/o lower back pain. No abd pain,  no fever, no blood in urine. Confusion and agitation per wife. Visit Vitals  /71   Pulse 69   Temp 97.6 °F (36.4 °C) (Oral)   Wt 168 lb (76.2 kg)   SpO2 94%   BMI 28.84 kg/m²     1. Have you been to the ER, urgent care clinic since your last visit? Hospitalized since your last visit? No    2. Have you seen or consulted any other health care providers outside of the [address] [place] [address] since your last visit? Include any pap smears or colon screening.  No

## 2023-02-10 NOTE — PROGRESS NOTES
Subjective   Cristiane Bergman is a [de-identified] y.o. male who presents for Urinary Frequency (Urinary frequency for 3 days now c/o lower back pain. No abd pain,  no fever, no blood in urine. Confusion and agitation per wife. )    Urinary frequency  UTIs frequently in the past. Usually has agitation with these. Over last 2 days, he has been urinating more frequently and been agitated. He also has dementia, which has been worsening. He is confused, thinking his wife is his mother, seeing relatives who have passed. Also having back pain. Denies dysuria, fevers, or chills. Wife is concerned that he has either a UTI or worsening dementia. Review of Systems   Review of Systems   Constitutional:  Negative for chills and fever. Gastrointestinal:  Negative for abdominal pain. Genitourinary:  Positive for frequency. Negative for dysuria. Musculoskeletal:  Positive for back pain. Psychiatric/Behavioral:  Positive for agitation and confusion. Medical History  Past Medical History:   Diagnosis Date    Alzheimer's dementia ([facility] [address]     BPH (benign prostatic hyperplasia)     CAD (coronary artery disease)     Carotid artery stenosis     CKD (chronic kidney disease)     COPD (chronic obstructive pulmonary disease) ([facility])     HLD (hyperlipidemia)     T2DM (type 2 diabetes mellitus) ([facility])        Medications  Current Outpatient Medications   Medication Sig    aspirin delayed-release 81 mg tablet Take 81 mg by mouth daily. sertraline (ZOLOFT) 100 mg tablet Take 1.5 Tablets by mouth daily. fluticasone propionate (FLONASE) 50 mcg/actuation nasal spray 1 [place] by Both Nostrils route daily. SHAKE LIQUID AND USE 1 SPRAY IN EACH NOSTRIL DAILY    traZODone (DESYREL) 50 mg tablet Take 0.5 Tablets by mouth daily. isosorbide mononitrate ER (IMDUR) 30 mg tablet TAKE THREE TABLETS BY MOUTH TWICE A DAY    ezetimibe (ZETIA) 10 mg tablet     insulin aspart U-100 (NOVOLOG) 100 unit/mL (3 mL) inpn by SubCUTAneous route.     amLODIPine (NORVASC) 5 mg tablet Take 1 Tablet by mouth daily. Insulin Needles, Disposable, (BD Ultra-Fine Micro Pen Needle) 32 gauge x 1/4\" ndle TEST BLOOD SUGAR THREE TIMES DAILY AS NEEDED    tamsulosin (FLOMAX) 0.4 mg capsule Take 1 Capsule by mouth daily. memantine (NAMENDA) 5 mg tablet TAKE ONE TABLET BY MOUTH TWICE A DAY    cetirizine (ZYRTEC) 10 mg tablet Take 1 Tablet by mouth daily. QUEtiapine (SEROquel) 50 mg tablet Take 50mg (1 tablet) in the morning and lunch time. Take 100mg (2 tablets) at bedtime    glucose blood VI test strips (OneTouch Ultra Test) strip USE TO CHECK BLOOD SUGAR FOUR TIMES DAILY    ferrous sulfate 325 mg (65 mg iron) tablet Take  by mouth Daily (before breakfast). albuterol (PROVENTIL VENTOLIN) 2.5 mg /3 mL (0.083 %) nebu 3 mL by Nebulization route every four (4) hours as needed for Wheezing. HYDROcodone-acetaminophen (NORCO) 5-325 mg per tablet TAKE 1 TABLET BY MOUTH TWICE DAILY AS NEEDED    Combivent Respimat  mcg/actuation inhaler INHALE ONE PUFF BY MOUTH TWICE A DAY    buPROPion (WELLBUTRIN) 100 mg tablet TAKE ONE TABLET BY MOUTH TWICE A DAY    albuterol (PROVENTIL HFA, VENTOLIN HFA, PROAIR HFA) 90 mcg/actuation inhaler Take 2 Puffs by inhalation every four (4) hours as needed for Wheezing. calcium-cholecalciferol, D3, (CALTRATE 600+D) tablet TAKE ONE TABLET BY MOUTH ONE TIME DAILY    Nebulizer & Compressor machine 1 Each by Does Not Apply route as needed for Wheezing or Shortness of Breath. Nebulizer Accessories kit For albuterol q4-8hrs PRN    Blood-Glucose Meter monitoring kit One Touch Ultra Meter    donepezil (ARICEPT) 10 mg tablet TAKE ONE TABLET BY MOUTH AT BEDTIME     No current facility-administered medications for this visit.        Immunizations   Immunization History   Administered Date(s) Administered    COVID-19, PFIZER PURPLE top, DILUTE for use, (age 15 y+), IM, 30mcg/0.3mL 01/20/2021, 02/10/2021, 08/19/2021, 04/15/2022       Allergies   Allergies Allergen Reactions    Ativan [Lorazepam] Other (comments)     Altered LOC  Patient able to tolerate 0.5mg    Dilaudid [Hydromorphone] Unknown (comments)    Lisinopril Cough    Adempas [Riociguat] Unknown (comments)    Codeine Unknown (comments)     Mental state= confusion and combative       Objective   Vital Signs  Visit Vitals  /71   Pulse 69   Temp 97.6 °F (36.4 °C) (Oral)   Wt 168 lb (76.2 kg)   SpO2 94%   BMI 28.84 kg/m²       Physical Examination  Physical Exam  Vitals and nursing note reviewed. Constitutional:       General: He is not in acute distress. Appearance: Normal appearance. HENT:      Head: Normocephalic and atraumatic. Right Ear: External ear normal.      Left Ear: External ear normal.   Eyes:      General: No scleral icterus. Extraocular Movements: Extraocular movements intact. Conjunctiva/sclera: Conjunctivae normal.   Cardiovascular:      Rate and Rhythm: Normal rate and regular rhythm. Pulses: Normal pulses. Heart sounds: Normal heart sounds. Pulmonary:      Effort: Pulmonary effort is normal. No respiratory distress. Breath sounds: Normal breath sounds. Abdominal:      General: Abdomen is flat. Bowel sounds are normal.      Palpations: Abdomen is soft. Tenderness: There is no abdominal tenderness. Musculoskeletal:      Right lower leg: No edema. Left lower leg: No edema. Skin:     General: Skin is warm and dry. Neurological:      General: No focal deficit present. Mental Status: He is alert. Mental status is at baseline. Psychiatric:         Mood and Affect: Mood normal.         Behavior: Behavior normal.        Assessment and Plan   Milvia More is a [de-identified] y.o. male who presents for Urinary Frequency (Urinary frequency for 3 days now c/o lower back pain. No abd pain,  no fever, no blood in urine. Confusion and agitation per wife. )    1. Frequency of urination  UA bland, will send for culture.  On flomax for kidney stone, follows with [facility]. Will follow up. - AMB POC URINALYSIS DIP STICK AUTO W/O MICRO  - CULTURE, URINE; Future    2. Depression, unspecified depression type  Mood is stable on Zoloft 150. Continue Zoloft 150mg daily. - sertraline (ZOLOFT) 100 mg tablet; Take 1.5 Tablets by mouth daily. Dispense: 180 Tablet; Refill: 2    3. Alzheimer's disease, unspecified ([address]  4. Agitation  Agitation worsening, likely related to worsening of his dementia. Had been on Namenda 10mg but behaviors were worse, so went back down to 5. Will try low dose trazodone for agitation. If behaviors persist, can begin tapering off of Seroquel for alternative antipsychotic.   - traZODone (DESYREL) 50 mg tablet; Take 0.5 Tablets by mouth daily. Dispense: 30 Tablet; Refill: 0    5. Environmental and seasonal allergies  Well controlled with flonase 50, will refill.  - fluticasone propionate (FLONASE) 50 mcg/actuation nasal spray; 1 Venus by Both Nostrils route daily. SHAKE LIQUID AND USE 1 SPRAY IN EACH NOSTRIL DAILY  Dispense: 3 Each; Refill: 3    Return in about 4 weeks (around 3/10/2023). Pt was discussed with Dr. Ana Drew (attending physician). I have reviewed patient medical and social history and medications. I have reviewed pertinent labs results and other data. I have discussed the diagnosis with the patient and the intended plan as seen in the above orders. The patient has received an after-visit summary and questions were answered concerning future plans. I have discussed medication side effects and warnings with the patient as well.     Mike Osorio MD  Resident, [facility]  02/10/23

## 2023-02-12 LAB
BACTERIA SPEC CULT: NORMAL
CC UR VC: NORMAL
SERVICE CMNT-IMP: NORMAL

## 2023-02-13 DIAGNOSIS — I10 ESSENTIAL HYPERTENSION: ICD-10-CM

## 2023-02-13 RX ORDER — ISOSORBIDE MONONITRATE 30 MG/1
TABLET, EXTENDED RELEASE ORAL
Qty: 360 TABLET | Refills: 1 | Status: SHIPPED | OUTPATIENT
Start: 2023-02-13

## 2023-02-16 DIAGNOSIS — I10 HYPERTENSION, UNSPECIFIED TYPE: ICD-10-CM

## 2023-02-16 NOTE — TELEPHONE ENCOUNTER
Patient's wife called stating that he needs a refill on his Amlodipine medication and the dosage needs to be increased to 10mg instead of 5mg, because he has been taking two. Thanks!

## 2023-02-17 RX ORDER — AMLODIPINE BESYLATE 10 MG/1
10 TABLET ORAL DAILY
Qty: 90 TABLET | Refills: 3 | Status: SHIPPED | OUTPATIENT
Start: 2023-02-17

## 2023-03-01 ENCOUNTER — PATIENT OUTREACH (OUTPATIENT)
Dept: CASE MANAGEMENT | Age: 83
End: 2023-03-01

## 2023-03-01 NOTE — PROGRESS NOTES
Social Work Note    Patient has graduated from the  program on 3/1/2023 . At this time all Social Work goals have been completed and the patient/family has the ability to self-manage. No further Social Work follow-up scheduled. Patient/family has Social Work contact information for further questions, concerns, or needs. Goals Addressed                   This Visit's Progress     Supportive resources in place to maintain patient in the community (ie., home health, equipment, DME, refer to, etc.)          3/1/2023  Follow up  SW completed chart review and there were no new needs identified. SW provided spouse with community resources needed to care for pt in the home. There is no further SW outreach scheduled at this time. Elian Schwartz  Ambulatory Care Coordination Team  Phone: [phone]     2/6/2023  Follow up  SW left voicemail with spouse requesting call back. SW will follow up once call is returned. 1/13/2023  Follow up  SW contacted spouse to update her on information received regarding respite hours and personal care agency. Spouse was advised to contact pt's Medicaid care coordinator to inquire about hours. Spouse reported that she received notification from [address] [place] [address] explaining the details for respite hours etc.  Spouse also mentioned that things have approved with the care aide, so she will like to remain with current personal care agency. SW provided her contact for [facility] for future follow up if needed. SW will follow up at later date. Elian Schwartz  Ambulatory Care Coordination Team  Phone: [phone]     1/10/2023  Follow up  SW contacted [facility] to inquire about availability for personal care services. Joe Priest reported that they attempted to contact spouse a few weeks ago to discuss services and assign a care aide but spouse never returned their call and did not follow up.   Dionne Santos left voicemail with spouse requesting a call back for further discussion. Elian Heath  Ambulatory Care Coordination Team  Phone: [phone]     1/6/2023  Follow up  SW contacted spouse for update from previous outreach. Pt was discharged home from [facility] with [facility] receiving PT&OT and nursing. Pt continues to receive personal care with [facility] 7hrs/day 5 days a week from 10am to 5pm.  Spouse continues to express interest about receiving pc services through a different agency if available. Spouse isn't totally satisfied with the care aides with current agency. Spouse also inquired about the option and process for respite care hours. SW left voicemail with DSS rep requesting call back to discuss respite care. Also left voicemail with Sb Liz    Provided spouse contact for [facility] to contact and inquire about available aides. Elian Heath  Ambulatory Care Coordination Team  Phone: [phone]       12/30/2022  Follow up  SW completed chart review. SW left voicemail with spouse requesting a call back. SW will follow up once call is returned. Elian Heath  Ambulatory Care Coordination Team  Phone: [phone]     12/19/2022  Follow up  SW spoke with pt's spouse for follow up. Spouse reported that pt was admitted to [facility] on 12/15 d/t having a brain bleed resulting from a fall. Pt may discharge to an inpatient rehabilitation center for skilled care but spouse stated that she feels pt will do/adjust better at home due to dx of Dementia. Pt continues to receive personal care services through [facility] but was provided with a new care aide. SW will follow up with pt/spouse a later date to determine d/c plans and additional care in the home.   JOSEPH Heath [facility] Coordination Team  Phone: [phone]       12/8/2022  SW left voicemail with spouse requesting call back for follow up.    11/29/22  SW received a list of personal care agencies to provide to spouse for outreach. SW left voicemail with spouse requesting call back. Elian Cano  Ambulatory Care Coordination Team  Phone: [phone]     11/23/2022  Follow up  SW left voicemail with pt's spouse requesting call back to discuss update with personal care search. SW will follow up at later date. [facility]  Phone: [phone]     11/15/2022  Follow up  SW received referral from pt's provider Ijeoma Ozuna requesting follow up with pt/spouse to discuss resources for personal care aides. SW contacted pt/spouse introduced self and explained reason for call. Spouse stated that she is not satisfied with current personal care agency and will like to receive a list of personal care agencies that accept Medicaid insurance. Pt has cognitive impairment and generalized weakness which impacts his balance and his ability to safely perform ADLs. Spouse does not have agency preferences and is willing to review a list for further contact. Plan  SW made spouse are of the current challenges of identifying an agency that has availability. SW explained to spouse the process of switching from one personal care agency to another, which involves ending services with current provider and providing a copy of pt's Uniform Assessment Instrument screening to the new agency once identified. SW will complete research to identify agencies that are accepting new Medicaid cases and follow up with spouse. [facility]  Ambulatory Care Coordination Team  Phone: [phone]               Patient's upcoming visits:  No future appointments.

## 2023-03-17 ENCOUNTER — TELEPHONE (OUTPATIENT)
Dept: FAMILY MEDICINE CLINIC | Age: 83
End: 2023-03-17

## 2023-03-17 DIAGNOSIS — E11.69 TYPE 2 DIABETES MELLITUS WITH OTHER SPECIFIED COMPLICATION, UNSPECIFIED WHETHER LONG TERM INSULIN USE (HCC): Primary | ICD-10-CM

## 2023-03-17 RX ORDER — INSULIN ASPART 100 [IU]/ML
INJECTION, SOLUTION INTRAVENOUS; SUBCUTANEOUS
Qty: 3 ML | Refills: 1 | Status: SHIPPED | OUTPATIENT
Start: 2023-03-17

## 2023-03-17 NOTE — TELEPHONE ENCOUNTER
Patient's wife called stating that [facility] needed a refill on his inuslin (Humalog). She stated that they didn't realize that what he had left was outdated. She would like for the prescription to be sent to [facility] on [address]. Thanks!

## 2023-03-20 ENCOUNTER — TELEPHONE (OUTPATIENT)
Dept: FAMILY MEDICINE CLINIC | Age: 83
End: 2023-03-20

## 2023-03-20 NOTE — TELEPHONE ENCOUNTER
Pharmacy needs to know max daily usage for Novolog insulin to calculate day supply for insurance claim. Please advise them. Their phone # [phone]. Thanks!

## 2023-03-24 ENCOUNTER — TELEPHONE (OUTPATIENT)
Dept: FAMILY MEDICINE CLINIC | Age: 83
End: 2023-03-24

## 2023-03-24 DIAGNOSIS — R45.1 AGITATION: ICD-10-CM

## 2023-03-24 DIAGNOSIS — F32.A DEPRESSION, UNSPECIFIED DEPRESSION TYPE: ICD-10-CM

## 2023-03-24 RX ORDER — BUPROPION HYDROCHLORIDE 100 MG/1
100 TABLET ORAL 2 TIMES DAILY
Qty: 180 TABLET | Refills: 4 | Status: CANCELLED | OUTPATIENT
Start: 2023-03-24

## 2023-03-27 RX ORDER — SERTRALINE HYDROCHLORIDE 100 MG/1
150 TABLET, FILM COATED ORAL DAILY
Qty: 180 TABLET | Refills: 2 | Status: SHIPPED | OUTPATIENT
Start: 2023-03-27

## 2023-03-27 RX ORDER — BUPROPION HYDROCHLORIDE 100 MG/1
100 TABLET ORAL 2 TIMES DAILY
Qty: 180 TABLET | Refills: 3 | Status: SHIPPED | OUTPATIENT
Start: 2023-03-27

## 2023-03-28 DIAGNOSIS — E11.69 TYPE 2 DIABETES MELLITUS WITH OTHER SPECIFIED COMPLICATION, UNSPECIFIED WHETHER LONG TERM INSULIN USE (HCC): ICD-10-CM

## 2023-03-28 RX ORDER — INSULIN ASPART 100 [IU]/ML
INJECTION, SOLUTION INTRAVENOUS; SUBCUTANEOUS
Qty: 3 ML | Refills: 1 | Status: CANCELLED | OUTPATIENT
Start: 2023-03-28

## 2023-04-05 ENCOUNTER — TELEPHONE (OUTPATIENT)
Dept: FAMILY MEDICINE CLINIC | Age: 83
End: 2023-04-05

## 2023-04-05 RX ORDER — INSULIN ASPART 100 [IU]/ML
INJECTION, SOLUTION INTRAVENOUS; SUBCUTANEOUS
Qty: 3 ML | Refills: 1 | Status: SHIPPED
Start: 2023-04-05

## 2023-04-05 NOTE — TELEPHONE ENCOUNTER
Pharmacy faxed regarding script for Novolog. They need to know how many units the patient is so that they can calculate the day supply. Please advise them at [phone]. Thanks!

## 2023-04-14 DIAGNOSIS — G30.9 ALZHEIMER'S DISEASE, UNSPECIFIED (HCC): ICD-10-CM

## 2023-04-14 DIAGNOSIS — F02.80 ALZHEIMER'S DISEASE, UNSPECIFIED (HCC): ICD-10-CM

## 2023-04-17 DIAGNOSIS — R45.1 AGITATION: ICD-10-CM

## 2023-04-17 RX ORDER — TRAZODONE HYDROCHLORIDE 50 MG/1
TABLET ORAL
Qty: 30 TABLET | Refills: 0 | Status: SHIPPED | OUTPATIENT
Start: 2023-04-17

## 2023-04-21 RX ORDER — MEMANTINE HYDROCHLORIDE 5 MG/1
TABLET ORAL
Qty: 180 TABLET | Refills: 0 | Status: SHIPPED | OUTPATIENT
Start: 2023-04-21

## 2023-06-07 RX ORDER — MEMANTINE HYDROCHLORIDE 5 MG/1
TABLET ORAL
Qty: 120 TABLET | Refills: 2 | Status: SHIPPED | OUTPATIENT
Start: 2023-06-07

## 2023-06-08 ENCOUNTER — OFFICE VISIT (OUTPATIENT)
Age: 83
End: 2023-06-08
Payer: COMMERCIAL

## 2023-06-08 VITALS
SYSTOLIC BLOOD PRESSURE: 143 MMHG | DIASTOLIC BLOOD PRESSURE: 63 MMHG | HEART RATE: 71 BPM | OXYGEN SATURATION: 94 % | TEMPERATURE: 98.2 F | RESPIRATION RATE: 18 BRPM | BODY MASS INDEX: 28 KG/M2 | WEIGHT: 164 LBS | HEIGHT: 64 IN

## 2023-06-08 DIAGNOSIS — F02.80 DEMENTIA IN OTHER DISEASES CLASSIFIED ELSEWHERE, UNSPECIFIED SEVERITY, WITHOUT BEHAVIORAL DISTURBANCE, PSYCHOTIC DISTURBANCE, MOOD DISTURBANCE, AND ANXIETY (HCC): ICD-10-CM

## 2023-06-08 DIAGNOSIS — J44.1 COPD WITH ACUTE EXACERBATION (HCC): Primary | ICD-10-CM

## 2023-06-08 DIAGNOSIS — H11.32 SUBCONJUNCTIVAL HEMORRHAGE OF LEFT EYE: ICD-10-CM

## 2023-06-08 PROCEDURE — 1123F ACP DISCUSS/DSCN MKR DOCD: CPT | Performed by: FAMILY MEDICINE

## 2023-06-08 PROCEDURE — 3077F SYST BP >= 140 MM HG: CPT | Performed by: FAMILY MEDICINE

## 2023-06-08 PROCEDURE — 3078F DIAST BP <80 MM HG: CPT | Performed by: FAMILY MEDICINE

## 2023-06-08 PROCEDURE — 99214 OFFICE O/P EST MOD 30 MIN: CPT | Performed by: FAMILY MEDICINE

## 2023-06-08 RX ORDER — ALBUTEROL SULFATE 90 UG/1
2 AEROSOL, METERED RESPIRATORY (INHALATION) EVERY 4 HOURS PRN
Qty: 18 G | Status: CANCELLED | OUTPATIENT
Start: 2023-06-08

## 2023-06-08 RX ORDER — PREDNISONE 20 MG/1
60 TABLET ORAL DAILY
Qty: 15 TABLET | Refills: 0 | Status: SHIPPED | OUTPATIENT
Start: 2023-06-08 | End: 2023-06-13

## 2023-06-08 RX ORDER — DOXYCYCLINE HYCLATE 100 MG
100 TABLET ORAL 2 TIMES DAILY
Qty: 14 TABLET | Refills: 0 | Status: SHIPPED | OUTPATIENT
Start: 2023-06-08 | End: 2023-06-15

## 2023-06-08 RX ORDER — FLUTICASONE PROPIONATE 50 MCG
1 SPRAY, SUSPENSION (ML) NASAL DAILY
Qty: 16 G | Refills: 0 | Status: SHIPPED | OUTPATIENT
Start: 2023-06-08

## 2023-06-08 RX ORDER — ALBUTEROL SULFATE 2.5 MG/3ML
2.5 SOLUTION RESPIRATORY (INHALATION) EVERY 4 HOURS PRN
Qty: 120 EACH | Refills: 3 | Status: SHIPPED | OUTPATIENT
Start: 2023-06-08

## 2023-06-08 NOTE — PROGRESS NOTES
Subjective:   Diana Garcia is an 82 y.o. male who presents for  evlaution of eye problem. The patient is accompanied by his wife. HPI  Chief Complaint   Patient presents with    Conjunctivitis     Patient with eye redness x 4 days. No pain but patient states it does itch sometimes. 4 days ago the patient suddenly developed the redness of the red eye associated with the mild itching. No discharge or bleeding from the eye. He denies any trauma or scratching of the eye. Has not tried any treatment. One week he also has been having productive cough with yellowish sputum associated with some wheezing. No SOB. No fever or chills. No significant change since the symptoms started. Has a history of COPD exacerbations with the last one over a year ago. Allergies - reviewed:    Allergies   Allergen Reactions    Hydromorphone      Other reaction(s): Unknown (comments)    Lisinopril Cough    Lorazepam Other (See Comments)     Altered LOC  Patient able to tolerate 0.5mg    Codeine      Other reaction(s): Unknown (comments)  Mental state= confusion and combative    Riociguat      Other reaction(s): Unknown (comments)         Medications - reviewed:  Current Outpatient Medications   Medication Sig    fluticasone (FLONASE) 50 MCG/ACT nasal spray 1 spray by Nasal route daily    doxycycline hyclate (VIBRA-TABS) 100 MG tablet Take 1 tablet by mouth 2 times daily for 7 days    predniSONE (DELTASONE) 20 MG tablet Take 3 tablets by mouth daily for 5 days    albuterol (PROVENTIL) (2.5 MG/3ML) 0.083% nebulizer solution Take 3 mLs by nebulization every 4 hours as needed for Wheezing    memantine (NAMENDA) 5 MG tablet TAKE ONE TABLET BY MOUTH TWICE A DAY    albuterol sulfate HFA (PROVENTIL;VENTOLIN;PROAIR) 108 (90 Base) MCG/ACT inhaler Inhale 2 puffs into the lungs every 4 hours as needed    amLODIPine (NORVASC) 10 MG tablet Take 1 tablet by mouth daily    aspirin 81 MG EC tablet Take 1 tablet by mouth daily    buPROPion

## 2023-06-08 NOTE — PROGRESS NOTES
Patient has been identified by name and . Chief Complaint   Patient presents with    Conjunctivitis     Patient with eye redness x 4 days. No pain but patient states it does itch sometimes. Vitals:    23 1727   BP: (!) 143/63   Site: Right Upper Arm   Position: Sitting   Cuff Size: Medium Adult   Pulse: 71   Resp: 18   Temp: 98.2 °F (36.8 °C)   TempSrc: Oral   SpO2: 94%   Weight: 164 lb (74.4 kg)   Height: 5' 4\" (1.626 m)        1. Have you been to the ER, urgent care clinic since your last visit? Hospitalized since your last visit? No    2. Have you seen or consulted any other health care providers outside of the [address] [place] [address] since your last visit? Include any pap smears or colon screening.  No

## 2023-06-13 RX ORDER — MEMANTINE HYDROCHLORIDE 5 MG/1
TABLET ORAL
Qty: 180 TABLET | Refills: 0 | OUTPATIENT
Start: 2023-06-13

## 2023-06-16 RX ORDER — MEMANTINE HYDROCHLORIDE 5 MG/1
5 TABLET ORAL 2 TIMES DAILY
Qty: 120 TABLET | Refills: 2 | OUTPATIENT
Start: 2023-06-16

## 2023-06-23 DIAGNOSIS — J44.9 CHRONIC OBSTRUCTIVE PULMONARY DISEASE, UNSPECIFIED COPD TYPE (HCC): Primary | ICD-10-CM

## 2023-09-14 RX ORDER — TAMSULOSIN HYDROCHLORIDE 0.4 MG/1
0.4 CAPSULE ORAL DAILY
Qty: 90 CAPSULE | Refills: 0 | Status: SHIPPED | OUTPATIENT
Start: 2023-09-14

## 2023-09-14 RX ORDER — MEMANTINE HYDROCHLORIDE 5 MG/1
TABLET ORAL
Qty: 180 TABLET | Refills: 0 | Status: SHIPPED | OUTPATIENT
Start: 2023-09-14

## 2023-09-28 ENCOUNTER — TELEPHONE (OUTPATIENT)
Age: 83
End: 2023-09-28

## 2023-09-28 NOTE — TELEPHONE ENCOUNTER
Hi Dr. Vicente Nagy,    Patient is needing a refill on fluticasone [facility]) 50 MCG/ACT nasal spray   Sent to the [facility] on [address], [address] [place] [address]

## 2023-09-29 DIAGNOSIS — J30.89 OTHER ALLERGIC RHINITIS: ICD-10-CM

## 2023-09-29 DIAGNOSIS — J30.89 OTHER ALLERGIC RHINITIS: Primary | ICD-10-CM

## 2023-09-29 RX ORDER — FLUTICASONE PROPIONATE 50 MCG
1 SPRAY, SUSPENSION (ML) NASAL DAILY
Qty: 2 EACH | Refills: 2 | Status: SHIPPED | OUTPATIENT
Start: 2023-09-29

## 2023-09-29 RX ORDER — FLUTICASONE PROPIONATE 50 MCG
1 SPRAY, SUSPENSION (ML) NASAL DAILY
Qty: 2 EACH | Refills: 2 | Status: CANCELLED | OUTPATIENT
Start: 2023-09-29

## 2023-10-03 ENCOUNTER — OFFICE VISIT (OUTPATIENT)
Age: 83
End: 2023-10-03
Payer: COMMERCIAL

## 2023-10-03 VITALS
TEMPERATURE: 98 F | RESPIRATION RATE: 17 BRPM | WEIGHT: 160 LBS | SYSTOLIC BLOOD PRESSURE: 159 MMHG | BODY MASS INDEX: 27.31 KG/M2 | DIASTOLIC BLOOD PRESSURE: 76 MMHG | HEART RATE: 54 BPM | OXYGEN SATURATION: 96 % | HEIGHT: 64 IN

## 2023-10-03 DIAGNOSIS — R35.0 URINARY FREQUENCY: Primary | ICD-10-CM

## 2023-10-03 LAB
BILIRUBIN, URINE, POC: NEGATIVE
BLOOD URINE, POC: NEGATIVE
GLUCOSE URINE, POC: NEGATIVE
GLUCOSE, POC: 88 MG/DL
HBA1C MFR BLD: 5.8 %
KETONES, URINE, POC: NEGATIVE
LEUKOCYTE ESTERASE, URINE, POC: NORMAL
NITRITE, URINE, POC: NEGATIVE
PH, URINE, POC: 5.5 (ref 4.6–8)
PROTEIN,URINE, POC: NEGATIVE
SPECIFIC GRAVITY, URINE, POC: 1.02 (ref 1–1.03)
URINALYSIS CLARITY, POC: CLEAR
URINALYSIS COLOR, POC: YELLOW
UROBILINOGEN, POC: NORMAL

## 2023-10-03 PROCEDURE — 83036 HEMOGLOBIN GLYCOSYLATED A1C: CPT

## 2023-10-03 PROCEDURE — 3074F SYST BP LT 130 MM HG: CPT | Performed by: FAMILY MEDICINE

## 2023-10-03 PROCEDURE — 99213 OFFICE O/P EST LOW 20 MIN: CPT

## 2023-10-03 PROCEDURE — 81003 URINALYSIS AUTO W/O SCOPE: CPT

## 2023-10-03 PROCEDURE — 3078F DIAST BP <80 MM HG: CPT | Performed by: FAMILY MEDICINE

## 2023-10-03 PROCEDURE — 1123F ACP DISCUSS/DSCN MKR DOCD: CPT | Performed by: FAMILY MEDICINE

## 2023-10-03 PROCEDURE — 82962 GLUCOSE BLOOD TEST: CPT

## 2023-10-03 RX ORDER — CEFDINIR 300 MG/1
300 CAPSULE ORAL 2 TIMES DAILY
Qty: 10 CAPSULE | Refills: 0 | Status: CANCELLED | OUTPATIENT
Start: 2023-10-03 | End: 2023-10-08

## 2023-10-03 ASSESSMENT — PATIENT HEALTH QUESTIONNAIRE - PHQ9
2. FEELING DOWN, DEPRESSED OR HOPELESS: 0
SUM OF ALL RESPONSES TO PHQ QUESTIONS 1-9: 1
SUM OF ALL RESPONSES TO PHQ QUESTIONS 1-9: 1
SUM OF ALL RESPONSES TO PHQ9 QUESTIONS 1 & 2: 1
SUM OF ALL RESPONSES TO PHQ QUESTIONS 1-9: 1
SUM OF ALL RESPONSES TO PHQ QUESTIONS 1-9: 1
1. LITTLE INTEREST OR PLEASURE IN DOING THINGS: 1

## 2023-10-04 RX ORDER — CEFDINIR 300 MG/1
300 CAPSULE ORAL 2 TIMES DAILY
Qty: 10 CAPSULE | Refills: 0 | Status: SHIPPED | OUTPATIENT
Start: 2023-10-04 | End: 2023-10-09

## 2023-10-05 LAB
BACTERIA SPEC CULT: NORMAL
SERVICE CMNT-IMP: NORMAL

## 2023-10-13 RX ORDER — ISOSORBIDE MONONITRATE 30 MG/1
TABLET, EXTENDED RELEASE ORAL
Qty: 360 TABLET | Refills: 1 | Status: SHIPPED | OUTPATIENT
Start: 2023-10-13

## 2023-10-13 NOTE — TELEPHONE ENCOUNTER
Medication Refill Request    Ludy Linn is requesting a refill of the following medication(s):   Requested Prescriptions     Pending Prescriptions Disp Refills    isosorbide mononitrate (IMDUR) 30 MG extended release tablet 360 tablet 1     Sig: TAKE THREE TABLETS BY MOUTH TWICE A DAY        Listed PCP is Olivia Peck MD   Last provider to prescribe medication: Dr. Claudeen Degree  Last Date of Medication Prescribed: 6/14/23   Date of Last Office Visit at [facility]: 10/3/23   FUTURE APPOINTMENT: No future appointments. Please send refill to:    [address] [phone]  [address] [place] [address]  Phone: [phone] Fax: [phone]        Please review request and approve or deny with recommendations.

## 2023-10-24 ENCOUNTER — NURSE TRIAGE (OUTPATIENT)
Dept: OTHER | Facility: CLINIC | Age: 83
End: 2023-10-24

## 2023-10-24 ENCOUNTER — OFFICE VISIT (OUTPATIENT)
Age: 83
End: 2023-10-24
Payer: COMMERCIAL

## 2023-10-24 VITALS
OXYGEN SATURATION: 95 % | TEMPERATURE: 98.1 F | BODY MASS INDEX: 27.52 KG/M2 | DIASTOLIC BLOOD PRESSURE: 80 MMHG | SYSTOLIC BLOOD PRESSURE: 130 MMHG | HEART RATE: 68 BPM | RESPIRATION RATE: 15 BRPM | HEIGHT: 64 IN | WEIGHT: 161.2 LBS

## 2023-10-24 DIAGNOSIS — S90.129A BRUISE OF TOE: Primary | ICD-10-CM

## 2023-10-24 DIAGNOSIS — Z91.81 AT HIGH RISK FOR FALLS: ICD-10-CM

## 2023-10-24 PROCEDURE — 1123F ACP DISCUSS/DSCN MKR DOCD: CPT | Performed by: FAMILY MEDICINE

## 2023-10-24 PROCEDURE — 3074F SYST BP LT 130 MM HG: CPT | Performed by: FAMILY MEDICINE

## 2023-10-24 PROCEDURE — 99213 OFFICE O/P EST LOW 20 MIN: CPT

## 2023-10-24 PROCEDURE — 3078F DIAST BP <80 MM HG: CPT | Performed by: FAMILY MEDICINE

## 2023-10-24 NOTE — PROGRESS NOTES
Gregory Fu is a 82 y.o. male    Chief Complaint   Patient presents with    Toe Injury     On going two days        1. Have you been to the ER, urgent care clinic since your last visit? Hospitalized since your last visit?no    2. Have you seen or consulted any other health care providers outside of the [address] [place] [address] Avenue since your last visit? Include any pap smears or colon screening. no    There were no vitals filed for this visit.        No data to display              Health Maintenance Due   Topic Date Due    Pneumococcal 65+ years Vaccine (1 - PCV) 06/04/1946    DTaP/Tdap/Td vaccine (1 - Tdap) Never done    Shingles vaccine (1 of 2) Never done    Hepatitis B vaccine (1 of 3 - Risk 3-dose series) Never done    COVID-19 Vaccine (5 - Pfizer series) 06/10/2022    Flu vaccine (1) 08/01/2023
Shingles vaccine (1 of 2) Never done    Hepatitis B vaccine (1 of 3 - Risk 3-dose series) Never done    COVID-19 Vaccine (5 - Pfizer series) 06/10/2022    Flu vaccine (1) 08/01/2023        Objective:   Vitals reviewed. There were no vitals taken for this visit. Physical Exam:  General: No acute distress. Alert. Cooperative. Head: Normocephalic. Atraumatic. Mouth: Mucosa pink. Moist mucous membranes. Respiratory: No increased work of breathing. Symmetric chest rise b/l. Cardiovascular: RRR. No clubbing or cyanosis  GI: Nondistended. No masses. Extremities: No LE edema. Moving all extremities spontaneously. Musculoskeletal: L 2nd toe with bruising; no swelling, deformity, or TTP present. Good sensation. Full ROM in all extremities, no obvious deformities. Skin: Warm, dry. No rashes. Neuro: Alert, normal behavior. No focal deficit. Assessment/Plan:     1. Bruise of toe  Unclear mechanism of injury, may have tripped or hit the toe on something. Presentation is not concerning for fracture or other serious injury. Exam shows bruising confined to the L 2nd toe but no deformity, swelling, or TTP. ROM and sensation are full.   - Reassurance provided, no indication at this time for imaging  - Can use ice if there is swelling or pain (none at the time of my encounter)  - On the basis of positive falls risk screening, assessment and plan is as follows: home safety tips discussed. Has done PT in the past. Doing better in this regard as they realized his shoes had been a problem and now has properly fitted shoes. Also has home aid so he is always monitored. Declines any further interventions at this time. Follow up:   Return if symptoms worsen or fail to improve. Pt was discussed with Dr Ata Richmond (attending physician).       Nyasia Kilgore MD  Resident [facility]  10/24/23

## 2023-12-14 ENCOUNTER — OFFICE VISIT (OUTPATIENT)
Age: 83
End: 2023-12-14
Payer: COMMERCIAL

## 2023-12-14 VITALS
TEMPERATURE: 97.5 F | WEIGHT: 163 LBS | RESPIRATION RATE: 18 BRPM | BODY MASS INDEX: 27.83 KG/M2 | HEIGHT: 64 IN | DIASTOLIC BLOOD PRESSURE: 65 MMHG | SYSTOLIC BLOOD PRESSURE: 117 MMHG | HEART RATE: 77 BPM | OXYGEN SATURATION: 92 %

## 2023-12-14 DIAGNOSIS — Z79.4 TYPE 2 DIABETES MELLITUS WITH OTHER SPECIFIED COMPLICATION, WITH LONG-TERM CURRENT USE OF INSULIN (HCC): ICD-10-CM

## 2023-12-14 DIAGNOSIS — K21.9 GASTROESOPHAGEAL REFLUX DISEASE, UNSPECIFIED WHETHER ESOPHAGITIS PRESENT: ICD-10-CM

## 2023-12-14 DIAGNOSIS — J44.1 COPD EXACERBATION (HCC): ICD-10-CM

## 2023-12-14 DIAGNOSIS — E11.69 TYPE 2 DIABETES MELLITUS WITH OTHER SPECIFIED COMPLICATION, WITH LONG-TERM CURRENT USE OF INSULIN (HCC): ICD-10-CM

## 2023-12-14 DIAGNOSIS — G30.9 ALZHEIMER'S DISEASE, UNSPECIFIED (CODE) (HCC): ICD-10-CM

## 2023-12-14 DIAGNOSIS — R05.1 ACUTE COUGH: Primary | ICD-10-CM

## 2023-12-14 LAB
INFLUENZA A ANTIGEN, POC: NEGATIVE
INFLUENZA B ANTIGEN, POC: NEGATIVE
VALID INTERNAL CONTROL, POC: NORMAL

## 2023-12-14 PROCEDURE — 87804 INFLUENZA ASSAY W/OPTIC: CPT | Performed by: STUDENT IN AN ORGANIZED HEALTH CARE EDUCATION/TRAINING PROGRAM

## 2023-12-14 PROCEDURE — 99214 OFFICE O/P EST MOD 30 MIN: CPT | Performed by: FAMILY MEDICINE

## 2023-12-14 PROCEDURE — 1123F ACP DISCUSS/DSCN MKR DOCD: CPT | Performed by: FAMILY MEDICINE

## 2023-12-14 PROCEDURE — 3074F SYST BP LT 130 MM HG: CPT | Performed by: FAMILY MEDICINE

## 2023-12-14 PROCEDURE — 99214 OFFICE O/P EST MOD 30 MIN: CPT | Performed by: STUDENT IN AN ORGANIZED HEALTH CARE EDUCATION/TRAINING PROGRAM

## 2023-12-14 PROCEDURE — 3044F HG A1C LEVEL LT 7.0%: CPT | Performed by: FAMILY MEDICINE

## 2023-12-14 PROCEDURE — PBSHW AMB POC RAPID INFLUENZA TEST: Performed by: FAMILY MEDICINE

## 2023-12-14 PROCEDURE — 3078F DIAST BP <80 MM HG: CPT | Performed by: FAMILY MEDICINE

## 2023-12-14 RX ORDER — CETIRIZINE HYDROCHLORIDE 5 MG/1
5 TABLET ORAL DAILY
Qty: 90 TABLET | Refills: 1 | Status: SHIPPED | OUTPATIENT
Start: 2023-12-14

## 2023-12-14 RX ORDER — TAMSULOSIN HYDROCHLORIDE 0.4 MG/1
0.4 CAPSULE ORAL DAILY
Qty: 90 CAPSULE | Refills: 0 | Status: SHIPPED | OUTPATIENT
Start: 2023-12-14

## 2023-12-14 RX ORDER — PREDNISONE 20 MG/1
20 TABLET ORAL DAILY
Qty: 5 TABLET | Refills: 0 | Status: SHIPPED | OUTPATIENT
Start: 2023-12-14 | End: 2023-12-19

## 2023-12-14 RX ORDER — DOXYCYCLINE HYCLATE 100 MG
100 TABLET ORAL 2 TIMES DAILY
Qty: 10 TABLET | Refills: 0 | Status: SHIPPED | OUTPATIENT
Start: 2023-12-14 | End: 2023-12-19

## 2023-12-14 RX ORDER — CETIRIZINE HYDROCHLORIDE 10 MG/1
10 TABLET ORAL DAILY
Qty: 90 TABLET | Refills: 3 | Status: SHIPPED | OUTPATIENT
Start: 2023-12-14 | End: 2023-12-14 | Stop reason: SDUPTHER

## 2023-12-14 RX ORDER — QUETIAPINE FUMARATE 50 MG/1
TABLET, FILM COATED ORAL
Qty: 270 TABLET | Refills: 1 | Status: SHIPPED | OUTPATIENT
Start: 2023-12-14

## 2023-12-14 RX ORDER — PANTOPRAZOLE SODIUM 40 MG/1
40 TABLET, DELAYED RELEASE ORAL
Qty: 14 TABLET | Refills: 0 | Status: SHIPPED | OUTPATIENT
Start: 2023-12-14

## 2023-12-14 RX ORDER — MEMANTINE HYDROCHLORIDE 10 MG/1
10 TABLET ORAL 2 TIMES DAILY
Qty: 180 TABLET | Refills: 3 | Status: SHIPPED | OUTPATIENT
Start: 2023-12-14

## 2023-12-14 ASSESSMENT — PATIENT HEALTH QUESTIONNAIRE - PHQ9
SUM OF ALL RESPONSES TO PHQ QUESTIONS 1-9: 0
2. FEELING DOWN, DEPRESSED OR HOPELESS: 0
SUM OF ALL RESPONSES TO PHQ QUESTIONS 1-9: 0
SUM OF ALL RESPONSES TO PHQ9 QUESTIONS 1 & 2: 0
SUM OF ALL RESPONSES TO PHQ QUESTIONS 1-9: 0
SUM OF ALL RESPONSES TO PHQ QUESTIONS 1-9: 0
1. LITTLE INTEREST OR PLEASURE IN DOING THINGS: 0

## 2023-12-29 ENCOUNTER — OFFICE VISIT (OUTPATIENT)
Age: 83
End: 2023-12-29
Payer: COMMERCIAL

## 2023-12-29 VITALS
TEMPERATURE: 98.8 F | BODY MASS INDEX: 27.88 KG/M2 | HEIGHT: 64 IN | RESPIRATION RATE: 22 BRPM | SYSTOLIC BLOOD PRESSURE: 138 MMHG | DIASTOLIC BLOOD PRESSURE: 69 MMHG | HEART RATE: 94 BPM | OXYGEN SATURATION: 90 %

## 2023-12-29 DIAGNOSIS — M51.36 LUMBAR DEGENERATIVE DISC DISEASE: Primary | ICD-10-CM

## 2023-12-29 PROCEDURE — 99214 OFFICE O/P EST MOD 30 MIN: CPT | Performed by: STUDENT IN AN ORGANIZED HEALTH CARE EDUCATION/TRAINING PROGRAM

## 2023-12-29 RX ORDER — OXYCODONE HYDROCHLORIDE 10 MG/1
10 TABLET ORAL EVERY 6 HOURS PRN
Qty: 10 TABLET | Refills: 0 | Status: SHIPPED | OUTPATIENT
Start: 2023-12-29 | End: 2024-01-01

## 2023-12-29 RX ORDER — OXYCODONE HYDROCHLORIDE 10 MG/1
10 TABLET ORAL EVERY 8 HOURS PRN
COMMUNITY

## 2023-12-29 RX ORDER — PREDNISONE 20 MG/1
20 TABLET ORAL DAILY
Qty: 7 TABLET | Refills: 0 | Status: SHIPPED | OUTPATIENT
Start: 2023-12-29 | End: 2024-01-05

## 2023-12-29 NOTE — PATIENT INSTRUCTIONS
Take Prednisone 20 mg daily. Continue Oxycodone 10 mg every 6-8 hours as needed. Do NOT take Norco while taking Oxycodone. Once pain improves, stop Oxycodone and go back to taking Norco as prescribed.

## 2023-12-29 NOTE — PROGRESS NOTES
[address] [facility]      Chief Complaint:   Chief Complaint   Patient presents with    Back Pain     Patient with significant back pain since fall 12/20/23. Patient has been to the ER twice but is having a lot of pain. He had a CT and it was normal besides arthritis. Requesting MRI. Patient can not even sit still without moaning in pain. Subjective:   History: This patient is a 82 y.o. male with a chief complaint of back pain. - Pt had a fall 1 week ago, he was helping put up dewey tree and lost his balance. Lacy Calero backwards and landed on back  - Had back pain immediately following fall. Has h/o chronic back pain with lumbar DDD and h/o kyphoplasty. Follows with pain management and on Norco 5-325 mg BID at baseline.  - Taken to [facility] ER, had CT Lumbar spine that showed:   1. No acute fracture of the lumbar spine. 2. Status post kyphoplasty of a chronic T12 compression fracture   3. Osteopenia and multilevel lumbar spondylosis. - Discharged home with Oxycodone 10 mg q8h in place of Norco  - Returned to ED as pt continued to have pain, but was discharged home with no change to original plan  - Pt does have h/o dementia, he is able to answer some questions but wife and son are here to help with history  - Continues to have significant pain in low back  - Oxycodone offers slight relief   - States pain is \"going down my leg\", hard to articulate which leg  - He is ambulating with walker at home, though presented in wheelchair to clinic  - No bowel or bladder incontinence. No fever, night sweats, or weight changes. No saddle anesthesia. Review of Systems:  See HPI.     Past Medical History:   Diagnosis Date    Alzheimer's dementia ([address])     BPH (benign prostatic hyperplasia)     CAD (coronary artery disease)     Carotid artery stenosis     CKD (chronic kidney disease)     COPD (chronic obstructive pulmonary

## 2024-01-01 ENCOUNTER — APPOINTMENT (OUTPATIENT)
Facility: HOSPITAL | Age: 84
DRG: 871 | End: 2024-01-01
Payer: MEDICARE

## 2024-01-01 ENCOUNTER — HOSPITAL ENCOUNTER (INPATIENT)
Facility: HOSPITAL | Age: 84
LOS: 1 days | End: 2024-01-26
Attending: FAMILY MEDICINE | Admitting: FAMILY MEDICINE
Payer: MEDICARE

## 2024-01-01 ENCOUNTER — HOSPITAL ENCOUNTER (INPATIENT)
Facility: HOSPITAL | Age: 84
LOS: 20 days | Discharge: HOSPICE | DRG: 871 | End: 2024-01-25
Attending: STUDENT IN AN ORGANIZED HEALTH CARE EDUCATION/TRAINING PROGRAM | Admitting: FAMILY MEDICINE
Payer: MEDICARE

## 2024-01-01 ENCOUNTER — TELEPHONE (OUTPATIENT)
Age: 84
End: 2024-01-01

## 2024-01-01 ENCOUNTER — HOSPICE ADMISSION (OUTPATIENT)
Age: 84
End: 2024-01-01
Payer: MEDICARE

## 2024-01-01 VITALS
HEIGHT: 64 IN | OXYGEN SATURATION: 92 % | HEART RATE: 91 BPM | DIASTOLIC BLOOD PRESSURE: 65 MMHG | BODY MASS INDEX: 28 KG/M2 | SYSTOLIC BLOOD PRESSURE: 125 MMHG | RESPIRATION RATE: 14 BRPM | WEIGHT: 164 LBS | TEMPERATURE: 97.5 F

## 2024-01-01 VITALS
RESPIRATION RATE: 16 BRPM | SYSTOLIC BLOOD PRESSURE: 104 MMHG | OXYGEN SATURATION: 94 % | DIASTOLIC BLOOD PRESSURE: 77 MMHG | TEMPERATURE: 98.4 F | HEART RATE: 115 BPM

## 2024-01-01 DIAGNOSIS — A41.9 SEVERE SEPSIS (HCC): ICD-10-CM

## 2024-01-01 DIAGNOSIS — R65.20 SEVERE SEPSIS (HCC): ICD-10-CM

## 2024-01-01 DIAGNOSIS — I50.9 ACUTE ON CHRONIC CONGESTIVE HEART FAILURE, UNSPECIFIED HEART FAILURE TYPE (HCC): ICD-10-CM

## 2024-01-01 DIAGNOSIS — J44.1 COPD EXACERBATION (HCC): ICD-10-CM

## 2024-01-01 DIAGNOSIS — R09.02 HYPOXIA: ICD-10-CM

## 2024-01-01 DIAGNOSIS — J18.9 PNEUMONIA OF BOTH LUNGS DUE TO INFECTIOUS ORGANISM, UNSPECIFIED PART OF LUNG: Primary | ICD-10-CM

## 2024-01-01 LAB
ALBUMIN SERPL-MCNC: 2 G/DL (ref 3.5–5)
ALBUMIN SERPL-MCNC: 2.1 G/DL (ref 3.5–5)
ALBUMIN SERPL-MCNC: 2.2 G/DL (ref 3.5–5)
ALBUMIN SERPL-MCNC: 2.3 G/DL (ref 3.5–5)
ALBUMIN SERPL-MCNC: 2.4 G/DL (ref 3.5–5)
ALBUMIN/GLOB SERPL: 0.4 (ref 1.1–2.2)
ALBUMIN/GLOB SERPL: 0.5 (ref 1.1–2.2)
ALBUMIN/GLOB SERPL: 0.6 (ref 1.1–2.2)
ALBUMIN/GLOB SERPL: 0.7 (ref 1.1–2.2)
ALP SERPL-CCNC: 112 U/L (ref 45–117)
ALP SERPL-CCNC: 121 U/L (ref 45–117)
ALP SERPL-CCNC: 124 U/L (ref 45–117)
ALP SERPL-CCNC: 126 U/L (ref 45–117)
ALP SERPL-CCNC: 131 U/L (ref 45–117)
ALP SERPL-CCNC: 133 U/L (ref 45–117)
ALP SERPL-CCNC: 133 U/L (ref 45–117)
ALP SERPL-CCNC: 135 U/L (ref 45–117)
ALP SERPL-CCNC: 136 U/L (ref 45–117)
ALP SERPL-CCNC: 141 U/L (ref 45–117)
ALP SERPL-CCNC: 145 U/L (ref 45–117)
ALP SERPL-CCNC: 147 U/L (ref 45–117)
ALP SERPL-CCNC: 153 U/L (ref 45–117)
ALP SERPL-CCNC: 156 U/L (ref 45–117)
ALT SERPL-CCNC: 109 U/L (ref 12–78)
ALT SERPL-CCNC: 122 U/L (ref 12–78)
ALT SERPL-CCNC: 142 U/L (ref 12–78)
ALT SERPL-CCNC: 34 U/L (ref 12–78)
ALT SERPL-CCNC: 35 U/L (ref 12–78)
ALT SERPL-CCNC: 38 U/L (ref 12–78)
ALT SERPL-CCNC: 40 U/L (ref 12–78)
ALT SERPL-CCNC: 41 U/L (ref 12–78)
ALT SERPL-CCNC: 42 U/L (ref 12–78)
ALT SERPL-CCNC: 43 U/L (ref 12–78)
ALT SERPL-CCNC: 43 U/L (ref 12–78)
ALT SERPL-CCNC: 70 U/L (ref 12–78)
ALT SERPL-CCNC: 79 U/L (ref 12–78)
ALT SERPL-CCNC: 96 U/L (ref 12–78)
ANION GAP SERPL CALC-SCNC: 11 MMOL/L (ref 5–15)
ANION GAP SERPL CALC-SCNC: 4 MMOL/L (ref 5–15)
ANION GAP SERPL CALC-SCNC: 4 MMOL/L (ref 5–15)
ANION GAP SERPL CALC-SCNC: 5 MMOL/L (ref 5–15)
ANION GAP SERPL CALC-SCNC: 6 MMOL/L (ref 5–15)
ANION GAP SERPL CALC-SCNC: 7 MMOL/L (ref 5–15)
ANION GAP SERPL CALC-SCNC: 8 MMOL/L (ref 5–15)
ANION GAP SERPL CALC-SCNC: 9 MMOL/L (ref 5–15)
APPEARANCE UR: ABNORMAL
ARTERIAL PATENCY WRIST A: ABNORMAL
ARTERIAL PATENCY WRIST A: NO
ARTERIAL PATENCY WRIST A: POSITIVE
ARTERIAL PATENCY WRIST A: YES
ARTERIAL PATENCY WRIST A: YES
AST SERPL-CCNC: 174 U/L (ref 15–37)
AST SERPL-CCNC: 22 U/L (ref 15–37)
AST SERPL-CCNC: 22 U/L (ref 15–37)
AST SERPL-CCNC: 24 U/L (ref 15–37)
AST SERPL-CCNC: 29 U/L (ref 15–37)
AST SERPL-CCNC: 32 U/L (ref 15–37)
AST SERPL-CCNC: 34 U/L (ref 15–37)
AST SERPL-CCNC: 35 U/L (ref 15–37)
AST SERPL-CCNC: 37 U/L (ref 15–37)
AST SERPL-CCNC: 38 U/L (ref 15–37)
AST SERPL-CCNC: 46 U/L (ref 15–37)
AST SERPL-CCNC: 51 U/L (ref 15–37)
AST SERPL-CCNC: 69 U/L (ref 15–37)
AST SERPL-CCNC: 85 U/L (ref 15–37)
B PERT DNA SPEC QL NAA+PROBE: NOT DETECTED
B PERT DNA SPEC QL NAA+PROBE: NOT DETECTED
BACTERIA SPEC CULT: NORMAL
BACTERIA URNS QL MICRO: NEGATIVE /HPF
BASE DEFICIT BLD-SCNC: 3.3 MMOL/L
BASE DEFICIT BLD-SCNC: 6 MMOL/L
BASE DEFICIT BLD-SCNC: 6.8 MMOL/L
BASE DEFICIT BLDA-SCNC: 4.4 MMOL/L
BASE DEFICIT BLDA-SCNC: 7.1 MMOL/L
BASE DEFICIT BLDA-SCNC: 7.4 MMOL/L
BASOPHILS # BLD: 0 K/UL (ref 0–0.1)
BASOPHILS # BLD: 0.1 K/UL (ref 0–0.1)
BASOPHILS NFR BLD: 0 % (ref 0–1)
BDY SITE: ABNORMAL
BILIRUB DIRECT SERPL-MCNC: 0.1 MG/DL (ref 0–0.2)
BILIRUB SERPL-MCNC: 0.4 MG/DL (ref 0.2–1)
BILIRUB SERPL-MCNC: 0.5 MG/DL (ref 0.2–1)
BILIRUB SERPL-MCNC: 0.6 MG/DL (ref 0.2–1)
BILIRUB SERPL-MCNC: 0.7 MG/DL (ref 0.2–1)
BILIRUB SERPL-MCNC: 0.7 MG/DL (ref 0.2–1)
BILIRUB SERPL-MCNC: 0.9 MG/DL (ref 0.2–1)
BILIRUB UR QL: NEGATIVE
BORDETELLA PARAPERTUSSIS BY PCR: NOT DETECTED
BORDETELLA PARAPERTUSSIS BY PCR: NOT DETECTED
BUN SERPL-MCNC: 102 MG/DL (ref 6–20)
BUN SERPL-MCNC: 18 MG/DL (ref 6–20)
BUN SERPL-MCNC: 26 MG/DL (ref 6–20)
BUN SERPL-MCNC: 33 MG/DL (ref 6–20)
BUN SERPL-MCNC: 47 MG/DL (ref 6–20)
BUN SERPL-MCNC: 48 MG/DL (ref 6–20)
BUN SERPL-MCNC: 52 MG/DL (ref 6–20)
BUN SERPL-MCNC: 52 MG/DL (ref 6–20)
BUN SERPL-MCNC: 53 MG/DL (ref 6–20)
BUN SERPL-MCNC: 54 MG/DL (ref 6–20)
BUN SERPL-MCNC: 55 MG/DL (ref 6–20)
BUN SERPL-MCNC: 56 MG/DL (ref 6–20)
BUN SERPL-MCNC: 56 MG/DL (ref 6–20)
BUN SERPL-MCNC: 59 MG/DL (ref 6–20)
BUN SERPL-MCNC: 59 MG/DL (ref 6–20)
BUN SERPL-MCNC: 63 MG/DL (ref 6–20)
BUN SERPL-MCNC: 65 MG/DL (ref 6–20)
BUN SERPL-MCNC: 67 MG/DL (ref 6–20)
BUN SERPL-MCNC: 72 MG/DL (ref 6–20)
BUN SERPL-MCNC: 81 MG/DL (ref 6–20)
BUN SERPL-MCNC: 82 MG/DL (ref 6–20)
BUN SERPL-MCNC: 91 MG/DL (ref 6–20)
BUN SERPL-MCNC: 95 MG/DL (ref 6–20)
BUN/CREAT SERPL: 12 (ref 12–20)
BUN/CREAT SERPL: 16 (ref 12–20)
BUN/CREAT SERPL: 20 (ref 12–20)
BUN/CREAT SERPL: 22 (ref 12–20)
BUN/CREAT SERPL: 22 (ref 12–20)
BUN/CREAT SERPL: 23 (ref 12–20)
BUN/CREAT SERPL: 24 (ref 12–20)
BUN/CREAT SERPL: 25 (ref 12–20)
BUN/CREAT SERPL: 27 (ref 12–20)
BUN/CREAT SERPL: 28 (ref 12–20)
BUN/CREAT SERPL: 29 (ref 12–20)
BUN/CREAT SERPL: 30 (ref 12–20)
BUN/CREAT SERPL: 32 (ref 12–20)
BUN/CREAT SERPL: 35 (ref 12–20)
BUN/CREAT SERPL: 35 (ref 12–20)
BUN/CREAT SERPL: 36 (ref 12–20)
BUN/CREAT SERPL: 37 (ref 12–20)
BUN/CREAT SERPL: 37 (ref 12–20)
BUN/CREAT SERPL: 39 (ref 12–20)
BUN/CREAT SERPL: 39 (ref 12–20)
BUN/CREAT SERPL: 40 (ref 12–20)
BUN/CREAT SERPL: 41 (ref 12–20)
BUN/CREAT SERPL: 41 (ref 12–20)
C PNEUM DNA SPEC QL NAA+PROBE: NOT DETECTED
C PNEUM DNA SPEC QL NAA+PROBE: NOT DETECTED
CA-I BLD-SCNC: 1.05 MMOL/L (ref 1.13–1.32)
CALCIUM SERPL-MCNC: 12 MG/DL (ref 8.5–10.1)
CALCIUM SERPL-MCNC: 6.3 MG/DL (ref 8.5–10.1)
CALCIUM SERPL-MCNC: 7.2 MG/DL (ref 8.5–10.1)
CALCIUM SERPL-MCNC: 7.5 MG/DL (ref 8.5–10.1)
CALCIUM SERPL-MCNC: 7.6 MG/DL (ref 8.5–10.1)
CALCIUM SERPL-MCNC: 7.7 MG/DL (ref 8.5–10.1)
CALCIUM SERPL-MCNC: 7.9 MG/DL (ref 8.5–10.1)
CALCIUM SERPL-MCNC: 7.9 MG/DL (ref 8.5–10.1)
CALCIUM SERPL-MCNC: 8 MG/DL (ref 8.5–10.1)
CALCIUM SERPL-MCNC: 8 MG/DL (ref 8.5–10.1)
CALCIUM SERPL-MCNC: 8.2 MG/DL (ref 8.5–10.1)
CALCIUM SERPL-MCNC: 8.3 MG/DL (ref 8.5–10.1)
CALCIUM SERPL-MCNC: 8.4 MG/DL (ref 8.5–10.1)
CALCIUM SERPL-MCNC: 8.4 MG/DL (ref 8.5–10.1)
CALCIUM SERPL-MCNC: 8.5 MG/DL (ref 8.5–10.1)
CALCIUM SERPL-MCNC: 8.5 MG/DL (ref 8.5–10.1)
CALCIUM SERPL-MCNC: 8.7 MG/DL (ref 8.5–10.1)
CALCIUM SERPL-MCNC: 8.8 MG/DL (ref 8.5–10.1)
CALCIUM SERPL-MCNC: 9.4 MG/DL (ref 8.5–10.1)
CALCIUM SERPL-MCNC: 9.5 MG/DL (ref 8.5–10.1)
CHLORIDE SERPL-SCNC: 111 MMOL/L (ref 97–108)
CHLORIDE SERPL-SCNC: 113 MMOL/L (ref 97–108)
CHLORIDE SERPL-SCNC: 116 MMOL/L (ref 97–108)
CHLORIDE SERPL-SCNC: 118 MMOL/L (ref 97–108)
CHLORIDE SERPL-SCNC: 120 MMOL/L (ref 97–108)
CHLORIDE SERPL-SCNC: 121 MMOL/L (ref 97–108)
CHLORIDE SERPL-SCNC: 122 MMOL/L (ref 97–108)
CHLORIDE SERPL-SCNC: 124 MMOL/L (ref 97–108)
CHLORIDE SERPL-SCNC: 125 MMOL/L (ref 97–108)
CHLORIDE SERPL-SCNC: 125 MMOL/L (ref 97–108)
CHLORIDE SERPL-SCNC: 126 MMOL/L (ref 97–108)
CHLORIDE SERPL-SCNC: 126 MMOL/L (ref 97–108)
CHLORIDE SERPL-SCNC: 128 MMOL/L (ref 97–108)
CHLORIDE SERPL-SCNC: 128 MMOL/L (ref 97–108)
CHLORIDE SERPL-SCNC: 129 MMOL/L (ref 97–108)
CHLORIDE SERPL-SCNC: 129 MMOL/L (ref 97–108)
CHLORIDE SERPL-SCNC: 130 MMOL/L (ref 97–108)
CHLORIDE SERPL-SCNC: 131 MMOL/L (ref 97–108)
CHOLEST SERPL-MCNC: 158 MG/DL
CO2 SERPL-SCNC: 15 MMOL/L (ref 21–32)
CO2 SERPL-SCNC: 16 MMOL/L (ref 21–32)
CO2 SERPL-SCNC: 16 MMOL/L (ref 21–32)
CO2 SERPL-SCNC: 17 MMOL/L (ref 21–32)
CO2 SERPL-SCNC: 18 MMOL/L (ref 21–32)
CO2 SERPL-SCNC: 18 MMOL/L (ref 21–32)
CO2 SERPL-SCNC: 19 MMOL/L (ref 21–32)
CO2 SERPL-SCNC: 20 MMOL/L (ref 21–32)
CO2 SERPL-SCNC: 21 MMOL/L (ref 21–32)
CO2 SERPL-SCNC: 22 MMOL/L (ref 21–32)
CO2 SERPL-SCNC: 22 MMOL/L (ref 21–32)
COLOR UR: ABNORMAL
COMMENT:: NORMAL
CREAT SERPL-MCNC: 1.37 MG/DL (ref 0.7–1.3)
CREAT SERPL-MCNC: 1.45 MG/DL (ref 0.7–1.3)
CREAT SERPL-MCNC: 1.51 MG/DL (ref 0.7–1.3)
CREAT SERPL-MCNC: 1.63 MG/DL (ref 0.7–1.3)
CREAT SERPL-MCNC: 1.68 MG/DL (ref 0.7–1.3)
CREAT SERPL-MCNC: 1.76 MG/DL (ref 0.7–1.3)
CREAT SERPL-MCNC: 1.83 MG/DL (ref 0.7–1.3)
CREAT SERPL-MCNC: 1.85 MG/DL (ref 0.7–1.3)
CREAT SERPL-MCNC: 1.87 MG/DL (ref 0.7–1.3)
CREAT SERPL-MCNC: 1.88 MG/DL (ref 0.7–1.3)
CREAT SERPL-MCNC: 1.9 MG/DL (ref 0.7–1.3)
CREAT SERPL-MCNC: 2.08 MG/DL (ref 0.7–1.3)
CREAT SERPL-MCNC: 2.08 MG/DL (ref 0.7–1.3)
CREAT SERPL-MCNC: 2.1 MG/DL (ref 0.7–1.3)
CREAT SERPL-MCNC: 2.16 MG/DL (ref 0.7–1.3)
CREAT SERPL-MCNC: 2.17 MG/DL (ref 0.7–1.3)
CREAT SERPL-MCNC: 2.21 MG/DL (ref 0.7–1.3)
CREAT SERPL-MCNC: 2.21 MG/DL (ref 0.7–1.3)
CREAT SERPL-MCNC: 2.22 MG/DL (ref 0.7–1.3)
CREAT SERPL-MCNC: 2.23 MG/DL (ref 0.7–1.3)
CREAT SERPL-MCNC: 2.24 MG/DL (ref 0.7–1.3)
CREAT SERPL-MCNC: 2.26 MG/DL (ref 0.7–1.3)
CREAT SERPL-MCNC: 2.28 MG/DL (ref 0.7–1.3)
CREAT SERPL-MCNC: 2.32 MG/DL (ref 0.7–1.3)
CREAT SERPL-MCNC: 2.41 MG/DL (ref 0.7–1.3)
CREAT SERPL-MCNC: 2.49 MG/DL (ref 0.7–1.3)
CREAT UR-MCNC: 63 MG/DL
DIFFERENTIAL METHOD BLD: ABNORMAL
ECHO AO ASC DIAM: 3.3 CM
ECHO AO ASCENDING AORTA INDEX: 1.83 CM/M2
ECHO AR MAX VEL PISA: 2.1 M/S
ECHO AV AREA PEAK VELOCITY: 2.6 CM2
ECHO AV AREA PEAK VELOCITY: 2.8 CM2
ECHO AV AREA VTI: 2.3 CM2
ECHO AV AREA/BSA VTI: 1.3 CM2/M2
ECHO AV MEAN GRADIENT: 4 MMHG
ECHO AV MEAN VELOCITY: 0.9 M/S
ECHO AV PEAK GRADIENT: 8 MMHG
ECHO AV PEAK GRADIENT: 9 MMHG
ECHO AV PEAK VELOCITY: 1.5 M/S
ECHO AV PEAK VELOCITY: 1.5 M/S
ECHO AV REGURGITANT PHT: 301.6 MILLISECOND
ECHO AV VTI: 26.6 CM
ECHO BSA: 1.83 M2
ECHO LA DIAMETER INDEX: 2.11 CM/M2
ECHO LA DIAMETER: 3.8 CM
ECHO LA VOL A-L A2C: 38 ML (ref 18–58)
ECHO LA VOL A-L A4C: 35 ML (ref 18–58)
ECHO LA VOL BP: 36 ML (ref 18–58)
ECHO LA VOL MOD A2C: 37 ML (ref 18–58)
ECHO LA VOL MOD A4C: 33 ML (ref 18–58)
ECHO LA VOL/BSA BIPLANE: 20 ML/M2 (ref 16–34)
ECHO LA VOLUME AREA LENGTH: 38 ML
ECHO LA VOLUME INDEX A-L A2C: 21 ML/M2 (ref 16–34)
ECHO LA VOLUME INDEX A-L A4C: 19 ML/M2 (ref 16–34)
ECHO LA VOLUME INDEX AREA LENGTH: 21 ML/M2 (ref 16–34)
ECHO LA VOLUME INDEX MOD A2C: 21 ML/M2 (ref 16–34)
ECHO LA VOLUME INDEX MOD A4C: 18 ML/M2 (ref 16–34)
ECHO LV E' LATERAL VELOCITY: 8 CM/S
ECHO LV E' SEPTAL VELOCITY: 7 CM/S
ECHO LV EDV A2C: 135 ML
ECHO LV EDV A4C: 107 ML
ECHO LV EDV BP: 127 ML (ref 67–155)
ECHO LV EDV INDEX A4C: 59 ML/M2
ECHO LV EDV INDEX BP: 71 ML/M2
ECHO LV EDV NDEX A2C: 75 ML/M2
ECHO LV EJECTION FRACTION A2C: 59 %
ECHO LV EJECTION FRACTION A4C: 44 %
ECHO LV ESV A2C: 55 ML
ECHO LV ESV A4C: 60 ML
ECHO LV ESV BP: 59 ML (ref 22–58)
ECHO LV ESV INDEX A2C: 31 ML/M2
ECHO LV ESV INDEX A4C: 33 ML/M2
ECHO LV ESV INDEX BP: 33 ML/M2
ECHO LV FRACTIONAL SHORTENING: 16 % (ref 28–44)
ECHO LV INTERNAL DIMENSION DIASTOLE INDEX: 3.17 CM/M2
ECHO LV INTERNAL DIMENSION DIASTOLIC: 5.7 CM (ref 4.2–5.9)
ECHO LV INTERNAL DIMENSION SYSTOLIC INDEX: 2.67 CM/M2
ECHO LV INTERNAL DIMENSION SYSTOLIC: 4.8 CM
ECHO LV IVSD: 0.9 CM (ref 0.6–1)
ECHO LV MASS 2D: 211.7 G (ref 88–224)
ECHO LV MASS INDEX 2D: 117.6 G/M2 (ref 49–115)
ECHO LV POSTERIOR WALL DIASTOLIC: 1 CM (ref 0.6–1)
ECHO LV RELATIVE WALL THICKNESS RATIO: 0.35
ECHO LVOT AREA: 4.5 CM2
ECHO LVOT AV VTI INDEX: 0.53
ECHO LVOT DIAM: 2.4 CM
ECHO LVOT MEAN GRADIENT: 2 MMHG
ECHO LVOT PEAK GRADIENT: 3 MMHG
ECHO LVOT PEAK VELOCITY: 0.9 M/S
ECHO LVOT STROKE VOLUME INDEX: 35.4 ML/M2
ECHO LVOT SV: 63.8 ML
ECHO LVOT VTI: 14.1 CM
ECHO MV A VELOCITY: 1.24 M/S
ECHO MV E DECELERATION TIME (DT): 138.1 MS
ECHO MV E VELOCITY: 0.7 M/S
ECHO MV E/A RATIO: 0.56
ECHO MV E/E' LATERAL: 8.75
ECHO MV E/E' RATIO (AVERAGED): 9.38
ECHO MV REGURGITANT PEAK GRADIENT: 96 MMHG
ECHO MV REGURGITANT PEAK VELOCITY: 4.9 M/S
ECHO PULMONARY ARTERY END DIASTOLIC PRESSURE: 3 MMHG
ECHO PV MAX VELOCITY: 1.1 M/S
ECHO PV PEAK GRADIENT: 5 MMHG
ECHO PV REGURGITANT MAX VELOCITY: 0.8 M/S
ECHO RV FREE WALL PEAK S': 10 CM/S
ECHO RV INTERNAL DIMENSION: 3.4 CM
ECHO RV TAPSE: 1 CM (ref 1.7–?)
ECHO TV REGURGITANT MAX VELOCITY: 2.8 M/S
ECHO TV REGURGITANT PEAK GRADIENT: 32 MMHG
EKG ATRIAL RATE: 102 BPM
EKG ATRIAL RATE: 102 BPM
EKG ATRIAL RATE: 64 BPM
EKG ATRIAL RATE: 80 BPM
EKG ATRIAL RATE: 96 BPM
EKG ATRIAL RATE: 96 BPM
EKG DIAGNOSIS: NORMAL
EKG P AXIS: -26 DEGREES
EKG P AXIS: 26 DEGREES
EKG P AXIS: 29 DEGREES
EKG P AXIS: 51 DEGREES
EKG P AXIS: 59 DEGREES
EKG P AXIS: 66 DEGREES
EKG P-R INTERVAL: 152 MS
EKG P-R INTERVAL: 154 MS
EKG P-R INTERVAL: 156 MS
EKG P-R INTERVAL: 166 MS
EKG P-R INTERVAL: 168 MS
EKG P-R INTERVAL: 180 MS
EKG Q-T INTERVAL: 310 MS
EKG Q-T INTERVAL: 340 MS
EKG Q-T INTERVAL: 356 MS
EKG Q-T INTERVAL: 366 MS
EKG Q-T INTERVAL: 378 MS
EKG Q-T INTERVAL: 402 MS
EKG QRS DURATION: 126 MS
EKG QRS DURATION: 86 MS
EKG QRS DURATION: 92 MS
EKG QRS DURATION: 94 MS
EKG QTC CALCULATION (BAZETT): 404 MS
EKG QTC CALCULATION (BAZETT): 414 MS
EKG QTC CALCULATION (BAZETT): 429 MS
EKG QTC CALCULATION (BAZETT): 435 MS
EKG QTC CALCULATION (BAZETT): 462 MS
EKG QTC CALCULATION (BAZETT): 463 MS
EKG R AXIS: 37 DEGREES
EKG R AXIS: 37 DEGREES
EKG R AXIS: 46 DEGREES
EKG R AXIS: 46 DEGREES
EKG R AXIS: 51 DEGREES
EKG R AXIS: 71 DEGREES
EKG T AXIS: 142 DEGREES
EKG T AXIS: 176 DEGREES
EKG T AXIS: 177 DEGREES
EKG T AXIS: 235 DEGREES
EKG T AXIS: 247 DEGREES
EKG T AXIS: 254 DEGREES
EKG VENTRICULAR RATE: 102 BPM
EKG VENTRICULAR RATE: 102 BPM
EKG VENTRICULAR RATE: 64 BPM
EKG VENTRICULAR RATE: 80 BPM
EKG VENTRICULAR RATE: 96 BPM
EKG VENTRICULAR RATE: 96 BPM
EOSINOPHIL # BLD: 0 K/UL (ref 0–0.4)
EOSINOPHIL # BLD: 0.1 K/UL (ref 0–0.4)
EOSINOPHIL # BLD: 0.2 K/UL (ref 0–0.4)
EOSINOPHIL NFR BLD: 0 % (ref 0–7)
EOSINOPHIL NFR BLD: 1 % (ref 0–7)
EPITH CASTS URNS QL MICRO: ABNORMAL /LPF
ERYTHROCYTE [DISTWIDTH] IN BLOOD BY AUTOMATED COUNT: 14.5 % (ref 11.5–14.5)
ERYTHROCYTE [DISTWIDTH] IN BLOOD BY AUTOMATED COUNT: 14.6 % (ref 11.5–14.5)
ERYTHROCYTE [DISTWIDTH] IN BLOOD BY AUTOMATED COUNT: 14.7 % (ref 11.5–14.5)
ERYTHROCYTE [DISTWIDTH] IN BLOOD BY AUTOMATED COUNT: 15 % (ref 11.5–14.5)
ERYTHROCYTE [DISTWIDTH] IN BLOOD BY AUTOMATED COUNT: 15.1 % (ref 11.5–14.5)
ERYTHROCYTE [DISTWIDTH] IN BLOOD BY AUTOMATED COUNT: 15.2 % (ref 11.5–14.5)
ERYTHROCYTE [DISTWIDTH] IN BLOOD BY AUTOMATED COUNT: 15.2 % (ref 11.5–14.5)
ERYTHROCYTE [DISTWIDTH] IN BLOOD BY AUTOMATED COUNT: 15.3 % (ref 11.5–14.5)
ERYTHROCYTE [DISTWIDTH] IN BLOOD BY AUTOMATED COUNT: 15.4 % (ref 11.5–14.5)
ERYTHROCYTE [DISTWIDTH] IN BLOOD BY AUTOMATED COUNT: 15.5 % (ref 11.5–14.5)
ERYTHROCYTE [DISTWIDTH] IN BLOOD BY AUTOMATED COUNT: 15.5 % (ref 11.5–14.5)
EST. AVERAGE GLUCOSE BLD GHB EST-MCNC: 111 MG/DL
FLUAV AG NPH QL IA: NEGATIVE
FLUAV SUBTYP SPEC NAA+PROBE: NOT DETECTED
FLUAV SUBTYP SPEC NAA+PROBE: NOT DETECTED
FLUBV AG NOSE QL IA: NEGATIVE
FLUBV RNA SPEC QL NAA+PROBE: NOT DETECTED
FLUBV RNA SPEC QL NAA+PROBE: NOT DETECTED
GAS FLOW.O2 O2 DELIVERY SYS: 6 L/MIN
GAS FLOW.O2 O2 DELIVERY SYS: 6 L/MIN
GAS FLOW.O2 O2 DELIVERY SYS: ABNORMAL
GAS FLOW.O2 O2 DELIVERY SYS: ABNORMAL
GGT SERPL-CCNC: 32 U/L (ref 15–85)
GLOBULIN SER CALC-MCNC: 3.4 G/DL (ref 2–4)
GLOBULIN SER CALC-MCNC: 3.6 G/DL (ref 2–4)
GLOBULIN SER CALC-MCNC: 3.6 G/DL (ref 2–4)
GLOBULIN SER CALC-MCNC: 3.7 G/DL (ref 2–4)
GLOBULIN SER CALC-MCNC: 3.8 G/DL (ref 2–4)
GLOBULIN SER CALC-MCNC: 3.9 G/DL (ref 2–4)
GLOBULIN SER CALC-MCNC: 4 G/DL (ref 2–4)
GLOBULIN SER CALC-MCNC: 4 G/DL (ref 2–4)
GLOBULIN SER CALC-MCNC: 4.2 G/DL (ref 2–4)
GLOBULIN SER CALC-MCNC: 4.5 G/DL (ref 2–4)
GLOBULIN SER CALC-MCNC: 4.5 G/DL (ref 2–4)
GLOBULIN SER CALC-MCNC: 5 G/DL (ref 2–4)
GLOBULIN SER CALC-MCNC: 5.1 G/DL (ref 2–4)
GLOBULIN SER CALC-MCNC: 5.2 G/DL (ref 2–4)
GLUCOSE BLD STRIP.AUTO-MCNC: 106 MG/DL (ref 65–117)
GLUCOSE BLD STRIP.AUTO-MCNC: 107 MG/DL (ref 65–117)
GLUCOSE BLD STRIP.AUTO-MCNC: 113 MG/DL (ref 65–117)
GLUCOSE BLD STRIP.AUTO-MCNC: 114 MG/DL (ref 65–117)
GLUCOSE BLD STRIP.AUTO-MCNC: 118 MG/DL (ref 65–117)
GLUCOSE BLD STRIP.AUTO-MCNC: 118 MG/DL (ref 65–117)
GLUCOSE BLD STRIP.AUTO-MCNC: 120 MG/DL (ref 65–117)
GLUCOSE BLD STRIP.AUTO-MCNC: 130 MG/DL (ref 65–117)
GLUCOSE BLD STRIP.AUTO-MCNC: 133 MG/DL (ref 65–117)
GLUCOSE BLD STRIP.AUTO-MCNC: 137 MG/DL (ref 65–117)
GLUCOSE BLD STRIP.AUTO-MCNC: 137 MG/DL (ref 65–117)
GLUCOSE BLD STRIP.AUTO-MCNC: 140 MG/DL (ref 65–117)
GLUCOSE BLD STRIP.AUTO-MCNC: 140 MG/DL (ref 65–117)
GLUCOSE BLD STRIP.AUTO-MCNC: 143 MG/DL (ref 65–117)
GLUCOSE BLD STRIP.AUTO-MCNC: 148 MG/DL (ref 65–117)
GLUCOSE BLD STRIP.AUTO-MCNC: 151 MG/DL (ref 65–117)
GLUCOSE BLD STRIP.AUTO-MCNC: 159 MG/DL (ref 65–117)
GLUCOSE BLD STRIP.AUTO-MCNC: 167 MG/DL (ref 65–117)
GLUCOSE BLD STRIP.AUTO-MCNC: 74 MG/DL (ref 65–117)
GLUCOSE BLD STRIP.AUTO-MCNC: 79 MG/DL (ref 65–117)
GLUCOSE BLD STRIP.AUTO-MCNC: 87 MG/DL (ref 65–117)
GLUCOSE BLD STRIP.AUTO-MCNC: 89 MG/DL (ref 65–117)
GLUCOSE BLD STRIP.AUTO-MCNC: 90 MG/DL (ref 65–117)
GLUCOSE BLD STRIP.AUTO-MCNC: 97 MG/DL (ref 65–117)
GLUCOSE BLD STRIP.AUTO-MCNC: 97 MG/DL (ref 65–117)
GLUCOSE BLD STRIP.AUTO-MCNC: 99 MG/DL (ref 65–117)
GLUCOSE SERPL-MCNC: 102 MG/DL (ref 65–100)
GLUCOSE SERPL-MCNC: 114 MG/DL (ref 65–100)
GLUCOSE SERPL-MCNC: 115 MG/DL (ref 65–100)
GLUCOSE SERPL-MCNC: 121 MG/DL (ref 65–100)
GLUCOSE SERPL-MCNC: 126 MG/DL (ref 65–100)
GLUCOSE SERPL-MCNC: 128 MG/DL (ref 65–100)
GLUCOSE SERPL-MCNC: 128 MG/DL (ref 65–100)
GLUCOSE SERPL-MCNC: 141 MG/DL (ref 65–100)
GLUCOSE SERPL-MCNC: 144 MG/DL (ref 65–100)
GLUCOSE SERPL-MCNC: 146 MG/DL (ref 65–100)
GLUCOSE SERPL-MCNC: 148 MG/DL (ref 65–100)
GLUCOSE SERPL-MCNC: 153 MG/DL (ref 65–100)
GLUCOSE SERPL-MCNC: 153 MG/DL (ref 65–100)
GLUCOSE SERPL-MCNC: 156 MG/DL (ref 65–100)
GLUCOSE SERPL-MCNC: 160 MG/DL (ref 65–100)
GLUCOSE SERPL-MCNC: 180 MG/DL (ref 65–100)
GLUCOSE SERPL-MCNC: 180 MG/DL (ref 65–100)
GLUCOSE SERPL-MCNC: 184 MG/DL (ref 65–100)
GLUCOSE SERPL-MCNC: 187 MG/DL (ref 65–100)
GLUCOSE SERPL-MCNC: 190 MG/DL (ref 65–100)
GLUCOSE SERPL-MCNC: 201 MG/DL (ref 65–100)
GLUCOSE SERPL-MCNC: 205 MG/DL (ref 65–100)
GLUCOSE SERPL-MCNC: 246 MG/DL (ref 65–100)
GLUCOSE SERPL-MCNC: 86 MG/DL (ref 65–100)
GLUCOSE SERPL-MCNC: 96 MG/DL (ref 65–100)
GLUCOSE UR STRIP.AUTO-MCNC: NEGATIVE MG/DL
HADV DNA SPEC QL NAA+PROBE: NOT DETECTED
HADV DNA SPEC QL NAA+PROBE: NOT DETECTED
HBA1C MFR BLD: 5.5 % (ref 4–5.6)
HCO3 BLD-SCNC: 18.3 MMOL/L (ref 22–26)
HCO3 BLD-SCNC: 20.4 MMOL/L (ref 22–26)
HCO3 BLD-SCNC: 20.5 MMOL/L (ref 22–26)
HCO3 BLDA-SCNC: 16 MMOL/L (ref 22–26)
HCO3 BLDA-SCNC: 18 MMOL/L (ref 22–26)
HCO3 BLDA-SCNC: 18 MMOL/L (ref 22–26)
HCOV 229E RNA SPEC QL NAA+PROBE: NOT DETECTED
HCOV 229E RNA SPEC QL NAA+PROBE: NOT DETECTED
HCOV HKU1 RNA SPEC QL NAA+PROBE: NOT DETECTED
HCOV HKU1 RNA SPEC QL NAA+PROBE: NOT DETECTED
HCOV NL63 RNA SPEC QL NAA+PROBE: NOT DETECTED
HCOV NL63 RNA SPEC QL NAA+PROBE: NOT DETECTED
HCOV OC43 RNA SPEC QL NAA+PROBE: NOT DETECTED
HCOV OC43 RNA SPEC QL NAA+PROBE: NOT DETECTED
HCT VFR BLD AUTO: 30.9 % (ref 36.6–50.3)
HCT VFR BLD AUTO: 33.8 % (ref 36.6–50.3)
HCT VFR BLD AUTO: 35.2 % (ref 36.6–50.3)
HCT VFR BLD AUTO: 36.2 % (ref 36.6–50.3)
HCT VFR BLD AUTO: 37.5 % (ref 36.6–50.3)
HCT VFR BLD AUTO: 37.5 % (ref 36.6–50.3)
HCT VFR BLD AUTO: 39.4 % (ref 36.6–50.3)
HCT VFR BLD AUTO: 39.5 % (ref 36.6–50.3)
HCT VFR BLD AUTO: 40.5 % (ref 36.6–50.3)
HCT VFR BLD AUTO: 40.7 % (ref 36.6–50.3)
HCT VFR BLD AUTO: 41.5 % (ref 36.6–50.3)
HCT VFR BLD AUTO: 42.9 % (ref 36.6–50.3)
HCT VFR BLD AUTO: 43.6 % (ref 36.6–50.3)
HCT VFR BLD AUTO: 44.7 % (ref 36.6–50.3)
HCT VFR BLD AUTO: 44.8 % (ref 36.6–50.3)
HCT VFR BLD AUTO: 45.2 % (ref 36.6–50.3)
HCT VFR BLD AUTO: 45.2 % (ref 36.6–50.3)
HCT VFR BLD AUTO: 47.3 % (ref 36.6–50.3)
HCT VFR BLD AUTO: 47.4 % (ref 36.6–50.3)
HDLC SERPL-MCNC: 27 MG/DL
HDLC SERPL: 5.9 (ref 0–5)
HGB BLD-MCNC: 10.4 G/DL (ref 12.1–17)
HGB BLD-MCNC: 11.1 G/DL (ref 12.1–17)
HGB BLD-MCNC: 11.4 G/DL (ref 12.1–17)
HGB BLD-MCNC: 11.8 G/DL (ref 12.1–17)
HGB BLD-MCNC: 12 G/DL (ref 12.1–17)
HGB BLD-MCNC: 12.1 G/DL (ref 12.1–17)
HGB BLD-MCNC: 12.1 G/DL (ref 12.1–17)
HGB BLD-MCNC: 12.4 G/DL (ref 12.1–17)
HGB BLD-MCNC: 12.6 G/DL (ref 12.1–17)
HGB BLD-MCNC: 13 G/DL (ref 12.1–17)
HGB BLD-MCNC: 13.1 G/DL (ref 12.1–17)
HGB BLD-MCNC: 13.7 G/DL (ref 12.1–17)
HGB BLD-MCNC: 13.8 G/DL (ref 12.1–17)
HGB BLD-MCNC: 14 G/DL (ref 12.1–17)
HGB BLD-MCNC: 14 G/DL (ref 12.1–17)
HGB BLD-MCNC: 14.3 G/DL (ref 12.1–17)
HGB BLD-MCNC: 14.4 G/DL (ref 12.1–17)
HGB BLD-MCNC: 14.5 G/DL (ref 12.1–17)
HGB BLD-MCNC: 15.2 G/DL (ref 12.1–17)
HGB UR QL STRIP: ABNORMAL
HMPV RNA SPEC QL NAA+PROBE: NOT DETECTED
HMPV RNA SPEC QL NAA+PROBE: NOT DETECTED
HPIV1 RNA SPEC QL NAA+PROBE: NOT DETECTED
HPIV1 RNA SPEC QL NAA+PROBE: NOT DETECTED
HPIV2 RNA SPEC QL NAA+PROBE: NOT DETECTED
HPIV2 RNA SPEC QL NAA+PROBE: NOT DETECTED
HPIV3 RNA SPEC QL NAA+PROBE: NOT DETECTED
HPIV3 RNA SPEC QL NAA+PROBE: NOT DETECTED
HPIV4 RNA SPEC QL NAA+PROBE: NOT DETECTED
HPIV4 RNA SPEC QL NAA+PROBE: NOT DETECTED
IMM GRANULOCYTES # BLD AUTO: 0 K/UL
IMM GRANULOCYTES # BLD AUTO: 0.1 K/UL (ref 0–0.04)
IMM GRANULOCYTES # BLD AUTO: 0.1 K/UL (ref 0–0.04)
IMM GRANULOCYTES # BLD AUTO: 0.2 K/UL (ref 0–0.04)
IMM GRANULOCYTES # BLD AUTO: 0.3 K/UL (ref 0–0.04)
IMM GRANULOCYTES # BLD AUTO: 0.3 K/UL (ref 0–0.04)
IMM GRANULOCYTES NFR BLD AUTO: 0 %
IMM GRANULOCYTES NFR BLD AUTO: 1 % (ref 0–0.5)
IMM GRANULOCYTES NFR BLD AUTO: 2 % (ref 0–0.5)
IMM GRANULOCYTES NFR BLD AUTO: 2 % (ref 0–0.5)
KETONES UR QL STRIP.AUTO: NEGATIVE MG/DL
LACTATE BLD-SCNC: 2.5 MMOL/L (ref 0.4–2)
LACTATE SERPL-SCNC: 0.8 MMOL/L (ref 0.4–2)
LACTATE SERPL-SCNC: 1.9 MMOL/L (ref 0.4–2)
LACTATE SERPL-SCNC: 2.2 MMOL/L (ref 0.4–2)
LACTATE SERPL-SCNC: 2.2 MMOL/L (ref 0.4–2)
LACTATE SERPL-SCNC: 2.4 MMOL/L (ref 0.4–2)
LACTATE SERPL-SCNC: 2.6 MMOL/L (ref 0.4–2)
LACTATE SERPL-SCNC: 2.8 MMOL/L (ref 0.4–2)
LACTATE SERPL-SCNC: 3.4 MMOL/L (ref 0.4–2)
LACTATE SERPL-SCNC: 3.7 MMOL/L (ref 0.4–2)
LDLC SERPL CALC-MCNC: 92.8 MG/DL (ref 0–100)
LEUKOCYTE ESTERASE UR QL STRIP.AUTO: ABNORMAL
LYMPHOCYTES # BLD: 0.8 K/UL (ref 0.8–3.5)
LYMPHOCYTES # BLD: 0.9 K/UL (ref 0.8–3.5)
LYMPHOCYTES # BLD: 1.1 K/UL (ref 0.8–3.5)
LYMPHOCYTES # BLD: 1.4 K/UL (ref 0.8–3.5)
LYMPHOCYTES # BLD: 1.6 K/UL (ref 0.8–3.5)
LYMPHOCYTES # BLD: 1.8 K/UL (ref 0.8–3.5)
LYMPHOCYTES # BLD: 1.9 K/UL (ref 0.8–3.5)
LYMPHOCYTES # BLD: 2 K/UL (ref 0.8–3.5)
LYMPHOCYTES # BLD: 2.1 K/UL (ref 0.8–3.5)
LYMPHOCYTES # BLD: 2.2 K/UL (ref 0.8–3.5)
LYMPHOCYTES # BLD: 2.5 K/UL (ref 0.8–3.5)
LYMPHOCYTES NFR BLD: 10 % (ref 12–49)
LYMPHOCYTES NFR BLD: 11 % (ref 12–49)
LYMPHOCYTES NFR BLD: 4 % (ref 12–49)
LYMPHOCYTES NFR BLD: 5 % (ref 12–49)
LYMPHOCYTES NFR BLD: 5 % (ref 12–49)
LYMPHOCYTES NFR BLD: 6 % (ref 12–49)
M PNEUMO DNA SPEC QL NAA+PROBE: NOT DETECTED
M PNEUMO DNA SPEC QL NAA+PROBE: NOT DETECTED
MAGNESIUM SERPL-MCNC: 2.2 MG/DL (ref 1.6–2.4)
MAGNESIUM SERPL-MCNC: 2.3 MG/DL (ref 1.6–2.4)
MAGNESIUM SERPL-MCNC: 2.3 MG/DL (ref 1.6–2.4)
MCH RBC QN AUTO: 28.3 PG (ref 26–34)
MCH RBC QN AUTO: 28.3 PG (ref 26–34)
MCH RBC QN AUTO: 28.4 PG (ref 26–34)
MCH RBC QN AUTO: 28.7 PG (ref 26–34)
MCH RBC QN AUTO: 28.8 PG (ref 26–34)
MCH RBC QN AUTO: 28.9 PG (ref 26–34)
MCH RBC QN AUTO: 29 PG (ref 26–34)
MCH RBC QN AUTO: 29.1 PG (ref 26–34)
MCH RBC QN AUTO: 29.3 PG (ref 26–34)
MCH RBC QN AUTO: 29.4 PG (ref 26–34)
MCH RBC QN AUTO: 29.5 PG (ref 26–34)
MCH RBC QN AUTO: 29.7 PG (ref 26–34)
MCH RBC QN AUTO: 29.8 PG (ref 26–34)
MCHC RBC AUTO-ENTMCNC: 29.6 G/DL (ref 30–36.5)
MCHC RBC AUTO-ENTMCNC: 30.3 G/DL (ref 30–36.5)
MCHC RBC AUTO-ENTMCNC: 30.4 G/DL (ref 30–36.5)
MCHC RBC AUTO-ENTMCNC: 30.5 G/DL (ref 30–36.5)
MCHC RBC AUTO-ENTMCNC: 31 G/DL (ref 30–36.5)
MCHC RBC AUTO-ENTMCNC: 31 G/DL (ref 30–36.5)
MCHC RBC AUTO-ENTMCNC: 31.3 G/DL (ref 30–36.5)
MCHC RBC AUTO-ENTMCNC: 31.5 G/DL (ref 30–36.5)
MCHC RBC AUTO-ENTMCNC: 32.1 G/DL (ref 30–36.5)
MCHC RBC AUTO-ENTMCNC: 32.3 G/DL (ref 30–36.5)
MCHC RBC AUTO-ENTMCNC: 32.3 G/DL (ref 30–36.5)
MCHC RBC AUTO-ENTMCNC: 32.4 G/DL (ref 30–36.5)
MCHC RBC AUTO-ENTMCNC: 32.4 G/DL (ref 30–36.5)
MCHC RBC AUTO-ENTMCNC: 32.6 G/DL (ref 30–36.5)
MCHC RBC AUTO-ENTMCNC: 32.8 G/DL (ref 30–36.5)
MCHC RBC AUTO-ENTMCNC: 32.8 G/DL (ref 30–36.5)
MCHC RBC AUTO-ENTMCNC: 33 G/DL (ref 30–36.5)
MCHC RBC AUTO-ENTMCNC: 33.2 G/DL (ref 30–36.5)
MCHC RBC AUTO-ENTMCNC: 33.7 G/DL (ref 30–36.5)
MCV RBC AUTO: 88.1 FL (ref 80–99)
MCV RBC AUTO: 88.3 FL (ref 80–99)
MCV RBC AUTO: 88.4 FL (ref 80–99)
MCV RBC AUTO: 89.1 FL (ref 80–99)
MCV RBC AUTO: 89.2 FL (ref 80–99)
MCV RBC AUTO: 89.5 FL (ref 80–99)
MCV RBC AUTO: 89.7 FL (ref 80–99)
MCV RBC AUTO: 89.7 FL (ref 80–99)
MCV RBC AUTO: 89.8 FL (ref 80–99)
MCV RBC AUTO: 90.1 FL (ref 80–99)
MCV RBC AUTO: 90.7 FL (ref 80–99)
MCV RBC AUTO: 91 FL (ref 80–99)
MCV RBC AUTO: 91.9 FL (ref 80–99)
MCV RBC AUTO: 92.5 FL (ref 80–99)
MCV RBC AUTO: 92.6 FL (ref 80–99)
MCV RBC AUTO: 92.9 FL (ref 80–99)
MCV RBC AUTO: 93.9 FL (ref 80–99)
MCV RBC AUTO: 94.6 FL (ref 80–99)
MCV RBC AUTO: 96.9 FL (ref 80–99)
METAMYELOCYTES NFR BLD MANUAL: 1 %
MONOCYTES # BLD: 0.2 K/UL (ref 0–1)
MONOCYTES # BLD: 0.4 K/UL (ref 0–1)
MONOCYTES # BLD: 0.4 K/UL (ref 0–1)
MONOCYTES # BLD: 0.5 K/UL (ref 0–1)
MONOCYTES # BLD: 0.5 K/UL (ref 0–1)
MONOCYTES # BLD: 0.7 K/UL (ref 0–1)
MONOCYTES # BLD: 0.9 K/UL (ref 0–1)
MONOCYTES # BLD: 0.9 K/UL (ref 0–1)
MONOCYTES # BLD: 1 K/UL (ref 0–1)
MONOCYTES # BLD: 1 K/UL (ref 0–1)
MONOCYTES # BLD: 1.2 K/UL (ref 0–1)
MONOCYTES # BLD: 1.7 K/UL (ref 0–1)
MONOCYTES # BLD: 2.4 K/UL (ref 0–1)
MONOCYTES NFR BLD: 1 % (ref 5–13)
MONOCYTES NFR BLD: 10 % (ref 5–13)
MONOCYTES NFR BLD: 2 % (ref 5–13)
MONOCYTES NFR BLD: 4 % (ref 5–13)
MONOCYTES NFR BLD: 4 % (ref 5–13)
MONOCYTES NFR BLD: 5 % (ref 5–13)
MONOCYTES NFR BLD: 7 % (ref 5–13)
MONOCYTES NFR BLD: 8 % (ref 5–13)
MYELOCYTES NFR BLD MANUAL: 3 %
NEUTS BAND NFR BLD MANUAL: 2 % (ref 0–6)
NEUTS BAND NFR BLD MANUAL: 2 % (ref 0–6)
NEUTS BAND NFR BLD MANUAL: 6 % (ref 0–6)
NEUTS SEG # BLD: 12.8 K/UL (ref 1.8–8)
NEUTS SEG # BLD: 13 K/UL (ref 1.8–8)
NEUTS SEG # BLD: 14.8 K/UL (ref 1.8–8)
NEUTS SEG # BLD: 15 K/UL (ref 1.8–8)
NEUTS SEG # BLD: 15.1 K/UL (ref 1.8–8)
NEUTS SEG # BLD: 15.8 K/UL (ref 1.8–8)
NEUTS SEG # BLD: 16.5 K/UL (ref 1.8–8)
NEUTS SEG # BLD: 17.7 K/UL (ref 1.8–8)
NEUTS SEG # BLD: 17.7 K/UL (ref 1.8–8)
NEUTS SEG # BLD: 17.8 K/UL (ref 1.8–8)
NEUTS SEG # BLD: 17.9 K/UL (ref 1.8–8)
NEUTS SEG # BLD: 18.5 K/UL (ref 1.8–8)
NEUTS SEG # BLD: 18.9 K/UL (ref 1.8–8)
NEUTS SEG # BLD: 21.1 K/UL (ref 1.8–8)
NEUTS SEG # BLD: 22 K/UL (ref 1.8–8)
NEUTS SEG NFR BLD: 74 % (ref 32–75)
NEUTS SEG NFR BLD: 80 % (ref 32–75)
NEUTS SEG NFR BLD: 81 % (ref 32–75)
NEUTS SEG NFR BLD: 82 % (ref 32–75)
NEUTS SEG NFR BLD: 83 % (ref 32–75)
NEUTS SEG NFR BLD: 84 % (ref 32–75)
NEUTS SEG NFR BLD: 85 % (ref 32–75)
NEUTS SEG NFR BLD: 91 % (ref 32–75)
NEUTS SEG NFR BLD: 92 % (ref 32–75)
NEUTS SEG NFR BLD: 92 % (ref 32–75)
NEUTS SEG NFR BLD: 93 % (ref 32–75)
NEUTS SEG NFR BLD: 94 % (ref 32–75)
NITRITE UR QL STRIP.AUTO: NEGATIVE
NRBC # BLD: 0 K/UL (ref 0–0.01)
NRBC # BLD: 0.02 K/UL (ref 0–0.01)
NRBC # BLD: 0.03 K/UL (ref 0–0.01)
NRBC # BLD: 0.05 K/UL (ref 0–0.01)
NRBC BLD-RTO: 0 PER 100 WBC
NRBC BLD-RTO: 0.1 PER 100 WBC
NRBC BLD-RTO: 0.2 PER 100 WBC
NRBC BLD-RTO: 0.2 PER 100 WBC
NRBC BLD-RTO: 0.3 PER 100 WBC
NT PRO BNP: 3515 PG/ML
NT PRO BNP: 4208 PG/ML
O2/TOTAL GAS SETTING VFR VENT: 40 %
O2/TOTAL GAS SETTING VFR VENT: 50 %
OSMOLALITY UR: 634 MOSM/KG H2O
PCO2 BLD: 32.9 MMHG (ref 35–45)
PCO2 BLD: 34.2 MMHG (ref 35–45)
PCO2 BLD: 42.6 MMHG (ref 35–45)
PCO2 BLDA: 26 MMHG (ref 35–45)
PCO2 BLDA: 28 MMHG (ref 35–45)
PCO2 BLDA: 33 MMHG (ref 35–45)
PEEP RESPIRATORY: 10 CMH2O
PH BLD: 7.29 (ref 7.35–7.45)
PH BLD: 7.34 (ref 7.35–7.45)
PH BLD: 7.4 (ref 7.35–7.45)
PH BLDA: 7.34 (ref 7.35–7.45)
PH BLDA: 7.38 (ref 7.35–7.45)
PH BLDA: 7.46 (ref 7.35–7.45)
PH UR STRIP: 5 (ref 5–8)
PHOSPHATE SERPL-MCNC: 3.6 MG/DL (ref 2.6–4.7)
PHOSPHATE SERPL-MCNC: 3.8 MG/DL (ref 2.6–4.7)
PLATELET # BLD AUTO: 149 K/UL (ref 150–400)
PLATELET # BLD AUTO: 152 K/UL (ref 150–400)
PLATELET # BLD AUTO: 160 K/UL (ref 150–400)
PLATELET # BLD AUTO: 163 K/UL (ref 150–400)
PLATELET # BLD AUTO: 175 K/UL (ref 150–400)
PLATELET # BLD AUTO: 178 K/UL (ref 150–400)
PLATELET # BLD AUTO: 192 K/UL (ref 150–400)
PLATELET # BLD AUTO: 197 K/UL (ref 150–400)
PLATELET # BLD AUTO: 203 K/UL (ref 150–400)
PLATELET # BLD AUTO: 237 K/UL (ref 150–400)
PLATELET # BLD AUTO: 242 K/UL (ref 150–400)
PLATELET # BLD AUTO: 251 K/UL (ref 150–400)
PLATELET # BLD AUTO: 265 K/UL (ref 150–400)
PLATELET # BLD AUTO: 266 K/UL (ref 150–400)
PLATELET # BLD AUTO: 267 K/UL (ref 150–400)
PLATELET # BLD AUTO: 270 K/UL (ref 150–400)
PLATELET # BLD AUTO: 272 K/UL (ref 150–400)
PLATELET # BLD AUTO: 278 K/UL (ref 150–400)
PLATELET # BLD AUTO: 288 K/UL (ref 150–400)
PMV BLD AUTO: 10 FL (ref 8.9–12.9)
PMV BLD AUTO: 10.2 FL (ref 8.9–12.9)
PMV BLD AUTO: 10.5 FL (ref 8.9–12.9)
PMV BLD AUTO: 10.5 FL (ref 8.9–12.9)
PMV BLD AUTO: 10.6 FL (ref 8.9–12.9)
PMV BLD AUTO: 10.7 FL (ref 8.9–12.9)
PMV BLD AUTO: 10.9 FL (ref 8.9–12.9)
PMV BLD AUTO: 11.6 FL (ref 8.9–12.9)
PMV BLD AUTO: 11.8 FL (ref 8.9–12.9)
PMV BLD AUTO: 11.9 FL (ref 8.9–12.9)
PMV BLD AUTO: 11.9 FL (ref 8.9–12.9)
PMV BLD AUTO: 12 FL (ref 8.9–12.9)
PMV BLD AUTO: 12.1 FL (ref 8.9–12.9)
PMV BLD AUTO: 12.4 FL (ref 8.9–12.9)
PMV BLD AUTO: 12.5 FL (ref 8.9–12.9)
PMV BLD AUTO: 12.6 FL (ref 8.9–12.9)
PO2 BLD: 51 MMHG (ref 80–100)
PO2 BLD: 97 MMHG (ref 80–100)
PO2 BLD: <27 MMHG (ref 80–100)
PO2 BLDA: 41 MMHG (ref 80–100)
PO2 BLDA: 52 MMHG (ref 80–100)
PO2 BLDA: 70 MMHG (ref 80–100)
POTASSIUM SERPL-SCNC: 2.9 MMOL/L (ref 3.5–5.1)
POTASSIUM SERPL-SCNC: 3.2 MMOL/L (ref 3.5–5.1)
POTASSIUM SERPL-SCNC: 3.2 MMOL/L (ref 3.5–5.1)
POTASSIUM SERPL-SCNC: 3.3 MMOL/L (ref 3.5–5.1)
POTASSIUM SERPL-SCNC: 3.3 MMOL/L (ref 3.5–5.1)
POTASSIUM SERPL-SCNC: 3.4 MMOL/L (ref 3.5–5.1)
POTASSIUM SERPL-SCNC: 3.4 MMOL/L (ref 3.5–5.1)
POTASSIUM SERPL-SCNC: 3.5 MMOL/L (ref 3.5–5.1)
POTASSIUM SERPL-SCNC: 3.6 MMOL/L (ref 3.5–5.1)
POTASSIUM SERPL-SCNC: 3.6 MMOL/L (ref 3.5–5.1)
POTASSIUM SERPL-SCNC: 3.7 MMOL/L (ref 3.5–5.1)
POTASSIUM SERPL-SCNC: 3.9 MMOL/L (ref 3.5–5.1)
POTASSIUM SERPL-SCNC: 4 MMOL/L (ref 3.5–5.1)
POTASSIUM SERPL-SCNC: 4.1 MMOL/L (ref 3.5–5.1)
POTASSIUM SERPL-SCNC: 4.1 MMOL/L (ref 3.5–5.1)
POTASSIUM SERPL-SCNC: 4.2 MMOL/L (ref 3.5–5.1)
POTASSIUM SERPL-SCNC: 4.2 MMOL/L (ref 3.5–5.1)
POTASSIUM SERPL-SCNC: 4.5 MMOL/L (ref 3.5–5.1)
POTASSIUM SERPL-SCNC: 4.5 MMOL/L (ref 3.5–5.1)
POTASSIUM SERPL-SCNC: 4.7 MMOL/L (ref 3.5–5.1)
PRESSURE SUPPORT SETTING VENT: 5 CMH2O
PROCALCITONIN SERPL-MCNC: 0.07 NG/ML
PROCALCITONIN SERPL-MCNC: 0.16 NG/ML
PROCALCITONIN SERPL-MCNC: 0.76 NG/ML
PROT SERPL-MCNC: 5.6 G/DL (ref 6.4–8.2)
PROT SERPL-MCNC: 5.7 G/DL (ref 6.4–8.2)
PROT SERPL-MCNC: 5.8 G/DL (ref 6.4–8.2)
PROT SERPL-MCNC: 5.8 G/DL (ref 6.4–8.2)
PROT SERPL-MCNC: 5.9 G/DL (ref 6.4–8.2)
PROT SERPL-MCNC: 6.1 G/DL (ref 6.4–8.2)
PROT SERPL-MCNC: 6.2 G/DL (ref 6.4–8.2)
PROT SERPL-MCNC: 6.3 G/DL (ref 6.4–8.2)
PROT SERPL-MCNC: 6.5 G/DL (ref 6.4–8.2)
PROT SERPL-MCNC: 6.5 G/DL (ref 6.4–8.2)
PROT SERPL-MCNC: 6.8 G/DL (ref 6.4–8.2)
PROT SERPL-MCNC: 7.2 G/DL (ref 6.4–8.2)
PROT SERPL-MCNC: 7.5 G/DL (ref 6.4–8.2)
PROT SERPL-MCNC: 7.5 G/DL (ref 6.4–8.2)
PROT UR STRIP-MCNC: 30 MG/DL
RBC # BLD AUTO: 3.5 M/UL (ref 4.1–5.7)
RBC # BLD AUTO: 3.77 M/UL (ref 4.1–5.7)
RBC # BLD AUTO: 3.87 M/UL (ref 4.1–5.7)
RBC # BLD AUTO: 4.06 M/UL (ref 4.1–5.7)
RBC # BLD AUTO: 4.18 M/UL (ref 4.1–5.7)
RBC # BLD AUTO: 4.18 M/UL (ref 4.1–5.7)
RBC # BLD AUTO: 4.19 M/UL (ref 4.1–5.7)
RBC # BLD AUTO: 4.26 M/UL (ref 4.1–5.7)
RBC # BLD AUTO: 4.4 M/UL (ref 4.1–5.7)
RBC # BLD AUTO: 4.43 M/UL (ref 4.1–5.7)
RBC # BLD AUTO: 4.57 M/UL (ref 4.1–5.7)
RBC # BLD AUTO: 4.71 M/UL (ref 4.1–5.7)
RBC # BLD AUTO: 4.78 M/UL (ref 4.1–5.7)
RBC # BLD AUTO: 4.88 M/UL (ref 4.1–5.7)
RBC # BLD AUTO: 4.93 M/UL (ref 4.1–5.7)
RBC # BLD AUTO: 4.93 M/UL (ref 4.1–5.7)
RBC # BLD AUTO: 5.03 M/UL (ref 4.1–5.7)
RBC # BLD AUTO: 5.09 M/UL (ref 4.1–5.7)
RBC # BLD AUTO: 5.26 M/UL (ref 4.1–5.7)
RBC #/AREA URNS HPF: ABNORMAL /HPF (ref 0–5)
RBC MORPH BLD: ABNORMAL
RSV RNA SPEC QL NAA+PROBE: NOT DETECTED
RSV RNA SPEC QL NAA+PROBE: NOT DETECTED
RV+EV RNA SPEC QL NAA+PROBE: NOT DETECTED
RV+EV RNA SPEC QL NAA+PROBE: NOT DETECTED
SAO2 % BLD: 81 % (ref 92–97)
SAO2 % BLD: 85 % (ref 92–97)
SAO2 % BLD: 86.3 % (ref 92–97)
SAO2 % BLD: 94 % (ref 92–97)
SAO2 % BLD: 97.2 % (ref 92–97)
SAO2% DEVICE SAO2% SENSOR NAME: ABNORMAL
SARS-COV-2 RDRP RESP QL NAA+PROBE: NOT DETECTED
SARS-COV-2 RNA RESP QL NAA+PROBE: NOT DETECTED
SARS-COV-2 RNA RESP QL NAA+PROBE: NOT DETECTED
SERVICE CMNT-IMP: ABNORMAL
SERVICE CMNT-IMP: NORMAL
SODIUM SERPL-SCNC: 138 MMOL/L (ref 136–145)
SODIUM SERPL-SCNC: 138 MMOL/L (ref 136–145)
SODIUM SERPL-SCNC: 143 MMOL/L (ref 136–145)
SODIUM SERPL-SCNC: 145 MMOL/L (ref 136–145)
SODIUM SERPL-SCNC: 145 MMOL/L (ref 136–145)
SODIUM SERPL-SCNC: 146 MMOL/L (ref 136–145)
SODIUM SERPL-SCNC: 146 MMOL/L (ref 136–145)
SODIUM SERPL-SCNC: 148 MMOL/L (ref 136–145)
SODIUM SERPL-SCNC: 148 MMOL/L (ref 136–145)
SODIUM SERPL-SCNC: 149 MMOL/L (ref 136–145)
SODIUM SERPL-SCNC: 149 MMOL/L (ref 136–145)
SODIUM SERPL-SCNC: 150 MMOL/L (ref 136–145)
SODIUM SERPL-SCNC: 150 MMOL/L (ref 136–145)
SODIUM SERPL-SCNC: 151 MMOL/L (ref 136–145)
SODIUM SERPL-SCNC: 152 MMOL/L (ref 136–145)
SODIUM SERPL-SCNC: 153 MMOL/L (ref 136–145)
SODIUM SERPL-SCNC: 154 MMOL/L (ref 136–145)
SODIUM SERPL-SCNC: 154 MMOL/L (ref 136–145)
SODIUM SERPL-SCNC: 156 MMOL/L (ref 136–145)
SODIUM SERPL-SCNC: 157 MMOL/L (ref 136–145)
SODIUM SERPL-SCNC: 158 MMOL/L (ref 136–145)
SODIUM UR-SCNC: 92 MMOL/L
SOURCE: NORMAL
SP GR UR REFRACTOMETRY: 1.01 (ref 1–1.03)
SPECIMEN HOLD: NORMAL
SPECIMEN SITE: ABNORMAL
SPECIMEN TYPE: ABNORMAL
TRIGL SERPL-MCNC: 191 MG/DL
TROPONIN I SERPL HS-MCNC: 103 NG/L (ref 0–76)
TROPONIN I SERPL HS-MCNC: 117 NG/L (ref 0–76)
TROPONIN I SERPL HS-MCNC: 46 NG/L (ref 0–76)
TROPONIN I SERPL HS-MCNC: 48 NG/L (ref 0–76)
TROPONIN I SERPL HS-MCNC: 61 NG/L (ref 0–76)
TROPONIN I SERPL HS-MCNC: 61 NG/L (ref 0–76)
TROPONIN I SERPL HS-MCNC: 67 NG/L (ref 0–76)
TROPONIN I SERPL HS-MCNC: 92 NG/L (ref 0–76)
URATE CRY URNS QL MICRO: ABNORMAL
UROBILINOGEN UR QL STRIP.AUTO: 0.2 EU/DL (ref 0.2–1)
UUN UR-MCNC: 960 MG/DL
VANCOMYCIN SERPL-MCNC: 15.3 UG/ML
VENTILATION MODE VENT: ABNORMAL
VLDLC SERPL CALC-MCNC: 38.2 MG/DL
WBC # BLD AUTO: 14.1 K/UL (ref 4.1–11.1)
WBC # BLD AUTO: 14.9 K/UL (ref 4.1–11.1)
WBC # BLD AUTO: 15.5 K/UL (ref 4.1–11.1)
WBC # BLD AUTO: 15.6 K/UL (ref 4.1–11.1)
WBC # BLD AUTO: 16.4 K/UL (ref 4.1–11.1)
WBC # BLD AUTO: 17.4 K/UL (ref 4.1–11.1)
WBC # BLD AUTO: 17.8 K/UL (ref 4.1–11.1)
WBC # BLD AUTO: 18.2 K/UL (ref 4.1–11.1)
WBC # BLD AUTO: 18.6 K/UL (ref 4.1–11.1)
WBC # BLD AUTO: 19.1 K/UL (ref 4.1–11.1)
WBC # BLD AUTO: 19.7 K/UL (ref 4.1–11.1)
WBC # BLD AUTO: 19.7 K/UL (ref 4.1–11.1)
WBC # BLD AUTO: 20.9 K/UL (ref 4.1–11.1)
WBC # BLD AUTO: 21 K/UL (ref 4.1–11.1)
WBC # BLD AUTO: 21.3 K/UL (ref 4.1–11.1)
WBC # BLD AUTO: 22.5 K/UL (ref 4.1–11.1)
WBC # BLD AUTO: 22.7 K/UL (ref 4.1–11.1)
WBC # BLD AUTO: 23.4 K/UL (ref 4.1–11.1)
WBC # BLD AUTO: 23.6 K/UL (ref 4.1–11.1)
WBC MORPH BLD: ABNORMAL
WBC URNS QL MICRO: ABNORMAL /HPF (ref 0–4)

## 2024-01-01 PROCEDURE — 80061 LIPID PANEL: CPT

## 2024-01-01 PROCEDURE — 2700000000 HC OXYGEN THERAPY PER DAY

## 2024-01-01 PROCEDURE — 93005 ELECTROCARDIOGRAM TRACING: CPT

## 2024-01-01 PROCEDURE — 6360000004 HC RX CONTRAST MEDICATION: Performed by: STUDENT IN AN ORGANIZED HEALTH CARE EDUCATION/TRAINING PROGRAM

## 2024-01-01 PROCEDURE — 99232 SBSQ HOSP IP/OBS MODERATE 35: CPT | Performed by: STUDENT IN AN ORGANIZED HEALTH CARE EDUCATION/TRAINING PROGRAM

## 2024-01-01 PROCEDURE — 99233 SBSQ HOSP IP/OBS HIGH 50: CPT | Performed by: FAMILY MEDICINE

## 2024-01-01 PROCEDURE — 6360000002 HC RX W HCPCS

## 2024-01-01 PROCEDURE — 2580000003 HC RX 258

## 2024-01-01 PROCEDURE — 82803 BLOOD GASES ANY COMBINATION: CPT

## 2024-01-01 PROCEDURE — 6360000002 HC RX W HCPCS: Performed by: INTERNAL MEDICINE

## 2024-01-01 PROCEDURE — 6370000000 HC RX 637 (ALT 250 FOR IP): Performed by: FAMILY MEDICINE

## 2024-01-01 PROCEDURE — 87635 SARS-COV-2 COVID-19 AMP PRB: CPT

## 2024-01-01 PROCEDURE — 99233 SBSQ HOSP IP/OBS HIGH 50: CPT

## 2024-01-01 PROCEDURE — 36415 COLL VENOUS BLD VENIPUNCTURE: CPT

## 2024-01-01 PROCEDURE — 6360000002 HC RX W HCPCS: Performed by: FAMILY MEDICINE

## 2024-01-01 PROCEDURE — C9113 INJ PANTOPRAZOLE SODIUM, VIA: HCPCS

## 2024-01-01 PROCEDURE — 6370000000 HC RX 637 (ALT 250 FOR IP)

## 2024-01-01 PROCEDURE — 80048 BASIC METABOLIC PNL TOTAL CA: CPT

## 2024-01-01 PROCEDURE — 94761 N-INVAS EAR/PLS OXIMETRY MLT: CPT

## 2024-01-01 PROCEDURE — 83935 ASSAY OF URINE OSMOLALITY: CPT

## 2024-01-01 PROCEDURE — 80076 HEPATIC FUNCTION PANEL: CPT

## 2024-01-01 PROCEDURE — A4216 STERILE WATER/SALINE, 10 ML: HCPCS

## 2024-01-01 PROCEDURE — 2500000003 HC RX 250 WO HCPCS

## 2024-01-01 PROCEDURE — 94640 AIRWAY INHALATION TREATMENT: CPT

## 2024-01-01 PROCEDURE — 2060000000 HC ICU INTERMEDIATE R&B

## 2024-01-01 PROCEDURE — 6360000002 HC RX W HCPCS: Performed by: STUDENT IN AN ORGANIZED HEALTH CARE EDUCATION/TRAINING PROGRAM

## 2024-01-01 PROCEDURE — 92610 EVALUATE SWALLOWING FUNCTION: CPT | Performed by: SPEECH-LANGUAGE PATHOLOGIST

## 2024-01-01 PROCEDURE — 71045 X-RAY EXAM CHEST 1 VIEW: CPT

## 2024-01-01 PROCEDURE — 92526 ORAL FUNCTION THERAPY: CPT

## 2024-01-01 PROCEDURE — 1100000000 HC RM PRIVATE

## 2024-01-01 PROCEDURE — 6370000000 HC RX 637 (ALT 250 FOR IP): Performed by: STUDENT IN AN ORGANIZED HEALTH CARE EDUCATION/TRAINING PROGRAM

## 2024-01-01 PROCEDURE — 93005 ELECTROCARDIOGRAM TRACING: CPT | Performed by: STUDENT IN AN ORGANIZED HEALTH CARE EDUCATION/TRAINING PROGRAM

## 2024-01-01 PROCEDURE — 83605 ASSAY OF LACTIC ACID: CPT

## 2024-01-01 PROCEDURE — 97116 GAIT TRAINING THERAPY: CPT

## 2024-01-01 PROCEDURE — 2580000003 HC RX 258: Performed by: FAMILY MEDICINE

## 2024-01-01 PROCEDURE — 74022 RADEX COMPL AQT ABD SERIES: CPT

## 2024-01-01 PROCEDURE — 85025 COMPLETE CBC W/AUTO DIFF WBC: CPT

## 2024-01-01 PROCEDURE — 0202U NFCT DS 22 TRGT SARS-COV-2: CPT

## 2024-01-01 PROCEDURE — 2580000003 HC RX 258: Performed by: INTERNAL MEDICINE

## 2024-01-01 PROCEDURE — 83036 HEMOGLOBIN GLYCOSYLATED A1C: CPT

## 2024-01-01 PROCEDURE — 99232 SBSQ HOSP IP/OBS MODERATE 35: CPT | Performed by: INTERNAL MEDICINE

## 2024-01-01 PROCEDURE — 99232 SBSQ HOSP IP/OBS MODERATE 35: CPT | Performed by: FAMILY MEDICINE

## 2024-01-01 PROCEDURE — 6370000000 HC RX 637 (ALT 250 FOR IP): Performed by: NURSE PRACTITIONER

## 2024-01-01 PROCEDURE — 99233 SBSQ HOSP IP/OBS HIGH 50: CPT | Performed by: STUDENT IN AN ORGANIZED HEALTH CARE EDUCATION/TRAINING PROGRAM

## 2024-01-01 PROCEDURE — 94660 CPAP INITIATION&MGMT: CPT

## 2024-01-01 PROCEDURE — 87804 INFLUENZA ASSAY W/OPTIC: CPT

## 2024-01-01 PROCEDURE — 84145 PROCALCITONIN (PCT): CPT

## 2024-01-01 PROCEDURE — 96375 TX/PRO/DX INJ NEW DRUG ADDON: CPT

## 2024-01-01 PROCEDURE — 84484 ASSAY OF TROPONIN QUANT: CPT

## 2024-01-01 PROCEDURE — 97530 THERAPEUTIC ACTIVITIES: CPT

## 2024-01-01 PROCEDURE — 93010 ELECTROCARDIOGRAM REPORT: CPT | Performed by: SPECIALIST

## 2024-01-01 PROCEDURE — 80053 COMPREHEN METABOLIC PANEL: CPT

## 2024-01-01 PROCEDURE — 85027 COMPLETE CBC AUTOMATED: CPT

## 2024-01-01 PROCEDURE — 82962 GLUCOSE BLOOD TEST: CPT

## 2024-01-01 PROCEDURE — 2580000003 HC RX 258: Performed by: STUDENT IN AN ORGANIZED HEALTH CARE EDUCATION/TRAINING PROGRAM

## 2024-01-01 PROCEDURE — 72146 MRI CHEST SPINE W/O DYE: CPT

## 2024-01-01 PROCEDURE — 36600 WITHDRAWAL OF ARTERIAL BLOOD: CPT

## 2024-01-01 PROCEDURE — 97165 OT EVAL LOW COMPLEX 30 MIN: CPT

## 2024-01-01 PROCEDURE — 93005 ELECTROCARDIOGRAM TRACING: CPT | Performed by: FAMILY MEDICINE

## 2024-01-01 PROCEDURE — 97535 SELF CARE MNGMENT TRAINING: CPT

## 2024-01-01 PROCEDURE — 82570 ASSAY OF URINE CREATININE: CPT

## 2024-01-01 PROCEDURE — 0656 HSPC GENERAL INPATIENT

## 2024-01-01 PROCEDURE — 93306 TTE W/DOPPLER COMPLETE: CPT | Performed by: INTERNAL MEDICINE

## 2024-01-01 PROCEDURE — A9540 TC99M MAA: HCPCS

## 2024-01-01 PROCEDURE — 93010 ELECTROCARDIOGRAM REPORT: CPT | Performed by: STUDENT IN AN ORGANIZED HEALTH CARE EDUCATION/TRAINING PROGRAM

## 2024-01-01 PROCEDURE — 83735 ASSAY OF MAGNESIUM: CPT

## 2024-01-01 PROCEDURE — APPSS30 APP SPLIT SHARED TIME 16-30 MINUTES: Performed by: NURSE PRACTITIONER

## 2024-01-01 PROCEDURE — 2500000003 HC RX 250 WO HCPCS: Performed by: FAMILY MEDICINE

## 2024-01-01 PROCEDURE — 83880 ASSAY OF NATRIURETIC PEPTIDE: CPT

## 2024-01-01 PROCEDURE — 87040 BLOOD CULTURE FOR BACTERIA: CPT

## 2024-01-01 PROCEDURE — 71046 X-RAY EXAM CHEST 2 VIEWS: CPT

## 2024-01-01 PROCEDURE — 82330 ASSAY OF CALCIUM: CPT

## 2024-01-01 PROCEDURE — 99222 1ST HOSP IP/OBS MODERATE 55: CPT | Performed by: STUDENT IN AN ORGANIZED HEALTH CARE EDUCATION/TRAINING PROGRAM

## 2024-01-01 PROCEDURE — 81001 URINALYSIS AUTO W/SCOPE: CPT

## 2024-01-01 PROCEDURE — 99285 EMERGENCY DEPT VISIT HI MDM: CPT

## 2024-01-01 PROCEDURE — 3430000000 HC RX DIAGNOSTIC RADIOPHARMACEUTICAL

## 2024-01-01 PROCEDURE — 99223 1ST HOSP IP/OBS HIGH 75: CPT | Performed by: INTERNAL MEDICINE

## 2024-01-01 PROCEDURE — 74018 RADEX ABDOMEN 1 VIEW: CPT

## 2024-01-01 PROCEDURE — 70498 CT ANGIOGRAPHY NECK: CPT

## 2024-01-01 PROCEDURE — 74176 CT ABD & PELVIS W/O CONTRAST: CPT

## 2024-01-01 PROCEDURE — 84100 ASSAY OF PHOSPHORUS: CPT

## 2024-01-01 PROCEDURE — 74230 X-RAY XM SWLNG FUNCJ C+: CPT

## 2024-01-01 PROCEDURE — 99232 SBSQ HOSP IP/OBS MODERATE 35: CPT

## 2024-01-01 PROCEDURE — 84300 ASSAY OF URINE SODIUM: CPT

## 2024-01-01 PROCEDURE — 93306 TTE W/DOPPLER COMPLETE: CPT

## 2024-01-01 PROCEDURE — 72148 MRI LUMBAR SPINE W/O DYE: CPT

## 2024-01-01 PROCEDURE — 97163 PT EVAL HIGH COMPLEX 45 MIN: CPT

## 2024-01-01 PROCEDURE — 99222 1ST HOSP IP/OBS MODERATE 55: CPT | Performed by: FAMILY MEDICINE

## 2024-01-01 PROCEDURE — 51798 US URINE CAPACITY MEASURE: CPT

## 2024-01-01 PROCEDURE — G0316 PR PROLONG INPT EVAL ADD15 M: HCPCS | Performed by: STUDENT IN AN ORGANIZED HEALTH CARE EDUCATION/TRAINING PROGRAM

## 2024-01-01 PROCEDURE — 93010 ELECTROCARDIOGRAM REPORT: CPT | Performed by: INTERNAL MEDICINE

## 2024-01-01 PROCEDURE — 96365 THER/PROPH/DIAG IV INF INIT: CPT

## 2024-01-01 PROCEDURE — L0457 TLSO FLEX TRNK SJ-SS PRE OTS: HCPCS

## 2024-01-01 PROCEDURE — 80202 ASSAY OF VANCOMYCIN: CPT

## 2024-01-01 PROCEDURE — 82977 ASSAY OF GGT: CPT

## 2024-01-01 PROCEDURE — 78580 LUNG PERFUSION IMAGING: CPT

## 2024-01-01 PROCEDURE — 99223 1ST HOSP IP/OBS HIGH 75: CPT | Performed by: FAMILY MEDICINE

## 2024-01-01 PROCEDURE — 92611 MOTION FLUOROSCOPY/SWALLOW: CPT

## 2024-01-01 PROCEDURE — 70450 CT HEAD/BRAIN W/O DYE: CPT

## 2024-01-01 PROCEDURE — 99222 1ST HOSP IP/OBS MODERATE 55: CPT | Performed by: NURSE PRACTITIONER

## 2024-01-01 PROCEDURE — 84540 ASSAY OF URINE/UREA-N: CPT

## 2024-01-01 RX ORDER — BUPROPION HYDROCHLORIDE 100 MG/1
100 TABLET ORAL 2 TIMES DAILY
Status: DISCONTINUED | OUTPATIENT
Start: 2024-01-01 | End: 2024-01-01

## 2024-01-01 RX ORDER — KETOROLAC TROMETHAMINE 30 MG/ML
30 INJECTION, SOLUTION INTRAMUSCULAR; INTRAVENOUS EVERY 6 HOURS PRN
Status: DISCONTINUED | OUTPATIENT
Start: 2024-01-01 | End: 2024-01-01 | Stop reason: HOSPADM

## 2024-01-01 RX ORDER — TAMSULOSIN HYDROCHLORIDE 0.4 MG/1
0.4 CAPSULE ORAL DAILY
Status: DISCONTINUED | OUTPATIENT
Start: 2024-01-01 | End: 2024-01-01

## 2024-01-01 RX ORDER — MORPHINE SULFATE 2 MG/ML
1 INJECTION, SOLUTION INTRAMUSCULAR; INTRAVENOUS ONCE
Status: COMPLETED | OUTPATIENT
Start: 2024-01-01 | End: 2024-01-01

## 2024-01-01 RX ORDER — HALOPERIDOL 5 MG/ML
0.5 INJECTION INTRAMUSCULAR ONCE
Status: COMPLETED | OUTPATIENT
Start: 2024-01-01 | End: 2024-01-01

## 2024-01-01 RX ORDER — METRONIDAZOLE 500 MG/100ML
500 INJECTION, SOLUTION INTRAVENOUS EVERY 8 HOURS
Status: DISCONTINUED | OUTPATIENT
Start: 2024-01-01 | End: 2024-01-01

## 2024-01-01 RX ORDER — MORPHINE SULFATE 2 MG/ML
2 INJECTION, SOLUTION INTRAMUSCULAR; INTRAVENOUS EVERY 6 HOURS PRN
Status: DISCONTINUED | OUTPATIENT
Start: 2024-01-01 | End: 2024-01-01

## 2024-01-01 RX ORDER — IPRATROPIUM BROMIDE AND ALBUTEROL SULFATE 2.5; .5 MG/3ML; MG/3ML
1 SOLUTION RESPIRATORY (INHALATION)
Status: DISCONTINUED | OUTPATIENT
Start: 2024-01-01 | End: 2024-01-01

## 2024-01-01 RX ORDER — POTASSIUM CHLORIDE, DEXTROSE MONOHYDRATE 150; 5 MG/100ML; G/100ML
INJECTION, SOLUTION INTRAVENOUS CONTINUOUS
Status: DISCONTINUED | OUTPATIENT
Start: 2024-01-01 | End: 2024-01-01

## 2024-01-01 RX ORDER — POTASSIUM CHLORIDE 7.45 MG/ML
10 INJECTION INTRAVENOUS
Status: COMPLETED | OUTPATIENT
Start: 2024-01-01 | End: 2024-01-01

## 2024-01-01 RX ORDER — HYDROMORPHONE HYDROCHLORIDE 2 MG/ML
2 INJECTION, SOLUTION INTRAMUSCULAR; INTRAVENOUS; SUBCUTANEOUS
Status: DISCONTINUED | OUTPATIENT
Start: 2024-01-01 | End: 2024-01-01 | Stop reason: HOSPADM

## 2024-01-01 RX ORDER — ENEMA 19; 7 G/133ML; G/133ML
1 ENEMA RECTAL
Status: DISCONTINUED | OUTPATIENT
Start: 2024-01-01 | End: 2024-01-01

## 2024-01-01 RX ORDER — MORPHINE SULFATE 2 MG/ML
1 INJECTION, SOLUTION INTRAMUSCULAR; INTRAVENOUS ONCE
Status: DISCONTINUED | OUTPATIENT
Start: 2024-01-01 | End: 2024-01-01

## 2024-01-01 RX ORDER — MEMANTINE HYDROCHLORIDE 10 MG/1
10 TABLET ORAL 2 TIMES DAILY
Status: DISCONTINUED | OUTPATIENT
Start: 2024-01-01 | End: 2024-01-01

## 2024-01-01 RX ORDER — SODIUM CHLORIDE 0.9 % (FLUSH) 0.9 %
5-40 SYRINGE (ML) INJECTION PRN
Status: DISCONTINUED | OUTPATIENT
Start: 2024-01-01 | End: 2024-01-01 | Stop reason: HOSPADM

## 2024-01-01 RX ORDER — HALOPERIDOL 5 MG/ML
1 INJECTION INTRAMUSCULAR EVERY 6 HOURS PRN
Status: DISCONTINUED | OUTPATIENT
Start: 2024-01-01 | End: 2024-01-01

## 2024-01-01 RX ORDER — HEPARIN SODIUM 5000 [USP'U]/ML
5000 INJECTION, SOLUTION INTRAVENOUS; SUBCUTANEOUS EVERY 12 HOURS SCHEDULED
Status: DISCONTINUED | OUTPATIENT
Start: 2024-01-01 | End: 2024-01-01

## 2024-01-01 RX ORDER — CALCITONIN SALMON 200 [IU]/.09ML
1 SPRAY, METERED NASAL DAILY
Status: DISCONTINUED | OUTPATIENT
Start: 2024-01-01 | End: 2024-01-01

## 2024-01-01 RX ORDER — IPRATROPIUM BROMIDE AND ALBUTEROL SULFATE 2.5; .5 MG/3ML; MG/3ML
1 SOLUTION RESPIRATORY (INHALATION) EVERY 4 HOURS PRN
Status: DISCONTINUED | OUTPATIENT
Start: 2024-01-01 | End: 2024-01-01

## 2024-01-01 RX ORDER — MEMANTINE HYDROCHLORIDE 10 MG/1
5 TABLET ORAL 2 TIMES DAILY
Status: DISCONTINUED | OUTPATIENT
Start: 2024-01-01 | End: 2024-01-01

## 2024-01-01 RX ORDER — IPRATROPIUM BROMIDE AND ALBUTEROL SULFATE 2.5; .5 MG/3ML; MG/3ML
1 SOLUTION RESPIRATORY (INHALATION)
Status: DISCONTINUED | OUTPATIENT
Start: 2024-01-01 | End: 2024-01-01 | Stop reason: SDUPTHER

## 2024-01-01 RX ORDER — MORPHINE SULFATE 2 MG/ML
2 INJECTION, SOLUTION INTRAMUSCULAR; INTRAVENOUS
Status: DISCONTINUED | OUTPATIENT
Start: 2024-01-01 | End: 2024-01-01

## 2024-01-01 RX ORDER — MORPHINE SULFATE 2 MG/ML
2 INJECTION, SOLUTION INTRAMUSCULAR; INTRAVENOUS
Status: DISCONTINUED | OUTPATIENT
Start: 2024-01-01 | End: 2024-01-01 | Stop reason: HOSPADM

## 2024-01-01 RX ORDER — BISACODYL 10 MG
10 SUPPOSITORY, RECTAL RECTAL DAILY PRN
Status: DISCONTINUED | OUTPATIENT
Start: 2024-01-01 | End: 2024-01-01 | Stop reason: HOSPADM

## 2024-01-01 RX ORDER — POTASSIUM CHLORIDE 750 MG/1
40 TABLET, FILM COATED, EXTENDED RELEASE ORAL ONCE
Status: COMPLETED | OUTPATIENT
Start: 2024-01-01 | End: 2024-01-01

## 2024-01-01 RX ORDER — ENEMA 19; 7 G/133ML; G/133ML
1 ENEMA RECTAL ONCE
Status: COMPLETED | OUTPATIENT
Start: 2024-01-01 | End: 2024-01-01

## 2024-01-01 RX ORDER — SODIUM CHLORIDE, SODIUM LACTATE, POTASSIUM CHLORIDE, AND CALCIUM CHLORIDE .6; .31; .03; .02 G/100ML; G/100ML; G/100ML; G/100ML
500 INJECTION, SOLUTION INTRAVENOUS ONCE
Status: COMPLETED | OUTPATIENT
Start: 2024-01-01 | End: 2024-01-01

## 2024-01-01 RX ORDER — NALOXONE HYDROCHLORIDE 0.4 MG/ML
0.4 INJECTION, SOLUTION INTRAMUSCULAR; INTRAVENOUS; SUBCUTANEOUS PRN
Status: DISCONTINUED | OUTPATIENT
Start: 2024-01-01 | End: 2024-01-01

## 2024-01-01 RX ORDER — DEXTROSE MONOHYDRATE 50 MG/ML
INJECTION, SOLUTION INTRAVENOUS CONTINUOUS
Status: DISPENSED | OUTPATIENT
Start: 2024-01-01 | End: 2024-01-01

## 2024-01-01 RX ORDER — 0.9 % SODIUM CHLORIDE 0.9 %
500 INTRAVENOUS SOLUTION INTRAVENOUS ONCE
Status: DISCONTINUED | OUTPATIENT
Start: 2024-01-01 | End: 2024-01-01

## 2024-01-01 RX ORDER — FERROUS SULFATE 325(65) MG
325 TABLET ORAL
Status: DISCONTINUED | OUTPATIENT
Start: 2024-01-01 | End: 2024-01-01

## 2024-01-01 RX ORDER — SALIVA STIMULANT COMB. NO.3
SPRAY, NON-AEROSOL (ML) MUCOUS MEMBRANE 2 TIMES DAILY
Status: CANCELLED | OUTPATIENT
Start: 2024-01-01

## 2024-01-01 RX ORDER — HALOPERIDOL 5 MG/ML
1 INJECTION INTRAMUSCULAR ONCE
Status: COMPLETED | OUTPATIENT
Start: 2024-01-01 | End: 2024-01-01

## 2024-01-01 RX ORDER — HYDROMORPHONE HYDROCHLORIDE 1 MG/ML
1 INJECTION, SOLUTION INTRAMUSCULAR; INTRAVENOUS; SUBCUTANEOUS
Status: DISCONTINUED | OUTPATIENT
Start: 2024-01-01 | End: 2024-01-01

## 2024-01-01 RX ORDER — MORPHINE SULFATE 2 MG/ML
1 INJECTION, SOLUTION INTRAMUSCULAR; INTRAVENOUS EVERY 6 HOURS PRN
Status: DISCONTINUED | OUTPATIENT
Start: 2024-01-01 | End: 2024-01-01

## 2024-01-01 RX ORDER — HALOPERIDOL 5 MG/ML
0.5 INJECTION INTRAMUSCULAR
Status: COMPLETED | OUTPATIENT
Start: 2024-01-01 | End: 2024-01-01

## 2024-01-01 RX ORDER — FLUTICASONE PROPIONATE 50 MCG
1 SPRAY, SUSPENSION (ML) NASAL DAILY
Status: DISCONTINUED | OUTPATIENT
Start: 2024-01-01 | End: 2024-01-01

## 2024-01-01 RX ORDER — EZETIMIBE 10 MG/1
10 TABLET ORAL NIGHTLY
Status: DISCONTINUED | OUTPATIENT
Start: 2024-01-01 | End: 2024-01-01

## 2024-01-01 RX ORDER — AMLODIPINE BESYLATE 5 MG/1
10 TABLET ORAL DAILY
Status: DISCONTINUED | OUTPATIENT
Start: 2024-01-01 | End: 2024-01-01

## 2024-01-01 RX ORDER — HALOPERIDOL 5 MG/ML
1 INJECTION INTRAMUSCULAR
Status: DISCONTINUED | OUTPATIENT
Start: 2024-01-01 | End: 2024-01-01

## 2024-01-01 RX ORDER — FUROSEMIDE 10 MG/ML
20 INJECTION INTRAMUSCULAR; INTRAVENOUS ONCE
Status: COMPLETED | OUTPATIENT
Start: 2024-01-01 | End: 2024-01-01

## 2024-01-01 RX ORDER — DONEPEZIL HYDROCHLORIDE 5 MG/1
10 TABLET, FILM COATED ORAL NIGHTLY
Status: DISCONTINUED | OUTPATIENT
Start: 2024-01-01 | End: 2024-01-01

## 2024-01-01 RX ORDER — GLYCOPYRROLATE 0.2 MG/ML
0.2 INJECTION INTRAMUSCULAR; INTRAVENOUS EVERY 4 HOURS PRN
Status: DISCONTINUED | OUTPATIENT
Start: 2024-01-01 | End: 2024-01-01 | Stop reason: HOSPADM

## 2024-01-01 RX ORDER — LIDOCAINE 4 G/G
1 PATCH TOPICAL DAILY
Status: DISCONTINUED | OUTPATIENT
Start: 2024-01-01 | End: 2024-01-01 | Stop reason: HOSPADM

## 2024-01-01 RX ORDER — PETROLATUM,WHITE
OINTMENT IN PACKET (GRAM) TOPICAL PRN
Status: DISCONTINUED | OUTPATIENT
Start: 2024-01-01 | End: 2024-01-01 | Stop reason: HOSPADM

## 2024-01-01 RX ORDER — MORPHINE SULFATE 10 MG/5ML
4 SOLUTION ORAL EVERY 8 HOURS
Status: DISCONTINUED | OUTPATIENT
Start: 2024-01-01 | End: 2024-01-01

## 2024-01-01 RX ORDER — POLYETHYLENE GLYCOL 3350 17 G/17G
17 POWDER, FOR SOLUTION ORAL DAILY PRN
Status: DISCONTINUED | OUTPATIENT
Start: 2024-01-01 | End: 2024-01-01 | Stop reason: HOSPADM

## 2024-01-01 RX ORDER — ISOSORBIDE MONONITRATE 30 MG/1
90 TABLET, EXTENDED RELEASE ORAL 2 TIMES DAILY
Status: DISCONTINUED | OUTPATIENT
Start: 2024-01-01 | End: 2024-01-01

## 2024-01-01 RX ORDER — LORAZEPAM 2 MG/ML
1 INJECTION INTRAMUSCULAR
Status: DISCONTINUED | OUTPATIENT
Start: 2024-01-01 | End: 2024-01-01 | Stop reason: HOSPADM

## 2024-01-01 RX ORDER — ACETAMINOPHEN 650 MG/1
650 SUPPOSITORY RECTAL EVERY 4 HOURS PRN
Status: DISCONTINUED | OUTPATIENT
Start: 2024-01-01 | End: 2024-01-01 | Stop reason: HOSPADM

## 2024-01-01 RX ORDER — POLYETHYLENE GLYCOL 3350 17 G/17G
17 POWDER, FOR SOLUTION ORAL DAILY
Status: DISCONTINUED | OUTPATIENT
Start: 2024-01-01 | End: 2024-01-01

## 2024-01-01 RX ORDER — MORPHINE SULFATE 10 MG/5ML
4 SOLUTION ORAL EVERY 6 HOURS
Status: DISCONTINUED | OUTPATIENT
Start: 2024-01-01 | End: 2024-01-01

## 2024-01-01 RX ORDER — MORPHINE SULFATE 2 MG/ML
1 INJECTION, SOLUTION INTRAMUSCULAR; INTRAVENOUS EVERY 4 HOURS PRN
Status: DISCONTINUED | OUTPATIENT
Start: 2024-01-01 | End: 2024-01-01

## 2024-01-01 RX ORDER — MORPHINE SULFATE 4 MG/ML
4 INJECTION, SOLUTION INTRAMUSCULAR; INTRAVENOUS EVERY 4 HOURS
Status: DISCONTINUED | OUTPATIENT
Start: 2024-01-01 | End: 2024-01-01

## 2024-01-01 RX ORDER — DEXTROSE MONOHYDRATE 50 MG/ML
INJECTION, SOLUTION INTRAVENOUS CONTINUOUS
Status: DISCONTINUED | OUTPATIENT
Start: 2024-01-01 | End: 2024-01-01

## 2024-01-01 RX ORDER — ARFORMOTEROL TARTRATE 15 UG/2ML
15 SOLUTION RESPIRATORY (INHALATION)
Status: DISCONTINUED | OUTPATIENT
Start: 2024-01-01 | End: 2024-01-01

## 2024-01-01 RX ORDER — SODIUM CHLORIDE 9 MG/ML
INJECTION, SOLUTION INTRAVENOUS PRN
Status: DISCONTINUED | OUTPATIENT
Start: 2024-01-01 | End: 2024-01-01

## 2024-01-01 RX ORDER — ACETAMINOPHEN 325 MG/1
650 TABLET ORAL EVERY 6 HOURS SCHEDULED
Status: DISCONTINUED | OUTPATIENT
Start: 2024-01-01 | End: 2024-01-01

## 2024-01-01 RX ORDER — QUETIAPINE FUMARATE 25 MG/1
50 TABLET, FILM COATED ORAL 3 TIMES DAILY
Status: DISCONTINUED | OUTPATIENT
Start: 2024-01-01 | End: 2024-01-01

## 2024-01-01 RX ORDER — ONDANSETRON 4 MG/1
4 TABLET, ORALLY DISINTEGRATING ORAL EVERY 8 HOURS PRN
Status: DISCONTINUED | OUTPATIENT
Start: 2024-01-01 | End: 2024-01-01 | Stop reason: HOSPADM

## 2024-01-01 RX ORDER — HALOPERIDOL 5 MG/ML
1 INJECTION INTRAMUSCULAR
Status: COMPLETED | OUTPATIENT
Start: 2024-01-01 | End: 2024-01-01

## 2024-01-01 RX ORDER — BUDESONIDE 0.5 MG/2ML
0.25 INHALANT ORAL
Status: DISCONTINUED | OUTPATIENT
Start: 2024-01-01 | End: 2024-01-01

## 2024-01-01 RX ORDER — SCOLOPAMINE TRANSDERMAL SYSTEM 1 MG/1
1 PATCH, EXTENDED RELEASE TRANSDERMAL
Status: DISCONTINUED | OUTPATIENT
Start: 2024-01-01 | End: 2024-01-01

## 2024-01-01 RX ORDER — LABETALOL HYDROCHLORIDE 5 MG/ML
10 INJECTION, SOLUTION INTRAVENOUS EVERY 6 HOURS PRN
Status: DISCONTINUED | OUTPATIENT
Start: 2024-01-01 | End: 2024-01-01

## 2024-01-01 RX ORDER — CALCITONIN SALMON 200 [IU]/.09ML
1 SPRAY, METERED NASAL DAILY
Qty: 2 EACH | Refills: 1 | Status: CANCELLED | OUTPATIENT
Start: 2024-01-01 | End: 2024-02-17

## 2024-01-01 RX ORDER — ONDANSETRON 2 MG/ML
4 INJECTION INTRAMUSCULAR; INTRAVENOUS EVERY 6 HOURS PRN
Status: DISCONTINUED | OUTPATIENT
Start: 2024-01-01 | End: 2024-01-01 | Stop reason: HOSPADM

## 2024-01-01 RX ORDER — CARVEDILOL 6.25 MG/1
6.25 TABLET ORAL 2 TIMES DAILY WITH MEALS
Status: DISCONTINUED | OUTPATIENT
Start: 2024-01-01 | End: 2024-01-01

## 2024-01-01 RX ORDER — SODIUM CHLORIDE, SODIUM LACTATE, POTASSIUM CHLORIDE, CALCIUM CHLORIDE 600; 310; 30; 20 MG/100ML; MG/100ML; MG/100ML; MG/100ML
INJECTION, SOLUTION INTRAVENOUS CONTINUOUS
Status: DISCONTINUED | OUTPATIENT
Start: 2024-01-01 | End: 2024-01-01

## 2024-01-01 RX ORDER — HYDROMORPHONE HYDROCHLORIDE 1 MG/ML
1 INJECTION, SOLUTION INTRAMUSCULAR; INTRAVENOUS; SUBCUTANEOUS ONCE
Status: DISCONTINUED | OUTPATIENT
Start: 2024-01-01 | End: 2024-01-01

## 2024-01-01 RX ORDER — MORPHINE SULFATE 20 MG/ML
5 SOLUTION ORAL
Status: DISCONTINUED | OUTPATIENT
Start: 2024-01-01 | End: 2024-01-01 | Stop reason: HOSPADM

## 2024-01-01 RX ORDER — SALIVA STIMULANT COMB. NO.3
SPRAY, NON-AEROSOL (ML) MUCOUS MEMBRANE PRN
Status: DISCONTINUED | OUTPATIENT
Start: 2024-01-01 | End: 2024-01-01 | Stop reason: HOSPADM

## 2024-01-01 RX ORDER — MORPHINE SULFATE 20 MG/ML
2.5 SOLUTION ORAL EVERY 4 HOURS PRN
Status: DISCONTINUED | OUTPATIENT
Start: 2024-01-01 | End: 2024-01-01

## 2024-01-01 RX ORDER — LORAZEPAM 2 MG/ML
1 INJECTION INTRAMUSCULAR
Status: DISCONTINUED | OUTPATIENT
Start: 2024-01-01 | End: 2024-01-01

## 2024-01-01 RX ORDER — DOCUSATE SODIUM 100 MG/1
100 CAPSULE, LIQUID FILLED ORAL DAILY
Status: DISCONTINUED | OUTPATIENT
Start: 2024-01-01 | End: 2024-01-01

## 2024-01-01 RX ORDER — LORAZEPAM 2 MG/ML
2 INJECTION INTRAMUSCULAR
Status: DISCONTINUED | OUTPATIENT
Start: 2024-01-01 | End: 2024-01-01

## 2024-01-01 RX ORDER — IPRATROPIUM BROMIDE AND ALBUTEROL SULFATE 2.5; .5 MG/3ML; MG/3ML
1 SOLUTION RESPIRATORY (INHALATION) EVERY 20 MIN
Status: COMPLETED | OUTPATIENT
Start: 2024-01-01 | End: 2024-01-01

## 2024-01-01 RX ORDER — MORPHINE SULFATE 20 MG/ML
2.5 SOLUTION ORAL EVERY 6 HOURS PRN
Status: DISCONTINUED | OUTPATIENT
Start: 2024-01-01 | End: 2024-01-01

## 2024-01-01 RX ORDER — HYDROMORPHONE HYDROCHLORIDE 1 MG/ML
1 INJECTION, SOLUTION INTRAMUSCULAR; INTRAVENOUS; SUBCUTANEOUS EVERY 4 HOURS
Status: DISCONTINUED | OUTPATIENT
Start: 2024-01-01 | End: 2024-01-01

## 2024-01-01 RX ORDER — ASPIRIN 81 MG/1
81 TABLET ORAL DAILY
Status: DISCONTINUED | OUTPATIENT
Start: 2024-01-01 | End: 2024-01-01

## 2024-01-01 RX ORDER — CETIRIZINE HYDROCHLORIDE 10 MG/1
5 TABLET ORAL DAILY
Status: DISCONTINUED | OUTPATIENT
Start: 2024-01-01 | End: 2024-01-01

## 2024-01-01 RX ORDER — HALOPERIDOL 5 MG/ML
1 INJECTION INTRAMUSCULAR ONCE
Status: DISCONTINUED | OUTPATIENT
Start: 2024-01-01 | End: 2024-01-01

## 2024-01-01 RX ORDER — LORAZEPAM 2 MG/ML
1 CONCENTRATE ORAL
Status: DISCONTINUED | OUTPATIENT
Start: 2024-01-01 | End: 2024-01-01 | Stop reason: HOSPADM

## 2024-01-01 RX ORDER — SODIUM CHLORIDE 0.9 % (FLUSH) 0.9 %
5-40 SYRINGE (ML) INJECTION EVERY 12 HOURS SCHEDULED
Status: DISCONTINUED | OUTPATIENT
Start: 2024-01-01 | End: 2024-01-01

## 2024-01-01 RX ORDER — MORPHINE SULFATE 2 MG/ML
1 INJECTION, SOLUTION INTRAMUSCULAR; INTRAVENOUS EVERY 6 HOURS
Status: DISCONTINUED | OUTPATIENT
Start: 2024-01-01 | End: 2024-01-01

## 2024-01-01 RX ORDER — TRAZODONE HYDROCHLORIDE 50 MG/1
25 TABLET ORAL
Status: DISCONTINUED | OUTPATIENT
Start: 2024-01-01 | End: 2024-01-01

## 2024-01-01 RX ORDER — MORPHINE SULFATE 2 MG/ML
2 INJECTION, SOLUTION INTRAMUSCULAR; INTRAVENOUS EVERY 4 HOURS
Status: DISCONTINUED | OUTPATIENT
Start: 2024-01-01 | End: 2024-01-01

## 2024-01-01 RX ADMIN — MORPHINE SULFATE 1 MG: 2 INJECTION, SOLUTION INTRAMUSCULAR; INTRAVENOUS at 02:04

## 2024-01-01 RX ADMIN — IPRATROPIUM BROMIDE AND ALBUTEROL SULFATE 1 DOSE: .5; 3 SOLUTION RESPIRATORY (INHALATION) at 13:10

## 2024-01-01 RX ADMIN — CETIRIZINE HYDROCHLORIDE 5 MG: 10 TABLET, FILM COATED ORAL at 10:28

## 2024-01-01 RX ADMIN — CEFEPIME 1000 MG: 1 INJECTION, POWDER, FOR SOLUTION INTRAMUSCULAR; INTRAVENOUS at 05:30

## 2024-01-01 RX ADMIN — HEPARIN SODIUM 5000 UNITS: 5000 INJECTION INTRAVENOUS; SUBCUTANEOUS at 20:24

## 2024-01-01 RX ADMIN — MORPHINE SULFATE 2 MG: 2 INJECTION, SOLUTION INTRAMUSCULAR; INTRAVENOUS at 14:33

## 2024-01-01 RX ADMIN — FLUTICASONE PROPIONATE 1 SPRAY: 50 SPRAY, METERED NASAL at 09:46

## 2024-01-01 RX ADMIN — BUDESONIDE 250 MCG: 0.5 INHALANT RESPIRATORY (INHALATION) at 08:42

## 2024-01-01 RX ADMIN — FLUTICASONE PROPIONATE 1 SPRAY: 50 SPRAY, METERED NASAL at 08:58

## 2024-01-01 RX ADMIN — MORPHINE SULFATE 1 MG: 2 INJECTION, SOLUTION INTRAMUSCULAR; INTRAVENOUS at 04:10

## 2024-01-01 RX ADMIN — ARFORMOTEROL TARTRATE 15 MCG: 15 SOLUTION RESPIRATORY (INHALATION) at 19:33

## 2024-01-01 RX ADMIN — HEPARIN SODIUM 5000 UNITS: 5000 INJECTION INTRAVENOUS; SUBCUTANEOUS at 10:01

## 2024-01-01 RX ADMIN — MORPHINE SULFATE 2.5 MG: 100 SOLUTION ORAL at 14:55

## 2024-01-01 RX ADMIN — IPRATROPIUM BROMIDE AND ALBUTEROL SULFATE 1 DOSE: .5; 3 SOLUTION RESPIRATORY (INHALATION) at 07:38

## 2024-01-01 RX ADMIN — DEXTROSE MONOHYDRATE: 50 INJECTION, SOLUTION INTRAVENOUS at 17:40

## 2024-01-01 RX ADMIN — IPRATROPIUM BROMIDE AND ALBUTEROL SULFATE 1 DOSE: .5; 3 SOLUTION RESPIRATORY (INHALATION) at 15:16

## 2024-01-01 RX ADMIN — HEPARIN SODIUM 5000 UNITS: 5000 INJECTION INTRAVENOUS; SUBCUTANEOUS at 21:45

## 2024-01-01 RX ADMIN — SODIUM CHLORIDE: 9 INJECTION, SOLUTION INTRAVENOUS at 05:28

## 2024-01-01 RX ADMIN — MORPHINE SULFATE 2.5 MG: 100 SOLUTION ORAL at 01:29

## 2024-01-01 RX ADMIN — CEFEPIME 1000 MG: 1 INJECTION, POWDER, FOR SOLUTION INTRAMUSCULAR; INTRAVENOUS at 05:37

## 2024-01-01 RX ADMIN — SODIUM CHLORIDE 3000 MG: 900 INJECTION INTRAVENOUS at 22:26

## 2024-01-01 RX ADMIN — SODIUM CHLORIDE: 9 INJECTION, SOLUTION INTRAVENOUS at 06:50

## 2024-01-01 RX ADMIN — SODIUM CHLORIDE 3000 MG: 900 INJECTION INTRAVENOUS at 22:03

## 2024-01-01 RX ADMIN — IPRATROPIUM BROMIDE AND ALBUTEROL SULFATE 1 DOSE: .5; 3 SOLUTION RESPIRATORY (INHALATION) at 07:25

## 2024-01-01 RX ADMIN — SODIUM CHLORIDE, PRESERVATIVE FREE 40 MG: 5 INJECTION INTRAVENOUS at 09:08

## 2024-01-01 RX ADMIN — IPRATROPIUM BROMIDE AND ALBUTEROL SULFATE 1 DOSE: .5; 3 SOLUTION RESPIRATORY (INHALATION) at 13:06

## 2024-01-01 RX ADMIN — DEXTROSE MONOHYDRATE: 50 INJECTION, SOLUTION INTRAVENOUS at 01:58

## 2024-01-01 RX ADMIN — IPRATROPIUM BROMIDE AND ALBUTEROL SULFATE 1 DOSE: .5; 3 SOLUTION RESPIRATORY (INHALATION) at 07:42

## 2024-01-01 RX ADMIN — DEXTROSE MONOHYDRATE: 50 INJECTION, SOLUTION INTRAVENOUS at 08:34

## 2024-01-01 RX ADMIN — SERTRALINE HYDROCHLORIDE 150 MG: 50 TABLET ORAL at 08:52

## 2024-01-01 RX ADMIN — IPRATROPIUM BROMIDE AND ALBUTEROL SULFATE 1 DOSE: .5; 3 SOLUTION RESPIRATORY (INHALATION) at 08:15

## 2024-01-01 RX ADMIN — TRAZODONE HYDROCHLORIDE 25 MG: 50 TABLET ORAL at 20:49

## 2024-01-01 RX ADMIN — QUETIAPINE FUMARATE 50 MG: 25 TABLET ORAL at 20:39

## 2024-01-01 RX ADMIN — KIT FOR THE PREPARATION OF TECHNETIUM TC 99M ALBUMIN AGGREGATED 4 MILLICURIE: 2.5 INJECTION, POWDER, FOR SOLUTION INTRAVENOUS at 13:48

## 2024-01-01 RX ADMIN — BUDESONIDE 250 MCG: 0.5 INHALANT RESPIRATORY (INHALATION) at 20:23

## 2024-01-01 RX ADMIN — HEPARIN SODIUM 5000 UNITS: 5000 INJECTION INTRAVENOUS; SUBCUTANEOUS at 10:54

## 2024-01-01 RX ADMIN — DEXTROSE MONOHYDRATE: 50 INJECTION, SOLUTION INTRAVENOUS at 18:37

## 2024-01-01 RX ADMIN — ARFORMOTEROL TARTRATE 15 MCG: 15 SOLUTION RESPIRATORY (INHALATION) at 19:27

## 2024-01-01 RX ADMIN — SERTRALINE HYDROCHLORIDE 150 MG: 50 TABLET ORAL at 09:56

## 2024-01-01 RX ADMIN — ARFORMOTEROL TARTRATE 15 MCG: 15 SOLUTION RESPIRATORY (INHALATION) at 15:37

## 2024-01-01 RX ADMIN — CEFEPIME 1000 MG: 1 INJECTION, POWDER, FOR SOLUTION INTRAMUSCULAR; INTRAVENOUS at 19:04

## 2024-01-01 RX ADMIN — IPRATROPIUM BROMIDE AND ALBUTEROL SULFATE 1 DOSE: .5; 3 SOLUTION RESPIRATORY (INHALATION) at 13:16

## 2024-01-01 RX ADMIN — VANCOMYCIN HYDROCHLORIDE 1750 MG: 10 INJECTION, POWDER, LYOPHILIZED, FOR SOLUTION INTRAVENOUS at 16:18

## 2024-01-01 RX ADMIN — CALCITONIN SALMON 1 SPRAY: 200 SPRAY, METERED NASAL at 13:18

## 2024-01-01 RX ADMIN — CHLOROTHIAZIDE SODIUM 250 MG: 500 INJECTION, POWDER, LYOPHILIZED, FOR SOLUTION INTRAVENOUS at 16:57

## 2024-01-01 RX ADMIN — SODIUM CHLORIDE: 9 INJECTION, SOLUTION INTRAVENOUS at 22:50

## 2024-01-01 RX ADMIN — MORPHINE SULFATE 2.5 MG: 100 SOLUTION ORAL at 08:31

## 2024-01-01 RX ADMIN — SODIUM CHLORIDE, PRESERVATIVE FREE 10 ML: 5 INJECTION INTRAVENOUS at 20:27

## 2024-01-01 RX ADMIN — NYSTATIN 500000 UNITS: 100000 SUSPENSION ORAL at 10:02

## 2024-01-01 RX ADMIN — SODIUM CHLORIDE, PRESERVATIVE FREE 10 ML: 5 INJECTION INTRAVENOUS at 22:31

## 2024-01-01 RX ADMIN — HEPARIN SODIUM 5000 UNITS: 5000 INJECTION INTRAVENOUS; SUBCUTANEOUS at 21:04

## 2024-01-01 RX ADMIN — IPRATROPIUM BROMIDE AND ALBUTEROL SULFATE 1 DOSE: .5; 3 SOLUTION RESPIRATORY (INHALATION) at 19:53

## 2024-01-01 RX ADMIN — IPRATROPIUM BROMIDE AND ALBUTEROL SULFATE 1 DOSE: .5; 3 SOLUTION RESPIRATORY (INHALATION) at 19:46

## 2024-01-01 RX ADMIN — CALCITONIN SALMON 1 SPRAY: 200 SPRAY, METERED NASAL at 10:40

## 2024-01-01 RX ADMIN — DONEPEZIL HYDROCHLORIDE 10 MG: 5 TABLET, FILM COATED ORAL at 20:28

## 2024-01-01 RX ADMIN — HALOPERIDOL LACTATE 1 MG: 5 INJECTION, SOLUTION INTRAMUSCULAR at 18:09

## 2024-01-01 RX ADMIN — BUDESONIDE 250 MCG: 0.5 INHALANT RESPIRATORY (INHALATION) at 09:12

## 2024-01-01 RX ADMIN — MORPHINE SULFATE 4 MG: 4 INJECTION, SOLUTION INTRAMUSCULAR; INTRAVENOUS at 17:28

## 2024-01-01 RX ADMIN — BUDESONIDE 250 MCG: 0.5 INHALANT RESPIRATORY (INHALATION) at 19:33

## 2024-01-01 RX ADMIN — MORPHINE SULFATE 2 MG: 2 INJECTION, SOLUTION INTRAMUSCULAR; INTRAVENOUS at 18:58

## 2024-01-01 RX ADMIN — IPRATROPIUM BROMIDE AND ALBUTEROL SULFATE 1 DOSE: .5; 3 SOLUTION RESPIRATORY (INHALATION) at 07:53

## 2024-01-01 RX ADMIN — IPRATROPIUM BROMIDE AND ALBUTEROL SULFATE 1 DOSE: .5; 3 SOLUTION RESPIRATORY (INHALATION) at 19:55

## 2024-01-01 RX ADMIN — BUDESONIDE 250 MCG: 0.5 INHALANT RESPIRATORY (INHALATION) at 07:33

## 2024-01-01 RX ADMIN — MORPHINE SULFATE 2 MG: 2 INJECTION, SOLUTION INTRAMUSCULAR; INTRAVENOUS at 17:37

## 2024-01-01 RX ADMIN — ARFORMOTEROL TARTRATE 15 MCG: 15 SOLUTION RESPIRATORY (INHALATION) at 08:20

## 2024-01-01 RX ADMIN — CEFEPIME 1000 MG: 1 INJECTION, POWDER, FOR SOLUTION INTRAMUSCULAR; INTRAVENOUS at 06:12

## 2024-01-01 RX ADMIN — CALCITONIN SALMON 1 SPRAY: 200 SPRAY, METERED NASAL at 08:20

## 2024-01-01 RX ADMIN — CEFEPIME 1000 MG: 1 INJECTION, POWDER, FOR SOLUTION INTRAMUSCULAR; INTRAVENOUS at 17:36

## 2024-01-01 RX ADMIN — ARFORMOTEROL TARTRATE 15 MCG: 15 SOLUTION RESPIRATORY (INHALATION) at 19:58

## 2024-01-01 RX ADMIN — IPRATROPIUM BROMIDE AND ALBUTEROL SULFATE 1 DOSE: .5; 3 SOLUTION RESPIRATORY (INHALATION) at 19:36

## 2024-01-01 RX ADMIN — BUDESONIDE 250 MCG: 0.5 INHALANT RESPIRATORY (INHALATION) at 07:57

## 2024-01-01 RX ADMIN — DEXTROSE MONOHYDRATE: 50 INJECTION, SOLUTION INTRAVENOUS at 17:27

## 2024-01-01 RX ADMIN — SODIUM CHLORIDE, PRESERVATIVE FREE 10 ML: 5 INJECTION INTRAVENOUS at 22:01

## 2024-01-01 RX ADMIN — MORPHINE SULFATE 1 MG: 2 INJECTION, SOLUTION INTRAMUSCULAR; INTRAVENOUS at 11:50

## 2024-01-01 RX ADMIN — SODIUM CHLORIDE, PRESERVATIVE FREE 40 MG: 5 INJECTION INTRAVENOUS at 09:46

## 2024-01-01 RX ADMIN — MORPHINE SULFATE 1 MG: 2 INJECTION, SOLUTION INTRAMUSCULAR; INTRAVENOUS at 01:34

## 2024-01-01 RX ADMIN — BUDESONIDE 250 MCG: 0.5 INHALANT RESPIRATORY (INHALATION) at 19:27

## 2024-01-01 RX ADMIN — ARFORMOTEROL TARTRATE 15 MCG: 15 SOLUTION RESPIRATORY (INHALATION) at 09:13

## 2024-01-01 RX ADMIN — CALCITONIN SALMON 1 SPRAY: 200 SPRAY, METERED NASAL at 10:17

## 2024-01-01 RX ADMIN — SODIUM CHLORIDE, POTASSIUM CHLORIDE, SODIUM LACTATE AND CALCIUM CHLORIDE 500 ML: 600; 310; 30; 20 INJECTION, SOLUTION INTRAVENOUS at 00:45

## 2024-01-01 RX ADMIN — DOXYCYCLINE 100 MG: 100 INJECTION, POWDER, LYOPHILIZED, FOR SOLUTION INTRAVENOUS at 23:18

## 2024-01-01 RX ADMIN — SODIUM CHLORIDE, PRESERVATIVE FREE 10 ML: 5 INJECTION INTRAVENOUS at 22:34

## 2024-01-01 RX ADMIN — WATER 80 MG: 1 INJECTION INTRAMUSCULAR; INTRAVENOUS; SUBCUTANEOUS at 06:07

## 2024-01-01 RX ADMIN — WATER 80 MG: 1 INJECTION INTRAMUSCULAR; INTRAVENOUS; SUBCUTANEOUS at 17:05

## 2024-01-01 RX ADMIN — HEPARIN SODIUM 5000 UNITS: 5000 INJECTION INTRAVENOUS; SUBCUTANEOUS at 17:20

## 2024-01-01 RX ADMIN — POTASSIUM CHLORIDE 10 MEQ: 7.46 INJECTION, SOLUTION INTRAVENOUS at 06:46

## 2024-01-01 RX ADMIN — BISACODYL 10 MG RECTAL SUPPOSITORY 10 MG: at 01:10

## 2024-01-01 RX ADMIN — WATER 80 MG: 1 INJECTION INTRAMUSCULAR; INTRAVENOUS; SUBCUTANEOUS at 14:49

## 2024-01-01 RX ADMIN — CEFEPIME 2000 MG: 2 INJECTION, POWDER, FOR SOLUTION INTRAVENOUS at 19:50

## 2024-01-01 RX ADMIN — DEXTROSE MONOHYDRATE: 50 INJECTION, SOLUTION INTRAVENOUS at 06:25

## 2024-01-01 RX ADMIN — SODIUM CHLORIDE 3000 MG: 900 INJECTION INTRAVENOUS at 11:10

## 2024-01-01 RX ADMIN — SODIUM CHLORIDE, PRESERVATIVE FREE 10 ML: 5 INJECTION INTRAVENOUS at 08:36

## 2024-01-01 RX ADMIN — SODIUM CHLORIDE 3000 MG: 900 INJECTION INTRAVENOUS at 10:09

## 2024-01-01 RX ADMIN — SODIUM CHLORIDE: 9 INJECTION, SOLUTION INTRAVENOUS at 17:21

## 2024-01-01 RX ADMIN — SODIUM CHLORIDE: 9 INJECTION, SOLUTION INTRAVENOUS at 05:36

## 2024-01-01 RX ADMIN — BUDESONIDE 250 MCG: 0.5 INHALANT RESPIRATORY (INHALATION) at 20:42

## 2024-01-01 RX ADMIN — MORPHINE SULFATE 2 MG: 2 INJECTION, SOLUTION INTRAMUSCULAR; INTRAVENOUS at 23:25

## 2024-01-01 RX ADMIN — WATER 80 MG: 1 INJECTION INTRAMUSCULAR; INTRAVENOUS; SUBCUTANEOUS at 06:06

## 2024-01-01 RX ADMIN — IPRATROPIUM BROMIDE AND ALBUTEROL SULFATE 1 DOSE: .5; 3 SOLUTION RESPIRATORY (INHALATION) at 21:50

## 2024-01-01 RX ADMIN — GLYCERIN 2 G: 2 SUPPOSITORY RECTAL at 19:51

## 2024-01-01 RX ADMIN — POTASSIUM CHLORIDE AND DEXTROSE MONOHYDRATE: 150; 5 INJECTION, SOLUTION INTRAVENOUS at 09:58

## 2024-01-01 RX ADMIN — DEXTROSE MONOHYDRATE: 50 INJECTION, SOLUTION INTRAVENOUS at 16:12

## 2024-01-01 RX ADMIN — IPRATROPIUM BROMIDE AND ALBUTEROL SULFATE 1 DOSE: .5; 3 SOLUTION RESPIRATORY (INHALATION) at 11:20

## 2024-01-01 RX ADMIN — HEPARIN SODIUM 5000 UNITS: 5000 INJECTION INTRAVENOUS; SUBCUTANEOUS at 20:26

## 2024-01-01 RX ADMIN — BUDESONIDE 250 MCG: 0.5 INHALANT RESPIRATORY (INHALATION) at 09:13

## 2024-01-01 RX ADMIN — CALCITONIN SALMON 1 SPRAY: 200 SPRAY, METERED NASAL at 08:28

## 2024-01-01 RX ADMIN — CEFEPIME 1000 MG: 1 INJECTION, POWDER, FOR SOLUTION INTRAMUSCULAR; INTRAVENOUS at 18:44

## 2024-01-01 RX ADMIN — MORPHINE SULFATE 2 MG: 2 INJECTION, SOLUTION INTRAMUSCULAR; INTRAVENOUS at 04:08

## 2024-01-01 RX ADMIN — SODIUM CHLORIDE, PRESERVATIVE FREE 10 ML: 5 INJECTION INTRAVENOUS at 00:47

## 2024-01-01 RX ADMIN — SODIUM CHLORIDE, PRESERVATIVE FREE 10 ML: 5 INJECTION INTRAVENOUS at 21:25

## 2024-01-01 RX ADMIN — DOXYCYCLINE 100 MG: 100 INJECTION, POWDER, LYOPHILIZED, FOR SOLUTION INTRAVENOUS at 22:53

## 2024-01-01 RX ADMIN — IPRATROPIUM BROMIDE AND ALBUTEROL SULFATE 1 DOSE: .5; 3 SOLUTION RESPIRATORY (INHALATION) at 19:32

## 2024-01-01 RX ADMIN — IPRATROPIUM BROMIDE AND ALBUTEROL SULFATE 1 DOSE: .5; 3 SOLUTION RESPIRATORY (INHALATION) at 14:09

## 2024-01-01 RX ADMIN — IPRATROPIUM BROMIDE AND ALBUTEROL SULFATE 1 DOSE: .5; 3 SOLUTION RESPIRATORY (INHALATION) at 20:04

## 2024-01-01 RX ADMIN — SODIUM CHLORIDE, PRESERVATIVE FREE 40 MG: 5 INJECTION INTRAVENOUS at 09:33

## 2024-01-01 RX ADMIN — SODIUM CHLORIDE, PRESERVATIVE FREE 40 MG: 5 INJECTION INTRAVENOUS at 10:57

## 2024-01-01 RX ADMIN — IPRATROPIUM BROMIDE AND ALBUTEROL SULFATE 1 DOSE: .5; 3 SOLUTION RESPIRATORY (INHALATION) at 07:28

## 2024-01-01 RX ADMIN — CHLOROTHIAZIDE SODIUM 250 MG: 500 INJECTION, POWDER, LYOPHILIZED, FOR SOLUTION INTRAVENOUS at 01:06

## 2024-01-01 RX ADMIN — BUDESONIDE 250 MCG: 0.5 INHALANT RESPIRATORY (INHALATION) at 08:44

## 2024-01-01 RX ADMIN — LORAZEPAM 2 MG: 2 INJECTION INTRAMUSCULAR; INTRAVENOUS at 21:14

## 2024-01-01 RX ADMIN — IPRATROPIUM BROMIDE AND ALBUTEROL SULFATE 1 DOSE: .5; 3 SOLUTION RESPIRATORY (INHALATION) at 16:07

## 2024-01-01 RX ADMIN — SODIUM CHLORIDE, PRESERVATIVE FREE 40 MG: 5 INJECTION INTRAVENOUS at 08:46

## 2024-01-01 RX ADMIN — SODIUM CHLORIDE, PRESERVATIVE FREE 40 MG: 5 INJECTION INTRAVENOUS at 09:19

## 2024-01-01 RX ADMIN — IPRATROPIUM BROMIDE AND ALBUTEROL SULFATE 1 DOSE: .5; 3 SOLUTION RESPIRATORY (INHALATION) at 19:47

## 2024-01-01 RX ADMIN — FLUTICASONE PROPIONATE 1 SPRAY: 50 SPRAY, METERED NASAL at 08:20

## 2024-01-01 RX ADMIN — SODIUM CHLORIDE, PRESERVATIVE FREE 10 ML: 5 INJECTION INTRAVENOUS at 10:58

## 2024-01-01 RX ADMIN — FLUTICASONE PROPIONATE 1 SPRAY: 50 SPRAY, METERED NASAL at 11:03

## 2024-01-01 RX ADMIN — DOXYCYCLINE 100 MG: 100 INJECTION, POWDER, LYOPHILIZED, FOR SOLUTION INTRAVENOUS at 17:24

## 2024-01-01 RX ADMIN — POTASSIUM CHLORIDE AND DEXTROSE MONOHYDRATE: 150; 5 INJECTION, SOLUTION INTRAVENOUS at 08:46

## 2024-01-01 RX ADMIN — BUDESONIDE 250 MCG: 0.5 INHALANT RESPIRATORY (INHALATION) at 08:20

## 2024-01-01 RX ADMIN — CHLOROTHIAZIDE SODIUM 250 MG: 500 INJECTION, POWDER, LYOPHILIZED, FOR SOLUTION INTRAVENOUS at 01:59

## 2024-01-01 RX ADMIN — IPRATROPIUM BROMIDE AND ALBUTEROL SULFATE 1 DOSE: .5; 3 SOLUTION RESPIRATORY (INHALATION) at 07:47

## 2024-01-01 RX ADMIN — SODIUM PHOSPHATE, DIBASIC AND SODIUM PHOSPHATE, MONOBASIC 1 ENEMA: 7; 19 ENEMA RECTAL at 17:44

## 2024-01-01 RX ADMIN — ARFORMOTEROL TARTRATE 15 MCG: 15 SOLUTION RESPIRATORY (INHALATION) at 07:58

## 2024-01-01 RX ADMIN — HALOPERIDOL LACTATE 0.5 MG: 5 INJECTION, SOLUTION INTRAMUSCULAR at 23:04

## 2024-01-01 RX ADMIN — IPRATROPIUM BROMIDE AND ALBUTEROL SULFATE 1 DOSE: .5; 3 SOLUTION RESPIRATORY (INHALATION) at 07:52

## 2024-01-01 RX ADMIN — MORPHINE SULFATE 2.5 MG: 100 SOLUTION ORAL at 09:25

## 2024-01-01 RX ADMIN — MORPHINE SULFATE 1 MG: 2 INJECTION, SOLUTION INTRAMUSCULAR; INTRAVENOUS at 21:02

## 2024-01-01 RX ADMIN — BUDESONIDE 250 MCG: 0.5 INHALANT RESPIRATORY (INHALATION) at 19:52

## 2024-01-01 RX ADMIN — MORPHINE SULFATE 2.5 MG: 100 SOLUTION ORAL at 15:55

## 2024-01-01 RX ADMIN — IPRATROPIUM BROMIDE AND ALBUTEROL SULFATE 1 DOSE: .5; 3 SOLUTION RESPIRATORY (INHALATION) at 07:45

## 2024-01-01 RX ADMIN — POTASSIUM CHLORIDE 40 MEQ: 750 TABLET, FILM COATED, EXTENDED RELEASE ORAL at 14:46

## 2024-01-01 RX ADMIN — ARFORMOTEROL TARTRATE 15 MCG: 15 SOLUTION RESPIRATORY (INHALATION) at 19:43

## 2024-01-01 RX ADMIN — FLUTICASONE PROPIONATE 1 SPRAY: 50 SPRAY, METERED NASAL at 10:02

## 2024-01-01 RX ADMIN — HEPARIN SODIUM 5000 UNITS: 5000 INJECTION INTRAVENOUS; SUBCUTANEOUS at 09:48

## 2024-01-01 RX ADMIN — POTASSIUM CHLORIDE 10 MEQ: 7.46 INJECTION, SOLUTION INTRAVENOUS at 09:56

## 2024-01-01 RX ADMIN — BUDESONIDE 250 MCG: 0.5 INHALANT RESPIRATORY (INHALATION) at 07:43

## 2024-01-01 RX ADMIN — HALOPERIDOL LACTATE 0.5 MG: 5 INJECTION, SOLUTION INTRAMUSCULAR at 23:26

## 2024-01-01 RX ADMIN — SACUBITRIL AND VALSARTAN 1 TABLET: 24; 26 TABLET, FILM COATED ORAL at 08:53

## 2024-01-01 RX ADMIN — MEMANTINE 5 MG: 10 TABLET ORAL at 09:54

## 2024-01-01 RX ADMIN — CALCITONIN SALMON 1 SPRAY: 200 SPRAY, METERED NASAL at 11:03

## 2024-01-01 RX ADMIN — MORPHINE SULFATE 2 MG: 2 INJECTION, SOLUTION INTRAMUSCULAR; INTRAVENOUS at 11:47

## 2024-01-01 RX ADMIN — IPRATROPIUM BROMIDE AND ALBUTEROL SULFATE 1 DOSE: .5; 3 SOLUTION RESPIRATORY (INHALATION) at 13:12

## 2024-01-01 RX ADMIN — MORPHINE SULFATE 2 MG: 2 INJECTION, SOLUTION INTRAMUSCULAR; INTRAVENOUS at 03:39

## 2024-01-01 RX ADMIN — ONDANSETRON 4 MG: 2 INJECTION INTRAMUSCULAR; INTRAVENOUS at 03:50

## 2024-01-01 RX ADMIN — ARFORMOTEROL TARTRATE 15 MCG: 15 SOLUTION RESPIRATORY (INHALATION) at 07:32

## 2024-01-01 RX ADMIN — DOXYCYCLINE 100 MG: 100 INJECTION, POWDER, LYOPHILIZED, FOR SOLUTION INTRAVENOUS at 10:26

## 2024-01-01 RX ADMIN — FLUTICASONE PROPIONATE 1 SPRAY: 50 SPRAY, METERED NASAL at 09:25

## 2024-01-01 RX ADMIN — BUDESONIDE 250 MCG: 0.5 INHALANT RESPIRATORY (INHALATION) at 07:34

## 2024-01-01 RX ADMIN — HALOPERIDOL LACTATE 1 MG: 5 INJECTION, SOLUTION INTRAMUSCULAR at 11:27

## 2024-01-01 RX ADMIN — DOXYCYCLINE 100 MG: 100 INJECTION, POWDER, LYOPHILIZED, FOR SOLUTION INTRAVENOUS at 10:44

## 2024-01-01 RX ADMIN — WATER 80 MG: 1 INJECTION INTRAMUSCULAR; INTRAVENOUS; SUBCUTANEOUS at 21:58

## 2024-01-01 RX ADMIN — LABETALOL HYDROCHLORIDE 10 MG: 5 INJECTION, SOLUTION INTRAVENOUS at 12:30

## 2024-01-01 RX ADMIN — ARFORMOTEROL TARTRATE 15 MCG: 15 SOLUTION RESPIRATORY (INHALATION) at 19:37

## 2024-01-01 RX ADMIN — DEXTROSE MONOHYDRATE: 50 INJECTION, SOLUTION INTRAVENOUS at 17:49

## 2024-01-01 RX ADMIN — HEPARIN SODIUM 5000 UNITS: 5000 INJECTION INTRAVENOUS; SUBCUTANEOUS at 21:49

## 2024-01-01 RX ADMIN — MORPHINE SULFATE 1 MG: 2 INJECTION, SOLUTION INTRAMUSCULAR; INTRAVENOUS at 11:10

## 2024-01-01 RX ADMIN — MORPHINE SULFATE 2 MG: 2 INJECTION, SOLUTION INTRAMUSCULAR; INTRAVENOUS at 21:06

## 2024-01-01 RX ADMIN — DEXTROSE MONOHYDRATE: 50 INJECTION, SOLUTION INTRAVENOUS at 22:33

## 2024-01-01 RX ADMIN — HEPARIN SODIUM 5000 UNITS: 5000 INJECTION INTRAVENOUS; SUBCUTANEOUS at 20:53

## 2024-01-01 RX ADMIN — BUDESONIDE 250 MCG: 0.5 INHALANT RESPIRATORY (INHALATION) at 19:58

## 2024-01-01 RX ADMIN — ARFORMOTEROL TARTRATE 15 MCG: 15 SOLUTION RESPIRATORY (INHALATION) at 19:52

## 2024-01-01 RX ADMIN — HEPARIN SODIUM 5000 UNITS: 5000 INJECTION INTRAVENOUS; SUBCUTANEOUS at 00:43

## 2024-01-01 RX ADMIN — FLUTICASONE PROPIONATE 1 SPRAY: 50 SPRAY, METERED NASAL at 10:11

## 2024-01-01 RX ADMIN — CHLOROTHIAZIDE SODIUM 250 MG: 500 INJECTION, POWDER, LYOPHILIZED, FOR SOLUTION INTRAVENOUS at 14:34

## 2024-01-01 RX ADMIN — SODIUM CHLORIDE, PRESERVATIVE FREE 10 ML: 5 INJECTION INTRAVENOUS at 09:37

## 2024-01-01 RX ADMIN — FLUTICASONE PROPIONATE 1 SPRAY: 50 SPRAY, METERED NASAL at 09:37

## 2024-01-01 RX ADMIN — HEPARIN SODIUM 5000 UNITS: 5000 INJECTION INTRAVENOUS; SUBCUTANEOUS at 09:46

## 2024-01-01 RX ADMIN — SODIUM CHLORIDE 3000 MG: 900 INJECTION INTRAVENOUS at 11:53

## 2024-01-01 RX ADMIN — FLUTICASONE PROPIONATE 1 SPRAY: 50 SPRAY, METERED NASAL at 08:28

## 2024-01-01 RX ADMIN — ARFORMOTEROL TARTRATE 15 MCG: 15 SOLUTION RESPIRATORY (INHALATION) at 19:50

## 2024-01-01 RX ADMIN — IPRATROPIUM BROMIDE AND ALBUTEROL SULFATE 1 DOSE: .5; 3 SOLUTION RESPIRATORY (INHALATION) at 09:13

## 2024-01-01 RX ADMIN — IPRATROPIUM BROMIDE AND ALBUTEROL SULFATE 1 DOSE: .5; 3 SOLUTION RESPIRATORY (INHALATION) at 13:53

## 2024-01-01 RX ADMIN — HALOPERIDOL LACTATE 1 MG: 5 INJECTION, SOLUTION INTRAMUSCULAR at 05:32

## 2024-01-01 RX ADMIN — HEPARIN SODIUM 5000 UNITS: 5000 INJECTION INTRAVENOUS; SUBCUTANEOUS at 08:46

## 2024-01-01 RX ADMIN — SODIUM CHLORIDE 3000 MG: 900 INJECTION INTRAVENOUS at 10:13

## 2024-01-01 RX ADMIN — CALCITONIN SALMON 1 SPRAY: 200 SPRAY, METERED NASAL at 09:19

## 2024-01-01 RX ADMIN — SODIUM CHLORIDE, PRESERVATIVE FREE 40 MG: 5 INJECTION INTRAVENOUS at 09:50

## 2024-01-01 RX ADMIN — IPRATROPIUM BROMIDE AND ALBUTEROL SULFATE 1 DOSE: .5; 3 SOLUTION RESPIRATORY (INHALATION) at 15:17

## 2024-01-01 RX ADMIN — HALOPERIDOL LACTATE 1 MG: 5 INJECTION, SOLUTION INTRAMUSCULAR at 21:59

## 2024-01-01 RX ADMIN — BUPROPION HYDROCHLORIDE 100 MG: 100 TABLET, FILM COATED ORAL at 23:00

## 2024-01-01 RX ADMIN — WATER 80 MG: 1 INJECTION INTRAMUSCULAR; INTRAVENOUS; SUBCUTANEOUS at 20:27

## 2024-01-01 RX ADMIN — SODIUM CHLORIDE, PRESERVATIVE FREE 40 MG: 5 INJECTION INTRAVENOUS at 10:53

## 2024-01-01 RX ADMIN — SODIUM CHLORIDE, PRESERVATIVE FREE 10 ML: 5 INJECTION INTRAVENOUS at 08:20

## 2024-01-01 RX ADMIN — HEPARIN SODIUM 5000 UNITS: 5000 INJECTION INTRAVENOUS; SUBCUTANEOUS at 20:19

## 2024-01-01 RX ADMIN — IPRATROPIUM BROMIDE AND ALBUTEROL SULFATE 1 DOSE: .5; 3 SOLUTION RESPIRATORY (INHALATION) at 12:40

## 2024-01-01 RX ADMIN — CALCITONIN SALMON 1 SPRAY: 200 SPRAY, METERED NASAL at 09:48

## 2024-01-01 RX ADMIN — DOXYCYCLINE 100 MG: 100 INJECTION, POWDER, LYOPHILIZED, FOR SOLUTION INTRAVENOUS at 22:15

## 2024-01-01 RX ADMIN — FUROSEMIDE 20 MG: 10 INJECTION, SOLUTION INTRAMUSCULAR; INTRAVENOUS at 14:48

## 2024-01-01 RX ADMIN — MORPHINE SULFATE 2 MG: 2 INJECTION, SOLUTION INTRAMUSCULAR; INTRAVENOUS at 11:17

## 2024-01-01 RX ADMIN — ARFORMOTEROL TARTRATE 15 MCG: 15 SOLUTION RESPIRATORY (INHALATION) at 20:19

## 2024-01-01 RX ADMIN — MORPHINE SULFATE 2.5 MG: 100 SOLUTION ORAL at 21:40

## 2024-01-01 RX ADMIN — DOCUSATE SODIUM 100 MG: 100 CAPSULE, LIQUID FILLED ORAL at 10:30

## 2024-01-01 RX ADMIN — ARFORMOTEROL TARTRATE 15 MCG: 15 SOLUTION RESPIRATORY (INHALATION) at 07:47

## 2024-01-01 RX ADMIN — HEPARIN SODIUM 5000 UNITS: 5000 INJECTION INTRAVENOUS; SUBCUTANEOUS at 09:34

## 2024-01-01 RX ADMIN — DEXTROSE MONOHYDRATE: 50 INJECTION, SOLUTION INTRAVENOUS at 14:25

## 2024-01-01 RX ADMIN — IPRATROPIUM BROMIDE AND ALBUTEROL SULFATE 1 DOSE: .5; 3 SOLUTION RESPIRATORY (INHALATION) at 14:36

## 2024-01-01 RX ADMIN — ARFORMOTEROL TARTRATE 15 MCG: 15 SOLUTION RESPIRATORY (INHALATION) at 08:07

## 2024-01-01 RX ADMIN — SODIUM CHLORIDE: 9 INJECTION, SOLUTION INTRAVENOUS at 21:48

## 2024-01-01 RX ADMIN — MORPHINE SULFATE 2.5 MG: 100 SOLUTION ORAL at 12:24

## 2024-01-01 RX ADMIN — MORPHINE SULFATE 2 MG: 2 INJECTION, SOLUTION INTRAMUSCULAR; INTRAVENOUS at 22:42

## 2024-01-01 RX ADMIN — MORPHINE SULFATE 1 MG: 2 INJECTION, SOLUTION INTRAMUSCULAR; INTRAVENOUS at 19:50

## 2024-01-01 RX ADMIN — HALOPERIDOL LACTATE 1 MG: 5 INJECTION, SOLUTION INTRAMUSCULAR at 10:56

## 2024-01-01 RX ADMIN — POTASSIUM CHLORIDE 10 MEQ: 7.46 INJECTION, SOLUTION INTRAVENOUS at 08:34

## 2024-01-01 RX ADMIN — SODIUM CHLORIDE, PRESERVATIVE FREE 10 ML: 5 INJECTION INTRAVENOUS at 09:12

## 2024-01-01 RX ADMIN — SODIUM CHLORIDE, PRESERVATIVE FREE 40 MG: 5 INJECTION INTRAVENOUS at 17:19

## 2024-01-01 RX ADMIN — SODIUM CHLORIDE, PRESERVATIVE FREE 40 MG: 5 INJECTION INTRAVENOUS at 08:35

## 2024-01-01 RX ADMIN — IPRATROPIUM BROMIDE AND ALBUTEROL SULFATE 1 DOSE: .5; 3 SOLUTION RESPIRATORY (INHALATION) at 12:43

## 2024-01-01 RX ADMIN — CHLOROTHIAZIDE SODIUM 250 MG: 500 INJECTION, POWDER, LYOPHILIZED, FOR SOLUTION INTRAVENOUS at 01:56

## 2024-01-01 RX ADMIN — DOCUSATE SODIUM 100 MG: 100 CAPSULE, LIQUID FILLED ORAL at 09:51

## 2024-01-01 RX ADMIN — CEFEPIME 1000 MG: 1 INJECTION, POWDER, FOR SOLUTION INTRAMUSCULAR; INTRAVENOUS at 06:08

## 2024-01-01 RX ADMIN — IPRATROPIUM BROMIDE AND ALBUTEROL SULFATE 1 DOSE: .5; 3 SOLUTION RESPIRATORY (INHALATION) at 07:29

## 2024-01-01 RX ADMIN — CHLOROTHIAZIDE SODIUM 250 MG: 500 INJECTION, POWDER, LYOPHILIZED, FOR SOLUTION INTRAVENOUS at 15:03

## 2024-01-01 RX ADMIN — SODIUM CHLORIDE, PRESERVATIVE FREE 10 ML: 5 INJECTION INTRAVENOUS at 22:00

## 2024-01-01 RX ADMIN — BUDESONIDE 250 MCG: 0.5 INHALANT RESPIRATORY (INHALATION) at 19:51

## 2024-01-01 RX ADMIN — ARFORMOTEROL TARTRATE 15 MCG: 15 SOLUTION RESPIRATORY (INHALATION) at 21:13

## 2024-01-01 RX ADMIN — SODIUM CHLORIDE, PRESERVATIVE FREE 40 MG: 5 INJECTION INTRAVENOUS at 10:37

## 2024-01-01 RX ADMIN — IPRATROPIUM BROMIDE AND ALBUTEROL SULFATE 1 DOSE: .5; 3 SOLUTION RESPIRATORY (INHALATION) at 08:00

## 2024-01-01 RX ADMIN — CALCITONIN SALMON 1 SPRAY: 200 SPRAY, METERED NASAL at 09:37

## 2024-01-01 RX ADMIN — SODIUM CHLORIDE 3000 MG: 900 INJECTION INTRAVENOUS at 21:50

## 2024-01-01 RX ADMIN — DEXTROSE MONOHYDRATE: 50 INJECTION, SOLUTION INTRAVENOUS at 02:02

## 2024-01-01 RX ADMIN — IPRATROPIUM BROMIDE AND ALBUTEROL SULFATE 1 DOSE: .5; 3 SOLUTION RESPIRATORY (INHALATION) at 09:07

## 2024-01-01 RX ADMIN — HEPARIN SODIUM 5000 UNITS: 5000 INJECTION INTRAVENOUS; SUBCUTANEOUS at 21:39

## 2024-01-01 RX ADMIN — CHLOROTHIAZIDE SODIUM 250 MG: 500 INJECTION, POWDER, LYOPHILIZED, FOR SOLUTION INTRAVENOUS at 01:22

## 2024-01-01 RX ADMIN — BUDESONIDE 250 MCG: 0.5 INHALANT RESPIRATORY (INHALATION) at 20:01

## 2024-01-01 RX ADMIN — SODIUM CHLORIDE, PRESERVATIVE FREE 10 ML: 5 INJECTION INTRAVENOUS at 01:05

## 2024-01-01 RX ADMIN — SODIUM CHLORIDE, PRESERVATIVE FREE 10 ML: 5 INJECTION INTRAVENOUS at 20:47

## 2024-01-01 RX ADMIN — ARFORMOTEROL TARTRATE 15 MCG: 15 SOLUTION RESPIRATORY (INHALATION) at 08:44

## 2024-01-01 RX ADMIN — BUDESONIDE 250 MCG: 0.5 INHALANT RESPIRATORY (INHALATION) at 19:43

## 2024-01-01 RX ADMIN — VANCOMYCIN HYDROCHLORIDE 1250 MG: 1.25 INJECTION, POWDER, LYOPHILIZED, FOR SOLUTION INTRAVENOUS at 14:54

## 2024-01-01 RX ADMIN — POTASSIUM CHLORIDE 10 MEQ: 7.45 INJECTION INTRAVENOUS at 08:18

## 2024-01-01 RX ADMIN — BUPROPION HYDROCHLORIDE 100 MG: 100 TABLET, FILM COATED ORAL at 09:51

## 2024-01-01 RX ADMIN — SODIUM CHLORIDE, PRESERVATIVE FREE 40 MG: 5 INJECTION INTRAVENOUS at 10:21

## 2024-01-01 RX ADMIN — MORPHINE SULFATE 1 MG: 2 INJECTION, SOLUTION INTRAMUSCULAR; INTRAVENOUS at 13:03

## 2024-01-01 RX ADMIN — MORPHINE SULFATE 4 MG: 4 INJECTION, SOLUTION INTRAMUSCULAR; INTRAVENOUS at 05:56

## 2024-01-01 RX ADMIN — BUDESONIDE 250 MCG: 0.5 INHALANT RESPIRATORY (INHALATION) at 21:57

## 2024-01-01 RX ADMIN — DEXTROSE MONOHYDRATE: 50 INJECTION, SOLUTION INTRAVENOUS at 05:35

## 2024-01-01 RX ADMIN — BUDESONIDE 250 MCG: 0.5 INHALANT RESPIRATORY (INHALATION) at 07:47

## 2024-01-01 RX ADMIN — SODIUM CHLORIDE, PRESERVATIVE FREE 10 ML: 5 INJECTION INTRAVENOUS at 20:38

## 2024-01-01 RX ADMIN — TAMSULOSIN HYDROCHLORIDE 0.4 MG: 0.4 CAPSULE ORAL at 10:31

## 2024-01-01 RX ADMIN — MORPHINE SULFATE 2.5 MG: 100 SOLUTION ORAL at 01:46

## 2024-01-01 RX ADMIN — SODIUM CHLORIDE, PRESERVATIVE FREE 10 ML: 5 INJECTION INTRAVENOUS at 10:03

## 2024-01-01 RX ADMIN — CALCITONIN SALMON 1 SPRAY: 200 SPRAY, METERED NASAL at 08:36

## 2024-01-01 RX ADMIN — SODIUM CHLORIDE, PRESERVATIVE FREE 10 ML: 5 INJECTION INTRAVENOUS at 20:55

## 2024-01-01 RX ADMIN — POLYETHYLENE GLYCOL 3350 17 G: 17 POWDER, FOR SOLUTION ORAL at 08:55

## 2024-01-01 RX ADMIN — MORPHINE SULFATE 4 MG: 4 INJECTION, SOLUTION INTRAMUSCULAR; INTRAVENOUS at 23:09

## 2024-01-01 RX ADMIN — SODIUM CHLORIDE, PRESERVATIVE FREE 10 ML: 5 INJECTION INTRAVENOUS at 22:23

## 2024-01-01 RX ADMIN — NYSTATIN 500000 UNITS: 100000 SUSPENSION ORAL at 10:33

## 2024-01-01 RX ADMIN — CHLOROTHIAZIDE SODIUM 250 MG: 500 INJECTION, POWDER, LYOPHILIZED, FOR SOLUTION INTRAVENOUS at 15:06

## 2024-01-01 RX ADMIN — IPRATROPIUM BROMIDE AND ALBUTEROL SULFATE 1 DOSE: .5; 3 SOLUTION RESPIRATORY (INHALATION) at 05:33

## 2024-01-01 RX ADMIN — ARFORMOTEROL TARTRATE 15 MCG: 15 SOLUTION RESPIRATORY (INHALATION) at 19:23

## 2024-01-01 RX ADMIN — IPRATROPIUM BROMIDE AND ALBUTEROL SULFATE 1 DOSE: .5; 3 SOLUTION RESPIRATORY (INHALATION) at 08:38

## 2024-01-01 RX ADMIN — HALOPERIDOL LACTATE 1 MG: 5 INJECTION, SOLUTION INTRAMUSCULAR at 03:34

## 2024-01-01 RX ADMIN — CHLOROTHIAZIDE SODIUM 250 MG: 500 INJECTION, POWDER, LYOPHILIZED, FOR SOLUTION INTRAVENOUS at 16:13

## 2024-01-01 RX ADMIN — HEPARIN SODIUM 5000 UNITS: 5000 INJECTION INTRAVENOUS; SUBCUTANEOUS at 20:30

## 2024-01-01 RX ADMIN — MEMANTINE 10 MG: 10 TABLET ORAL at 10:29

## 2024-01-01 RX ADMIN — POTASSIUM CHLORIDE AND DEXTROSE MONOHYDRATE: 150; 5 INJECTION, SOLUTION INTRAVENOUS at 21:53

## 2024-01-01 RX ADMIN — CEFEPIME 1000 MG: 1 INJECTION, POWDER, FOR SOLUTION INTRAMUSCULAR; INTRAVENOUS at 06:51

## 2024-01-01 RX ADMIN — HALOPERIDOL LACTATE 1 MG: 5 INJECTION, SOLUTION INTRAMUSCULAR at 02:04

## 2024-01-01 RX ADMIN — IPRATROPIUM BROMIDE AND ALBUTEROL SULFATE 1 DOSE: .5; 3 SOLUTION RESPIRATORY (INHALATION) at 03:07

## 2024-01-01 RX ADMIN — SODIUM CHLORIDE 3000 MG: 900 INJECTION INTRAVENOUS at 09:28

## 2024-01-01 RX ADMIN — AMLODIPINE BESYLATE 10 MG: 5 TABLET ORAL at 08:52

## 2024-01-01 RX ADMIN — LORAZEPAM 1 MG: 2 INJECTION INTRAMUSCULAR; INTRAVENOUS at 14:33

## 2024-01-01 RX ADMIN — IPRATROPIUM BROMIDE AND ALBUTEROL SULFATE 1 DOSE: .5; 3 SOLUTION RESPIRATORY (INHALATION) at 13:45

## 2024-01-01 RX ADMIN — FLUTICASONE PROPIONATE 1 SPRAY: 50 SPRAY, METERED NASAL at 10:57

## 2024-01-01 RX ADMIN — IPRATROPIUM BROMIDE AND ALBUTEROL SULFATE 1 DOSE: .5; 3 SOLUTION RESPIRATORY (INHALATION) at 19:22

## 2024-01-01 RX ADMIN — NYSTATIN 500000 UNITS: 100000 SUSPENSION ORAL at 18:17

## 2024-01-01 RX ADMIN — BUDESONIDE 250 MCG: 0.5 INHALANT RESPIRATORY (INHALATION) at 08:07

## 2024-01-01 RX ADMIN — HEPARIN SODIUM 5000 UNITS: 5000 INJECTION INTRAVENOUS; SUBCUTANEOUS at 23:45

## 2024-01-01 RX ADMIN — ARFORMOTEROL TARTRATE 15 MCG: 15 SOLUTION RESPIRATORY (INHALATION) at 20:42

## 2024-01-01 RX ADMIN — CETIRIZINE HYDROCHLORIDE 5 MG: 10 TABLET, FILM COATED ORAL at 09:54

## 2024-01-01 RX ADMIN — CEFEPIME 1000 MG: 1 INJECTION, POWDER, FOR SOLUTION INTRAMUSCULAR; INTRAVENOUS at 20:39

## 2024-01-01 RX ADMIN — BUDESONIDE 250 MCG: 0.5 INHALANT RESPIRATORY (INHALATION) at 21:13

## 2024-01-01 RX ADMIN — SODIUM CHLORIDE 3000 MG: 900 INJECTION INTRAVENOUS at 01:04

## 2024-01-01 RX ADMIN — HEPARIN SODIUM 5000 UNITS: 5000 INJECTION INTRAVENOUS; SUBCUTANEOUS at 20:42

## 2024-01-01 RX ADMIN — MORPHINE SULFATE 1 MG: 2 INJECTION, SOLUTION INTRAMUSCULAR; INTRAVENOUS at 12:00

## 2024-01-01 RX ADMIN — IPRATROPIUM BROMIDE AND ALBUTEROL SULFATE 1 DOSE: .5; 3 SOLUTION RESPIRATORY (INHALATION) at 21:07

## 2024-01-01 RX ADMIN — CALCITONIN SALMON 1 SPRAY: 200 SPRAY, METERED NASAL at 10:57

## 2024-01-01 RX ADMIN — ARFORMOTEROL TARTRATE 15 MCG: 15 SOLUTION RESPIRATORY (INHALATION) at 07:43

## 2024-01-01 RX ADMIN — ARFORMOTEROL TARTRATE 15 MCG: 15 SOLUTION RESPIRATORY (INHALATION) at 07:18

## 2024-01-01 RX ADMIN — ARFORMOTEROL TARTRATE 15 MCG: 15 SOLUTION RESPIRATORY (INHALATION) at 07:34

## 2024-01-01 RX ADMIN — SODIUM CHLORIDE, PRESERVATIVE FREE 10 ML: 5 INJECTION INTRAVENOUS at 09:29

## 2024-01-01 RX ADMIN — HEPARIN SODIUM 5000 UNITS: 5000 INJECTION INTRAVENOUS; SUBCUTANEOUS at 09:57

## 2024-01-01 RX ADMIN — SODIUM CHLORIDE, PRESERVATIVE FREE 40 MG: 5 INJECTION INTRAVENOUS at 11:20

## 2024-01-01 RX ADMIN — IPRATROPIUM BROMIDE AND ALBUTEROL SULFATE 1 DOSE: .5; 3 SOLUTION RESPIRATORY (INHALATION) at 11:12

## 2024-01-01 RX ADMIN — SODIUM CHLORIDE, POTASSIUM CHLORIDE, SODIUM LACTATE AND CALCIUM CHLORIDE: 600; 310; 30; 20 INJECTION, SOLUTION INTRAVENOUS at 13:12

## 2024-01-01 RX ADMIN — IPRATROPIUM BROMIDE AND ALBUTEROL SULFATE 1 DOSE: .5; 3 SOLUTION RESPIRATORY (INHALATION) at 19:27

## 2024-01-01 RX ADMIN — SODIUM CHLORIDE: 9 INJECTION, SOLUTION INTRAVENOUS at 06:11

## 2024-01-01 RX ADMIN — QUETIAPINE FUMARATE 50 MG: 25 TABLET ORAL at 10:31

## 2024-01-01 RX ADMIN — BUPROPION HYDROCHLORIDE 100 MG: 100 TABLET, FILM COATED ORAL at 10:28

## 2024-01-01 RX ADMIN — SODIUM CHLORIDE, PRESERVATIVE FREE 40 MG: 5 INJECTION INTRAVENOUS at 08:54

## 2024-01-01 RX ADMIN — BUDESONIDE 250 MCG: 0.5 INHALANT RESPIRATORY (INHALATION) at 19:47

## 2024-01-01 RX ADMIN — HALOPERIDOL LACTATE 0.5 MG: 5 INJECTION, SOLUTION INTRAMUSCULAR at 19:03

## 2024-01-01 RX ADMIN — DOXYCYCLINE 100 MG: 100 INJECTION, POWDER, LYOPHILIZED, FOR SOLUTION INTRAVENOUS at 11:47

## 2024-01-01 RX ADMIN — IPRATROPIUM BROMIDE AND ALBUTEROL SULFATE 1 DOSE: .5; 3 SOLUTION RESPIRATORY (INHALATION) at 19:20

## 2024-01-01 RX ADMIN — MORPHINE SULFATE 4 MG: 4 INJECTION, SOLUTION INTRAMUSCULAR; INTRAVENOUS at 02:29

## 2024-01-01 RX ADMIN — BUDESONIDE 250 MCG: 0.5 INHALANT RESPIRATORY (INHALATION) at 07:58

## 2024-01-01 RX ADMIN — ARFORMOTEROL TARTRATE 15 MCG: 15 SOLUTION RESPIRATORY (INHALATION) at 20:23

## 2024-01-01 RX ADMIN — IPRATROPIUM BROMIDE AND ALBUTEROL SULFATE 1 DOSE: .5; 3 SOLUTION RESPIRATORY (INHALATION) at 13:24

## 2024-01-01 RX ADMIN — BUDESONIDE 250 MCG: 0.5 INHALANT RESPIRATORY (INHALATION) at 07:55

## 2024-01-01 RX ADMIN — DOXYCYCLINE 100 MG: 100 INJECTION, POWDER, LYOPHILIZED, FOR SOLUTION INTRAVENOUS at 17:28

## 2024-01-01 RX ADMIN — MORPHINE SULFATE 1 MG: 2 INJECTION, SOLUTION INTRAMUSCULAR; INTRAVENOUS at 19:10

## 2024-01-01 RX ADMIN — WATER 20 MG: 1 INJECTION INTRAMUSCULAR; INTRAVENOUS; SUBCUTANEOUS at 10:35

## 2024-01-01 RX ADMIN — CHLOROTHIAZIDE SODIUM 250 MG: 500 INJECTION, POWDER, LYOPHILIZED, FOR SOLUTION INTRAVENOUS at 01:31

## 2024-01-01 RX ADMIN — WATER 125 MG: 1 INJECTION INTRAMUSCULAR; INTRAVENOUS; SUBCUTANEOUS at 13:08

## 2024-01-01 RX ADMIN — CALCITONIN SALMON 1 SPRAY: 200 SPRAY, METERED NASAL at 10:02

## 2024-01-01 RX ADMIN — HEPARIN SODIUM 5000 UNITS: 5000 INJECTION INTRAVENOUS; SUBCUTANEOUS at 08:36

## 2024-01-01 RX ADMIN — BUDESONIDE 250 MCG: 0.5 INHALANT RESPIRATORY (INHALATION) at 19:23

## 2024-01-01 RX ADMIN — DOXYCYCLINE 100 MG: 100 INJECTION, POWDER, LYOPHILIZED, FOR SOLUTION INTRAVENOUS at 23:37

## 2024-01-01 RX ADMIN — FLUTICASONE PROPIONATE 1 SPRAY: 50 SPRAY, METERED NASAL at 08:32

## 2024-01-01 RX ADMIN — HEPARIN SODIUM 5000 UNITS: 5000 INJECTION INTRAVENOUS; SUBCUTANEOUS at 20:37

## 2024-01-01 RX ADMIN — DOCUSATE SODIUM 100 MG: 100 CAPSULE, LIQUID FILLED ORAL at 08:52

## 2024-01-01 RX ADMIN — SODIUM CHLORIDE, PRESERVATIVE FREE 10 ML: 5 INJECTION INTRAVENOUS at 11:03

## 2024-01-01 RX ADMIN — HEPARIN SODIUM 5000 UNITS: 5000 INJECTION INTRAVENOUS; SUBCUTANEOUS at 08:54

## 2024-01-01 RX ADMIN — FLUTICASONE PROPIONATE 1 SPRAY: 50 SPRAY, METERED NASAL at 09:48

## 2024-01-01 RX ADMIN — ARFORMOTEROL TARTRATE 15 MCG: 15 SOLUTION RESPIRATORY (INHALATION) at 08:42

## 2024-01-01 RX ADMIN — ACETAMINOPHEN 650 MG: 325 TABLET ORAL at 05:50

## 2024-01-01 RX ADMIN — CHLOROTHIAZIDE SODIUM 250 MG: 500 INJECTION, POWDER, LYOPHILIZED, FOR SOLUTION INTRAVENOUS at 13:10

## 2024-01-01 RX ADMIN — ARFORMOTEROL TARTRATE 15 MCG: 15 SOLUTION RESPIRATORY (INHALATION) at 07:55

## 2024-01-01 RX ADMIN — SODIUM CHLORIDE 3000 MG: 900 INJECTION INTRAVENOUS at 10:54

## 2024-01-01 RX ADMIN — SODIUM CHLORIDE: 9 INJECTION, SOLUTION INTRAVENOUS at 22:14

## 2024-01-01 RX ADMIN — NYSTATIN 500000 UNITS: 100000 SUSPENSION ORAL at 14:18

## 2024-01-01 RX ADMIN — DEXTROSE MONOHYDRATE: 50 INJECTION, SOLUTION INTRAVENOUS at 11:00

## 2024-01-01 RX ADMIN — DEXTROSE MONOHYDRATE: 50 INJECTION, SOLUTION INTRAVENOUS at 08:44

## 2024-01-01 RX ADMIN — HEPARIN SODIUM 5000 UNITS: 5000 INJECTION INTRAVENOUS; SUBCUTANEOUS at 10:57

## 2024-01-01 RX ADMIN — SODIUM CHLORIDE: 9 INJECTION, SOLUTION INTRAVENOUS at 23:13

## 2024-01-01 RX ADMIN — MORPHINE SULFATE 2 MG: 2 INJECTION, SOLUTION INTRAMUSCULAR; INTRAVENOUS at 08:15

## 2024-01-01 RX ADMIN — FLUTICASONE PROPIONATE 1 SPRAY: 50 SPRAY, METERED NASAL at 09:19

## 2024-01-01 RX ADMIN — NYSTATIN 500000 UNITS: 100000 SUSPENSION ORAL at 20:19

## 2024-01-01 RX ADMIN — BUDESONIDE 250 MCG: 0.5 INHALANT RESPIRATORY (INHALATION) at 07:53

## 2024-01-01 RX ADMIN — MORPHINE SULFATE 2.5 MG: 100 SOLUTION ORAL at 16:40

## 2024-01-01 RX ADMIN — IPRATROPIUM BROMIDE AND ALBUTEROL SULFATE 1 DOSE: .5; 3 SOLUTION RESPIRATORY (INHALATION) at 13:09

## 2024-01-01 RX ADMIN — HALOPERIDOL LACTATE 1 MG: 5 INJECTION, SOLUTION INTRAMUSCULAR at 17:39

## 2024-01-01 RX ADMIN — BUDESONIDE 250 MCG: 0.5 INHALANT RESPIRATORY (INHALATION) at 07:32

## 2024-01-01 RX ADMIN — FLUTICASONE PROPIONATE 1 SPRAY: 50 SPRAY, METERED NASAL at 08:36

## 2024-01-01 RX ADMIN — IPRATROPIUM BROMIDE AND ALBUTEROL SULFATE 1 DOSE: .5; 3 SOLUTION RESPIRATORY (INHALATION) at 13:32

## 2024-01-01 RX ADMIN — SODIUM CHLORIDE, PRESERVATIVE FREE 10 ML: 5 INJECTION INTRAVENOUS at 20:20

## 2024-01-01 RX ADMIN — IOPAMIDOL 60 ML: 755 INJECTION, SOLUTION INTRAVENOUS at 14:11

## 2024-01-01 RX ADMIN — QUETIAPINE FUMARATE 50 MG: 25 TABLET ORAL at 14:46

## 2024-01-01 RX ADMIN — CALCITONIN SALMON 1 SPRAY: 200 SPRAY, METERED NASAL at 09:00

## 2024-01-01 RX ADMIN — IPRATROPIUM BROMIDE AND ALBUTEROL SULFATE 1 DOSE: .5; 3 SOLUTION RESPIRATORY (INHALATION) at 08:24

## 2024-01-01 RX ADMIN — SODIUM CHLORIDE: 9 INJECTION, SOLUTION INTRAVENOUS at 22:52

## 2024-01-01 RX ADMIN — IPRATROPIUM BROMIDE AND ALBUTEROL SULFATE 1 DOSE: .5; 3 SOLUTION RESPIRATORY (INHALATION) at 07:50

## 2024-01-01 RX ADMIN — TAMSULOSIN HYDROCHLORIDE 0.4 MG: 0.4 CAPSULE ORAL at 09:52

## 2024-01-01 RX ADMIN — CALCITONIN SALMON 1 SPRAY: 200 SPRAY, METERED NASAL at 08:32

## 2024-01-01 RX ADMIN — DEXTROSE MONOHYDRATE: 50 INJECTION, SOLUTION INTRAVENOUS at 16:04

## 2024-01-01 RX ADMIN — SODIUM CHLORIDE: 9 INJECTION, SOLUTION INTRAVENOUS at 11:52

## 2024-01-01 RX ADMIN — MORPHINE SULFATE 2 MG: 2 INJECTION, SOLUTION INTRAMUSCULAR; INTRAVENOUS at 12:19

## 2024-01-01 RX ADMIN — HEPARIN SODIUM 5000 UNITS: 5000 INJECTION INTRAVENOUS; SUBCUTANEOUS at 08:30

## 2024-01-01 RX ADMIN — BUDESONIDE 250 MCG: 0.5 INHALANT RESPIRATORY (INHALATION) at 19:37

## 2024-01-01 RX ADMIN — WATER 80 MG: 1 INJECTION INTRAMUSCULAR; INTRAVENOUS; SUBCUTANEOUS at 21:52

## 2024-01-01 RX ADMIN — ARFORMOTEROL TARTRATE 15 MCG: 15 SOLUTION RESPIRATORY (INHALATION) at 07:44

## 2024-01-01 RX ADMIN — POTASSIUM CHLORIDE 10 MEQ: 7.45 INJECTION INTRAVENOUS at 04:32

## 2024-01-01 RX ADMIN — HEPARIN SODIUM 5000 UNITS: 5000 INJECTION INTRAVENOUS; SUBCUTANEOUS at 09:19

## 2024-01-01 RX ADMIN — CEFEPIME 1000 MG: 1 INJECTION, POWDER, FOR SOLUTION INTRAMUSCULAR; INTRAVENOUS at 10:35

## 2024-01-01 RX ADMIN — AZITHROMYCIN MONOHYDRATE 500 MG: 500 INJECTION, POWDER, LYOPHILIZED, FOR SOLUTION INTRAVENOUS at 13:27

## 2024-01-01 RX ADMIN — DEXTROSE MONOHYDRATE: 50 INJECTION, SOLUTION INTRAVENOUS at 10:10

## 2024-01-01 RX ADMIN — HEPARIN SODIUM 5000 UNITS: 5000 INJECTION INTRAVENOUS; SUBCUTANEOUS at 21:57

## 2024-01-01 RX ADMIN — MORPHINE SULFATE 1 MG: 2 INJECTION, SOLUTION INTRAMUSCULAR; INTRAVENOUS at 09:20

## 2024-01-01 RX ADMIN — CEFEPIME 1000 MG: 1 INJECTION, POWDER, FOR SOLUTION INTRAMUSCULAR; INTRAVENOUS at 23:14

## 2024-01-01 RX ADMIN — WATER 40 MG: 1 INJECTION INTRAMUSCULAR; INTRAVENOUS; SUBCUTANEOUS at 08:53

## 2024-01-01 RX ADMIN — SODIUM CHLORIDE, PRESERVATIVE FREE 40 MG: 5 INJECTION INTRAVENOUS at 10:02

## 2024-01-01 RX ADMIN — CALCITONIN SALMON 1 SPRAY: 200 SPRAY, METERED NASAL at 10:05

## 2024-01-01 RX ADMIN — SODIUM CHLORIDE, PRESERVATIVE FREE 10 ML: 5 INJECTION INTRAVENOUS at 09:50

## 2024-01-01 RX ADMIN — MORPHINE SULFATE 1 MG: 2 INJECTION, SOLUTION INTRAMUSCULAR; INTRAVENOUS at 03:34

## 2024-01-01 RX ADMIN — WATER 1000 MG: 1 INJECTION INTRAMUSCULAR; INTRAVENOUS; SUBCUTANEOUS at 13:21

## 2024-01-01 RX ADMIN — ARFORMOTEROL TARTRATE 15 MCG: 15 SOLUTION RESPIRATORY (INHALATION) at 19:47

## 2024-01-01 RX ADMIN — SODIUM CHLORIDE 3000 MG: 900 INJECTION INTRAVENOUS at 21:11

## 2024-01-01 RX ADMIN — SODIUM CHLORIDE, PRESERVATIVE FREE 40 MG: 5 INJECTION INTRAVENOUS at 08:19

## 2024-01-01 RX ADMIN — IPRATROPIUM BROMIDE AND ALBUTEROL SULFATE 1 DOSE: .5; 3 SOLUTION RESPIRATORY (INHALATION) at 11:07

## 2024-01-01 RX ADMIN — IPRATROPIUM BROMIDE AND ALBUTEROL SULFATE 1 DOSE: .5; 3 SOLUTION RESPIRATORY (INHALATION) at 01:33

## 2024-01-01 RX ADMIN — CALCITONIN SALMON 1 SPRAY: 200 SPRAY, METERED NASAL at 08:43

## 2024-01-01 RX ADMIN — BUDESONIDE 250 MCG: 0.5 INHALANT RESPIRATORY (INHALATION) at 19:49

## 2024-01-01 RX ADMIN — CHLOROTHIAZIDE SODIUM 250 MG: 500 INJECTION, POWDER, LYOPHILIZED, FOR SOLUTION INTRAVENOUS at 02:19

## 2024-01-01 RX ADMIN — SODIUM CHLORIDE, PRESERVATIVE FREE 10 ML: 5 INJECTION INTRAVENOUS at 19:50

## 2024-01-01 RX ADMIN — SODIUM CHLORIDE, PRESERVATIVE FREE 40 MG: 5 INJECTION INTRAVENOUS at 09:59

## 2024-01-01 RX ADMIN — NYSTATIN 500000 UNITS: 100000 SUSPENSION ORAL at 16:45

## 2024-01-01 RX ADMIN — HALOPERIDOL LACTATE 1 MG: 5 INJECTION, SOLUTION INTRAMUSCULAR at 16:45

## 2024-01-01 RX ADMIN — NYSTATIN 500000 UNITS: 100000 SUSPENSION ORAL at 21:17

## 2024-01-01 RX ADMIN — IPRATROPIUM BROMIDE AND ALBUTEROL SULFATE 1 DOSE: .5; 3 SOLUTION RESPIRATORY (INHALATION) at 08:37

## 2024-01-01 RX ADMIN — SODIUM CHLORIDE 3000 MG: 900 INJECTION INTRAVENOUS at 20:38

## 2024-01-01 RX ADMIN — NYSTATIN 500000 UNITS: 100000 SUSPENSION ORAL at 12:22

## 2024-01-01 RX ADMIN — POTASSIUM CHLORIDE 10 MEQ: 7.45 INJECTION INTRAVENOUS at 06:58

## 2024-01-01 RX ADMIN — SACUBITRIL AND VALSARTAN 1 TABLET: 24; 26 TABLET, FILM COATED ORAL at 20:39

## 2024-01-01 RX ADMIN — WATER 60 MG: 1 INJECTION INTRAMUSCULAR; INTRAVENOUS; SUBCUTANEOUS at 22:46

## 2024-01-01 RX ADMIN — IPRATROPIUM BROMIDE AND ALBUTEROL SULFATE 1 DOSE: .5; 3 SOLUTION RESPIRATORY (INHALATION) at 11:43

## 2024-01-01 RX ADMIN — ARFORMOTEROL TARTRATE 15 MCG: 15 SOLUTION RESPIRATORY (INHALATION) at 19:51

## 2024-01-01 RX ADMIN — IPRATROPIUM BROMIDE AND ALBUTEROL SULFATE 1 DOSE: .5; 3 SOLUTION RESPIRATORY (INHALATION) at 13:38

## 2024-01-01 RX ADMIN — DOXYCYCLINE 100 MG: 100 INJECTION, POWDER, LYOPHILIZED, FOR SOLUTION INTRAVENOUS at 08:58

## 2024-01-01 RX ADMIN — WATER 80 MG: 1 INJECTION INTRAMUSCULAR; INTRAVENOUS; SUBCUTANEOUS at 06:28

## 2024-01-01 RX ADMIN — QUETIAPINE FUMARATE 50 MG: 25 TABLET ORAL at 08:52

## 2024-01-01 RX ADMIN — ASPIRIN 81 MG: 81 TABLET, COATED ORAL at 10:27

## 2024-01-01 RX ADMIN — ARFORMOTEROL TARTRATE 15 MCG: 15 SOLUTION RESPIRATORY (INHALATION) at 09:12

## 2024-01-01 RX ADMIN — HEPARIN SODIUM 5000 UNITS: 5000 INJECTION INTRAVENOUS; SUBCUTANEOUS at 22:42

## 2024-01-01 RX ADMIN — IPRATROPIUM BROMIDE AND ALBUTEROL SULFATE 1 DOSE: .5; 3 SOLUTION RESPIRATORY (INHALATION) at 13:19

## 2024-01-01 RX ADMIN — CEFEPIME 1000 MG: 1 INJECTION, POWDER, FOR SOLUTION INTRAMUSCULAR; INTRAVENOUS at 17:40

## 2024-01-01 RX ADMIN — SODIUM CHLORIDE, PRESERVATIVE FREE 10 ML: 5 INJECTION INTRAVENOUS at 08:35

## 2024-01-01 RX ADMIN — ASPIRIN 81 MG: 81 TABLET, COATED ORAL at 09:50

## 2024-01-01 RX ADMIN — SODIUM CHLORIDE 3000 MG: 900 INJECTION INTRAVENOUS at 00:42

## 2024-01-01 RX ADMIN — DEXTROSE MONOHYDRATE: 50 INJECTION, SOLUTION INTRAVENOUS at 09:48

## 2024-01-01 RX ADMIN — HEPARIN SODIUM 5000 UNITS: 5000 INJECTION INTRAVENOUS; SUBCUTANEOUS at 09:26

## 2024-01-01 RX ADMIN — FLUTICASONE PROPIONATE 1 SPRAY: 50 SPRAY, METERED NASAL at 10:05

## 2024-01-01 RX ADMIN — ARFORMOTEROL TARTRATE 15 MCG: 15 SOLUTION RESPIRATORY (INHALATION) at 07:54

## 2024-01-01 RX ADMIN — SODIUM CHLORIDE 5 ML/HR: 9 INJECTION, SOLUTION INTRAVENOUS at 09:23

## 2024-01-01 RX ADMIN — SODIUM CHLORIDE, PRESERVATIVE FREE 10 ML: 5 INJECTION INTRAVENOUS at 09:01

## 2024-01-01 RX ADMIN — WATER 60 MG: 1 INJECTION INTRAMUSCULAR; INTRAVENOUS; SUBCUTANEOUS at 17:39

## 2024-01-01 RX ADMIN — BISACODYL 10 MG RECTAL SUPPOSITORY 10 MG: at 09:05

## 2024-01-01 RX ADMIN — MORPHINE SULFATE 2 MG: 2 INJECTION, SOLUTION INTRAMUSCULAR; INTRAVENOUS at 06:06

## 2024-01-01 RX ADMIN — ARFORMOTEROL TARTRATE 15 MCG: 15 SOLUTION RESPIRATORY (INHALATION) at 08:31

## 2024-01-01 RX ADMIN — CALCITONIN SALMON 1 SPRAY: 200 SPRAY, METERED NASAL at 09:46

## 2024-01-01 RX ADMIN — ARFORMOTEROL TARTRATE 15 MCG: 15 SOLUTION RESPIRATORY (INHALATION) at 20:01

## 2024-01-01 RX ADMIN — HEPARIN SODIUM 5000 UNITS: 5000 INJECTION INTRAVENOUS; SUBCUTANEOUS at 09:58

## 2024-01-01 RX ADMIN — IPRATROPIUM BROMIDE AND ALBUTEROL SULFATE 1 DOSE: .5; 3 SOLUTION RESPIRATORY (INHALATION) at 19:40

## 2024-01-01 RX ADMIN — WATER 60 MG: 1 INJECTION INTRAMUSCULAR; INTRAVENOUS; SUBCUTANEOUS at 05:26

## 2024-01-01 RX ADMIN — POTASSIUM CHLORIDE AND DEXTROSE MONOHYDRATE: 150; 5 INJECTION, SOLUTION INTRAVENOUS at 21:48

## 2024-01-01 RX ADMIN — WATER 40 MG: 1 INJECTION INTRAMUSCULAR; INTRAVENOUS; SUBCUTANEOUS at 10:57

## 2024-01-01 RX ADMIN — NYSTATIN 500000 UNITS: 100000 SUSPENSION ORAL at 21:39

## 2024-01-01 RX ADMIN — IPRATROPIUM BROMIDE AND ALBUTEROL SULFATE 1 DOSE: .5; 3 SOLUTION RESPIRATORY (INHALATION) at 20:29

## 2024-01-01 RX ADMIN — FLUTICASONE PROPIONATE 1 SPRAY: 50 SPRAY, METERED NASAL at 10:40

## 2024-01-01 RX ADMIN — HALOPERIDOL LACTATE 1 MG: 5 INJECTION, SOLUTION INTRAMUSCULAR at 23:31

## 2024-01-01 RX ADMIN — WATER 60 MG: 1 INJECTION INTRAMUSCULAR; INTRAVENOUS; SUBCUTANEOUS at 06:40

## 2024-01-01 RX ADMIN — SODIUM CHLORIDE 3000 MG: 900 INJECTION INTRAVENOUS at 09:14

## 2024-01-01 RX ADMIN — LORAZEPAM 1 MG: 2 INJECTION INTRAMUSCULAR; INTRAVENOUS at 10:37

## 2024-01-01 RX ADMIN — HALOPERIDOL LACTATE 0.5 MG: 5 INJECTION, SOLUTION INTRAMUSCULAR at 03:45

## 2024-01-01 RX ADMIN — SERTRALINE HYDROCHLORIDE 150 MG: 50 TABLET ORAL at 10:28

## 2024-01-01 RX ADMIN — HALOPERIDOL LACTATE 1 MG: 5 INJECTION, SOLUTION INTRAMUSCULAR at 12:34

## 2024-01-01 RX ADMIN — DOXYCYCLINE 100 MG: 100 INJECTION, POWDER, LYOPHILIZED, FOR SOLUTION INTRAVENOUS at 06:31

## 2024-01-01 RX ADMIN — BUDESONIDE 250 MCG: 0.5 INHALANT RESPIRATORY (INHALATION) at 20:18

## 2024-01-01 RX ADMIN — POTASSIUM CHLORIDE 10 MEQ: 7.45 INJECTION INTRAVENOUS at 09:19

## 2024-01-01 RX ADMIN — IPRATROPIUM BROMIDE AND ALBUTEROL SULFATE 1 DOSE: .5; 3 SOLUTION RESPIRATORY (INHALATION) at 07:12

## 2024-01-01 RX ADMIN — BUDESONIDE 250 MCG: 0.5 INHALANT RESPIRATORY (INHALATION) at 07:18

## 2024-01-01 RX ADMIN — IPRATROPIUM BROMIDE AND ALBUTEROL SULFATE 1 DOSE: .5; 3 SOLUTION RESPIRATORY (INHALATION) at 16:19

## 2024-01-01 RX ADMIN — NYSTATIN 500000 UNITS: 100000 SUSPENSION ORAL at 13:29

## 2024-01-01 RX ADMIN — HEPARIN SODIUM 5000 UNITS: 5000 INJECTION INTRAVENOUS; SUBCUTANEOUS at 10:21

## 2024-01-01 RX ADMIN — MORPHINE SULFATE 2 MG: 2 INJECTION, SOLUTION INTRAMUSCULAR; INTRAVENOUS at 20:26

## 2024-01-01 RX ADMIN — HEPARIN SODIUM 5000 UNITS: 5000 INJECTION INTRAVENOUS; SUBCUTANEOUS at 08:35

## 2024-01-01 RX ADMIN — MEMANTINE 5 MG: 10 TABLET ORAL at 20:28

## 2024-01-01 RX ADMIN — HEPARIN SODIUM 5000 UNITS: 5000 INJECTION INTRAVENOUS; SUBCUTANEOUS at 08:19

## 2024-01-01 RX ADMIN — MORPHINE SULFATE 2 MG: 2 INJECTION, SOLUTION INTRAMUSCULAR; INTRAVENOUS at 17:36

## 2024-01-01 RX ADMIN — NYSTATIN 500000 UNITS: 100000 SUSPENSION ORAL at 17:00

## 2024-01-01 RX ADMIN — SODIUM CHLORIDE, PRESERVATIVE FREE 40 MG: 5 INJECTION INTRAVENOUS at 09:25

## 2024-01-01 RX ADMIN — HYDROMORPHONE HYDROCHLORIDE 1 MG: 1 INJECTION, SOLUTION INTRAMUSCULAR; INTRAVENOUS; SUBCUTANEOUS at 09:35

## 2024-01-01 RX ADMIN — IPRATROPIUM BROMIDE AND ALBUTEROL SULFATE 1 DOSE: .5; 3 SOLUTION RESPIRATORY (INHALATION) at 13:48

## 2024-01-01 RX ADMIN — SODIUM CHLORIDE: 9 INJECTION, SOLUTION INTRAVENOUS at 10:08

## 2024-01-01 RX ADMIN — NYSTATIN 500000 UNITS: 100000 SUSPENSION ORAL at 10:17

## 2024-01-01 RX ADMIN — TRAZODONE HYDROCHLORIDE 25 MG: 50 TABLET ORAL at 20:28

## 2024-01-01 RX ADMIN — SODIUM CHLORIDE, PRESERVATIVE FREE 10 ML: 5 INJECTION INTRAVENOUS at 21:04

## 2024-01-01 RX ADMIN — POTASSIUM CHLORIDE 10 MEQ: 7.45 INJECTION INTRAVENOUS at 05:35

## 2024-01-01 RX ADMIN — DOXYCYCLINE 100 MG: 100 INJECTION, POWDER, LYOPHILIZED, FOR SOLUTION INTRAVENOUS at 04:36

## 2024-01-01 RX ADMIN — QUETIAPINE FUMARATE 50 MG: 25 TABLET ORAL at 09:55

## 2024-01-01 RX ADMIN — MORPHINE SULFATE 2 MG: 2 INJECTION, SOLUTION INTRAMUSCULAR; INTRAVENOUS at 21:58

## 2024-01-01 RX ADMIN — POTASSIUM CHLORIDE AND DEXTROSE MONOHYDRATE: 150; 5 INJECTION, SOLUTION INTRAVENOUS at 11:43

## 2024-01-01 RX ADMIN — SODIUM CHLORIDE, PRESERVATIVE FREE 40 MG: 5 INJECTION INTRAVENOUS at 08:29

## 2024-01-01 RX ADMIN — BUDESONIDE 250 MCG: 0.5 INHALANT RESPIRATORY (INHALATION) at 08:30

## 2024-01-01 RX ADMIN — IPRATROPIUM BROMIDE AND ALBUTEROL SULFATE 1 DOSE: .5; 3 SOLUTION RESPIRATORY (INHALATION) at 20:23

## 2024-01-01 RX ADMIN — HYDROMORPHONE HYDROCHLORIDE 2 MG: 2 INJECTION, SOLUTION INTRAMUSCULAR; INTRAVENOUS; SUBCUTANEOUS at 12:27

## 2024-01-01 RX ADMIN — DOXYCYCLINE 100 MG: 100 INJECTION, POWDER, LYOPHILIZED, FOR SOLUTION INTRAVENOUS at 12:14

## 2024-01-01 RX ADMIN — SODIUM BICARBONATE: 84 INJECTION, SOLUTION INTRAVENOUS at 00:06

## 2024-01-01 RX ADMIN — IPRATROPIUM BROMIDE AND ALBUTEROL SULFATE 1 DOSE: .5; 3 SOLUTION RESPIRATORY (INHALATION) at 19:28

## 2024-01-01 RX ADMIN — FLUTICASONE PROPIONATE 1 SPRAY: 50 SPRAY, METERED NASAL at 08:43

## 2024-01-01 RX ADMIN — LABETALOL HYDROCHLORIDE 10 MG: 5 INJECTION, SOLUTION INTRAVENOUS at 10:26

## 2024-01-01 RX ADMIN — SODIUM CHLORIDE, PRESERVATIVE FREE 10 ML: 5 INJECTION INTRAVENOUS at 10:07

## 2024-01-01 RX ADMIN — NYSTATIN 500000 UNITS: 100000 SUSPENSION ORAL at 20:44

## 2024-01-01 RX ADMIN — HEPARIN SODIUM 5000 UNITS: 5000 INJECTION INTRAVENOUS; SUBCUTANEOUS at 20:38

## 2024-01-01 RX ADMIN — QUETIAPINE FUMARATE 50 MG: 25 TABLET ORAL at 19:04

## 2024-01-01 RX ADMIN — SODIUM CHLORIDE, PRESERVATIVE FREE 40 MG: 5 INJECTION INTRAVENOUS at 09:58

## 2024-01-01 RX ADMIN — HEPARIN SODIUM 5000 UNITS: 5000 INJECTION INTRAVENOUS; SUBCUTANEOUS at 21:59

## 2024-01-01 RX ADMIN — WATER 60 MG: 1 INJECTION INTRAMUSCULAR; INTRAVENOUS; SUBCUTANEOUS at 13:08

## 2024-01-01 RX ADMIN — SODIUM CHLORIDE: 9 INJECTION, SOLUTION INTRAVENOUS at 20:37

## 2024-01-01 RX ADMIN — HEPARIN SODIUM 5000 UNITS: 5000 INJECTION INTRAVENOUS; SUBCUTANEOUS at 09:22

## 2024-01-01 RX ADMIN — DOXYCYCLINE 100 MG: 100 INJECTION, POWDER, LYOPHILIZED, FOR SOLUTION INTRAVENOUS at 22:51

## 2024-01-01 RX ADMIN — IPRATROPIUM BROMIDE AND ALBUTEROL SULFATE 1 DOSE: .5; 3 SOLUTION RESPIRATORY (INHALATION) at 19:17

## 2024-01-01 RX ADMIN — MORPHINE SULFATE 1 MG: 2 INJECTION, SOLUTION INTRAMUSCULAR; INTRAVENOUS at 12:27

## 2024-01-01 RX ADMIN — HEPARIN SODIUM 5000 UNITS: 5000 INJECTION INTRAVENOUS; SUBCUTANEOUS at 10:40

## 2024-01-01 RX ADMIN — HEPARIN SODIUM 5000 UNITS: 5000 INJECTION INTRAVENOUS; SUBCUTANEOUS at 22:27

## 2024-01-01 RX ADMIN — CEFEPIME 1000 MG: 1 INJECTION, POWDER, FOR SOLUTION INTRAMUSCULAR; INTRAVENOUS at 19:18

## 2024-01-01 ASSESSMENT — PAIN DESCRIPTION - DESCRIPTORS
DESCRIPTORS: ACHING
DESCRIPTORS: OTHER (COMMENT)
DESCRIPTORS: OTHER (COMMENT)
DESCRIPTORS: ACHING

## 2024-01-01 ASSESSMENT — PAIN DESCRIPTION - LOCATION
LOCATION: BACK
LOCATION: GENERALIZED
LOCATION: BACK
LOCATION: GENERALIZED
LOCATION: BACK
LOCATION: GENERALIZED
LOCATION: BACK
LOCATION: CHEST

## 2024-01-01 ASSESSMENT — PAIN SCALES - PAIN ASSESSMENT IN ADVANCED DEMENTIA (PAINAD)
BODYLANGUAGE: 0
CONSOLABILITY: 0
FACIALEXPRESSION: 2
NEGVOCALIZATION: 0
BREATHING: 1
TOTALSCORE: 7
NEGVOCALIZATION: 0
FACIALEXPRESSION: 0
BREATHING: 1
NEGVOCALIZATION: 1
FACIALEXPRESSION: 2
CONSOLABILITY: 1
CONSOLABILITY: 1
FACIALEXPRESSION: 0
BODYLANGUAGE: 0
FACIALEXPRESSION: 0
BODYLANGUAGE: 1
BODYLANGUAGE: 1
BREATHING: 0
NEGVOCALIZATION: 2
CONSOLABILITY: 1
TOTALSCORE: 7
BREATHING: 1
BODYLANGUAGE: 0
FACIALEXPRESSION: 2
CONSOLABILITY: 0
FACIALEXPRESSION: 0
BREATHING: 1
TOTALSCORE: 0
FACIALEXPRESSION: 0
NEGVOCALIZATION: 1
TOTALSCORE: 3
BODYLANGUAGE: 1
NEGVOCALIZATION: 1
NEGVOCALIZATION: 2
CONSOLABILITY: 1
BREATHING: 1
TOTALSCORE: 1
BODYLANGUAGE: 1
TOTALSCORE: 1
BREATHING: 1
NEGVOCALIZATION: 2
BODYLANGUAGE: 0
TOTALSCORE: 3
TOTALSCORE: 2
TOTALSCORE: 7
CONSOLABILITY: 0
NEGVOCALIZATION: 0
CONSOLABILITY: 0
TOTALSCORE: 3
FACIALEXPRESSION: 0
CONSOLABILITY: 1
BREATHING: 0
NEGVOCALIZATION: 1
BODYLANGUAGE: 1
FACIALEXPRESSION: 0
BODYLANGUAGE: 0
BREATHING: 1
BREATHING: 1
CONSOLABILITY: 0

## 2024-01-01 ASSESSMENT — PAIN SCALES - GENERAL
PAINLEVEL_OUTOF10: 0
PAINLEVEL_OUTOF10: 5
PAINLEVEL_OUTOF10: 0
PAINLEVEL_OUTOF10: 7
PAINLEVEL_OUTOF10: 8
PAINLEVEL_OUTOF10: 0
PAINLEVEL_OUTOF10: 6
PAINLEVEL_OUTOF10: 0
PAINLEVEL_OUTOF10: 0
PAINLEVEL_OUTOF10: 7
PAINLEVEL_OUTOF10: 0
PAINLEVEL_OUTOF10: 7
PAINLEVEL_OUTOF10: 8
PAINLEVEL_OUTOF10: 8
PAINLEVEL_OUTOF10: 0
PAINLEVEL_OUTOF10: 7
PAINLEVEL_OUTOF10: 3
PAINLEVEL_OUTOF10: 0
PAINLEVEL_OUTOF10: 1
PAINLEVEL_OUTOF10: 5
PAINLEVEL_OUTOF10: 0
PAINLEVEL_OUTOF10: 6
PAINLEVEL_OUTOF10: 0
PAINLEVEL_OUTOF10: 0

## 2024-01-01 ASSESSMENT — PAIN DESCRIPTION - ORIENTATION
ORIENTATION: LOWER
ORIENTATION: LOWER;POSTERIOR

## 2024-01-01 ASSESSMENT — PAIN SCALES - WONG BAKER
WONGBAKER_NUMERICALRESPONSE: 0
WONGBAKER_NUMERICALRESPONSE: 6
WONGBAKER_NUMERICALRESPONSE: 6;8
WONGBAKER_NUMERICALRESPONSE: 6
WONGBAKER_NUMERICALRESPONSE: 0

## 2024-01-01 ASSESSMENT — PAIN - FUNCTIONAL ASSESSMENT
PAIN_FUNCTIONAL_ASSESSMENT: 0-10
PAIN_FUNCTIONAL_ASSESSMENT: ACTIVITIES ARE NOT PREVENTED

## 2024-01-01 ASSESSMENT — PAIN DESCRIPTION - PAIN TYPE: TYPE: CHRONIC PAIN

## 2024-01-05 PROBLEM — R65.20 SEVERE SEPSIS (HCC): Status: ACTIVE | Noted: 2024-01-01

## 2024-01-05 PROBLEM — A41.9 SEVERE SEPSIS (HCC): Status: ACTIVE | Noted: 2024-01-01

## 2024-01-05 NOTE — H&P
[id number] [place] [address]   Office [phone]  Fax [phone]       Admission H&P     Name: Jerry Fry MRN: [id number]  Sex: Male   YOB: 1940  Age: 83 y.o.  PCP: Renetta Cota MD     Source of Information: patient, medical records    Chief complaint: fatigue and SOB    History of Present Illness  Jerry Fry is a 83 y.o. male with known history of dementia, CAD s/p CABG, CKD 4, COPD on 4L, T2DM who was brought in to the ED by his caregivers for fatigue and shortness of breath. Caregiver states that patient was very weak and slumped down during his shower prompting them to call EMTs.   No family is present with him and his ability to provide history is limited, but he complains of abdominal pain and not feeling too well.     Additional history from wife of recent back injury about 3 weeks ago with no evidence of fracture. He has since been on multiple opioid pain medication with no bowel movement for about 2 weeks with preceding diarhoea. She also endorses progressively worsening confusion, poor PO intake,  weakness and SOB. She denies chills, cough, SOB, URI symptoms (endorses chronic congestion), sick contacts, changes in urination.     In the ER:      Vitals:  Patient Vitals for the past 8 hrs:   Temp Pulse Resp BP SpO2   01/05/24 1243 -- 93 20 -- 93 %   01/05/24 1235 -- 95 22 123/66 92 %   01/05/24 1204 97.5 °F (36.4 °C) 98 29 (!) 104/57 (!) 87 %         Labs:   Recent Labs     01/05/24  1207   WBC 23.6*   HGB 13.1   HCT 39.5        Recent Labs     01/05/24  1207      K 4.7   *   CO2 18*   BUN 52*   MG 2.3     Recent Labs     01/05/24  1207   GLOB 5.2*     No results for input(s): \"INR\", \"APTT\" in the last 72 hours.    Invalid input(s): \"PTP\"   Invalid input(s): \"PHI\", \"PCO2I\", \"PO2I\", \"FIO2I\"  No results for input(s): \"CPK\", \"CKMB\" in the last 72 hours.    Invalid input(s): \"TROIQ\", \"BNPP\"    Imaging: CXR    Treatment: S/p IV Azithromycin

## 2024-01-05 NOTE — ED NOTES
Spoke with Efraín from family medicine- will upgrade to stepdown with the continuous bi-pap. Verbal order to hold lasix at this time.

## 2024-01-05 NOTE — ED NOTES
Dr. De Paz notified of elevated Lactic and VBG results. Plan to hold Bipap at this time. RT aware.

## 2024-01-05 NOTE — ED NOTES
Bedside shift change report given to Mara (oncoming nurse) by Corin (offgoing nurse). Report included the following information Nurse Handoff Report.

## 2024-01-05 NOTE — ED PROVIDER NOTES
[facility] EMERGENCY DEPT  EMERGENCY DEPARTMENT ENCOUNTER      Pt Name: Jerry Fry  MRN: [id number]  Birthdate 1940  Date of evaluation: 1/5/2024  Provider: Elina De Paz DO    CHIEF COMPLAINT       Chief Complaint   Patient presents with    Fatigue       PMH   Past Medical History:   Diagnosis Date    Alzheimer's dementia (HCC)     BPH (benign prostatic hyperplasia)     CAD (coronary artery disease)     Carotid artery stenosis     CKD (chronic kidney disease)     COPD (chronic obstructive pulmonary disease) (HCC)     HLD (hyperlipidemia)     T2DM (type 2 diabetes mellitus) ([facility])          MDM:   Vitals:    Vitals:    01/05/24 1243   BP:    Pulse: 93   Resp: 20   Temp:    SpO2: 93%           This is a 83 y.o. male with pmhx dementia, CAD, COPD on 4L home O2, CHF, CKD, T2DM, HLD, HTN who presents today for cc of dyspnea and fatigue.  Patient unable give history secondary to dementia and acuity of condition.  Per EMS patient's caregivers had noticed that over the last few days he has had worsening fatigue, productive cough, worsening dyspnea on exertion.  They took him off his oxygen to help him take a shower and afterwards he felt too fatigued to stand up and they lowered him to the ground.  No falls.  He currently complains of dyspnea, no chest pain.    On arrival VS include hypoxia 86% on home 4 L, up to 93% on nonrebreather.  Otherwise stable vitals.   Physical Exam  General: Alert, no acute distress  HEENT: Normocephalic, atraumatic. EOMI, moist oral mucosa, no conjunctival injection  Neck: ROM normal, supple  Cardio: Heart regular rate and rhythm, cap refill <2seconds  Lungs: Tachypneic, slightly increased work of breathing without tripoding.  Diffuse expiratory wheeze with crackles in bilateral lung bases  Abdomen: Soft, nontender  MSK: ROM normal, 2+ pitting edema up to the thighs  Skin: Warm, dry, no rash  Neuro: No focal neurodeficits, at baseline mental status    Treatments given for likely COPD

## 2024-01-05 NOTE — ED TRIAGE NOTES
Patient arrival via EMS from home-     EMS reports they have had multiple calls to the home over the past few days for lift assist for the patient. EMS reports today patients caregiver was helping the patient in the shower when he had generalized weakness afterwards and was lowered to the ground.     EMS reported Rhonchi and gave the patient a neb treatment-     Patient arrived with pulse ox 86-88% on 4L nasal cannula

## 2024-01-06 PROBLEM — F02.80 ALZHEIMER'S DEMENTIA (HCC): Status: ACTIVE | Noted: 2019-06-28

## 2024-01-06 PROBLEM — R79.89 ELEVATED BRAIN NATRIURETIC PEPTIDE (BNP) LEVEL: Status: ACTIVE | Noted: 2024-01-01

## 2024-01-06 PROBLEM — J18.9 PNEUMONIA OF BOTH LUNGS DUE TO INFECTIOUS ORGANISM: Status: ACTIVE | Noted: 2024-01-01

## 2024-01-06 PROBLEM — E87.20 LACTIC ACIDOSIS: Status: ACTIVE | Noted: 2024-01-01

## 2024-01-06 PROBLEM — J96.01 ACUTE RESPIRATORY FAILURE WITH HYPOXIA (HCC): Status: ACTIVE | Noted: 2024-01-01

## 2024-01-06 PROBLEM — K59.00 CONSTIPATION: Status: ACTIVE | Noted: 2024-01-01

## 2024-01-06 PROBLEM — D72.829 LEUKOCYTOSIS: Status: ACTIVE | Noted: 2024-01-01

## 2024-01-06 PROBLEM — J44.1 COPD EXACERBATION (HCC): Status: ACTIVE | Noted: 2019-09-13

## 2024-01-06 PROBLEM — G30.9 ALZHEIMER'S DEMENTIA (HCC): Status: ACTIVE | Noted: 2019-06-28

## 2024-01-06 PROBLEM — I50.9 ACUTE ON CHRONIC CONGESTIVE HEART FAILURE (HCC): Status: ACTIVE | Noted: 2022-12-23

## 2024-01-06 NOTE — ED NOTES
Attending asked if patient can come off bipap, no ABG was obtained, unclear why patient is on bipap. Order for ABG now

## 2024-01-06 NOTE — ED NOTES
Bipap removed, patient without distress w/ 3L NC. Attempted swallow screening, failed. Farooq BARAJAS notified, patient bed order downgraded, routine speech consult

## 2024-01-07 PROBLEM — G93.40 ACUTE ENCEPHALOPATHY: Status: ACTIVE | Noted: 2024-01-01

## 2024-01-08 PROBLEM — E87.0 HYPERNATREMIA: Status: ACTIVE | Noted: 2024-01-01

## 2024-01-08 NOTE — CARE COORDINATION
01/08/24 1632   Service Assessment   Patient Orientation Unable to Assess   Cognition Dementia / Early Alzheimer's   History Provided By Spouse   Primary Caregiver Spouse   Support Systems Spouse/Significant Other;Other (Comment)  (personal care aide)   Patient's Healthcare Decision Maker is: Legal Next of Kin   PCP Verified by CM Yes  (Renetta Cota MD)   Last Visit to PCP Within last 3 months   Prior Functional Level Assistance with the following:;Bathing;Dressing;Toileting;Cooking;Shopping;Mobility   Current Functional Level Assistance with the following:;Bathing;Dressing;Toileting;Cooking;Housework;Shopping;Mobility   Can patient return to prior living arrangement No   Family able to assist with home care needs: No  (needs are too great at this time)   Would you like for me to discuss the discharge plan with any other family members/significant others, and if so, who? Yes  (wife, Patricia)   Financial Resources Medicare;Medicaid   Social/Functional History   Lives With Spouse   Type of Home House   Home Layout One level   Bathroom Toilet Bedside commode   Bathroom Equipment Shower chair   Home Equipment Cane;Walker, rolling;Wheelchair-manual   Receives Help From Personal care attendant   Active  No   Patient's  Info wife   Discharge Planning   Type of Residence Skilled Nursing Facility   Patient expects to be discharged to: Skilled nursing facility   Services At/After Discharge   Mode of Transport at Discharge BLS   Condition of Participation: Discharge Planning   The Plan for Transition of Care is related to the following treatment goals: hypoxia, COPD exacerbation,severe sepsis, pneumonia, acute on chronic CHF   The Patient and/or Patient Representative was provided with a Choice of Provider? Patient Representative   Name of the Patient Representative who was provided with the Choice of Provider and agrees with the Discharge Plan?  Patricia, wife   The Patient and/Or Patient Representative agree with

## 2024-01-08 NOTE — TELEPHONE ENCOUNTER
----- Message from Fiorella Oglesby sent at 1/8/2024 12:35 PM EST -----  Subject: Message to Provider    QUESTIONS  Information for Provider? patient wife (Patricia) would like for you to call   her about his admission into the hospital  ---------------------------------------------------------------------------  --------------  CALL BACK INFO  [phone]; OK to leave message on voicemail  ---------------------------------------------------------------------------  --------------  SCRIPT ANSWERS  Relationship to Patient? Spouse/Partner  Representative Name? patricia  Is the representative on the Communication Release of Information (SHERON)   form in Epic? Yes

## 2024-01-09 NOTE — ACP (ADVANCE CARE PLANNING)
Primary Decision Maker: Patricia Fry - Spouse - [phone]    Pt does not have AMD on file, is currently unable to complete.  In absence of verified Medical POA, wife is legal NOK and surrogate decision maker.

## 2024-01-09 NOTE — CARE COORDINATION
CM Note:  Pt was accepted at [facility].  [facility] pending.    Pt will need auth and be sitter-free  Pt needs stretcher transport at d/c  Currently on 3L O2  SLP consult  RAMO Machuca

## 2024-01-10 NOTE — CARE COORDINATION
2:26 PM  CM received a return call from patients spouse and updated on acceptances and [facility] denial. Patients spouse prefers [facility] as her first choice as he had been there in the past. CM sent a message to admissions but found that she is out of the office and CM attempted to reach the , Alek but CM could not reach him. CM let admissions know to review tomorrow when she is back in the office, admissions said she would look in the AM and let the CM know, she also asked for the referral in careport since she cannot see the referral in [facility] at this time. CM sent via [employer].     10:38 AM  CM left a VM on home and cell for patients spouse to update her on referrals and to obtain choice to start auth. Aleena Hernandez    9:18 AM  CM received the below message from Arsenio:   \"Unfortunately he has a Medicare Advantage plan and we are \"out-of-network\" for all Medicare Advantage plans so we will not be able to help (either short or long term care) as long as he has a replacement plan\"    CM will update patients spouse to let her know that [facility] are out of network with patients insurance plan.     Aleena Hernandez  Care Manager    1/10/24  8:53 AM    Care Management Daily Progress Note:    1. Pneumonia of both lungs due to infectious organism, unspecified part of lung    2. COPD exacerbation (HCC)    3. Acute on chronic congestive heart failure, unspecified heart failure type (HCC)    4. Hypoxia    5. Severe sepsis (HCC)      CM reviewed EMR, noted patients spouse would like an additional referral placed with Arsen[facility]- CM placed the referral in careport- awaiting response.     CM noted [facility] accepted and [facility] is pending.    Patients insurance requires authorization for SNF- once acceptances are known- auth will need to be started at the SNF the family prefers for patient to discharge to for rehab.     Palliative has been consulted

## 2024-01-11 PROBLEM — J69.0 ASPIRATION PNEUMONITIS (HCC): Status: ACTIVE | Noted: 2024-01-01

## 2024-01-11 NOTE — CARE COORDINATION
1600:  Met with pt's wife and son.  They are interested in him going to [facility].  Several call made to the snf and reached Cam.  She cannot get any referrals in [facility].  She stated they will not have any bed availability until next week.  A referral was sent in [facility].    CM Note:  Cardiology following-CHF, fluid overload  SLP following-pt now npo--aspiration  Dietitian following  Palliative care following  Ortho following- L2 fx-kyphoplasty recommended OP  COPD exacerbation-currently on 8L O2  Dementia-has a sitter  PT/OT following-pt's wife wants [facility]; unable to reach admissions; will try again today  RAMO Machuac

## 2024-01-12 NOTE — CARE COORDINATION
CM Note:  PT/OT/SLP following  Nephrology following  Pt currently on 6L O2  He has a sitter  Palliative to meet with family for GOC  RAMO Machuca

## 2024-01-16 NOTE — CARE COORDINATION
CM Note:  Met with pt's wife and son re dispo plans.  They are considering LTC at [facility] (a referral has been sent) and adding hospice.  A referral was sent in [facility] to [facility] (they go to [facility]).  Information was left in pt's room on their hospice along with a list of hospice agencies.  Pt has a sitter.  RAMO Machuca

## 2024-01-17 PROBLEM — A41.9 SEVERE SEPSIS (HCC): Status: RESOLVED | Noted: 2024-01-01 | Resolved: 2024-01-01

## 2024-01-17 PROBLEM — R65.20 SEVERE SEPSIS (HCC): Status: RESOLVED | Noted: 2024-01-01 | Resolved: 2024-01-01

## 2024-01-17 NOTE — CARE COORDINATION
3:45 pm:  PRIYANKA s/w Natasha with [facility].  She has been in touch with patient's wife who needs to reschedule their meeting to either later this evening or tomorrow.  Natasha will contact PRIYANKA later to update.      1:11 pm:  PRIYANKA s/w Natasha with [facility].  She will reach out to patient's wife to set up information session this afternoon sometime after 3 pm today.

## 2024-01-18 PROBLEM — J44.1 COPD EXACERBATION (HCC): Status: RESOLVED | Noted: 2019-09-13 | Resolved: 2024-01-01

## 2024-01-18 PROBLEM — J18.9 PNEUMONIA OF BOTH LUNGS DUE TO INFECTIOUS ORGANISM: Status: RESOLVED | Noted: 2024-01-01 | Resolved: 2024-01-01

## 2024-01-18 PROBLEM — K59.00 CONSTIPATION: Status: RESOLVED | Noted: 2024-01-01 | Resolved: 2024-01-01

## 2024-01-18 NOTE — CARE COORDINATION
CM Note:  [facility] is OON with pt's insurance and unable to accept.  [facility] denied pt as they have no LTC beds.  A referral was sent in [facility] to [facility] for LTC.  Pt's family met with Natasha from [facility] last evening for an info session.  No decision has been made.  Pt has a sitter  IVF running-possibly start TPN?  RAMO Machuca

## 2024-01-19 NOTE — WOUND CARE
Wound Consult:  New Patient Visit. Chart reviewed.  Consulted for left hand and sacral area.  Spoke with patients nurse,  Demarcus who consulted for same.  Patient is resting on a [place] bed with [employer] system on mattress. Moves legs/crosses ankles.  Patient is minimally alert; requires one to two person assist to move side to side in bed.  Ozzie score 12, brief and man wick in use. Patient has sitter.  Assessment:  Gluteal cleft - linear pink intact skin ~ 6 x 0.4 cm, no surrounding redness.  Left hand - web space between 4 th and 5th fingers - small laceration/tear noted ~ 0.6 x 0.1 cm. Had good amount of sanguinous drainage forming clot on bandage.   Heels blanching red, with dry chaffed areas along edges.  Treatment:  Sacral foam.  Maxsorb AG to wed space left hand/gauze/tape.  Wound Recommendations:  Sacral foam. Change every three days.  Maxsorb AG to wed space left hand/gauze/tape. Change as needed.  Skin Care / PI Prevention Recommendations:  1. Minimize friction/shear: minimize layers of linen/pads under patient. [place] system. Sacral Foam.  2. Off load pressure/reposition: Turn and reposition approximately every 2 hours. Float heels with pillows .  3. Manage Moisture - Keep skin folds dry. Intentional toileting. Incontinence skin care with incontinence wipes; matt hickey, zinc ointment.  4. Continue to monitor nutrition, pain, and skin risk scale, and skin assessment.  Plan:  Hand off to  Dr. Doty regarding findings and proposed orders for treatment.    Please re-consult should concerns arise despite continued skin/PI prevention measures.    [facility], Wound / Ostomy Department  Wound Healing Office [phone]

## 2024-01-22 NOTE — CARE COORDINATION
CM Note:  [facility] have a LTC bed.  Pt needs to be sitter-free 24 hrs prior to d/c.  TCT pt's wife to let her know.  She stated she can't afftod to pay for a sitter and he can't be without one.  RAMO Machuca

## 2024-01-22 NOTE — CONSULTS
[facility]    Jerry [facility]  YOB: 1940          Assessment & Plan:     CKD 3b, previously f/b Dr. Tyson but lost to f/u since 2020  Hypernatremia/dehydration  Hypoxia: edema vs. Aspiration. May be difficult to distinguish these entities clinically and radiographically  Debility  Dementia   HFpEF  CAD s/p CABG  HTN  COPD    Rec:  Can try IV diuril  Giving free water has minimal effect on plasma volume and should be done if Na continues to worsen  Avoid nephrotoxins and watch labs       Subjective:   CC: Hypernatremia, CKD  HPI: 83 WM with dementia and CKD is seen for hypernnatremia. He used to see Dr. Tyson and his Cr is long-term stable at 1.8. He is admitted with sepsis and possible aspiration. CXR is read as \"pulmonary edema.\" Na is 153   ROS: Limited due to dementia  PMH: HTN, COPD, Dementia, CAD s/p CABG, HFpEF  Current Facility-Administered Medications   Medication Dose Route Frequency    methylPREDNISolone sodium succ (SOLU-MEDROL) 60 mg in sterile water 0.96 mL injection  60 mg IntraVENous Q12H    ipratropium 0.5 mg-albuterol 2.5 mg (DUONEB) nebulizer solution 1 Dose  1 Dose Inhalation Q6H WA RT    haloperidol lactate (HALDOL) injection 1 mg  1 mg IntraVENous Once    chlorothiazide (DIURIL) 250 mg in sterile water 9 mL injection  250 mg IntraVENous Q12H    [Held by provider] dextrose 5 % solution   IntraVENous Continuous    [Held by provider] carvedilol (COREG) tablet 6.25 mg  6.25 mg Oral BID WC    [Held by provider] sacubitril-valsartan (ENTRESTO) 24-26 MG per tablet 1 tablet  1 tablet Oral BID    [Held by provider] amLODIPine (NORVASC) tablet 10 mg  10 mg Oral Daily    labetalol (NORMODYNE;TRANDATE) injection 10 mg  10 mg IntraVENous Q6H PRN    acetaminophen (TYLENOL) tablet 650 mg  650 mg Oral 4 times per day    morphine (PF) injection 2 mg  2 mg IntraVENous Q6H PRN    calcitonin (MIACALCIN) nasal spray 1 spray  1 spray 
     Nutrition Note    Consulted for TF orders and management; noted patient removed NGT yesterday. Will continue to follow peripherally; GOC discussion in the works per chart review. Consult RD as needed.     Electronically signed by Selina Orta MS RD on 1/13/24 at 10:07 AM EST    Contact: Ext: [phone], or via [employer]      
 Nutrition Consult     Type and Reason for Visit: consult for General Nutrition management/assistance with comfort feeds    Nutrition Recommendations/Plan:   Allow PO for comfort feeds as desired by patient when safe position and alert   Diet Texture/Thickened vs Thin liquids per SLP and MD   Measure weight and document    Brief. Pt has not had adequate nutrition since admission. Patient unable to tolerate feeding tube. Pt is unable to swallow without aspiration. Not tolerating any PO meds. Family having conversation with palliative and family practice regarding goals of care. RD has little to add except to communicate the effect IVF have in causing discomfort during the final stage of life. Advise hospice.        Electronically signed by Niru Vale MS, RD   Contact: Ext: [phone], or via [email or web address]    
Contact information for patient's son, Oli, added to chart as secondary contact in event patient's wife, Patricia, cannot be reached.  Oli Fry: [phone]  
Palliative Medicine  Patient Name: Jerry Fry  YOB: 1940  MRN: [id number]  Age: 83 y.o.  Gender: male    Date of Initial Consult: 1/9/2024  Date of Service: 1/11/2024  Time: 10:56 AM  Provider: Serge Nelson MD  Hospital Day: 7  Admit Date: 1/5/2024  Referring Provider: Maryellen Lama      Reasons for Consultation:  Goals of Care    HISTORY OF PRESENT ILLNESS (HPI):   Jerry Fry is a 83 y.o. male with a past medical history of Alzheimer's Dementia, HFpEF (Dr. Edwards), COPD on 4L, CKD IV (Dr. Tyson), DMII, CAD s/p CABG, constipation, who was admitted on 1/5/2024 from home with a diagnosis of severe sepsis secondary to CAP, COPD exacerbation, acute L2 fracture, constipation.  Presentation preceded by fall ~3 weeks prior with no noted injury at that time but started on opioids for pain.  Developed constipation with last bowel movement reportedly 2 weeks prior to admission.  In recent days has had increasing weakness and shortness of breath prompting ED visit.  Initial evaluation significant for BUN/Cr 52/2.21 (baseline Cr ~1.8), WBC 23.6, lacitc acid 2.5, pro BNP ~4k, troponin 103, albumin 2.3, ABG with pH 7.29 and pO2 <27, CXR with increased interstitial markings markings, CT A&P without acute process.  He has been started on antibiotics for infection.  Cardiology and Nephrology following.  Hospital course complicated by impaired swallowing of unknown duration, SLP following, PO meds including GDMT and dementia held.    Psychosocial:   Lives with wife, Patricia rFy  Further history unobtainable as not family available to discuss.    PALLIATIVE DIAGNOSES:    Sepsis 2/2 CAP  COPD Exacerbation, now on baseline O2  Dementia  Acute pain, L2 vertebral fracture  Aspiration risk, NPO status  Palliative Care Encounter    ASSESSMENT AND PLAN:   Reviewed medical chart including progress/consultant notes, MAR, and recent labs/imaging.  Discussed with FM team.  Attempted to start PO diet and meds 
Palliative Medicine  Patient Name: Jerry Fry  YOB: 1940  MRN: [id number]  Age: 83 y.o.  Gender: male    Date of Initial Consult: 1/9/2024  Date of Service: 1/15/2024  Time: 4:55 PM  Provider: Serge Nelson MD  Hospital Day: 11  Admit Date: 1/5/2024  Referring Provider: Maryellen Lama      Reasons for Consultation:  Goals of Care    HISTORY OF PRESENT ILLNESS (HPI):   Jerry Fry is a 83 y.o. male with a past medical history of Alzheimer's Dementia, HFpEF (Dr. Edwards), COPD on 4L, CKD IV (Dr. Tyson), DMII, CAD s/p CABG, constipation, who was admitted on 1/5/2024 from home with a diagnosis of severe sepsis secondary to CAP, COPD exacerbation, acute L2 fracture, constipation.  Presentation preceded by fall ~3 weeks prior with no noted injury at that time but started on opioids for pain.  Developed constipation with last bowel movement reportedly 2 weeks prior to admission.  In recent days has had increasing weakness and shortness of breath prompting ED visit.  Initial evaluation significant for BUN/Cr 52/2.21 (baseline Cr ~1.8), WBC 23.6, lacitc acid 2.5, pro BNP ~4k, troponin 103, albumin 2.3, ABG with pH 7.29 and pO2 <27, CXR with increased interstitial markings markings, CT A&P without acute process.  He has been started on antibiotics for infection.  Cardiology and Nephrology following.  Hospital course complicated by impaired swallowing of unknown duration, SLP following, PO meds including GDMT and dementia held.    Psychosocial:   Lives with wife, Patricia Fry  Further history unobtainable as not family available to discuss.    PALLIATIVE DIAGNOSES:    Sepsis 2/2 CAP  COPD Exacerbation, now on baseline O2  Dementia  Acute pain, L2 vertebral fracture  Aspiration risk, NPO status  Palliative Care Encounter    ASSESSMENT AND PLAN:   Reviewed medical chart including progress/consultant notes, MAR, and recent labs/imaging.  Discussed with FM team and SLP Grace.  Had NGT placed Friday 
re-directable with sitter.  Attempted to call wife Patricia but no answer.  Attempted to call FM team to discuss, no answer.  Possibly meeting together(?).  Previous plan had been to transition to comfort measures today once family arrived to hospital.     Hopeful to address a few questions FM team has asked prior and some I see in chart review:  If/when family accepting of allowing PO for comfort, might be best to resume at prior recommendation of SLP: puree and moderately thick liquids, offering patient food if he expresses interest but not forcing food if he does not wish to take it or if too lethargic  Note family is interested in inpatient hospice (GIP) per documentation.  Unfortunately, GIP is not elective.  Patients must meet fairly strict criteria for this level of care and would not be eligible for GIP while continuing interventions like IV antibiotics and IV fluids, which he remains on.  He has been and continues to be appropriate for Routine level of care with hospice which could occur in private home, group home, or LTC facility though room and board or private caregivers would not be covered by Hospice or Medicare.  He would require 24-hour care.    Family has asked for further delineation between Palliative and Hospice.  I do not see a clear role for Palliative care outside of the hospital or possibility for him to transition out of hospital with a community Palliative program, even one that does home visits as this would not be nearly enough support for him or family.  Hospice would be much more robust support team and is appropriate.  If transition made to comfort measures, following medications would be reasonable and appropriate  Morphine 5mg solution by mouth every 1 hour as needed for pain or shortness of breath, or morphine 2mg IV every 15 minutes as needed for pain or shortness of breath not relieved by PO or if not tolerating PO.   Lorazepam 1mg solution every 1 hour as needed for anxiety or 
  Made recommendation for DNR/DNI, expressing that it is unlikely he would survive these interventions and if so would certainly be in worse condition even than he is now.  Mrs. Gomez believes he would not want these measures but wishes to think about this first before potential transition.  Palliative team not present over weekend but will check in again on Monday.  Please call with any palliative questions or concerns.  Palliative Care Team is available via perfect serve or via phone.    Referrals to:   [] Outpatient Palliative Care  [] Home Based Palliative Care  [] Home Based Primary Care  [] Hospice       ADVANCE CARE PLANNING:   [x] [facility] Interdisciplinary Team has updated the ACP Navigator with Health Care Decision Maker and Patient Capacity      Primary Decision Maker: Patricia Fry - Spouse - [phone]    Secondary Decision Maker: AngOli - Child - [phone]  Confirm Advance Directive: None    Current Code Status: Full Code     Goals of Care: Goals of Care and Interventions  Patient/Health Care Proxy Stated Goals:  (unable to determine at this time)  Life Goals  Patient and Family Personal Goals: unable to determine at this time    Please refer to Palliative Medicine ACP notes for further details.    PALLIATIVE ASSESSMENT:      Palliative Performance Scale (PPS):  PPS: 40    ECOG:        Modified ESAS:  Modified-[place] Symptom Assessment Scale (ESAS)  Pain Score: 2  Dyspnea Score: No shortness of breath    Clinical Pain Assessment (nonverbal scale for severity on nonverbal patients):   Clinical Pain Assessment  Severity: 2  Location: NA  Character: NA  Duration: NA  Factors: NA  Frequency: NA       NVPS:  Adult Nonverbal Pain Scale (NVPS)  Face: No particular expression or smile  Activity (Movement): Restless, excessive activity and/or withdrawal reflexes  Guarding: Lying quietly, no positioning of hands over areas of bod  Physiology (Vital Signs): Stable vital signs  Respiratory: 
PLANNING:   [x] [facility] Interdisciplinary Team has updated the ACP Navigator with Health Care Decision Maker and Patient Capacity      Primary Decision Maker: Patricia Fry - Spouse - [phone]    Secondary Decision Maker: Oli Fry - Child - [phone]  Confirm Advance Directive: None    Current Code Status: DNR     Goals of Care: Goals of Care and Interventions  Patient/Health Care Proxy Stated Goals:  (unable to determine at this time)  Life Goals  Patient and Family Personal Goals: unable to determine at this time    Please refer to Palliative Medicine ACP notes for further details.    PALLIATIVE ASSESSMENT:      Palliative Performance Scale (PPS):  PPS: 40    ECOG:        Modified ESAS:  Modified-[place] Symptom Assessment Scale (ESAS)  Pain Score: 2  Dyspnea Score: No shortness of breath    Clinical Pain Assessment (nonverbal scale for severity on nonverbal patients):   Clinical Pain Assessment  Severity: 2  Location: NA  Character: NA  Duration: NA  Factors: NA  Frequency: NA       NVPS:  Adult Nonverbal Pain Scale (NVPS)  Face: No particular expression or smile  Activity (Movement): Laid quietly, normal position  Guarding: Lying quietly, no positioning of hands over areas of bod  Physiology (Vital Signs): Stable vital signs  Respiratory: Baseline RR/SpO2 compliant with ventilator  NVPS Score : 0    RDOS:  RDOS  Heart rate per minute: less than 90  Respiratory rate per minute: less than 19  Restlessness:non-purposeful: None  Paradoxical breathing pattern:abdomen moves in on inspiration: None  Accessory muscle use: rise in clavicle during inspiration: None  Grunting at end-expiration: guttural sound: None  Nasal flaring: involuntary movement of nares: None  Look of fear: None  Total : 0      Vital Signs: Blood pressure 124/73, pulse (!) 104, temperature 98.1 °F (36.7 °C), temperature source Axillary, resp. rate 16, height 1.626 m (5' 4\"), weight 74.4 kg (164 lb), SpO2 95 %.    PHYSICAL 
mL/d    Nutrition Related Findings:   Edema: None            RLE Edema: +1, Pitting  LLE Edema: +1, Non-pitting    Bowel Movement:  Last BM (including prior to admit): 01/14/24    Wounds: Wound Type: None    Nutrition Related Labs:  Lab Results   Component Value Date    CREATININE 2.23 (H) 01/18/2024    BUN 63 (H) 01/18/2024     (H) 01/18/2024    K 4.5 01/18/2024     (H) 01/18/2024    CO2 20 (L) 01/18/2024     Lab Results   Component Value Date/Time    POCGLU 137 01/18/2024 12:17 PM    POCGLU 140 01/18/2024 06:04 AM    POCGLU 167 01/17/2024 04:38 PM    POCGLU 118 01/17/2024 12:09 PM    POCGLU 113 01/17/2024 08:22 AM    POCGLU 114 01/16/2024 11:24 PM      Hemoglobin A1C   Date Value Ref Range Status   01/11/2024 5.5 4.0 - 5.6 % Final     Comment:     (NOTE)  HbA1C Interpretive Ranges  <5.7              Normal  5.7 - 6.4         Consider Prediabetes  >6.5              Consider Diabetes       Hemoglobin A1C, POC   Date Value Ref Range Status   10/03/2023 5.8 % Final     Lab Results   Component Value Date/Time    MG 2.3 01/18/2024 11:53 AM     Lab Results   Component Value Date    CALCIUM 8.7 01/18/2024    PHOS 3.8 01/18/2024     Current Medications:  Scheduled Meds:   haloperidol lactate  1 mg IntraMUSCular Once    ipratropium 0.5 mg-albuterol 2.5 mg  1 Dose Inhalation Q6H WA RT    nystatin  5 mL Oral 4x Daily    ampicillin-sulbactam  3,000 mg IntraVENous Q12H    [Held by provider] carvedilol  6.25 mg Oral BID WC    [Held by provider] sacubitril-valsartan  1 tablet Oral BID    [Held by provider] amLODIPine  10 mg Oral Daily    [Held by provider] acetaminophen  650 mg Oral 4 times per day    calcitonin  1 spray Alternating Nares Daily    [Held by provider] memantine  5 mg Oral BID    pantoprazole (PROTONIX) 40 mg in sodium chloride (PF) 0.9 % 10 mL injection  40 mg IntraVENous Daily    arformoterol tartrate  15 mcg Nebulization BID RT    budesonide  0.25 mg Nebulization BID RT    [Held by provider] 
chloride flush, sodium chloride, ondansetron **OR** ondansetron, polyethylene glycol    Current Nutrition Therapies:  Diet: ADULT DIET; Dysphagia - Pureed; Moderately Thick (Honey)  ADULT ORAL NUTRITION SUPPLEMENT; Dinner; Frozen Oral Supplement  ADULT ORAL NUTRITION SUPPLEMENT; Lunch; Protein Modular      Meal Intake:   Patient Vitals for the past 168 hrs:   PO Meals Eaten (%)   01/10/24 0330 0%   01/09/24 1940 0%     Supplement Intake:  No data found.    Anthropometric Measures:  Height: 162.6 cm (5' 4\")  Ideal Body Weight (IBW): 130 lbs (59 kg)    Admission Body Weight: 73.7 kg (162 lb 7.7 oz)  Current Body Weight: 74.4 kg (164 lb 0.4 oz), 126.2 % IBW. Weight Source: Bed Scale  Current BMI (kg/m2): 28.1  Usual Body Weight: 73.7 kg (162 lb 7.7 oz)  % Weight Change (Calculated): 0.9  Weight Adjustment For: No Adjustment                 BMI Categories: Overweight (BMI 25.0-29.9)    Wt Readings from Last 10 Encounters:   01/06/24 74.4 kg (164 lb)   12/22/23 73.7 kg (162 lb 6.4 oz)   12/14/23 73.9 kg (163 lb)   10/24/23 73.1 kg (161 lb 3.2 oz)   10/03/23 72.6 kg (160 lb)   06/08/23 74.4 kg (164 lb)   02/10/23 76.2 kg (168 lb)   01/20/23 74.8 kg (165 lb)   01/12/23 75.3 kg (166 lb)   12/23/22 77.4 kg (170 lb 9.6 oz)     Last Weight Metrics:      1/10/2024     3:29 PM 1/6/2024     9:04 AM 1/5/2024    12:04 PM 12/29/2023    10:55 AM 12/22/2023     2:03 PM 12/22/2023     1:59 PM 12/14/2023     1:09 PM   Weight Loss Metrics   Height 5' 4\" 5' 4\" 5' 4\" 5' 4\"   5' 4\"   Weight - Scale  164 lbs 162 lbs 6 oz  162 lbs 6 oz 162 lbs 6 oz 163 lbs   BMI (Calculated)  28.2 kg/m2 27.9 kg/m2  0 kg/m2 0 kg/m2 28 kg/m2       Nutrition Diagnosis:   Inadequate oral intake related to swallowing difficulty as evidenced by swallow study results  Moderate malnutrition related to cognitive or neurological impairment, inadequate protein-energy intake, swallowing difficulty as evidenced by Criteria as identified in malnutrition assessment, mild 
mental status exam  [] Drowsy  [x] Confused  []Other: awake and alert, tracking with eyes, follows basic commands to squeeze hands and move toes  Cardiovascular: [x] Regular rate [] Arrhythmia  [] Other:  Chest: [x] Effort normal  []Lungs clear  [] Respiratory distress  []Tachypnea  [x] Other: on 10L NC  Abdomen: [] Soft/non-tender  [x] Normal appearance  [] Distended  [] Ascites  [] Other:  Neurological: [] Normal speech  [] Normal sensation  []Deficits present:  Extremity: [] Normal skin color/temp  [] Clubbing/cyanosis  [] No edema  [] Other:    Wt Readings from Last 15 Encounters:   01/06/24 74.4 kg (164 lb)   12/22/23 73.7 kg (162 lb 6.4 oz)   12/14/23 73.9 kg (163 lb)   10/24/23 73.1 kg (161 lb 3.2 oz)   10/03/23 72.6 kg (160 lb)   06/08/23 74.4 kg (164 lb)   02/10/23 76.2 kg (168 lb)   01/20/23 74.8 kg (165 lb)   01/12/23 75.3 kg (166 lb)   12/23/22 77.4 kg (170 lb 9.6 oz)   11/29/22 77.3 kg (170 lb 8 oz)   11/09/22 76.6 kg (168 lb 12.8 oz)   09/30/22 77.1 kg (170 lb)   09/12/22 76.2 kg (168 lb)   07/29/22 75.8 kg (167 lb)        Current Diet: Diet NPO       PSYCHOSOCIAL/SPIRITUAL SCREENING:   Palliative IDT has assessed this patient for cultural preferences / practices and a referral made as appropriate to needs (Cultural Services, Patient Advocacy, Ethics, etc.)    Spiritual Affiliation: Confucianist    Any spiritual / Latter-day concerns:  [] Yes /  [x] No   If \"Yes\" to discuss with pastoral care during IDT     Does caregiver feel burdened by caring for their loved one:   [] Yes /  [x] No /  [] No Caregiver Present/Available [] No Caregiver [] Pt Lives at Facility  If \"Yes\" to discuss with social work during IDT    Anticipatory grief assessment:   [x] Normal  / [] Maladaptive     If \"Maladaptive\" to discuss with social work during IDT    ESAS Anxiety:      ESAS Depression:          LAB AND IMAGING FINDINGS:   Objective data reviewed:  labs, images, records, medication use, vitals, and chart     FINAL COMMENTS 
services.    Electronically signed by   Serge Nelson MD  Palliative Care Team  on 1/18/2024 at 1:24 PM      
the care of Jerry Fry.    Only check if applicable and billing time based rather than MDM  [x] The total encounter time on this service date was _55_ minutes which was spent performing a face-to-face encounter and personally completing the provider-level activities documented in the note. This includes time spent prior to the visit and after the visit in direct care of the patient. This time does not include time spent in any separately reportable services.    Electronically signed by   Serge Nelson MD  Palliative Care Team  on 1/9/2024 at 4:19 PM      
2.2 (L) 3.5 - 5.0 g/dL    Globulin 5.0 (H) 2.0 - 4.0 g/dL    Albumin/Globulin Ratio 0.4 (L) 1.1 - 2.2      Total Bilirubin 0.4 0.2 - 1.0 MG/DL    Bilirubin, Direct 0.1 0.0 - 0.2 MG/DL    Alk Phosphatase 145 (H) 45 - 117 U/L    AST 85 (H) 15 - 37 U/L     (H) 12 - 78 U/L   CBC with Auto Differential    Collection Time: 01/06/24 10:42 PM   Result Value Ref Range    WBC 23.4 (H) 4.1 - 11.1 K/uL    RBC 4.18 4.10 - 5.70 M/uL    Hemoglobin 12.1 12.1 - 17.0 g/dL    Hematocrit 37.5 36.6 - 50.3 %    MCV 89.7 80.0 - 99.0 FL    MCH 28.9 26.0 - 34.0 PG    MCHC 32.3 30.0 - 36.5 g/dL    RDW 14.6 (H) 11.5 - 14.5 %    Platelets 251 150 - 400 K/uL    MPV 10.7 8.9 - 12.9 FL    Nucleated RBCs 0.1 (H) 0  WBC    nRBC 0.03 (H) 0.00 - 0.01 K/uL    Neutrophils % 92 (H) 32 - 75 %    Band Neutrophils 2 0 - 6 %    Lymphocytes % 4 (L) 12 - 49 %    Monocytes % 2 (L) 5 - 13 %    Eosinophils % 0 0 - 7 %    Basophils % 0 0 - 1 %    Immature Granulocytes 0 %    Neutrophils Absolute 22.0 (H) 1.8 - 8.0 K/UL    Lymphocytes Absolute 0.9 0.8 - 3.5 K/UL    Monocytes Absolute 0.5 0.0 - 1.0 K/UL    Eosinophils Absolute 0.0 0.0 - 0.4 K/UL    Basophils Absolute 0.0 0.0 - 0.1 K/UL    Absolute Immature Granulocyte 0.0 K/UL    Differential Type MANUAL      RBC Comment NORMOCYTIC, NORMOCHROMIC      WBC Comment TOXIC GRANULATION         Assessment:     Patient Active Problem List    Diagnosis Date Noted    Pneumonia of both lungs due to infectious organism 01/06/2024    Acute respiratory failure with hypoxia (HCC) 01/06/2024    Constipation 01/06/2024    Lactic acidosis 01/06/2024    Leukocytosis 01/06/2024    Elevated brain natriuretic peptide (BNP) level 01/06/2024    Severe sepsis ([facility]) 01/05/2024    Acute on chronic congestive heart failure ([facility]) 12/23/2022    CKD (chronic kidney disease) stage 4, GFR 15-29 ml/min ([facility]) 12/23/2022    Agitation 11/29/2022    Acute cystitis without hematuria 11/29/2022    Altered mental status 11/11/2022    Type 2 
25 mg, Oral, QHS, Jorge Paniagua MD, 25 mg at 01/06/24 2028    lidocaine 4 % external patch 1 patch, 1 patch, TransDERmal, Daily, Kiya Medina MD, 1 patch at 01/07/24 1002    docusate sodium (COLACE) capsule 100 mg, 100 mg, Oral, Daily, Jorge Paniagua MD, 100 mg at 01/07/24 0951    polyethylene glycol (GLYCOLAX) packet 17 g, 17 g, Oral, Daily, Jorge Paniagua MD Stephanie Heath, APRN - NP    [facility]  Call center: (P) [phone]  (F) [phone]      CC:Renetta Cota MD

## 2024-01-24 NOTE — PLAN OF CARE
Problem: Occupational Therapy - Adult  Goal: By Discharge: Performs self-care activities at highest level of function for planned discharge setting.  See evaluation for individualized goals.  Description: FUNCTIONAL STATUS PRIOR TO ADMISSION:  Pt used RW for functional mobility and had assistance from family for ADLs. He required increased assistance in the last few weeks and has had a few recent falls. Pt uses 4 L O2 at baseline.  HOME SUPPORT: Patient lived with family who provided assistance.    Occupational Therapy Goals:  Initiated 1/7/2024  1.  Patient will perform grooming with Set-up and Supervision sitting in chair within 7 day(s).  2.  Patient will perform upper body dressing with Minimal Assist within 7 day(s).  3.  Patient will perform toilet transfers with Moderate Assist  within 7 day(s).  4.  Patient will perform all aspects of toileting with Moderate Assist within 7 day(s).  5.  Patient will participate in upper extremity therapeutic exercise/activities with Supervision for 5 minutes within 7 day(s).    Outcome: Progressing  OCCUPATIONAL THERAPY EVALUATION    Patient: Jerry Fry (83 y.o. male)  Date: 1/7/2024  Primary Diagnosis: Hypoxia [R09.02]  COPD exacerbation (HCC) [J44.1]  Severe sepsis ([facility]) [A41.9, R65.20]  Pneumonia of both lungs due to infectious organism, unspecified part of lung [J18.9]  Acute on chronic congestive heart failure, unspecified heart failure type (HCC) [I50.9]         Precautions: Fall Risk, Aspiration Risk, Bed Alarm                  ASSESSMENT :  The patient is limited by decreased functional mobility, independence in ADLs, ROM, strength, activity tolerance, attention/concentration, and balance following admission for hypoxia, acute on chronic CHF, sepsis and fall resulting in L2 acute fx. Pt was received semisupine in bed, oriented to self and following commands with increased time and cueing. He performed grooming ADLs in semisupine with setup/min A. Bed mobility 
  Problem: Physical Therapy - Adult  Goal: By Discharge: Performs mobility at highest level of function for planned discharge setting.  See evaluation for individualized goals.  Description: FUNCTIONAL STATUS PRIOR TO ADMISSION: At baseline the patient was on 4 liters/min of supplemental O2 continuously.    HOME SUPPORT PRIOR TO ADMISSION: The patient lived with family and required moderate-min  assistance for all mobility. Increased freq of falls and recent decline. Lowered to ground and called EMS with worsening crow for activity, increased cough and SEGUNDO.    Physical Therapy Goals  Initiated 1/6/2024  1.  Patient will move from supine to sit and sit to supine, scoot up and down, and roll side to side in bed with moderate assistance within 7 day(s).   2.  Patient will tolerate sitting edge of bed 10 min stable vitals and min A x 1 for static balance within 7 days.   3.  Patient will perform sit <> stand with moderate assistance x 1 within 7 day(s).  4.  Patient will transfer from bed to chair and chair to bed with moderate assistance x 2 using the least restrictive device within 7 day(s).    Outcome: Progressing  PHYSICAL THERAPY EVALUATION    Patient: Jerry Fry (83 y.o. male)  Date: 1/6/2024  Primary Diagnosis: Hypoxia [R09.02]  COPD exacerbation ([facility]) [J44.1]  Severe sepsis ([facility]) [A41.9, R65.20]  Pneumonia of both lungs due to infectious organism, unspecified part of lung [J18.9]  Acute on chronic congestive heart failure, unspecified heart failure type ([facility]) [I50.9]       Precautions: Fall Risk, Aspiration Risk, Bed Alarm                      ASSESSMENT :   DEFICITS/IMPAIRMENTS:   The patient is 84 yo male limited by decreased functional mobility, strength, body mechanics, activity tolerance, endurance, safety awareness, cognition, command following, attention/concentration, coordination, balance adm yesterday for worsening cough, SOB, lethargy, fatigue , and increased need for mobility assistance from his 
  Problem: Safety - Adult  Goal: Free from fall injury  Outcome: Progressing     
  Problem: Safety - Medical Restraint  Goal: Remains free of injury from restraints (Restraint for Interference with Medical Device)  Description: INTERVENTIONS:  1. Determine that other, less restrictive measures have been tried or would not be effective before applying the restraint  2. Evaluate the patient's condition at the time of restraint application  3. Inform patient/family regarding the reason for restraint  4. Q2H: Monitor safety, psychosocial status, comfort, nutrition and hydration  1/24/2024 1557 by Mery Arredondo RN  Outcome: Progressing  Flowsheets  Taken 1/24/2024 1400 by Mery Arredondo, RN  Remains free of injury from restraints (restraint for interference with medical device): Every 2 hours: Monitor safety, psychosocial status, comfort, nutrition and hydration  Taken 1/24/2024 1309 by Mery Arredondo, RN  Remains free of injury from restraints (restraint for interference with medical device): Every 2 hours: Monitor safety, psychosocial status, comfort, nutrition and hydration  Taken 1/24/2024 1200 by Mery Arredondo, RN  Remains free of injury from restraints (restraint for interference with medical device): Every 2 hours: Monitor safety, psychosocial status, comfort, nutrition and hydration  Taken 1/24/2024 1000 by Mery Arredondo, RN  Remains free of injury from restraints (restraint for interference with medical device): Every 2 hours: Monitor safety, psychosocial status, comfort, nutrition and hydration  Taken 1/24/2024 0800 by Mery Arredondo, RN  Remains free of injury from restraints (restraint for interference with medical device): Every 2 hours: Monitor safety, psychosocial status, comfort, nutrition and hydration      Outcome: Progressing  Note: Mitts in place. Patient continues to attempt to pull at lines and to grab the staff when trial with mitts off.      
  Problem: Skin/Tissue Integrity  Goal: Absence of new skin breakdown  Description: 1.  Monitor for areas of redness and/or skin breakdown  2.  Assess vascular access sites hourly  3.  Every 4-6 hours minimum:  Change oxygen saturation probe site  4.  Every 4-6 hours:  If on nasal continuous positive airway pressure, respiratory therapy assess nares and determine need for appliance change or resting period.  1/13/2024 2330 by Ileana Poon RN  Outcome: Progressing  1/13/2024 2046 by Ami Scales RN  Outcome: Progressing     Problem: Discharge Planning  Goal: Discharge to home or other facility with appropriate resources  1/13/2024 2330 by Ileana Poon RN  Outcome: Progressing  1/13/2024 2046 by Ami Scales RN  Outcome: Progressing     Problem: Safety - Adult  Goal: Free from fall injury  1/13/2024 2330 by Ileana Poon RN  Outcome: Progressing  1/13/2024 2046 by Ami Scales RN  Outcome: Progressing     Problem: Chronic Conditions and Co-morbidities  Goal: Patient's chronic conditions and co-morbidity symptoms are monitored and maintained or improved  1/13/2024 2330 by Ileana Poon RN  Outcome: Progressing  1/13/2024 2046 by Ami Scales RN  Outcome: Progressing     Problem: Respiratory - Adult  Goal: Achieves optimal ventilation and oxygenation  1/13/2024 2330 by Ileana Poon RN  Outcome: Progressing  1/13/2024 2046 by Ami Scales RN  Outcome: Progressing  1/13/2024 1928 by Yohana Rae, RT  Outcome: Progressing  1/13/2024 1001 by Marlena Dumont, RT  Outcome: Not Progressing     Problem: Pain  Goal: Verbalizes/displays adequate comfort level or baseline comfort level  1/13/2024 2330 by Ileana Poon RN  Outcome: Progressing  1/13/2024 2046 by Ami Scales RN  Outcome: Progressing     Problem: Confusion  Goal: Confusion, delirium, dementia, or psychosis is improved or at baseline  Description: INTERVENTIONS:  1. Assess for 
  Problem: Skin/Tissue Integrity  Goal: Absence of new skin breakdown  Description: 1.  Monitor for areas of redness and/or skin breakdown  2.  Assess vascular access sites hourly  3.  Every 4-6 hours minimum:  Change oxygen saturation probe site  4.  Every 4-6 hours:  If on nasal continuous positive airway pressure, respiratory therapy assess nares and determine need for appliance change or resting period.  Outcome: Progressing     Problem: Discharge Planning  Goal: Discharge to home or other facility with appropriate resources  Outcome: Progressing     Problem: Safety - Adult  Goal: Free from fall injury  Outcome: Progressing     Problem: Chronic Conditions and Co-morbidities  Goal: Patient's chronic conditions and co-morbidity symptoms are monitored and maintained or improved  Outcome: Progressing     Problem: Respiratory - Adult  Goal: Achieves optimal ventilation and oxygenation  1/13/2024 2046 by Ami Scales RN  Outcome: Progressing  1/13/2024 1928 by Yohana Rae, RT  Outcome: Progressing  1/13/2024 1001 by Marlena Dumont, RT  Outcome: Not Progressing     Problem: Pain  Goal: Verbalizes/displays adequate comfort level or baseline comfort level  Outcome: Progressing     Problem: Confusion  Goal: Confusion, delirium, dementia, or psychosis is improved or at baseline  Description: INTERVENTIONS:  1. Assess for possible contributors to thought disturbance, including medications, impaired vision or hearing, underlying metabolic abnormalities, dehydration, psychiatric diagnoses, and notify attending LIP  2. [place] high risk fall precautions, as indicated  3. Provide frequent short contacts to provide reality reorientation, refocusing and direction  4. Decrease environmental stimuli, including noise as appropriate  5. Monitor and intervene to maintain adequate nutrition, hydration, elimination, sleep and activity  6. If unable to ensure safety without constant attention obtain sitter and review 
  Problem: Skin/Tissue Integrity  Goal: Absence of new skin breakdown  Description: 1.  Monitor for areas of redness and/or skin breakdown  2.  Assess vascular access sites hourly  3.  Every 4-6 hours minimum:  Change oxygen saturation probe site  4.  Every 4-6 hours:  If on nasal continuous positive airway pressure, respiratory therapy assess nares and determine need for appliance change or resting period.  Outcome: Progressing     Problem: Discharge Planning  Goal: Discharge to home or other facility with appropriate resources  Outcome: Progressing     Problem: Safety - Adult  Goal: Free from fall injury  Outcome: Progressing     Problem: Chronic Conditions and Co-morbidities  Goal: Patient's chronic conditions and co-morbidity symptoms are monitored and maintained or improved  Outcome: Progressing     Problem: Respiratory - Adult  Goal: Achieves optimal ventilation and oxygenation  1/15/2024 2305 by Taty Olivier RN  Outcome: Progressing  1/15/2024 0950 by Juli Lua, RT  Outcome: Progressing     Problem: Pain  Goal: Verbalizes/displays adequate comfort level or baseline comfort level  Outcome: Progressing     Problem: Confusion  Goal: Confusion, delirium, dementia, or psychosis is improved or at baseline  Description: INTERVENTIONS:  1. Assess for possible contributors to thought disturbance, including medications, impaired vision or hearing, underlying metabolic abnormalities, dehydration, psychiatric diagnoses, and notify attending LIP  2. [place] high risk fall precautions, as indicated  3. Provide frequent short contacts to provide reality reorientation, refocusing and direction  4. Decrease environmental stimuli, including noise as appropriate  5. Monitor and intervene to maintain adequate nutrition, hydration, elimination, sleep and activity  6. If unable to ensure safety without constant attention obtain sitter and review sitter guidelines with assigned personnel  7. Initiate Psychosocial CNS and 
  Problem: Skin/Tissue Integrity  Goal: Absence of new skin breakdown  Description: 1.  Monitor for areas of redness and/or skin breakdown  2.  Assess vascular access sites hourly  3.  Every 4-6 hours minimum:  Change oxygen saturation probe site  4.  Every 4-6 hours:  If on nasal continuous positive airway pressure, respiratory therapy assess nares and determine need for appliance change or resting period.  Outcome: Progressing     Problem: Discharge Planning  Goal: Discharge to home or other facility with appropriate resources  Outcome: Progressing     Problem: Safety - Adult  Goal: Free from fall injury  Outcome: Progressing     Problem: Chronic Conditions and Co-morbidities  Goal: Patient's chronic conditions and co-morbidity symptoms are monitored and maintained or improved  Outcome: Progressing     Problem: Respiratory - Adult  Goal: Achieves optimal ventilation and oxygenation  Outcome: Progressing     Problem: Pain  Goal: Verbalizes/displays adequate comfort level or baseline comfort level  Outcome: Progressing     Problem: Confusion  Goal: Confusion, delirium, dementia, or psychosis is improved or at baseline  Description: INTERVENTIONS:  1. Assess for possible contributors to thought disturbance, including medications, impaired vision or hearing, underlying metabolic abnormalities, dehydration, psychiatric diagnoses, and notify attending LIP  2. [place] high risk fall precautions, as indicated  3. Provide frequent short contacts to provide reality reorientation, refocusing and direction  4. Decrease environmental stimuli, including noise as appropriate  5. Monitor and intervene to maintain adequate nutrition, hydration, elimination, sleep and activity  6. If unable to ensure safety without constant attention obtain sitter and review sitter guidelines with assigned personnel  7. Initiate Psychosocial CNS and Spiritual Care consult, as indicated  Outcome: Progressing     Problem: Safety - Medical 
  Problem: Skin/Tissue Integrity  Goal: Absence of new skin breakdown  Description: 1.  Monitor for areas of redness and/or skin breakdown  2.  Assess vascular access sites hourly  3.  Every 4-6 hours minimum:  Change oxygen saturation probe site  4.  Every 4-6 hours:  If on nasal continuous positive airway pressure, respiratory therapy assess nares and determine need for appliance change or resting period.  Outcome: Progressing     Problem: Safety - Adult  Goal: Free from fall injury  Outcome: Progressing     Problem: Chronic Conditions and Co-morbidities  Goal: Patient's chronic conditions and co-morbidity symptoms are monitored and maintained or improved  Outcome: Progressing     Problem: Respiratory - Adult  Goal: Achieves optimal ventilation and oxygenation  1/13/2024 0021 by Ileana Poon RN  Outcome: Progressing  1/12/2024 2004 by Jamia Adrian, RT  Outcome: Progressing     Problem: Pain  Goal: Verbalizes/displays adequate comfort level or baseline comfort level  Outcome: Progressing     Problem: Confusion  Goal: Confusion, delirium, dementia, or psychosis is improved or at baseline  Description: INTERVENTIONS:  1. Assess for possible contributors to thought disturbance, including medications, impaired vision or hearing, underlying metabolic abnormalities, dehydration, psychiatric diagnoses, and notify attending LIP  2. [place] high risk fall precautions, as indicated  3. Provide frequent short contacts to provide reality reorientation, refocusing and direction  4. Decrease environmental stimuli, including noise as appropriate  5. Monitor and intervene to maintain adequate nutrition, hydration, elimination, sleep and activity  6. If unable to ensure safety without constant attention obtain sitter and review sitter guidelines with assigned personnel  7. Initiate Psychosocial CNS and Spiritual Care consult, as indicated  Outcome: Progressing     
  Problem: Skin/Tissue Integrity  Goal: Absence of new skin breakdown  Description: 1.  Monitor for areas of redness and/or skin breakdown  2.  Assess vascular access sites hourly  3.  Every 4-6 hours minimum:  Change oxygen saturation probe site  4.  Every 4-6 hours:  If on nasal continuous positive airway pressure, respiratory therapy assess nares and determine need for appliance change or resting period.  Outcome: Progressing     Problem: Safety - Adult  Goal: Free from fall injury  Outcome: Progressing     Problem: Pain  Goal: Verbalizes/displays adequate comfort level or baseline comfort level  Outcome: Progressing     
SPEECH PATHOLOGY MODIFIED BARIUM SWALLOW STUDY  Patient: Jerry Fry (83 y.o. male)  Date: 1/8/2024  Primary Diagnosis: Hypoxia [R09.02]  COPD exacerbation ([facility]) [J44.1]  Severe sepsis ([facility]) [A41.9, R65.20]  Pneumonia of both lungs due to infectious organism, unspecified part of lung [J18.9]  Acute on chronic congestive heart failure, unspecified heart failure type ([facility]) [I50.9]       Precautions: aspiration  Fall Risk, Aspiration Risk, Bed Alarm                  ASSESSMENT :  Based on the objective data described below, the patient presents with silent aspiration with thins and mildly thick liquids due to delayed airway closure. He had an eventual weak cough that was too weak to clear any aspiration. Even more delayed wet, gurgly cough.    He had functional pharyngeal swallow of purees, and moderately thick liquids. As a recommendation, it is often difficult to maintain hydration with modeartely thick liquids.   In addition, he had reduced chew and severe oral holding of solids.  He needed SLP's help to remove oral residue.   VOIce remains severely dysphonic.    Will need to determine how long he has had dysphonia and dysphagia.    Will contact family in am and determine more about his swallowing history and their tolerance of cautious feeding with known aspiration risks.       Patient will benefit from skilled intervention to address the above impairments.     PLAN :  Recommendations and Planned Interventions:  Diet: NPO  Need to discuss MBS results with family and MD.   Acute SLP Services: Yes, patient will be followed by speech-language pathology 5x/week to address goals. Patient's rehabilitation potential is considered to be Fair.  Discharge Recommendations: Continue to assess pending progress     SUBJECTIVE:   Patient cooperative in MBS”.    OBJECTIVE:     Past Medical History:   Diagnosis Date    Alzheimer's dementia (HCC)     BPH (benign prostatic hyperplasia)     CAD (coronary artery disease)     
Speech LAnguage Pathology EVALUATION    Patient: Jerry Fry (83 y.o. male)  Date: 1/6/2024  Primary Diagnosis: Severe sepsis (HCC) [A41.9, R65.20]       Precautions: fall, aspiration                    ASSESSMENT :  Based on the objective data described below, the patient presents with functional oral phase of the swallow for limited trials and suspected at least mild/moderate pharyngeal dysphagia.  Tolerated ice chips and purees without difficulty, however, immediate and violent coughing followed by audible wheezing and tachypnea after a single small sip of thin liquids.  Patient also with hoarse, nearly aphonic vocal quality that did not improve with PO trials.  He was unable to verbalize if this is his baseline.  Concerned regarding aspiration risks especially with liquids at this time.  Given patient still in the ED receiving formal workup, would cautiously provide meds in applesauce and ice chips for comfort for now.  Will follow Monday for re-assessment of swallow function, and if decreased tolerance of PO trials is still noted, would consider objective imaging of the swallow at that time.  Patient noted to be confused with some difficulty following commands, so uncertain he would tolerate a FEES pending mental status on re-evaluation.      Patient will benefit from skilled intervention to address the above impairments.     PLAN :  Recommendations and Planned Interventions:  Diet: NPO except ice chips for comfort after oral care.  Ok to provide meds in applesauce as tolerated.  Stop if any s/s of aspiration  Will follow for re-evaluation of swallow function.  If decreased tolerance persists, may benefit from objective imaging for further assessment of safest diet consistency.      Acute SLP Services: Yes, patient will be followed by speech-language pathology 3x/week to address goals. Patient's rehabilitation potential is considered to be Fair.    Discharge Recommendations: Continue to assess pending 
Speech LAnguage Pathology TREATMENT    Patient: Jerry Fry (83 y.o. male)  Date: 1/10/2024  Primary Diagnosis: Hypoxia [R09.02]  COPD exacerbation (HCC) [J44.1]  Severe sepsis ([facility]) [A41.9, R65.20]  Pneumonia of both lungs due to infectious organism, unspecified part of lung [J18.9]  Acute on chronic congestive heart failure, unspecified heart failure type (HCC) [I50.9]       Precautions: aspiration Fall Risk, Aspiration Risk, Bed Alarm                  ASSESSMENT :  Based on the objective data described below, SLP recommends trials of purees, moderately thick liquids.   Patient has been NPO Since Admission 1/5/24 due to failed bedside eval and failed MBS 1/8/24.  He had silent aspiration of thins and mildly thick liquids and severe oral residue with solids on L side.   Had some swallow function without residue with purees, moderately thick liquids.   Have been trying to discuss options with his wife, but she has not been available in person or by phone. She did tell Mds, he had some dysphagia prior to admission.   At this time, will recommend starting purees, moderately thick liquids by spoon.   Discussed with RN, PCT, MD and RD.   15 min later, PCT felt that he was coughing more. Will continue to monitor.     Patient will benefit from skilled intervention to address the above impairments.     PLAN :  Recommendations and Planned Interventions:  Diet: Puree and moderately thick liquids  Upright for all PO  Stop if congestion increases.   Continue to try to work with family on GOC.      Acute SLP Services: Yes, patient will be followed by speech-language pathology 4x/week to address goals. Patient's rehabilitation potential is considered to be Fair.    Discharge Recommendations: Yes, recommend SLP treatment at next level of care     SUBJECTIVE:   Patient agreeable to some PO.”    OBJECTIVE:     Past Medical History:   Diagnosis Date    Alzheimer's dementia (HCC)     BPH (benign prostatic hyperplasia)     CAD 
Restraint  Goal: Remains free of injury from restraints (Restraint for Interference with Medical Device)  Description: INTERVENTIONS:  1. Determine that other, less restrictive measures have been tried or would not be effective before applying the restraint  2. Evaluate the patient's condition at the time of restraint application  3. Inform patient/family regarding the reason for restraint  4. Q2H: Monitor safety, psychosocial status, comfort, nutrition and hydration  Outcome: Progressing     Problem: Nutrition Deficit:  Goal: Optimize nutritional status  Outcome: Progressing     
before applying the restraint  2. Evaluate the patient's condition at the time of restraint application  3. Inform patient/family regarding the reason for restraint  4. Q2H: Monitor safety, psychosocial status, comfort, nutrition and hydration  Outcome: Progressing  Note: Mitts in place. Patient continues to attempt to pull at lines and to grab the staff when trial with mitts off.      Problem: Nutrition Deficit:  Goal: Optimize nutritional status  Outcome: Progressing  Note: Patient remains NPO     
  Balance  Sitting: Impaired  Sitting - Static: Fair (occasional)  Sitting - Dynamic: Fair (occasional)      ADL Intervention:                                            UE Dressing: Maximum assistance  UE Dressing Skilled Clinical Factors: gown max A and LDO total A    LE Dressing: Dependent/Total          Skin Care: Chlorhexidine wipes    Patient instructed and indicated understanding the benefits of maintaining activity tolerance, functional mobility, and independence with self care tasks during acute stay  to ensure safe return home and to baseline. Encouraged patient to increase frequency and duration OOB, be out of bed for all meals, perform daily ADLs (as approved by RN/MD regarding bathing etc), and performing functional mobility to/from [facility]              Pain Rating:  Posterior neck pain at rest, did not rate.  Nurse aware     Pain Intervention(s):   Nurse present      Activity Tolerance:   Fair   Please refer to the flowsheet for vital signs taken during this treatment.    After treatment:   Patient left in no apparent distress sitting up in chair, Call bell within reach, Bed/ chair alarm activated, and sitter present    COMMUNICATION/EDUCATION:   The patient's plan of care was discussed with: physical therapist and registered nurse    Patient Education  Education Given To: Patient  Education Provided: Plan of Care;Transfer Training  Education Provided Comments: ALEX moore  Education Method: Demonstration;Verbal  Barriers to Learning: Cognition  Education Outcome: Continued education needed    Thank you for this referral.  Blanca Kraft OT  Minutes: 25  
(occasional)  Standing - Static: Fair;Constant support  Standing - Dynamic: Fair;Poor;Constant support   Ambulation/Gait Training:     Gait  Overall Level of Assistance: Minimum assistance;Moderate assistance;Assist X2  Distance (ft): 5 Feet  Assistive Device: Gait belt;Walker, rolling  Speed/Nena: Pace decreased (< 100 feet/min)  Gait Abnormalities: Trunk sway increased;Decreased step clearance  Neuro Re-Education:                    Pain Rating:  None observed or reported     Activity Tolerance:   Fair  and desaturates with activity and requires oxygen    After treatment:   Patient left in no apparent distress sitting up in chair, Call bell within reach, and sitter present      COMMUNICATION/EDUCATION:   The patient's plan of care was discussed with: occupational therapist and registered nurse           Veronica Lozoya, PT  Minutes: 23    
assistance;Assist X2  Bed to Chair: Moderate assistance;Assist X2  Balance:  Balance  Sitting: Impaired  Sitting - Static: Fair (occasional)  Sitting - Dynamic: Fair (occasional)  Standing: Impaired  Standing - Static: Fair;Constant support  Standing - Dynamic: Fair;Poor;Constant support   Ambulation/Gait Training:     Gait  Overall Level of Assistance: Moderate assistance;Assist X2  Distance (ft): 2 Feet  Assistive Device: Gait belt (B HHA)  Interventions: Safety awareness training;Weight shifting training/pressure relief  Speed/Nena: Pace decreased (< 100 feet/min)  Gait Abnormalities: Trunk sway increased;Decreased step clearance  Neuro Re-Education:                    Pain Rating:  None     Activity Tolerance:   Fair , requires rest breaks, and desaturates with activity and requires oxygen    After treatment:   Patient left in no apparent distress sitting up in chair, Call bell within reach, Bed/ chair alarm activated, and sitter present      COMMUNICATION/EDUCATION:   The patient's plan of care was discussed with: occupational therapist and registered nurse           Veronica Lozoya, PT  Minutes: 24    
present    COMMUNICATION/EDUCATION:   The patient's plan of care was discussed with: physical therapist and registered nurse         Thank you for this referral.  Alberto Radford OT  Minutes: 23

## 2024-01-24 NOTE — CARE COORDINATION
1/24/24  2:20 PM    Care Management Daily Progress Note:    1. Pneumonia of both lungs due to infectious organism, unspecified part of lung    2. COPD exacerbation (HCC)    3. Acute on chronic congestive heart failure, unspecified heart failure type (HCC)    4. Hypoxia    5. Severe sepsis (HCC)      RUR: 18%  LOS 19D    CM reviewed EMR, patients family working through deciding on goals of care. CM following for needs.    Aleena Hernandez  Care Manager

## 2024-01-25 PROBLEM — R06.03 RESPIRATORY DISTRESS: Status: ACTIVE | Noted: 2024-01-01

## 2024-01-25 PROBLEM — Z51.5 HOSPICE CARE: Status: ACTIVE | Noted: 2024-01-01

## 2024-01-25 PROBLEM — R52 NONVERBAL SIGNS OF PAIN: Status: ACTIVE | Noted: 2024-01-01

## 2024-01-25 PROBLEM — J96.00 ACUTE RESPIRATORY FAILURE (HCC): Status: ACTIVE | Noted: 2024-01-01

## 2024-01-25 NOTE — PROGRESS NOTES
agitation, requiring IV meds for symptom burden.  The patient/family chose comfort measures with the support of Hospice.        Prognosis estimated based on 01/25/24 clinical assessment is:   [] Few to Many Hours  [x] Hours to Days   [] Few to Many Days   [] Days to Weeks   [] Few to Many Weeks   [] Weeks to Months   [] Few to Many Months    ASSESSMENT    Patient self-reports:  []  Yes    [x] No    SYMPTOMS: Lethargy, shortness of breath, agitation, pain    SIGNS/PHYSICAL FINDINGS: edema to Bilat upper extremities, pallor, mouth breathing, SOB, non-verbal signs of pain (grimacing and groaning)    KARNOFSKY: 10    FAST for all dementia:      Learning Assessment:      Caregiver  Is caregiver willing to learn care for patient? yes  What is the highest level of education completed? N/a  Learning preference (written material, demonstration, visual)? written  Learning barriers (ESOL, [place], poor vision)? none      CLINICAL INFORMATION     Wt Readings from Last 3 Encounters:   01/06/24 74.4 kg (164 lb)   12/22/23 73.7 kg (162 lb 6.4 oz)   12/14/23 73.9 kg (163 lb)     Ht Readings from Last 3 Encounters:   01/10/24 1.626 m (5' 4\")   12/29/23 1.626 m (5' 4\")   12/14/23 1.626 m (5' 4\")     There is no height or weight on file to calculate BMI.    There were no vitals filed for this visit.    LAB VALUES  [unfilled]  [unfilled]  No results found for: \"TP\", \"ALBR\", \"ALB\"    Currently this patient has:  [] Supplemental O2 [] Peripheral IV  [] PICC    [] PORT   [] Mar Catheter [] NG Tube   [] PEG Tube [] Ostomy    [] AICD: Has ICD been deactivated?  [] Yes [] No:______    PLAN     Admit GIP  Increased Morphine to 4mg and scheduled every 4 hours  Ativan increased to 2mg as needed every 15 min   to provide support to family  Social work to follow and provide support as needed  Place mar as needed per facility protocols  Frequent rounding and POC/med adjustments to be made as needed.      Hospice Team Frequency  Orders:  Skilled Nurse -   Daily x 7 days /every other day x 7 days  with 5 PRN visits for symptom control. MSW - 1 visit for initial assessment/evaluation for family support and need for volunteer services..  - 1 visit for initial assessment/evaluation for spiritual support.     ADVANCE CARE PLANNING (Complete in ACP Flow Sheet)   Code Status: DNR  Durable DNR: []  Yes  [x]  No  Code Status Discussed/Confirmed:  Preference for Other Life Sustaining Treatment Discussed/Confirmed:  Hospitalization Preference: Wife/Patient would like to receive end of life care in the hospital      2024     1:26 PM   Demographics   Marital Status         Service: [] Yes  []  No      [x] Unknown  Appropriate for Pinning Ceremony:  [] Yes     [] No  [place]: Ep[employer]opalian   Home: TBD    DISCHARGE PLANNING     1. Discharge Plan: patient expected to  at [facility], [place] plan would be LTC if patient were to become stable  2. Patient/Family teaching: hospice philosophy, comfort care, general plan of care  3. Response to patient/family teaching: verbalized understanding    SOCIAL/EMOTIONAL/SPIRITUAL NEEDS     Spiritual Issues Identified: none,  visited    Psych/ Social/ Emotional Issues Identified: wife upset about course of illness that lead to EOL diagnosis    Caregiver Support:  [x] Provided information on End of Life Care   [] Material Provided: Gone From My Sight or [facility]     CARE COORDINATION   Dr. Lipscomb contacted, discharge to hospice order received  Dr. Lipscomb contacted, agrees to serve as attending provider for hospice and provided verbal certification of terminal illness with life expectancy of 6 months or less. Orders for hospice admission, medications and plan of treatment received. Medication reconciliation completed.  MEDS: See medication list below  DME: Per hospital  Supplies: Per hospital  IDT communication to include MD, SN, SW, CH and support team    ALLERGIES AND

## 2024-01-25 NOTE — H&P
Maker: Patricia Fry - Spouse - [phone]    Secondary Decision Maker: Oli Fry - Child - [phone]    Resuscitation Status: DNR No additional code details  If DNR is there a Durable DNR on file? : [x] Yes [] No (If no, complete Durable DNR)    HISTORY     History obtained from: Chart, hospice liaison, bedside nursing team    CHIEF COMPLAINT: This of breath  The patient is:   [] Verbal  [] Nonverbal  [x] Unresponsive    HPI/SUBJECTIVE: Patient is minimally responsive but definitely shows evidence of increased work of breathing, accessory muscle use       REVIEW OF SYSTEMS     The following systems were: [] reviewed  [x] unable to be reviewed    Positive ROS include:  Constitutional: fatigue, weakness, in pain, short of breath  Ears/nose/mouth/throat: increased airway secretions  Respiratory:shortness of breath, wheezing  Gastrointestinal:poor appetite, nausea, vomiting, abdominal pain, constipation, diarrhea  Musculoskeletal:pain, deformities, swelling legs  Neurologic:confusion, hallucinations, weakness  Psychiatric:anxiety, feeling depressed, poor sleep  Endocrine:     Adult Non-Verbal Pain Assessment Score: 5    Face  [] 0   No particular expression or smile  [x] 1   Occasional grimace, tearing, frowning, wrinkled forehead  [] 2   Frequent grimace, tearing, frowning, wrinkled forehead    Activity (movement)  [x] 0   Lying quietly, normal position  [] 1   Seeking attention through movement or slow, cautious movement  [] 2   Restless, excessive activity and/or withdrawal reflexes    Guarding  [] 0   Lying quietly, no positioning of hands over areas of body  [x] 1   Splinting areas of the body, tense  [] 2   Rigid, stiff    Physiology (vital signs)  [x] 0   Stable vital signs  [] 1   Change in any of the following: SBP > 20mm Hg; HR > 20/minute  [] 2   Change in any of the following: SBP > 30mm Hg; HR > 25/minute    Respiratory  [] 0   Baseline RR/SpO2, compliant with ventilator  [] 1   RR > 10 above  baseline, or 5% drop SpO2, mild asynchrony with ventilator  [x] 2   RR > 20 above baseline, or 10% drop SpO2, asynchrony with ventilator     FUNCTIONAL ASSESSMENT     Palliative Performance Scale (PPS): 10       PSYCHOSOCIAL/SPIRITUAL ASSESSMENT     Principal Problem:    Acute respiratory failure (HCC)  Resolved Problems:    * No resolved hospital problems. *    Past Medical History:   Diagnosis Date    Alzheimer's dementia (HCC)     BPH (benign prostatic hyperplasia)     CAD (coronary artery disease)     Carotid artery stenosis     CKD (chronic kidney disease)     COPD (chronic obstructive pulmonary disease) (HCC)     HLD (hyperlipidemia)     T2DM (type 2 diabetes mellitus) (HCC)       Past Surgical History:   Procedure Laterality Date    CHOLECYSTECTOMY, LAPAROSCOPIC      COLONOSCOPY N/A 2022    COLONOSCOPY/EGD performed by Sandro Ramon MD at [facility] ENDOSCOPY    AK UNLISTED PROCEDURE CARDIAC SURGERY      CABG and stents    ROTATOR CUFF REPAIR        Social History     Tobacco Use    Smoking status: Former     Current packs/day: 0.00     Types: Cigarettes     Quit date: 2010     Years since quittin.0    Smokeless tobacco: Never   Substance Use Topics    Alcohol use: Never     No family history on file.   Allergies   Allergen Reactions    Hydromorphone      Other reaction(s): Unknown (comments)    Pt's wife reports not having any allergy to it.    Lisinopril Cough    Lorazepam Other (See Comments)     Altered LOC  Patient able to tolerate 0.5mg    Codeine      Other reaction(s): Unknown (comments)  Mental state= confusion and combative    Riociguat      Other reaction(s): Unknown (comments)      Current Facility-Administered Medications   Medication Dose Route Frequency    sodium chloride flush 0.9 % injection 5-40 mL  5-40 mL IntraVENous PRN    ketorolac (TORADOL) injection 30 mg  30 mg IntraVENous Q6H PRN    bisacodyl (DULCOLAX) suppository 10 mg  10 mg Rectal Daily PRN    glycopyrrolate (ROBINUL)

## 2024-01-25 NOTE — PROGRESS NOTES
Responded to request from staff ro a chplain visit on IM.  Mr. Fry wife, Patricia ws in the room along with the hospice nurse.  After the nurse left, Patricia shared her frustrations and their medical journey over the past month or so.  She  shared how hard she had tried to get her husban the medical attention he needed, knowing he has a hard time expressing himself.  She indicated she has a very strong belief in God, know that he/she has been with her her entire life and brought her through things that she should not have been able to get through.  She said she knows there is a nabeel power.  Provided her a space to vent and provided words of comfort and encouragement.  Contact Spiritual [facility] for any further referrals.  Chaplain Vaishali Zavala MDiv., MS., [facility]  Page a  [phone])

## 2024-01-25 NOTE — CARE COORDINATION
CM Note:  Order for hospice noted.  A referral was sent in EPIC to [facility] to evaluate for GIP.  RAMO Machuca

## 2024-01-25 NOTE — PLAN OF CARE
Problem: Chronic Conditions and Co-morbidities  Goal: Patient's chronic conditions and co-morbidity symptoms are monitored and maintained or improved  Outcome: Progressing     Problem: Discharge Planning  Goal: Discharge to home or other facility with appropriate resources  Outcome: Progressing     Problem: Safety - Medical Restraint  Goal: Remains free of injury from restraints (Restraint for Interference with Medical Device)  Description: INTERVENTIONS:  1. Determine that other, less restrictive measures have been tried or would not be effective before applying the restraint  2. Evaluate the patient's condition at the time of restraint application  3. Inform patient/family regarding the reason for restraint  4. Q2H: Monitor safety, psychosocial status, comfort, nutrition and hydration  Outcome: Progressing     Problem: Hospice Orientation  Goal: Demonstrate understanding of hospice philosophy, plan of care, and inpatient hospice program  Description: The patient/family/caregiver will demonstrate understanding of hospice philosophy, plan of care and the inpatient hospice program as evidenced by participation in meeting the patient's psychosocial, spiritual, medical, and physical needs inclusive of medical supplies/equipment focusing on symptoms.  Outcome: Progressing     Problem: Potential for Alteration in Skin Integrity  Goal: Monitor skin for areas of alteration in skin integrity  Description: Patient [unfilled] will remain free from alterations of or worsening skin integrity as evidenced by no changes to skin during assessment each shift during the inpatient hospice stay.  Outcome: Progressing     Problem: Risk for Falls  Goal: Fall prevention  Description: Patient  will remain free from falls as evidenced by no witnessed or reported falls each shift during the inpatient hospice stay.     Patient  and or family/caregiver will receive education on fall prevention as evidenced by verbalizing recall of using the  inpatient hospice upon request or when services are no longer needed and transitioned to the next level of care during the inpatient hospice stay.    Outcome: Progressing

## 2024-01-25 NOTE — PROGRESS NOTES
[facility]    J[facility]  YOB: 1940          Assessment & Plan:     CKD 3b, previously f/b Dr. Tyson but lost to f/u since 2020  Hypernatremia/dehydration  Hypoxia: edema vs. Aspiration. May be difficult to distinguish these entities clinically and radiographically  Debility  Dementia   HFpEF  CAD s/p CABG  HTN  COPD    Rec:  No new labs  PEG vs. Hospice due to aspiration. D/w family. They are meeting with palliative today  If the family elects against comfort care at this point, watch labs and give free water  Available as needed over weekend       Subjective:   CC: Hypernatremia, CKD  HPI: No new labs. Hypoxic due to aspiration. Family meeing today  Current Facility-Administered Medications   Medication Dose Route Frequency    cefepime (MAXIPIME) 1,000 mg in sodium chloride 0.9 % 50 mL IVPB (mini-bag)  1,000 mg IntraVENous Q12H    scopolamine (TRANSDERM-SCOP) transdermal patch 1 patch  1 patch TransDERmal Q72H    methylPREDNISolone sodium succ (SOLU-MEDROL) 40 mg in sterile water 1 mL injection  40 mg IntraVENous Daily    haloperidol lactate (HALDOL) injection 1 mg  1 mg IntraMUSCular Q6H PRN    doxycycline (VIBRAMYCIN) 100 mg in sodium chloride 0.9 % 100 mL IVPB (mini-bag)  100 mg IntraVENous Q12H    ipratropium 0.5 mg-albuterol 2.5 mg (DUONEB) nebulizer solution 1 Dose  1 Dose Inhalation Q6H WA RT    chlorothiazide (DIURIL) 250 mg in sterile water 9 mL injection  250 mg IntraVENous Q12H    bisacodyl (DULCOLAX) suppository 10 mg  10 mg Rectal Daily PRN    naloxone (NARCAN) injection 0.4 mg  0.4 mg IntraVENous PRN    [Held by provider] carvedilol (COREG) tablet 6.25 mg  6.25 mg Oral BID WC    [Held by provider] sacubitril-valsartan (ENTRESTO) 24-26 MG per tablet 1 tablet  1 tablet Oral BID    amLODIPine (NORVASC) tablet 10 mg  10 mg Oral Daily    labetalol (NORMODYNE;TRANDATE) injection 10 mg  10 mg IntraVENous Q6H PRN    acetaminophen 
                            [facility] [place]    Jerry Fry  YOB: 1940          Assessment & Plan:     CKD 3b, previously f/b Dr. Tyson but lost to f/u since 2020  Hypernatremia/dehydration  Hypoxia: edema vs. Aspiration. May be difficult to distinguish these entities clinically and radiographically  Debility  Dementia   HFpEF  CAD s/p CABG  HTN  COPD    Rec:  Increase D5W @ 75 cc/hr  Check BMP today  Not a candidate for dialysis  Continue supportive care  Palliative care on board       Subjective:   CC: Hypernatremia, CKD  HPI: sleeping now, on IVF  No labs from today  Current Facility-Administered Medications   Medication Dose Route Frequency    dextrose 5 % solution   IntraVENous Continuous    haloperidol lactate (HALDOL) injection 1 mg  1 mg IntraMUSCular Once    ipratropium 0.5 mg-albuterol 2.5 mg (DUONEB) nebulizer solution 1 Dose  1 Dose Inhalation Q6H WA RT    nystatin (MYCOSTATIN) 244215 UNIT/ML suspension 500,000 Units  5 mL Oral 4x Daily    ampicillin-sulbactam (UNASYN) 3,000 mg in sodium chloride 0.9 % 100 mL IVPB (mini-bag)  3,000 mg IntraVENous Q12H    bisacodyl (DULCOLAX) suppository 10 mg  10 mg Rectal Daily PRN    naloxone (NARCAN) injection 0.4 mg  0.4 mg IntraVENous PRN    [Held by provider] carvedilol (COREG) tablet 6.25 mg  6.25 mg Oral BID WC    [Held by provider] sacubitril-valsartan (ENTRESTO) 24-26 MG per tablet 1 tablet  1 tablet Oral BID    [Held by provider] amLODIPine (NORVASC) tablet 10 mg  10 mg Oral Daily    labetalol (NORMODYNE;TRANDATE) injection 10 mg  10 mg IntraVENous Q6H PRN    [Held by provider] acetaminophen (TYLENOL) tablet 650 mg  650 mg Oral 4 times per day    calcitonin (MIACALCIN) nasal spray 1 spray  1 spray Alternating Nares Daily    [Held by provider] memantine (NAMENDA) tablet 5 mg  5 mg Oral BID    pantoprazole (PROTONIX) 40 mg in sodium chloride (PF) 0.9 % 10 mL injection  40 mg IntraVENous Daily    arformoterol tartrate 
                            [facility]    Jerry Fry  YOB: 1940          Assessment & Plan:     CKD 3b, previously f/b Dr. Tyson but lost to f/u since 2020  Hypernatremia/dehydration  Hypoxia: edema vs. Aspiration. May be difficult to distinguish these entities clinically and radiographically  Debility  Dementia   HFpEF  CAD s/p CABG  HTN  COPD    Rec:  Na better p diuril and free water  Give free water PRN  Suspect respiratory issue is primarily aspiration       Subjective:   CC: Hypernatremia, CKD  HPI: Na improved to 150. Creat stable.  Current Facility-Administered Medications   Medication Dose Route Frequency    [START ON 1/11/2024] methylPREDNISolone sodium succ (SOLU-MEDROL) 40 mg in sterile water 1 mL injection  40 mg IntraVENous Daily    haloperidol lactate (HALDOL) injection 1 mg  1 mg IntraMUSCular Q6H PRN    ipratropium 0.5 mg-albuterol 2.5 mg (DUONEB) nebulizer solution 1 Dose  1 Dose Inhalation Q6H WA RT    chlorothiazide (DIURIL) 250 mg in sterile water 9 mL injection  250 mg IntraVENous Q12H    bisacodyl (DULCOLAX) suppository 10 mg  10 mg Rectal Daily PRN    naloxone (NARCAN) injection 0.4 mg  0.4 mg IntraVENous PRN    dextrose 5 % solution   IntraVENous Continuous    [Held by provider] carvedilol (COREG) tablet 6.25 mg  6.25 mg Oral BID WC    [Held by provider] sacubitril-valsartan (ENTRESTO) 24-26 MG per tablet 1 tablet  1 tablet Oral BID    [Held by provider] amLODIPine (NORVASC) tablet 10 mg  10 mg Oral Daily    labetalol (NORMODYNE;TRANDATE) injection 10 mg  10 mg IntraVENous Q6H PRN    acetaminophen (TYLENOL) tablet 650 mg  650 mg Oral 4 times per day    calcitonin (MIACALCIN) nasal spray 1 spray  1 spray Alternating Nares Daily    [Held by provider] memantine (NAMENDA) tablet 5 mg  5 mg Oral BID    doxycycline (VIBRAMYCIN) 100 mg in sodium chloride 0.9 % 100 mL IVPB (mini-bag)  100 mg IntraVENous Q12H    pantoprazole (PROTONIX) 40 mg in sodium 
                            [facility]    Jerry Fry  YOB: 1940          Assessment & Plan:     CKD 3b, previously f/b Dr. Tyson but lost to f/u since 2020  Hypernatremia/dehydration  Hypoxia: edema vs. Aspiration. May be difficult to distinguish these entities clinically and radiographically  Debility  Dementia   HFpEF  CAD s/p CABG  HTN  COPD    Rec:  Na better  Continues to have issues with aspiration  Agree with D5W       Subjective:   CC: Hypernatremia, CKD  HPI: Continues to have issues with aspiration. Na 151. On D5W IV  Current Facility-Administered Medications   Medication Dose Route Frequency    cefepime (MAXIPIME) 1,000 mg in sodium chloride 0.9 % 50 mL IVPB (mini-bag)  1,000 mg IntraVENous Q12H    dextrose 5 % solution   IntraVENous Continuous    scopolamine (TRANSDERM-SCOP) transdermal patch 1 patch  1 patch TransDERmal Q72H    methylPREDNISolone sodium succ (SOLU-MEDROL) 40 mg in sterile water 1 mL injection  40 mg IntraVENous Daily    haloperidol lactate (HALDOL) injection 1 mg  1 mg IntraMUSCular Q6H PRN    doxycycline (VIBRAMYCIN) 100 mg in sodium chloride 0.9 % 100 mL IVPB (mini-bag)  100 mg IntraVENous Q12H    ipratropium 0.5 mg-albuterol 2.5 mg (DUONEB) nebulizer solution 1 Dose  1 Dose Inhalation Q6H WA RT    chlorothiazide (DIURIL) 250 mg in sterile water 9 mL injection  250 mg IntraVENous Q12H    bisacodyl (DULCOLAX) suppository 10 mg  10 mg Rectal Daily PRN    naloxone (NARCAN) injection 0.4 mg  0.4 mg IntraVENous PRN    [Held by provider] carvedilol (COREG) tablet 6.25 mg  6.25 mg Oral BID     sacubitril-valsartan (ENTRESTO) 24-26 MG per tablet 1 tablet  1 tablet Oral BID    amLODIPine (NORVASC) tablet 10 mg  10 mg Oral Daily    labetalol (NORMODYNE;TRANDATE) injection 10 mg  10 mg IntraVENous Q6H PRN    acetaminophen (TYLENOL) tablet 650 mg  650 mg Oral 4 times per day    calcitonin (MIACALCIN) nasal spray 1 spray  1 spray Alternating 
                            [facility]    Jerry Fry  YOB: 1940          Assessment & Plan:     CKD 3b, previously f/b Dr. Tyson but lost to f/u since 2020  Hypernatremia/dehydration  Hypoxia: edema vs. Aspiration. May be difficult to distinguish these entities clinically and radiographically  Debility  Dementia   HFpEF  CAD s/p CABG  HTN  COPD    Rec:  increase D5W @ 100 cc/hr  Labs reviewed from 1/18  and cr 2.23  Not a candidate for dialysis  Continue supportive care  Palliative care on board       Subjective:   CC: Hypernatremia, CKD  HPI: sleeping now, on IVF  No labs from today  Current Facility-Administered Medications   Medication Dose Route Frequency    morphine (PF) injection 1 mg  1 mg IntraVENous Q6H PRN    haloperidol lactate (HALDOL) injection 1 mg  1 mg IntraMUSCular Once    dextrose 5 % solution   IntraVENous Continuous    ipratropium 0.5 mg-albuterol 2.5 mg (DUONEB) nebulizer solution 1 Dose  1 Dose Inhalation Q6H WA RT    nystatin (MYCOSTATIN) 684993 UNIT/ML suspension 500,000 Units  5 mL Oral 4x Daily    ampicillin-sulbactam (UNASYN) 3,000 mg in sodium chloride 0.9 % 100 mL IVPB (mini-bag)  3,000 mg IntraVENous Q12H    bisacodyl (DULCOLAX) suppository 10 mg  10 mg Rectal Daily PRN    naloxone (NARCAN) injection 0.4 mg  0.4 mg IntraVENous PRN    [Held by provider] carvedilol (COREG) tablet 6.25 mg  6.25 mg Oral BID WC    [Held by provider] sacubitril-valsartan (ENTRESTO) 24-26 MG per tablet 1 tablet  1 tablet Oral BID    [Held by provider] amLODIPine (NORVASC) tablet 10 mg  10 mg Oral Daily    labetalol (NORMODYNE;TRANDATE) injection 10 mg  10 mg IntraVENous Q6H PRN    [Held by provider] acetaminophen (TYLENOL) tablet 650 mg  650 mg Oral 4 times per day    calcitonin (MIACALCIN) nasal spray 1 spray  1 spray Alternating Nares Daily    [Held by provider] memantine (NAMENDA) tablet 5 mg  5 mg Oral BID    pantoprazole (PROTONIX) 40 mg in sodium 
                            [facility]    Jerry Fry  YOB: 1940          Assessment & Plan:     CKD 3b, previously f/b Dr. Tyson but lost to f/u since 2020  Hypernatremia/dehydration  Hypoxia: edema vs. Aspiration. May be difficult to distinguish these entities clinically and radiographically  Debility  Dementia   HFpEF  CAD s/p CABG  HTN  COPD    Rec:  On D5W  Most appropriate for hospice  Not a candidate for dialysis       Subjective:   CC: Hypernatremia, CKD  HPI: Pulled out enteral tube. On D5W IV  Current Facility-Administered Medications   Medication Dose Route Frequency    dextrose 5 % solution   IntraVENous Continuous    ampicillin-sulbactam (UNASYN) 3,000 mg in sodium chloride 0.9 % 100 mL IVPB (mini-bag)  3,000 mg IntraVENous Q12H    morphine (PF) injection 1 mg  1 mg IntraVENous Q6H PRN    scopolamine (TRANSDERM-SCOP) transdermal patch 1 patch  1 patch TransDERmal Q72H    [Held by provider] haloperidol lactate (HALDOL) injection 1 mg  1 mg IntraMUSCular Q6H PRN    ipratropium 0.5 mg-albuterol 2.5 mg (DUONEB) nebulizer solution 1 Dose  1 Dose Inhalation Q6H WA RT    chlorothiazide (DIURIL) 250 mg in sterile water 9 mL injection  250 mg IntraVENous Q12H    bisacodyl (DULCOLAX) suppository 10 mg  10 mg Rectal Daily PRN    naloxone (NARCAN) injection 0.4 mg  0.4 mg IntraVENous PRN    [Held by provider] carvedilol (COREG) tablet 6.25 mg  6.25 mg Oral BID WC    [Held by provider] sacubitril-valsartan (ENTRESTO) 24-26 MG per tablet 1 tablet  1 tablet Oral BID    [Held by provider] amLODIPine (NORVASC) tablet 10 mg  10 mg Oral Daily    labetalol (NORMODYNE;TRANDATE) injection 10 mg  10 mg IntraVENous Q6H PRN    [Held by provider] acetaminophen (TYLENOL) tablet 650 mg  650 mg Oral 4 times per day    calcitonin (MIACALCIN) nasal spray 1 spray  1 spray Alternating Nares Daily    [Held by provider] memantine (NAMENDA) tablet 5 mg  5 mg Oral BID    pantoprazole 
     Nutrition Note    Chart reviewed. Noted plan to discharge with hospice services. No aggressive nutrition interventions needed at this time. Will assist as needed.    Diet NPO Exceptions are: Ice Chips, Other (Specify); Specify Other Exceptions: comfort feeds      RENETTA MONET RD, MS  Ext: [phone], or via Perfect[employer]erve      
     Palliative Medicine Social Work Note      [facility] made supportive visit to pt, who was sitting up in recliner with sitter present.  No family currently at bedside.  Pt was alert but hard of hearing, agitated, picking at wristband, trying to pull off LSO brace, was unable to engage in lucid conversation.  Call was placed to wife; Dr. Nelson left  requesting return call.  Noted SLP has attempted contact with wife today as well.    Thank you for including Palliative Medicine in the care of Mr. Fry.     Jamia Mayer, CHANTAL, ACHP-SW  Palliative Medicine:  [phone]  
  [address] [place] [address]   Office [phone]  Fax [phone]            Subjective / Objective     24 Hour Events: Agitated overnight with haldol give     SUBJECTIVE  Pt was seen and examined at bedside. Denies chest pain, nausea, vomiting, dizziness.   Reports no BM reported overnight after fleet enema.    OBJECTIVE  Respiratory:  Room air      BP (!) 149/77   Pulse 80   Temp 99.1 °F (37.3 °C) (Oral)   Resp 21   Ht 1.626 m (5' 4\")   Wt 74.4 kg (164 lb)   SpO2 93%   BMI 28.15 kg/m²     Physical Exam:  General: No acute distress. Alert.  Respiratory: Generalized rhonchi, air entry adequate bilaterally with clear lung bases  Cardiovascular: Regular rate and rhythm, clear S1 S2. Pulses present bilaterally.  GI: Distended, diffuse tenderness, dec bowel sounds.  Extremities: No LE edema. Pulses present.  Skin: Warm, dry.  Neuro: Alert and oriented, moving extremities spontanteously     I/O:  @IO@    Inpatient Medications  Current Facility-Administered Medications   Medication Dose Route Frequency    vancomycin (VANCOCIN) 1,250 mg in sodium chloride 0.9 % 250 mL IVPB ([id number])  1,250 mg IntraVENous Q24H    memantine (NAMENDA) tablet 5 mg  5 mg Oral BID    doxycycline (VIBRAMYCIN) 100 mg in sodium chloride 0.9 % 100 mL IVPB (mini-bag)  100 mg IntraVENous Q12H    methylPREDNISolone sodium succ (SOLU-MEDROL) 80 mg in sterile water 1.28 mL injection  80 mg IntraVENous Q8H    pantoprazole (PROTONIX) 40 mg in sodium chloride (PF) 0.9 % 10 mL injection  40 mg IntraVENous Daily    ipratropium 0.5 mg-albuterol 2.5 mg (DUONEB) nebulizer solution 1 Dose  1 Dose Inhalation 4x Daily RT    arformoterol tartrate (BROVANA) nebulizer solution 15 mcg  15 mcg Nebulization BID RT    budesonide (PULMICORT) nebulizer suspension 250 mcg  0.25 mg Nebulization BID RT    QUEtiapine (SEROQUEL) tablet 50 mg  50 mg Oral TID    sodium chloride flush 0.9 % injection 5-40 mL  5-40 mL IntraVENous 2 times per day    
  [address] [place] [address]   Office [phone]  Fax [phone]            Subjective / Objective     24 Hour Events: JADIEL MASTERS  Pt was seen and examined at bedside. Denies chest pain, nausea, vomiting, dizziness.   Per chart review had 2 bowel movements, however patient does not recall this.     OBJECTIVE  Respiratory:  Room air      BP (!) 155/85   Pulse 79   Temp 97.7 °F (36.5 °C) (Oral)   Resp 16   Ht 1.626 m (5' 4\")   Wt 74.4 kg (164 lb)   SpO2 93%   BMI 28.15 kg/m²     Physical Exam:  General: No acute distress. Alert.  Respiratory: no IWOB, rales present bilaterally in middle and lower lung zones bilaterally, no wheezing   Cardiovascular: Regular rate and rhythm, clear S1 S2. Pulses present bilaterally.  GI: Distended, non-tender , dec bowel sounds.  Extremities: No LE edema. Pulses present.  Skin: Warm, dry.  Neuro: Alert and oriented, moving extremities spontanteously     I/O:  @IO@    Inpatient Medications  Current Facility-Administered Medications   Medication Dose Route Frequency    [Held by provider] dextrose 5 % solution   IntraVENous Continuous    [Held by provider] carvedilol (COREG) tablet 6.25 mg  6.25 mg Oral BID WC    [Held by provider] sacubitril-valsartan (ENTRESTO) 24-26 MG per tablet 1 tablet  1 tablet Oral BID    [Held by provider] amLODIPine (NORVASC) tablet 10 mg  10 mg Oral Daily    methylPREDNISolone sodium succ (SOLU-MEDROL) 60 mg in sterile water 0.96 mL injection  60 mg IntraVENous Q8H    labetalol (NORMODYNE;TRANDATE) injection 10 mg  10 mg IntraVENous Q6H PRN    acetaminophen (TYLENOL) tablet 650 mg  650 mg Oral 4 times per day    morphine (PF) injection 2 mg  2 mg IntraVENous Q6H PRN    calcitonin (MIACALCIN) nasal spray 1 spray  1 spray Alternating Nares Daily    [Held by provider] memantine (NAMENDA) tablet 5 mg  5 mg Oral BID    doxycycline (VIBRAMYCIN) 100 mg in sodium chloride 0.9 % 100 mL IVPB (mini-bag)  100 mg IntraVENous Q12H    
  [address] [place] [address]   Office [phone]  Fax [phone]            Subjective / Objective     24 Hour Events: JADIEL MASTERS  Pt was seen and examined at bedside. Not well vocalizing and sleepy during examination. Denies chest pain, nausea, vomiting, dizziness.     OBJECTIVE  Respiratory:  Room air      /75   Pulse 88   Temp 97.9 °F (36.6 °C) (Axillary)   Resp 20   Ht 1.626 m (5' 4\")   Wt 74.4 kg (164 lb)   SpO2 93%   BMI 28.15 kg/m²     Physical Exam:  General: No acute distress. Alert.  Respiratory: no IWOB, mild rales present bilaterally in lower lung zones, no wheezing   Cardiovascular: Regular rate and rhythm, clear S1 S2. Pulses present bilaterally.  GI: Distended, non-tender , bowel sounds present  Extremities: No LE edema. Pulses present.  Skin: Warm, dry.  Neuro: Alert and oriented, moving extremities spontanteously     I/O:  @IO@    Inpatient Medications  Current Facility-Administered Medications   Medication Dose Route Frequency    morphine (PF) injection 1 mg  1 mg IntraVENous Q6H PRN    scopolamine (TRANSDERM-SCOP) transdermal patch 1 patch  1 patch TransDERmal Q72H    [Held by provider] haloperidol lactate (HALDOL) injection 1 mg  1 mg IntraMUSCular Q6H PRN    ipratropium 0.5 mg-albuterol 2.5 mg (DUONEB) nebulizer solution 1 Dose  1 Dose Inhalation Q6H WA RT    chlorothiazide (DIURIL) 250 mg in sterile water 9 mL injection  250 mg IntraVENous Q12H    bisacodyl (DULCOLAX) suppository 10 mg  10 mg Rectal Daily PRN    naloxone (NARCAN) injection 0.4 mg  0.4 mg IntraVENous PRN    [Held by provider] carvedilol (COREG) tablet 6.25 mg  6.25 mg Oral BID WC    [Held by provider] sacubitril-valsartan (ENTRESTO) 24-26 MG per tablet 1 tablet  1 tablet Oral BID    [Held by provider] amLODIPine (NORVASC) tablet 10 mg  10 mg Oral Daily    labetalol (NORMODYNE;TRANDATE) injection 10 mg  10 mg IntraVENous Q6H PRN    [Held by provider] acetaminophen (TYLENOL) tablet 
  [address] [place] [address]   Office [phone]  Fax [phone]            Subjective / Objective     24 Hour Events: Had rapid called for IWOB     SUBJECTIVE  Pt was seen and examined at bedside. Not well vocalizing and sleepy during examination.     OBJECTIVE  Respiratory:  Room air      /78   Pulse (!) 103   Temp 97.3 °F (36.3 °C) (Axillary)   Resp 21   Ht 1.626 m (5' 4\")   Wt 74.4 kg (164 lb)   SpO2 92%   BMI 28.15 kg/m²     Physical Exam:  General: No acute distress. Awake but does not engage.   Respiratory: CTAB anteriorly, no rales/rhonchi/wheezing   Cardiovascular: Regular rate and rhythm, clear S1 S2. Pulses present bilaterally.  GI: Distended, non-tender , bowel sounds present  Extremities: No LE edema. Pulses present.  Skin: Warm, dry.  Neuro: Alert and oriented, moving extremities spontanteously     I/O:  @IO@    Inpatient Medications  Current Facility-Administered Medications   Medication Dose Route Frequency    ipratropium 0.5 mg-albuterol 2.5 mg (DUONEB) nebulizer solution 1 Dose  1 Dose Inhalation Q4H RT    dextrose 5 % solution   IntraVENous Continuous    [Held by provider] morphine 10 MG/5ML solution 4 mg  4 mg Oral q8h    saliva substitute (BIOTENE/MOUTH KOTE) liquid   Oral PRN    white petrolatum ointment   Topical PRN    nystatin (MYCOSTATIN) 126280 UNIT/ML suspension 500,000 Units  5 mL Oral 4x Daily    ampicillin-sulbactam (UNASYN) 3,000 mg in sodium chloride 0.9 % 100 mL IVPB (mini-bag)  3,000 mg IntraVENous Q12H    bisacodyl (DULCOLAX) suppository 10 mg  10 mg Rectal Daily PRN    naloxone (NARCAN) injection 0.4 mg  0.4 mg IntraVENous PRN    [Held by provider] carvedilol (COREG) tablet 6.25 mg  6.25 mg Oral BID WC    [Held by provider] sacubitril-valsartan (ENTRESTO) 24-26 MG per tablet 1 tablet  1 tablet Oral BID    [Held by provider] amLODIPine (NORVASC) tablet 10 mg  10 mg Oral Daily    labetalol (NORMODYNE;TRANDATE) injection 10 mg  10 mg IntraVENous 
  [address] [place] [address]   Office [phone]  Fax [phone]            Subjective / Objective     24 Hour Events: JADIEL MASTERS  Pt was seen and examined at bedside, resting comfortably. Eyes open to voice but does not respond to questions.     OBJECTIVE  Respiratory:  Room air      /65   Pulse 91   Temp 97.5 °F (36.4 °C) (Axillary)   Resp 14   Ht 1.626 m (5' 4\")   Wt 74.4 kg (164 lb)   SpO2 92%   BMI 28.15 kg/m²     Physical Exam:  General: No acute distress. Awake but does not engage.   Respiratory: CTAB anteriorly, bilateral rhonchi  Cardiovascular: Regular rate and rhythm, clear S1 S2. Pulses present bilaterally.  GI: Distended, non-tender , bowel sounds present  Extremities: No LE edema. Pulses present.  Skin: Warm, dry.  Neuro: Opens eyes to voice, does not respond to questions. moving extremities spontanteously     I/O:  @IO@    Inpatient Medications  Current Facility-Administered Medications   Medication Dose Route Frequency    morphine (PF) injection 2 mg  2 mg IntraVENous Q15 Min PRN    LORazepam (ATIVAN) 2 MG/ML concentrated solution 1 mg  1 mg Oral Q1H PRN    LORazepam (ATIVAN) injection 1 mg  1 mg IntraVENous Q15 Min PRN    morphine 20MG/ML concentrated solution 5 mg  5 mg Oral Q1H PRN    saliva substitute (BIOTENE/MOUTH KOTE) liquid   Oral PRN    white petrolatum ointment   Topical PRN    nystatin (MYCOSTATIN) 566611 UNIT/ML suspension 500,000 Units  5 mL Oral 4x Daily    bisacodyl (DULCOLAX) suppository 10 mg  10 mg Rectal Daily PRN    sodium chloride flush 0.9 % injection 5-40 mL  5-40 mL IntraVENous PRN    ondansetron (ZOFRAN-ODT) disintegrating tablet 4 mg  4 mg Oral Q8H PRN    Or    ondansetron (ZOFRAN) injection 4 mg  4 mg IntraVENous Q6H PRN    polyethylene glycol (GLYCOLAX) packet 17 g  17 g Oral Daily PRN    lidocaine 4 % external patch 1 patch  1 patch TransDERmal Daily    polyethylene glycol (GLYCOLAX) packet 17 g  17 g Oral Daily 
  [address] [place] [address]   Office [phone]  Fax [phone]            Subjective / Objective     24 Hour Events: JADIEL MASTERS  Pt was seen and examined at bedside. Denies chest pain, nausea, vomiting, dizziness.   Per chart review had 2 bowel movements, however patient does not recall this.     OBJECTIVE  Respiratory:  Room air      BP (!) 175/88   Pulse 93   Temp 98.4 °F (36.9 °C) (Oral)   Resp 16   Ht 1.626 m (5' 4\")   Wt 74.4 kg (164 lb)   SpO2 93%   BMI 28.15 kg/m²     Physical Exam:  General: No acute distress. Alert.  Respiratory: no IWOB, rales present bilaterally in middle and lower lung zones bilaterally, no wheezing   Cardiovascular: Regular rate and rhythm, clear S1 S2. Pulses present bilaterally.  GI: Distended, non-tender , dec bowel sounds.  Extremities: No LE edema. Pulses present.  Skin: Warm, dry.  Neuro: Alert and oriented, moving extremities spontanteously     I/O:  @IO@    Inpatient Medications  Current Facility-Administered Medications   Medication Dose Route Frequency    dextrose 5 % solution   IntraVENous Continuous    acetaminophen (TYLENOL) tablet 650 mg  650 mg Oral 4 times per day    morphine (PF) injection 2 mg  2 mg IntraVENous Q6H PRN    calcitonin (MIACALCIN) nasal spray 1 spray  1 spray Alternating Nares Daily    memantine (NAMENDA) tablet 5 mg  5 mg Oral BID    doxycycline (VIBRAMYCIN) 100 mg in sodium chloride 0.9 % 100 mL IVPB (mini-bag)  100 mg IntraVENous Q12H    methylPREDNISolone sodium succ (SOLU-MEDROL) 80 mg in sterile water 1.28 mL injection  80 mg IntraVENous Q8H    pantoprazole (PROTONIX) 40 mg in sodium chloride (PF) 0.9 % 10 mL injection  40 mg IntraVENous Daily    ipratropium 0.5 mg-albuterol 2.5 mg (DUONEB) nebulizer solution 1 Dose  1 Dose Inhalation 4x Daily RT    arformoterol tartrate (BROVANA) nebulizer solution 15 mcg  15 mcg Nebulization BID RT    budesonide (PULMICORT) nebulizer suspension 250 mcg  0.25 mg 
  [address] [place] [address]   Office [phone]  Fax [phone]            Subjective / Objective     24 Hour Events: on BiPAP overnight, stopped 11 PM    SUBJECTIVE  Pt was seen and examined at bedside. Denies chest pain, nausea, vomiting, dizziness.   Nurse reports no BM reported overnight after fleet enema.    OBJECTIVE      Respiratory:       /73   Pulse 98   Temp 98.1 °F (36.7 °C) (Oral)   Resp 25   Ht 1.626 m (5' 4\")   Wt 73.7 kg (162 lb 6.4 oz)   SpO2 94%   BMI 27.88 kg/m²     Physical Exam:  General: No acute distress. Alert.  Respiratory: diffuse exp wheezes, no crackles, wet cough  Cardiovascular: Regular rate and rhythm, clear S1 S2. Pulses present bilaterally.  GI: Distended, diffuse tenderness, dec bowel sounds.  Extremities: No LE edema. Pulses present.  Skin: Warm, dry.  Neuro: Alert and oriented, moving extremities spontanteously     I/O:  @IO@    Inpatient Medications  Current Facility-Administered Medications   Medication Dose Route Frequency    doxycycline (VIBRAMYCIN) 100 mg in sodium chloride 0.9 % 100 mL IVPB (mini-bag)  100 mg IntraVENous Q12H    methylPREDNISolone sodium succ (SOLU-MEDROL) 80 mg in sterile water 1.28 mL injection  80 mg IntraVENous Q8H    pantoprazole (PROTONIX) 40 mg in sodium chloride (PF) 0.9 % 10 mL injection  40 mg IntraVENous Daily    ipratropium 0.5 mg-albuterol 2.5 mg (DUONEB) nebulizer solution 1 Dose  1 Dose Inhalation 4x Daily RT    Vancomycin - Pharmacist to dose   Other RX Placeholder    Vancomycin - Please draw RANDOM level at 1600, 1/6/24. Thank you!!!   Other Once    arformoterol tartrate (BROVANA) nebulizer solution 15 mcg  15 mcg Nebulization BID RT    budesonide (PULMICORT) nebulizer suspension 250 mcg  0.25 mg Nebulization BID RT    QUEtiapine (SEROQUEL) tablet 50 mg  50 mg Oral TID    sodium chloride flush 0.9 % injection 5-40 mL  5-40 mL IntraVENous 2 times per day    sodium chloride flush 0.9 % injection 5-40 mL  
  [address] [place] [address]   Office [phone]  Fax [phone]            Subjective / Objective     24 Hour Events: Agitated overnight, resolved with 0.5 Haloperidol.     SUBJECTIVE  Pt was seen and examined at bedside. Sleepy during questioning. No bowel movements reported per sitter and nursing     OBJECTIVE  Respiratory:  Room air      /87   Pulse 83   Temp 98 °F (36.7 °C) (Axillary)   Resp 24   Ht 1.626 m (5' 4\")   Wt 74.4 kg (164 lb)   SpO2 90%   BMI 28.15 kg/m²     Physical Exam:  General: No acute distress. Alert.  Respiratory: no IWOB, rales present bilaterally in middle and lower lung zones bilaterally, no wheezing   Cardiovascular: Regular rate and rhythm, clear S1 S2. Pulses present bilaterally.  GI: Distended, non-tender , dec bowel sounds.  Extremities: No LE edema. Pulses present.  Skin: Warm, dry.  Neuro: Alert and oriented, moving extremities spontanteously     I/O:  @IO@    Inpatient Medications  Current Facility-Administered Medications   Medication Dose Route Frequency    methylPREDNISolone sodium succ (SOLU-MEDROL) 40 mg in sterile water 1 mL injection  40 mg IntraVENous Daily    haloperidol lactate (HALDOL) injection 1 mg  1 mg IntraMUSCular Q6H PRN    cefepime (MAXIPIME) 1,000 mg in sodium chloride 0.9 % 50 mL IVPB (mini-bag)  1,000 mg IntraVENous Q12H    doxycycline (VIBRAMYCIN) 100 mg in sodium chloride 0.9 % 100 mL IVPB (mini-bag)  100 mg IntraVENous Q12H    ipratropium 0.5 mg-albuterol 2.5 mg (DUONEB) nebulizer solution 1 Dose  1 Dose Inhalation Q6H WA RT    chlorothiazide (DIURIL) 250 mg in sterile water 9 mL injection  250 mg IntraVENous Q12H    bisacodyl (DULCOLAX) suppository 10 mg  10 mg Rectal Daily PRN    naloxone (NARCAN) injection 0.4 mg  0.4 mg IntraVENous PRN    [Held by provider] carvedilol (COREG) tablet 6.25 mg  6.25 mg Oral BID WC    sacubitril-valsartan (ENTRESTO) 24-26 MG per tablet 1 tablet  1 tablet Oral BID    amLODIPine (NORVASC) 
  [address] [place] [address]   Office [phone]  Fax [phone]            Subjective / Objective     24 Hour Events: Received haldol x1 ovn for agitation. Hypothermic to 96.4.     SUBJECTIVE  Pt was seen and examined at bedside. Not well vocalizing and sleepy during examination.     OBJECTIVE  Respiratory:  Room air      /75   Pulse 100   Temp 97 °F (36.1 °C) (Axillary)   Resp 16   Ht 1.626 m (5' 4\")   Wt 74.4 kg (164 lb)   SpO2 95%   BMI 28.15 kg/m²     Physical Exam:  General: No acute distress. Awake but does not engage.   Respiratory: CTAB anteriorly, no rales/rhonchi/wheezing   Cardiovascular: Regular rate and rhythm, clear S1 S2. Pulses present bilaterally.  GI: Distended, non-tender , bowel sounds present  Extremities: No LE edema. Pulses present.  Skin: Warm, dry.  Neuro: Alert and oriented, moving extremities spontanteously     I/O:  @IO@    Inpatient Medications  Current Facility-Administered Medications   Medication Dose Route Frequency    morphine (ROXANOL) 0.4 mg/mL oral soln 3.72 mg  0.05 mg/kg Oral Q6H    haloperidol lactate (HALDOL) injection 1 mg  1 mg IntraMUSCular Once    dextrose 5 % solution   IntraVENous Continuous    ipratropium 0.5 mg-albuterol 2.5 mg (DUONEB) nebulizer solution 1 Dose  1 Dose Inhalation Q6H WA RT    nystatin (MYCOSTATIN) 786660 UNIT/ML suspension 500,000 Units  5 mL Oral 4x Daily    ampicillin-sulbactam (UNASYN) 3,000 mg in sodium chloride 0.9 % 100 mL IVPB (mini-bag)  3,000 mg IntraVENous Q12H    bisacodyl (DULCOLAX) suppository 10 mg  10 mg Rectal Daily PRN    naloxone (NARCAN) injection 0.4 mg  0.4 mg IntraVENous PRN    [Held by provider] carvedilol (COREG) tablet 6.25 mg  6.25 mg Oral BID WC    [Held by provider] sacubitril-valsartan (ENTRESTO) 24-26 MG per tablet 1 tablet  1 tablet Oral BID    [Held by provider] amLODIPine (NORVASC) tablet 10 mg  10 mg Oral Daily    labetalol (NORMODYNE;TRANDATE) injection 10 mg  10 mg IntraVENous 
  [address] [place] [address]   Office [phone]  Fax [phone]            Subjective / Objective     24 Hour Events: JADIEL MASTERS  Pt was seen and examined at bedside.  Denies chest pain, nausea, vomiting, dizziness. Asking to get out of bed repeatedly.       OBJECTIVE  Respiratory:  Room air      BP (!) 143/78   Pulse 93   Temp 97.3 °F (36.3 °C) (Axillary)   Resp 18   Ht 1.626 m (5' 4\")   Wt 74.4 kg (164 lb)   SpO2 90%   BMI 28.15 kg/m²     Physical Exam:  General: No acute distress. Alert.  Respiratory: no IWOB, rales present bilaterally in middle and lower lung zones bilaterally, no wheezing   Cardiovascular: Regular rate and rhythm, clear S1 S2. Pulses present bilaterally.  GI: Distended, non-tender , dec bowel sounds.  Extremities: No LE edema. Pulses present.  Skin: Warm, dry.  Neuro: Alert and oriented, moving extremities spontanteously     I/O:  @IO@    Inpatient Medications  Current Facility-Administered Medications   Medication Dose Route Frequency    potassium chloride 10 mEq/100 mL IVPB (Peripheral Line)  10 mEq IntraVENous Q1H    dextrose 5 % solution   IntraVENous Continuous    morphine (PF) injection 1 mg  1 mg IntraVENous Q6H PRN    scopolamine (TRANSDERM-SCOP) transdermal patch 1 patch  1 patch TransDERmal Q72H    [Held by provider] haloperidol lactate (HALDOL) injection 1 mg  1 mg IntraMUSCular Q6H PRN    ipratropium 0.5 mg-albuterol 2.5 mg (DUONEB) nebulizer solution 1 Dose  1 Dose Inhalation Q6H WA RT    chlorothiazide (DIURIL) 250 mg in sterile water 9 mL injection  250 mg IntraVENous Q12H    bisacodyl (DULCOLAX) suppository 10 mg  10 mg Rectal Daily PRN    naloxone (NARCAN) injection 0.4 mg  0.4 mg IntraVENous PRN    [Held by provider] carvedilol (COREG) tablet 6.25 mg  6.25 mg Oral BID WC    [Held by provider] sacubitril-valsartan (ENTRESTO) 24-26 MG per tablet 1 tablet  1 tablet Oral BID    [Held by provider] amLODIPine (NORVASC) tablet 10 mg  10 mg 
  [address] [place] [address]   Office [phone]  Fax [phone]            Subjective / Objective     24 Hour Events: received haldol x1 for agitation. Placed on HHF for desaturations and upgraded to stepdown.     SUBJECTIVE  Pt was seen and examined at bedside, resting comfortably. Eyes open to voice but does not respond to questions.     OBJECTIVE  Respiratory:  Room air      /63   Pulse 84   Temp 97.9 °F (36.6 °C) (Axillary)   Resp 18   Ht 1.626 m (5' 4\")   Wt 74.4 kg (164 lb)   SpO2 95%   BMI 28.15 kg/m²     Physical Exam:  General: No acute distress. Awake but does not engage.   Respiratory: CTAB anteriorly, no rales/rhonchi/wheezing   Cardiovascular: Regular rate and rhythm, clear S1 S2. Pulses present bilaterally.  GI: Distended, non-tender , bowel sounds present  Extremities: No LE edema. Pulses present.  Skin: Warm, dry.  Neuro: Opens eyes to voice, does not respond to questions. moving extremities spontanteously     I/O:  @IO@    Inpatient Medications  Current Facility-Administered Medications   Medication Dose Route Frequency    ipratropium 0.5 mg-albuterol 2.5 mg (DUONEB) nebulizer solution 1 Dose  1 Dose Inhalation Q6H WA RT    dextrose 5 % with KCl 20 mEq infusion   IntraVENous Continuous    morphine 20MG/ML concentrated solution 2.5 mg  2.5 mg SubLINGual Q6H PRN    [Held by provider] morphine 10 MG/5ML solution 4 mg  4 mg Oral q8h    saliva substitute (BIOTENE/MOUTH KOTE) liquid   Oral PRN    white petrolatum ointment   Topical PRN    nystatin (MYCOSTATIN) [id number] UNIT/ML suspension 500,000 Units  5 mL Oral 4x Daily    bisacodyl (DULCOLAX) suppository 10 mg  10 mg Rectal Daily PRN    naloxone (NARCAN) injection 0.4 mg  0.4 mg IntraVENous PRN    [Held by provider] carvedilol (COREG) tablet 6.25 mg  6.25 mg Oral BID WC    [Held by provider] sacubitril-valsartan (ENTRESTO) 24-26 MG per tablet 1 tablet  1 tablet Oral BID    [Held by provider] amLODIPine (NORVASC) 
  [address] [place] [address]   Office [phone]  Fax [phone]            Subjective / Objective     24 Hour Events: JADIEL MASTERS  Pt was seen and examined at bedside. Sitter reports that patient has been sleeping for most of the morning.     OBJECTIVE  Respiratory:  Room air      /77   Pulse 89   Temp 97.5 °F (36.4 °C) (Axillary)   Resp 20   Ht 1.626 m (5' 4\")   Wt 74.4 kg (164 lb)   SpO2 95%   BMI 28.15 kg/m²     Physical Exam:  General: No acute distress. Awake but does not engage.   Respiratory: CTAB anteriorly, no rales/rhonchi/wheezing   Cardiovascular: Regular rate and rhythm, clear S1 S2. Pulses present bilaterally.  GI: Distended, non-tender , bowel sounds present  Extremities: No LE edema. Pulses present.  Skin: Warm, dry.  Neuro: Alert and oriented, moving extremities spontanteously     I/O:  @IO@    Inpatient Medications  Current Facility-Administered Medications   Medication Dose Route Frequency    ipratropium 0.5 mg-albuterol 2.5 mg (DUONEB) nebulizer solution 1 Dose  1 Dose Inhalation Q4H RT    morphine 20MG/ML concentrated solution 2.5 mg  2.5 mg SubLINGual Q6H PRN    haloperidol lactate (HALDOL) injection 1 mg  1 mg IntraMUSCular Once    [Held by provider] morphine 10 MG/5ML solution 4 mg  4 mg Oral q8h    saliva substitute (BIOTENE/MOUTH KOTE) liquid   Oral PRN    white petrolatum ointment   Topical PRN    nystatin (MYCOSTATIN) [id number] UNIT/ML suspension 500,000 Units  5 mL Oral 4x Daily    bisacodyl (DULCOLAX) suppository 10 mg  10 mg Rectal Daily PRN    naloxone (NARCAN) injection 0.4 mg  0.4 mg IntraVENous PRN    [Held by provider] carvedilol (COREG) tablet 6.25 mg  6.25 mg Oral BID WC    [Held by provider] sacubitril-valsartan (ENTRESTO) 24-26 MG per tablet 1 tablet  1 tablet Oral BID    [Held by provider] amLODIPine (NORVASC) tablet 10 mg  10 mg Oral Daily    labetalol (NORMODYNE;TRANDATE) injection 10 mg  10 mg IntraVENous Q6H PRN    [Held by 
  [address] [place] [address]   Office [phone]  Fax [phone]            Subjective / Objective     24 Hour Events: Had a respiratory rapid called for desats to 85%    SUBJECTIVE  Pt was seen and examined at bedside. Not well vocalizing and sleepy during examination. Denies chest pain, nausea, vomiting, dizziness.     OBJECTIVE  Respiratory:  Room air      /71   Pulse 88   Temp 97.8 °F (36.6 °C) (Axillary)   Resp 20   Ht 1.626 m (5' 4\")   Wt 74.4 kg (164 lb)   SpO2 96%   BMI 28.15 kg/m²     Physical Exam:  General: No acute distress. Alert.  Respiratory: no IWOB, mild rales present bilaterally in lower lung zones, no wheezing   Cardiovascular: Regular rate and rhythm, clear S1 S2. Pulses present bilaterally.  GI: Distended, non-tender , bowel sounds present  Extremities: No LE edema. Pulses present.  Skin: Warm, dry.  Neuro: Alert and oriented, moving extremities spontanteously     I/O:  @IO@    Inpatient Medications  Current Facility-Administered Medications   Medication Dose Route Frequency    ampicillin-sulbactam (UNASYN) 3,000 mg in sodium chloride 0.9 % 100 mL IVPB (mini-bag)  3,000 mg IntraVENous Q12H    ipratropium 0.5 mg-albuterol 2.5 mg (DUONEB) nebulizer solution 1 Dose  1 Dose Inhalation Q4H RT    morphine (PF) injection 1 mg  1 mg IntraVENous Q6H PRN    scopolamine (TRANSDERM-SCOP) transdermal patch 1 patch  1 patch TransDERmal Q72H    [Held by provider] haloperidol lactate (HALDOL) injection 1 mg  1 mg IntraMUSCular Q6H PRN    bisacodyl (DULCOLAX) suppository 10 mg  10 mg Rectal Daily PRN    naloxone (NARCAN) injection 0.4 mg  0.4 mg IntraVENous PRN    [Held by provider] carvedilol (COREG) tablet 6.25 mg  6.25 mg Oral BID WC    [Held by provider] sacubitril-valsartan (ENTRESTO) 24-26 MG per tablet 1 tablet  1 tablet Oral BID    [Held by provider] amLODIPine (NORVASC) tablet 10 mg  10 mg Oral Daily    labetalol (NORMODYNE;TRANDATE) injection 10 mg  10 mg 
  [address] [place] [address]   Office [phone]  Fax [phone]            Subjective / Objective     24 Hour Events: Received a dose of haloperidol overnight for an episode of aggressive confusion    SUBJECTIVE  Pt was seen and examined at bedside. Not well vocalizing and sleepy during examination.     OBJECTIVE  Respiratory:  Room air      /87   Pulse 99   Temp 97.3 °F (36.3 °C) (Axillary)   Resp 25   Ht 1.626 m (5' 4\")   Wt 74.4 kg (164 lb)   SpO2 91%   BMI 28.15 kg/m²     Physical Exam:  General: No acute distress. Alert.  Respiratory: CTAB anteriorly, no rales/rhonchi/wheezing   Cardiovascular: Regular rate and rhythm, clear S1 S2. Pulses present bilaterally.  GI: Distended, non-tender , bowel sounds present  Extremities: No LE edema. Pulses present.  Skin: Warm, dry.  Neuro: Alert and oriented, moving extremities spontanteously     I/O:  @IO@    Inpatient Medications  Current Facility-Administered Medications   Medication Dose Route Frequency    dextrose 5 % solution   IntraVENous Continuous    haloperidol lactate (HALDOL) injection 1 mg  1 mg IntraMUSCular Once    ipratropium 0.5 mg-albuterol 2.5 mg (DUONEB) nebulizer solution 1 Dose  1 Dose Inhalation Q6H WA RT    nystatin (MYCOSTATIN) [id number] UNIT/ML suspension 500,000 Units  5 mL Oral 4x Daily    ampicillin-sulbactam (UNASYN) 3,000 mg in sodium chloride 0.9 % 100 mL IVPB (mini-bag)  3,000 mg IntraVENous Q12H    bisacodyl (DULCOLAX) suppository 10 mg  10 mg Rectal Daily PRN    naloxone (NARCAN) injection 0.4 mg  0.4 mg IntraVENous PRN    [Held by provider] carvedilol (COREG) tablet 6.25 mg  6.25 mg Oral BID WC    [Held by provider] sacubitril-valsartan (ENTRESTO) 24-26 MG per tablet 1 tablet  1 tablet Oral BID    [Held by provider] amLODIPine (NORVASC) tablet 10 mg  10 mg Oral Daily    labetalol (NORMODYNE;TRANDATE) injection 10 mg  10 mg IntraVENous Q6H PRN    [Held by provider] acetaminophen (TYLENOL) tablet 650 mg  
  Physician Progress Note      PATIENT:               KRISSY ZACARIAS  [facility] #:                  [id number]  :                       1940  ADMIT DATE:       2024 11:40 AM  DISCH DATE:  RESPONDING  PROVIDER #:        ROBERTO ELLIS          QUERY TEXT:    Good afternoon.    Patient admitted with sepsis secondary to pneumonia. Noted to have moderate   malnutrition documented in 01/10 Registered Dietician note.    If possible, please document in progress notes and discharge summary if you   are evaluating and /or treating any of the following:      The medical record reflects the following:    Risk Factors: 83 yr old female, CHF, COPD, vertebral fracture,   dysphonia/dysphagia, CKD 4, DDD    Clinical Indicators: from 01/10 RD note: \"Malnutrition Assessment:   Malnutrition Status:  Moderate malnutrition (01/10/24 2877) Context:  Chronic   Illness Findings of the 6 clinical characteristics of malnutrition: Energy   Intake:  Mild decrease in energy intake (Comment) Weight Loss:  No significant   weight loss Body Fat Loss:  No significant body fat loss Triceps, Fat   Overlying Ribs Muscle Mass Loss:  Mild muscle mass loss Clavicles (pectoralis   & deltoids), Calf (gastrocnemius) Fluid Accumulation:  Mild Extremities    Strength:  Not Performed\"; Labs: 24 12:07Albumin: 2.3, 24 12:10   Albumin: 2.0, 24 22:42  Albumin: 2.2, 24 00:40 Albumin: 2.3, 24 02:50 Albumin: 2.2;   24 12:07 Sodium: 138, 24 12:10 Sodium: 146, 24 22:42 Sodium:   148, 24 00:40 Sodium: 154, 24 06:15 Sodium: 150, 24 17:34   Sodium: 149, 24 02:50 Sodium: 153 (H); 24 12:07 CALCIUM, SERUM,   [id number]: 9.5, 24 12:10 CALCIUM, SERUM, [id number]: 8.7, 24 22:42   CALCIUM, SERUM, [id number]: 9.4, 24 00:40 CALCIUM, SERUM, [id number]: 8.7,   24 06:15 CALCIUM, SERUM, [id number]: 7.7, 24 17:34 CALCIUM, SERUM,   [id number]: 12.0, 24 02:50 CALCIUM, SERUM, 108202: 8.0; 
  Physician Progress Note      PATIENT:               KRISSY ZACARIAS  CSN #:                  [id number]  :                       1940  ADMIT DATE:       2024 11:40 AM  DISCH DATE:  RESPONDING  PROVIDER #:        ROBERTO ELLIS          QUERY TEXT:    Good morning.    Pt admitted with sepsis. Pt noted to have L2 acute fracture on  MRI of   L-spine.  CT abd/pelvis showed: The patient is osteoporotic. Ortho was   consulted and recommended conservative treatment. Patient is being treated   with Calcitonin nasal spray.    If possible, please document in progress notes and discharge summary if you   are evaluating and/or treating any of the following:    The medical record reflects the following:    Risk Factors: 83 yr old male, COPD, dementia, CAD s/p CABG, CKD 4, T2DM    Clinical Indicators:  Ortho consult: \"Would recommend conservative tx   with PT/OT, LSO brace, WBAT. Pain control.  Once patient medically stable, would recommend outpatient kyphoplasty with Dr. Wilcox whom he sees regularly if he fails conservative treatment.\";  CT   abd/pelvis: \"Bones: The patient is osteoporotic. An L1 compression fracture is   post  kyphoplasty. An L2 compression fracture is age indeterminate, new from   10/12/2020.\";  MRI L-Spine: 1. L2 acute fracture. 2. L1 chronic fracture   kyphoplasty treated. 3. Degenerative disc/facet disease; Labs: 24 12:10  CALCIUM, SERUM, [id number]: 8.7, 24 22:42 CALCIUM, SERUM, 412409: 9.4,   24 00:40 CALCIUM, SERUM, 002162: 8.7, 24 06:15 CALCIUM, SERUM,   332296: 7.7, 24 17:34 CALCIUM, SERUM, [id number]: 12.0, 24 02:50   CALCIUM, SERUM, [id number]: 8.0, 01/10/24 00:24 CALCIUM, SERUM, 667506: 8.5,   01/10/24 07:50 CALCIUM, SERUM, 737300: 8.7, 01/10/24 16:32 CALCIUM, SERUM,   [id number]: 6.3, 24 00:47 CALCIUM, SERUM, 685825: 8.8    Treatment: MRI of L-spine, CT abd/pelvis, Calcitonin nasal spray, Ortho   consult, labs, pain control      Thank 
  Rapid response note        SUBJECTIVE: I examined the patient at bedside.  Patient had a round of nebulization.  Nurse noted that saturations dropped down to 85 to 86% after nebulization.  Patient was on 1 L nasal cannula and she increased oxygen to 6 L.  Patient initially had shortness of breath which quickly resolved.  Oxygen saturation improved to 91%.  No complaints of chest pain.   Denies nausea, vomiting, abdominal pain, dizziness.     OBJECTIVE:    BP: 122/68, Pain 0-10: Pain Level: 0, Location: BUE and abdomen bruises, scar on the chest;     HR 75, /82    Physical Exam:  General: Mild acute distress  Respiratory: Lungs coarse rhonchi bilaterally with wheeze.  No crackles.   Cardiovascular: Regular rate, rhythm no murmurs gallops  GI: Nondistended. + bowel sounds. Nontender.  Extremities: No LE edema.   Skin: Warm, dry.  Neuro: Alert         Assessment and Plan   SOB: In setting of COPD exacerbation and HFmEF .  No signs of fluid overload on examination.  Likely worsening COPD exacerbation. I considered CT scan to look for PE, low suspicion given well score of 1.5 and absence of DVT, tachycardia. Will consider if new signs develop.   -Upgrade from remote tele to telemetry  -Now stable on 6 L oxygen will update titrate to high flow nasal cannula as needed.   -Stat chest x-ray, BNP, BMP, ABG  -Patient not on fluids  -Will reassess and monitor closely.  -Duo-nebs q4h scheduled  -Brovana-Pulmicort BID  -IV protonix for ulcer prophylaxis     9:06 PM  Chest x-ray unchanged trace edema. Noted to require 15L of oxygen via HFNC. Will get RPP and VQ scan to look for PE given elevated Aa gradient.BNP 3K ( lower than 4k at admission, EKG unchanged, Trop 48. Repeat Trop pending.     4:47 AM  Trop downtrended to 48>46. Now at NC at 4 L.    Madhu Schrader MD  Family Medicine Resident      
  Spoke with patient's wife, Patricia Fry:     - she has been sick with URI since Saturday, which is why it has been difficult to reach her  - upset about her 's condition  - upset he developed pneumonia and L2 fracture was not discovered sooner  - blames herself for not advocating for him   - worried he will not return to baseline and will not be stable enough to return home  - wants patient to be placed at [facility]   - they have a son who lives in [place], no other family who lives here    I updated her on his NPO status d/t aspiration risk. She shared that his swallow has been progressively worsening as of late. She will try to visit patient tomorrow or Thursday.     I did not bring up goals of care with her during our call. It would likely be more appropriate to have that discussion in person.     Patricia Garcia MD  
0715: Bedside and Verbal shift change report given to Demarcus RN (oncoming nurse) by Jeanine RN (offgoing nurse). Report included the following information Adult Overview, Recent Results, Med Rec Status, and Cardiac Rhythm STach .      0737: called FP advised pt did not have V access and that night shift RRT advised that vascular would need to place line. Asked if they wanted to place order for vascular to access. They advised would look into options.     0811: RRT nurse messaged and advised that vascular would be coming to try to get line for PT     0850: messaged FP residents due to pt having multiple PO meds and pt being NPO, asked if they would like to change routes. Also advised vascular was coming to get IV access.    0920: Vascular messaged advised pt has extended dwell in R upper arm.     0945: called FP advised pt still has numerous PO meds and did not know if they wanted to change route, they advised were in the process of changing route now.     1046: Called pharmacy because pt calcitonin nasal spray was left at room temp. Needed new bottle, Carolin advised that it shows can be stored at room temp for up to 35 days.     1615: messaged FP residents, advised deterioration index 67 per epic      1720: called FP advised ng tube in however pt already pulling at tube, they advised not to restrain pt that if he pulls it out he pulls it out.     1729: called FP advised other nurse thought to use bridle did not know if order needed they advised would put nursing msc order in so it could be applied.     1741: while awaiting xray to confirm placement of NG tube, patient removed the NG tube with the Bridle as well.     1810: notified FP that NG tube was removed by patient, they did not wish to try placement again.     1915: Bedside and Verbal shift change report given to Ana Rosa RN (oncoming nurse) by Demarcus RN (offgoing nurse). Report included the following information Adult Overview, Recent Results, Med Rec Status, Cardiac 
0715: Bedside and Verbal shift change report given to Marie RN (oncoming nurse) by Taylor RN (offgoing nurse). Report included the following information Nurse Handoff Report, Adult Overview, Intake/Output, MAR, Recent Results, and Cardiac Rhythm NSR/ST .      1100: Notified FP that patient has not been able to tolerate anything PO. Patient began to cough and became wheezy after meds with applesauce. Orders to keep completely NPO and hold PO meds for now. FP to change what they can to IV.     1715: Soap bonita enema tolerated well by patient.    
0720: Bedside and Verbal shift change report given to Demarcus RN (oncoming nurse) by Joy RN (offgoing nurse). Report included the following information Adult Overview, Recent Results, Med Rec Status, and Cardiac Rhythm SR .        1136:called FP due to pt sepsis risk 12 FP advised they are going to check with family regarding workup because they are thinkking about going comfort care. If work up is needed they will place orders.     1750: called advised fp that family in room now and that pt is pointing at chest like chest pain advised did not know if they wanted to do a chest pain work up. They advised would be sending someone down.     1751: called FP advised pt ripping at cords and equipment, they advised would order one time dose of Haldol.     1856:  called FP again asked if they had decided if they wanted any orders for pt pointing at chest like chest pain. They advised had not decided but would place orders IF they decided they were needed. Advised would tell night shift.     1920: Bedside and Verbal shift change report given to Joy RALPH (oncoming nurse) by Demarcus RN (offgoing nurse). Report included the following information Adult Overview, Recent Results, Med Rec Status, and Cardiac Rhythm STach .    
0804 and 925: Notified Dr. Kenny that bilateral mitten restraints order will  today. MD to adjust order to reflect bilateral mittens.  
0925 reached out to family practice attending regarding patient medications. He is currently NPO due to failing Yusra Swallow Study. Family is to meet with palliative to discuss hospice. Until then can his current medications be converted to iv.  
10:30am SLP attempted to call patient's wife to discuss MBS results and request information about swallowing status prior to admission..  No Answer.   12:30: same-called wife; no answer.   1:50pm: still no answer. Left a message.  
1100: Notified family practice that diet order only allows for applesauce and ice chips. No IV fluid ordered. Pt mildly tachy low 100s.  
1630 pt extremely agitated, attempting to climb out of bd, pulling at IV  and lines. Provider notified.    1755 pulling  at Ivs, taking off clothes, attempting tonclimb out of bed, combative. Prn order obtained for haldol pre provider for agitation.    Pt family at bedside stating that the pt is in pain. Assess pt and pt indicated that he was not in pain.  
1900 - Bedside and verbal shift change report given to RAMO Hamilton (oncoming nurse) by RAMO Salazar (offgoing nurse). Report included the following information: SBAR, Kardex, Intake/Output, MAR, Recent Results, and Med Rec Status.      0130 - Notified provider of hypertension and discussed recent lab results concerning hypernatremia. Orders in place for IVF change, repeat labs, and BP monitoring.      This patient was assisted with intentional toileting every 2 hours during this shift as appropriate. Documentation of ambulation and output reflected on flow sheet as appropriate. Purposeful hourly rounding was completed using AIDET and 5 P's. Outcomes of PHR documented as they occurred. Bed alarm in use as appropriate. Dual suction and ambubag in place.     0700 - Bedside and verbal shift change report given to Taty HILL RN (oncoming nurse) by RAMO Hamilton (offgoing nurse). Report included the following information: SBAR, Kardex, Intake/Output, MAR, Recent Results, and Med Rec Status.     
1900 - Bedside and verbal shift change report given to RAMO Hamilton (oncoming nurse) by RAMO Tracy (offgoing nurse). Report included the following information: SBAR, Kardex, Intake/Output, MAR, Recent Results, and Med Rec Status.    This patient was assisted with intentional toileting every 2 hours during this shift as appropriate. Documentation of ambulation and output reflected on flow sheet as appropriate. Purposeful hourly rounding was completed using AIDET and 5 P's. Outcomes of PHR documented as they occurred. Bed alarm in use as appropriate. Dual suction and ambubag in place.     0700 - Bedside and verbal shift change report given to RAMO Schaefer (oncoming nurse) by RAMO Hamilton (offgoing nurse). Report included the following information: SBAR, Kardex, Intake/Output, MAR, Recent Results, and Med Rec Status.     
1900 - Bedside and verbal shift change report given to RAMO Hamilton (oncoming nurse) by RAMO Trammell (offgoing nurse). Report included the following information: SBAR, Kardex, Intake/Output, MAR, Recent Results, and Med Rec Status.    This patient was assisted with intentional toileting every 2 hours during this shift as appropriate. Documentation of ambulation and output reflected on flow sheet as appropriate. Purposeful hourly rounding was completed using AIDET and 5 P's. Outcomes of PHR documented as they occurred. Bed alarm in use as appropriate. Dual suction and ambubag in place.     0700 - Bedside and verbal shift change report given to RAMO Tracy (oncoming nurse) by RAMO Hamilton (offgoing nurse). Report included the following information: SBAR, Kardex, Intake/Output, MAR, Recent Results, and Med Rec Status.     
1900 - Bedside and verbal shift change report given to RAMO Hamilton (oncoming nurse) by Taty HILL RN (offgoing nurse). Report included the following information: SBAR, Kardex, Intake/Output, MAR, Recent Results, and Med Rec Status.    This patient was assisted with intentional toileting every 2 hours during this shift as appropriate. Documentation of ambulation and output reflected on flow sheet as appropriate. Purposeful hourly rounding was completed using AIDET and 5 P's. Outcomes of PHR documented as they occurred. Bed alarm in use as appropriate. Dual suction and ambubag in place.     0700 - Bedside and verbal shift change report given to Lisa WATERS RN (oncoming nurse) by RAMO Hamilton (offgoing nurse). Report included the following information: SBAR, Kardex, Intake/Output, MAR, Recent Results, and Med Rec Status.     
1900 Bedside shift change report given to Jeanine (oncoming nurse) by Lisa (offgoing nurse). Report included the following information Nurse Handoff Report, Adult Overview, Intake/Output, MAR, Recent Results, Med Rec Status, and Cardiac Rhythm NSR .     
1900 Bedside shift change report given to Jeanine (oncoming nurse) by Lisa (offgoing nurse). Report included the following information Nurse Handoff Report, Adult Overview, Intake/Output, MAR, Recent Results, Med Rec Status, and Cardiac Rhythm Sinus Tach .       0556 Reached out to family practice resident about patients US guided IV infiltrating. Medications stopped in MAR. Unable to get new access at this time. RRT at bedside with recommendations to have vascular place line. Unable to draw AM labs.     0700 Bedside shift change report given to Demarcus (oncoming nurse) by Jeanine (offgoing nurse). Report included the following information Nurse Handoff Report, Adult Overview, Intake/Output, MAR, Recent Results, Med Rec Status, and Cardiac Rhythm Sinus Tach .     
2002: RRT  called d/t respiratory distress and SOB during shift change. Pt was on 1LNC all day per day shift RN and now will not keep 02 above 86% on 6LNC. EKG,ABG, labs, STAT CXR ordered by provider.      2040: Pt now more comfortable and breathing better with SATs at 96% on 6LNC. RN received call from [facility] alb for O2 of 81%. Pt is now requiring a non rebreathe at 15L NC. RN notified MD and RRT RN. Orders received to transfer to tele bed. No open beds at this time. Will continue to assess and monitor.    0110: Report called to RAMO Taylor on IMCU. RN to call and update wife. Will continue to monitor.    0235: RN called wife and left a HIPAA compliant voicemail. Will try again in the morning.  
ATTENDING CARDIOLOGIST  The patient was personally examined and chart reviewed. All the elements of history and examination were personally performed and I agree with the plan as listed by advanced practice provider.    Treatment plan was addressed with the patient.      Subjective:  No complaints  Lost voice?  Sitter at bedside  ?aspiration issues - S/S to evaluate    Blood pressure (!) 170/98, pulse 92, temperature 98.5 °F (36.9 °C), temperature source Axillary, resp. rate 16, height 1.626 m (5' 4\"), weight 74.4 kg (164 lb), SpO2 92 %.  Normal rate, regular rhythm, S1/S2  Lungs clear      A/P:  CAD sp CABG/HFpEF EF 40-45%, MR - OMT - ASA 81, coreg 6.25 BID, imdur 90, entresto 24/.  Currently held in setting of Sepsis.  Followed by Dr. Edwards.  No plans for cardiac testing.  Pro BNP 4,208.   Diuretic initially given, but held no by primary team.   HTN - meds on hold secondary to sepsis  COPD on home O2  Dementia  Sepsis - leukocytosis.  Possible respiratory source.  ABX per primary team.     Treat sepsis.  Restart GDMT if able when better.  Will see prn.  FU with Dr. Edwards.      []    High complexity decision making was performed  []    Patient is at high-risk of decompensation with multiple organ involvement        Grace Carr MS, MD, FACC        Grace Carr MS, MD, FACC  [facility] Cad[facility]  [phone] (P)  [phone] (F)      [facility] CARDIOLOGY                    Cardiology Care Note     []Initial Encounter     [x]Follow-up    Patient Name: Jerry Fry - :1940 - MRN:[id number]  Primary Cardiologist: Dr Ulises Edwards  Consulting Cardiologist: Grace Carr MD     Reason for encounter: CHF    HPI:       Jerry Fry is a 83 y.o. male with PMH significant for  CAD s/p CABG, HF p EF, CKD 4, COPD on 4L, T2DM who was brought in to the ED by his caregivers for fatigue and shortness of breath. Caregiver states that patient was very weak and slumped down during his shower prompting them 
[facility] Discussion    Time: 1623  Location: Room B326   Parties present: Ms. Fry (M-POA), son Oli, kwadwo Gomez present (with family's permission), nurse Olive intermittently  Summary: Will add Mouth Kote for pt's dry mouth, Vaseline for dryness to nares, HOLD all opioids, benzos, and haldol (unless absolutely necessary), treat hypernatremia (agreeable with IVF, aware of risks), do not want to work-up severe sepsis further with more advanced imaging or escalate to stronger antibiotics at this time. Check BMP and LA q4h. Keep IV access (may replace PRN -- family requesting this for tonight). Reach out to Nephrology for assistance with balancing FWD and diuresis (if needed). Consult ethics and risk management.     Met with Ms. Fry and son Oli in Room 326:  - Provided updates in interval since last discussion including blood work and imaging.   - Pt did receive 3 doses of IV morphine 1 mg (the last dose was at ~ 0900). Pt appears sedated and per son \"out of it\". Informed them that it is entirely possible the opioids may have had this effect and that we had discussed the possibility of altered mentation along with respiratory depression previously -- they had agreed to pursue this path. However, at this stage, family is opting against further opioids, including Roxanol (which was never administered).   - Discussed possibility of using ativan as recommended by palliative for agitation. Again, as pt is not complete comfort care and with current level of consciousness, would opt not to use benzo's. Further, may lead to paradoxical effect of increasing agitation in certain patients (higher risk for those with underlying dementia). Will hold on this for now.  - Again, family expressed concern regarding Mr. Reese's dry mouth -- place order for mouth kote spray and vaseline for nasal dryness likely related to usage of HF NC. Does not appear humidifier is available here anymore.   - Regarding treatment 
[facility] Discussion    Time: 2:40 PM  Conducted over phone  Parties Present: Patricia Fry (wife, POA), Dr. Frey    Meeting with Family 1/23/2024:   - Ms. Fry expressed a desire to trial PO for Mr. Fry; I explained that he has shown no signs of improvement and has continued to aspirate on secretions, indicating that he would not tolerate PO. I also explained that on comfort care, he can receive comfort feeds.  - She also expressed frustration with his care prior to hospitalization. She partially blames herself for not getting him care sooner, but wishes his compression fracture had been diagnosed sooner. I explained that his current treatment takes his pain into account, but his primary issue is related to his dysphagia and not the vertebral fracture.   - She did note that she feels \"bombarded\" by frequent conversations about Mr. Reese's status and goals of care. She expressed what boils down to decision fatigue and concern that she is not doing right by him to let him die. We discussed how much care she has given him over the last 4 years since his Alzheimer's diagnosis and that orienting his care towards comfort is not giving up. She feels that his care is moving in one direction but wants more time to process everything.   - We discussed that if he were off IV fluids and down to low flow nasal cannula, he very well may qualify for inpatient hospice. She has conveyed that inpatient hospice is the only disposition she is willing to consider at this time. She noted that getting off the fluids and decreasing his O2 are worthwhile if he can go to inpatient hospice, but again, she needs time to process. She asked that we reach back out on Thursday to continue this discussion.    Efraín Frey MD 2:40 PM 1/23/2024   PGY-3 Resident, [facility]  To be reviewed with Dr. Lama ( attending)     Addendum 6:00 PM  - discussed patient's case with Elsie Cobb from Risk Management via [employer]. She 
Attempted to work with the patient for PT/OT, chart reviewed and discussed with nurse and , Family and Palliative MD might meet again today for disposition planning. We will defer for now pending outcome of the meeting today.  
Attempted to work with the patient for Physical Therapy, chart reviewed and discussed with nurse has a family meeting today. We will continue to follow up with the patient pending outcome of family meeting.  
Attempted to work with the patient for Physical Therapy, chart reviewed and discussed with nurse hospice consulted we will continue to follow up with the patient pending outcome of hospice consult.  
Attempted to work with the patient for Physical Therapy, chart reviewed and discussed with nurse palliative having a meeting now with the patient. Palliative MD ask to defer Physical therapy for today and try it tomorrow after Speech Therapy see the patient today. We will continue to follow up with the patient for therapy.  
Attempted to work with the patient for Physical Therapy, chart reviewed and discussed with nurse patient has no skilled therapy needs at this times we will complete therapy order for now.  
Brief Progress Note:    Discussed patient care with patient's wife, Patricia, who is active decision maker for this patient. Patient ripped out his NG tube within hours of it being placed. Remains at quite a high risk of aspiration and unsafe for PO.     Ultimately, Patricia decided she would not want him to be coded if it came to such, and decided to make him DNR. She will be back in the hospital in the morning. She endorsed interest in hospice care, but would like to speak with the palliative team and possible hospice team in the morning to discuss what this would look like.    Of note, she endorsed frustration about the time it took to diagnose spinal fracture outpatient.    Patient is now DNR status    Danny Melara,    
[facility] Dosing Services: Antimicrobial Stewardship Daily Doc  Consult for antibiotic dosing of Vancomycin by Dr. Paniagua  Indication: Sepsis  Day of Therapy: 2    Ht Readings from Last 1 Encounters:   01/06/24 1.626 m (5' 4\")        Wt Readings from Last 1 Encounters:   01/06/24 74.4 kg (164 lb)      Vancomycin:  Loading dose: Vancomycin 1750 mg x1 dose now/given 1618 1/6  Maintenance dose: Based on level. CrCl < 30 ml/min    Dose calculated to approximate a           a. Target AUC/FRANDY of 400-600          b. Trough of 15-20 mcg/mL     Vanc level: 15.3 @1210 1/6   A/Plan: WBC 19.1 improving, Scr 2.08 @ BL, afebrile. Vanc level was obtained earlier than ordered (~18h level) which indicates pt is clearing. Will order vanc 1.25g q24h regimen. Obtain a level in 48h or sooner if indicated.     Cefepime:   2 gm followed by 1 gm IV Q12h for CrCl 26ml/min     Other Antimicrobial   (not dosed by pharmacist) Doxycycline   Cultures   1/5 Blood : Ngsf  1/5 Blood : Ngsf  1/5 MRSA : ip       Serum Creatinine Lab Results   Component Value Date/Time    CREATININE 2.08 01/06/2024 12:10 PM          Creatinine Clearance Estimated Creatinine Clearance: 26 mL/min (A) (based on SCr of 2.08 mg/dL (H)).     Temp Temp: 98.3 °F (36.8 °C) (Axillary)       WBC Lab Results   Component Value Date/Time    WBC 19.1 01/06/2024 12:10 PM          Procalcitonin No results found for: \"PCT\"     For Antifungals, Metronidazole and Nafcillin: Lab Results   Component Value Date/Time     01/06/2024 12:10 PM    AST 69 01/06/2024 12:10 PM        Pharmacist: Jackeline Gill, PharmD, BCPS  [phone]     
Called patient's wife to inform her that NG tube had been passed for Mr Fry and that he had already made attempts to pull the tube out. Informed her that as discussed during the family meeting including myself and the palliative team, we will not be applying restraints to prevent Mr Fry from pulling out the tube which she agreed to and expressed gratitude. I also explained that while tube is in place it will be used to optimize his nutrition status and give some of his oral medications and that as discussed earlier, there still is a risk of aspiration with NG tube feeding.    
Called to bedside around 2:30 since throughout the day, Mr. Fry has had worsening pain and tachypnea. Upon evaluation, he indicates back pain, and blood was noted in his nares. He was able to tolerate SL morphine 2.5mg without aspiration and was slightly more comfortable. Mrs. Fry was visibly upset with Mr. Fry' pain level and stated that she felt he would benefit from comfort measures, but wanted to discuss with her son, Oli. I was called back to the room around 4:45pm to meet with Mrs. Fry. Addressed questions about comfort measures and inpatient hospice. I indicated that he would be able to be evaluated by inpatient hospice if he were off IV fluids and high flow nasal cannula. She agreed to these measures. We will wean his HFNC to low flow and add in morphine and ativan prn orders, per Dr. Nelson' recommendations, and consult hospice.     I discussed this patient with Dr. Lama (Attending Physician).    Efraín Frey MD 6:39 PM 1/24/2024  PGY-3 Resident  [facility]    
Doctor made aware that patient is refusing his heart monitor.  
Family Meeting Note    Discussed Mr. Reese's hospital course and prognosis with his wife, Patricia, and son, Oli. Family was noting that patient's mental status seems to be waxing and waning, and at times, it seems like he recognizes them. Today though, he was less alert than yesterday evening. We discussed that his mentation is related to his chronic dementia, which they understand. We also discussed that an increase in alertness does not necessarily correlate with a change in his dysphagia. Family requests that he be re-evaluated by SLP now that he seems to be somewhat improved from their perspective. I mentioned that SLP had noted they would follow with the patient 2x/week, per note on 1/12. We had a long conversation regarding feeding routes, as the NG tube had been unsuccessful, due to patient intolerance. Family inquired about PEG tube. We discussed that PEG tubes do not prolong life in patients with late-stage dementia and would not be an appropriate intervention in Mr. Reese's case. We then discussed forms of care that Mr. Fry could receive upon discharge, differentiating between Palliative and Hospice care, as well as the levels of care that Hospice offers. Family expressed a desire to reconvene with the Palliative Care team to help answer further questions. We will communicate this with the team on Monday. Family estimates they will return at 2pm on 1/15.    Efraín Frey MD 4:34 PM 1/14/2024  PGY-3 Resident  [facility]    
Family Phone Call at 10:50 with Ms Fry  - Called to inform family on overnight updates including respiratory rapid, patient pulling out his IV line. not being compliant with respiratory therapy and repeatedly taking off his oxygen with subsequent desaturations in oxygen levels and his general decline in overall health status  - Concerning previous goals of care discussions, she expressed that it has been difficult making a firm decision and that she and her son want to do what will be the best for Jerry without causing suffering. She also expressed concern about his pain control   - I informed her that he will be getting morphine as needed and as discussed earlier with Dr Doty, haloperidol for agitation, concerning his goals of care I informed that current active treatment with multiple lab draws and testing will only treat his short term problems but unlikely to improve his long term prognosis which remains guarded.   - Family will be in today at 12:30 for discussion of GOC and hopefully make a decision concerning his care per Mrs Fry    
Follow up spiritual care for Mr. Jerry Fry on IMCU.  Pt is not awake at this time, sitter in the room and consulted with staff. Nursing staff shared updates and that wife plans on coming in later today. Chaplains will look for her as able to offer availability of care and support. Please contact Spiritual Health Department. Thank you.    Chaplain Jesus Schilling M.Div., [facility].  [facility] Spiritual Health Team [phone]    
I saw the patient as the patient's PCP in the hospital. I agree and appreciate  the plan of the Family medicine team and their excellent care.     Renetta Cota MD  Family Medicine resident    
Memantine changed from 10mg BID to 5mg BID d/t Crcl 26 ml/min    Jackeline Gill, SamsonD, BCPS  
Noted events of weekend.  NGT was placed and patient pulled NGT.   Any PO would only be for comfort.    Prognosis for recovery of swallowing is poor.  Research regarding PEG tube feeding in the setting of advanced dementia find that potential benefits of tube feeding do not outweigh the associated treatment burdens. Research shows that in patients with advanced dementia PEG tube placement is associated with substantial patient burdens including recurrent and new onset aspiration, tube-associated and aspiration-related infection, increased oral secretions that are difficult to manage, discomfort, tube malfunction, pressure wounds, and the use of physical and chemical restraints. Furthermore, in the geriatric population, research shows that there is no proven benefit in weight gain or markers of nutrition (albumin, prealbumin) in patients with malnutrition due to impaired oral intake.   
Notified on call provider Dr. Flores of family [facility] via phone about Lactic acid 2.4 down from 3.7.  No new orders given.   
Occupational Therapy    Chart reviewed, consulted with RN, who advised to complete OT order has patient is still inappropriate to participate (no command following, not directable). Will complete OT order.    Alberto Saucedo, OTR/L   
Occupational Therapy  Chart reviewed and discussed with RN, patient not following commands and not medically stable for therapy. Discussed with , patient needs to be sitter free to go to LTC. Prior to admission he was ambulatory with rolling walker. Last tx 1/9/2024, will follow up one more time to see if able to participate.   Es Kent, OTR/L    
Occupational Therapy Note:  Chart reviewed.  Patient is currently off the floor and unavailable for OT interventions.  Lisa Bustillo, OTR/L  
Orders received, chart reviewed and patient evaluated by physical therapy. Pending progression with skilled acute physical therapy, recommend:  Therapy up to 5 days/week in Skilled nursing facility    Recommend with nursing assist with repositioning up high in bed to elevated HOB for postural pulmonary drainage. Patient lacks tolerance for activity and poor sitting balance and crow to dangle edge of bed even with two assist. Unable to crow up to chair or sit edge of bed today due to severe debility and high fall risk. Male pure wick and bed pan only at this time, unsafe to attempt BSC. Will see for PT 5 x week for progressive mobilization. Charting of full evaluation report to follow late today. Thank you   Ileana Wynn, PT, DPT  
Paged by staff to notify  that Pt's wife is present in IMCU.  Pt is unable to speak at this time but moaning in much discomfort. Pt's wife is sitting having conversation with medical sitter in the room. Mrs. Fry shared life review and stories, and expressed that she has a Adventism belief and is praying to God for help. She said sadly that they do not have a home Amish for any support. Mrs Fry is overwhelmed with grief, seeing her  in so much discomfort.  provided a listening and supportive presence and prayed for  And Mrs Fry.  let her know more about  services available for her and she expressed much gratitude. Chaplains remain available for continued care and support while Pt is here.    Chaplain Jesus Schilling M.Div., [facility].  [facility] Team [phone]    
Palliative Medicine      Code Status: Full Code    Advance Care Planning:    Primary Decision Maker: Patricia Fry - Spouse - [phone]    Secondary Decision Maker: Oli Fry - Child - [phone]    Family provided copy of POA paperwork dated 4/10/2023; however, document pertains to finances, does not authorize medical decision making.  Wife will contact their  to obtain copy of AMD if unable to locate.  In absence of verified Medical POA, wife Lenny \"aPtricia\" Ang is legal NOK and surrogate decision maker.     Patient / Family Encounter Documentation    Participants (names): Pt, wife Patricia, son Oli, Family Medicine (Dr. Medina), Palliative Medicine (Dr. Nelson, [facility])    Narrative:  Met with wife and son at bedside; pt rested through majority of visit, opened eyes briefly but did not interact.  Son is visiting from [place], pt has a dtr who resides in [place].  Wife shared her understanding of pt's current medical condition and challenges moving forward.  Pt has been evaluated by SLP, is NPO due to aspiration risk.  Family was educated re: options of short term NG tube for meds/nutrition vs comfort feeds and hospice.  Wife expressed belief that pt would elect to have NG tube placed if he were able to participate in conversation, stated, \"he wants to live.\"        Psychosocial Issues Identified/ Resilience Factors: Wife expressed feelings of both frustration and guilt re: events over the past several weeks, had requested MRI from numerous providers before one was ordered and L2 fracture was discovered, expressed belief that she was \"torturing\" pt in the meantime by making him get up and move around, get in and out of the car, etc.  No spiritual concerns expressed; Chaplains are following for support as needed, note pt is Rastafarian.      Caregiver [place]: Significant in the weeks prior to hospitalization  Does the caregiver feel confident administering medication? Pt is currently 
Palliative Medicine    Code Status: DNR    Advance Care Planning:  Primary Decision Maker: Patricia Fry - Spouse - [phone]     Family provided copy of POA paperwork dated 4/10/2023; however, document pertains to finances, does not authorize medical decision making.  Wife will contact their  to obtain copy of AMD if unable to locate.  In absence of verified Medical POA, wife Lenny \"Patricia\"Ang is legal NOK and surrogate decision maker.     Patient / Family Encounter Documentation    Participants (names): Pt, wife Patricia and son Oli by phone, Palliative Medicine (Dr. Nelson, [facility])    Narrative: Palliative team made visit to pt just after 2pm after noting family planned to be present at that time.  Pt was resting in bed with sitter at bedside, no family present.  Pt roused briefly when greeted, opened eyes but did not otherwise engage.  No signs of distress noted.      Palliative team spoke with wife and son by phone.  Family reports they were unable to visit at 2pm as planned due to wife's medical issues that needed attention, are now concerned about driving in the snow but have not ruled out the possibility of coming later.  Family reports pt was awake and alert over the weekend, recognized family and interacted appropriately, wife denied signs of delirium.  Family plans to bring pt's hearing aids back to the hospital and would like SLP to evaluate pt again once his hearing aids are in place; however, family will not likely arrive until this evening or tomorrow. Option of allowing comfort feeds was discussed, understanding that family would be accepting aspiration risks.    Psychosocial Issues Identified/ Resilience Factors:  Coping with pt's current medical issues in addition to wife's health concerns.  No spiritual concerns expressed; Chaplains are following for support as needed, note pt is Pentecostal.        Caregiver [place]: Significant in the weeks prior to hospitalization  Does the 
Patient arrived to the unit via bed from the 5th floor. Patient on a non re-breather sats noted to be at 100%. Will try to wean off non rebreather, made [facility].    0300 Respiratory therapy at bedside. Wean off non re breather. Now on 4l of oxygen at 100%. Sitter at bedside. Bed alarm on. Call bell within reach.   
Patient assessment done, patient found on 6 LPM NC mid-flow 02 saturation 88-90%. Oxygen increased to 7 LPM, 02 saturation 91% at this time.  
Patient found in bed with gown pulled off and oxygen off. Sitter in room and tele did not notify me so I don't know how long in was off. Patient was clearly agitated and appeared to be in pain. PRN morphine was given but patient continued to pulled off his oxygen. Family Practice MD notified and 1 time Haldol order received. Patient placed back on oxygen (15 L) but O2 sats were in the mid 80's. Respiratory therapist contacted and patient was placed on HiFlo.   
Patient hitting his chest. I asked him if his chest was hurting and he shook his head yes. Gave morphine for chest pain. After giving morphine oxygen levels dropped to he low 80's. Had to titrate his oxygen to 6 liters. Oxygen levels noted to 93% after applying oxygen. Sitter at bedside. Will continue to monitor.   
Patient tolerated aerosol treatment very poorly.  Patient combative, taking mask off and swinging at aide and therapist.  Treatment discontinued after several minutes.  
Physical Therapy    Attempted to see patient this morning.  RN reports patient not doing well at this time, reports worsening hypoxia and now requiring 10L O2 with difficulty maintaining sats.  Will defer at this time and follow as appropriate.    Veronica Lozoya, MS, PT  
Physical Therapy    Chart reviewed in prep for PT session.  Note patient combative and swatting at RT during attempting breathing treatment, also on separate incident ripped out his NGT soon after being placed.  Not appropriate for therapy at this time.  Additionally, wife to come meet with palliative and hospice team regarding possible transition to hospice.  Will defer at this time and follow as appropriate.    Veronica Lozoya, MS, PT  
Physical Therapy  Patient currently off the floor.  We will continue to follow.  Thank you.  Ines Modi PT,DPT,NCS,CLT    
Pt agitated, combative and attempting to climb out of the bed. Not easily redirectable. Pt pulled IV out, provider notified of no IV access. Multiple attempts to re-established IV but unsuccessful. Reach out to RRT Nurse, awaiting response.    Unable to give IV morphine for pain due to pt pulling out IV. Provider notified and requested different route for pain medication.     Subq morphine ordered, confirm correct route and administration with Ani in pharmacy.  
RN reports that 02 needs have increased to 10L since starting PO yesterday. May have post prandial aspiration or silent aspiration of purees, mod thick liquids not seen on MBS. Will return patient to NPO>  
RRT evaluation:Pt is resting on the bed, alert, but confused. Sitter at bedside, Lung sound and heart sound is within normal range, no pain, no distress at this time.    Sepsis Score 5.54    Predictive Model Details          6 (High)  Factor Value    Calculated 1/12/2024 20:58 22% SIRS pulse criterion met    Early Detection of Sepsis Model 15% Age 83     9% Diagnosis of hypertension present     8% Neutrophils high (13.0 K/UL)     7% Number of active cephalosporin orders 1        Predictive Model Details          66 (Warning)  Factor Value    Calculated 1/12/2024 21:01 23% Sodium abnormal (151 mmol/L)    Deterioration Index Model 19% Age 83 years old     17% Supplemental oxygen Nasal cannula     14% Neurological exam X     10% Spelter coma scale 14     8% Respiratory rate 20     5% WBC count abnormal (15.6 K/uL)     1% BUN abnormal (72 MG/DL)     1% Pulse 92     1% Systolic 120     1% Pulse oximetry 94 %     0% Hematocrit 44.7 %     0% Temperature 97.7 °F (36.5 °C)     0% Potassium 4.0 mmol/L     0% Blood pH 7.38            Spoke with primary RN regarding pt status. Pt resting comfortably in bed with no s/s of distress. Denies CP or SOB. No further interventions at this time.     Ladonna Keen, RN               
RRT evaluation:Pt is resting on the bed, sitter at bedside to keep pt safe. No CP, nor SOB, no distress, still confused at his baseline, keep current POC. Bilateral lung sound and heart sound are within the normal.     Sepsis Score 4.18    Predictive Model Details          4 (Moderate)  Factor Value    Calculated 1/13/2024 23:36 17% Age 83    Early Detection of Sepsis Model 15% SIRS WBC criterion met     10% Diagnosis of hypertension present     9% Neutrophils high (15.8 K/UL)     8% Number of active cephalosporin orders 1        Predictive Model Details          69 (Warning)  Factor Value    Calculated 1/13/2024 23:50 27% Sodium abnormal (153 mmol/L)    Deterioration Index Model 18% Age 83 years old     16% Supplemental oxygen Nasal cannula     13% Neurological exam X     10% Bishop coma scale 14     8% Respiratory rate 20     5% WBC count abnormal (18.6 K/uL)     1% BUN abnormal (95 MG/DL)     1% Potassium abnormal (3.4 mmol/L)     1% Systolic 118     0% Pulse 82     0% Pulse oximetry 97 %     0% Hematocrit 44.8 %     0% Temperature 98.6 °F (37 °C)     0% Blood pH 7.38            Spoke with primary RN regarding pt status. Pt resting comfortably in bed with no s/s of distress. Denies CP or SOB. No further interventions at this time.     Ladonna Keen, RN               
RRT evaluation:Pt is resting on the bed, sitter at bedside, no behavior issue right now. Pt is confused at his baseline, no distress right now. Lung sound and heart sound are within the normal. Skin color is appropriate for his ethnicity. Keep current POC.     Sepsis Score 17.67      Predictive Model Details          67 (Warning)  Factor Value    Calculated 1/7/2024 20:23 19% Age 83 years old    Deterioration Index Model 16% Supplemental oxygen Nasal cannula     15% Sodium abnormal (148 mmol/L)     14% Neurological exam X     10% Respiratory rate 21     10% Bishop coma scale 14     6% WBC count abnormal (23.4 K/uL)     5% Pulse oximetry 90 %     3% Systolic 149     1% BUN abnormal (59 MG/DL)     1% Pulse 91     1% Potassium 3.6 mmol/L     0% Hematocrit 37.5 %     0% Temperature 98.4 °F (36.9 °C)          Spoke with primary RN regarding pt status. Pt resting comfortably in bed with no s/s of distress. Denies CP or SOB. No further interventions at this time.     Ladonna Keen, RN               
RRT evaluation:Pt is sleeping in the bed with normal VS, chest rises and falls evenly, no distress at this time, skin color is appropriate for his ethnicity. Keep current POC.     Sepsis Score 10.27      Predictive Model Details          66 (Warning)  Factor Value    Calculated 1/7/2024 04:55 19% Age 83 years old    Deterioration Index Model 17% Supplemental oxygen Nasal cannula     16% Sodium abnormal (148 mmol/L)     14% Neurological exam X     10% Respiratory rate 21     10% Hartwick coma scale 14     6% WBC count abnormal (23.4 K/uL)     4% Systolic 149     3% Pulse oximetry 92 %     1% BUN abnormal (59 MG/DL)     1% Potassium 3.6 mmol/L     0% Temperature 99.1 °F (37.3 °C)     0% Pulse 80     0% Hematocrit 37.5 %          Spoke with primary RN regarding pt status. Pt resting comfortably in bed with no s/s of distress. Denies CP or SOB. No further interventions at this time.     Ladonna Keen, RN               
Rapid Called at 0127    Responded to RRT at 0127 for SOB    Provider at bedside: YES  Interventions ordered: STAT Chest XR, Labs, EKG, and ABG  Sepsis Suspected: No  Transfer to Higher Level of Care: na      Vitals:    01/20/24 2353   BP: 123/77   Pulse: (!) 115   Resp: 22   Temp: 97.5 °F (36.4 °C)   SpO2: 90%        Rapid Ended at 0140  RRT RN assisted with transport to accepting unit NA.    Ladonna Kene RN   
Rapid Called at 2002    Responded to RRT at 2003 for SOB, Hypoxia, and Respiratory Distress    Provider at bedside: YES  Interventions ordered: STAT Chest XR, Labs, EKG, ABG, and Other (Comment) increased 02  Sepsis Suspected: No  Transfer to Higher Level of Care: yes-tele, bed pending       Vitals:    01/15/24 1925   BP:    Pulse:    Resp:    Temp:    SpO2: 93%      Rapid called for hypoxia and resp distress. Pt on 1L 02 all day and increased need to 6L. Provider at bedside. Pt noted with wheezing br sds to left and RU lobe is rhonchus and diminished base. Labs drawn and ABG collected by RT. Pt appears more comfortable and is sats 95% on 6l nc.     2220-recheck pt, pt resting comfortably with reg resps     Rapid Ended at 2040  RRT RN assisted with transport to accepting unit KEVIN Coulter RN   
Rapid response note    S: rapid response was called.  I examined the patient at bedside.  No family now at bedside.  Patient noted to have increased work of breathing using his abdominal muscles.  Patient has been on 6 L of oxygen all day and currently requiring 6 L and maintaining saturation.  On nasal cannula right now.      O: HR 87 , /77, oxygen 91% he is on 6 L oxygen via NC  Physical examination  Mild respiratory distress.  Lungs minimal air entry bilaterally, bilateral wheeze noted, no crackles noted  Cardiac S1-S2 heard no murmurs, regular rhythm, rubs, feels  Abdomen mildly distended, no tenderness to palpation, bowel sounds noted  Lower extremities: No pedal edema    A/P : Worsening SOB: Contributing factors include COPD, severe sepsis, HFmEF: No signs of clinical fluid overload.  Getting gentle hydration with D5 for hyper natremia.  Lactate improved from 3.7-2.4  - Continuous cardiac monitoring  - Stat ABG, chest x-ray, BMP, lactate  - Duonebs every 4 hours scheduled  - Brovana-Pulmicort BID  - IV protonix for ulcer prophylaxis   - No increased oxygen requirement, stable on 6 L all day  - Will monitor closely and contact family in case of worsening respiratory distress    Madhu Schrader MD  Family Medicine resident  
Received order to place patient on BiPAP.  Unable to do so at this time due to patient being in hallway of ED.  Patient in no acute distress on nasal cannula and getting aerosol treatment.  
Restraint type: Secured mitt:   both  Reason for restraints: Pulling out devices:  Pulling lines  Duration: 24 hours    Restraints must be removed when an alternative is available and effective and/or patient no longer meets criteria.    Orders must be renewed every calendar day or when discontinued.    The MD must conduct a face to face assessment within 1 calendar day of initiation when initial restraint order is verbal.   
SEVERE SEPSIS / SEPTIC SHOCK REASSESSMENT    SUBJECTIVE  Jerry Fry is currently being treated for severe sepsis. He is currently undergoing treatment, including broad-spectrum antibiotics and roughly 800 cc NS with Abx. The 30 cc/kg bolus was avoiding in s/o elevated pBNP, fluid overload, CHF. The patient is feeling better on reassessment. Not c/o abdominal pain. Wife states he voided small amount of stool after enema given. No new concerns.      OBJECTIVE  Sepsis Re-Assessment Documentation:   SpO2: 99 %      Intake/Output Summary (Last 24 hours) at 1/5/2024 2342  Last data filed at 1/5/2024 2030  Gross per 24 hour   Intake 100 ml   Output --   Net 100 ml         Recent Results (from the past 4 hour(s))   Arterial Blood Gas, POC    Collection Time: 01/05/24 11:23 PM   Result Value Ref Range    FIO2 50 %    POC pH 7.34 (L) 7.35 - 7.45      POC pCO2 34.2 (L) 35.0 - 45.0 MMHG    POC PO2 97 80 - 100 MMHG    POC HCO3 18.3 (L) 22 - 26 MMOL/L    POC O2 SAT 97.2 (H) 92 - 97 %    Base Deficit 6.8 mmol/L    Site LEFT BRACHIAL      DEVICE BIPAP MASK      Mode BILEVEL      POC PEEP 10 cmH2O    POC Pressure Support 5 cmH2O    POC Vinod's Test NOT APPLICABLE      Specimen type: ARTERIAL         General appearance: Alert, cooperative, no distress, appears stated age  Lungs: Bipap in place. Rhonchi noted b/l.  Heart: Tachycardic, regular rhythm.   Pulses: 2+ and symmetric   Capillary Refill: <3 seconds, normal/brisk  Abdomen: Mild distention, mild TTP in RLQ, no rebound or guarding. +BS.  Extremities: Extremities normal, atraumatic, no cyanosis or edema  Skin: Pink, warm, dry  Neurologic: Awake, alert, moving all extremities      ASSESSMENT AND PLAN  Jerry Fry is an 83 y.o. male currently being treated for severe sepsis and I currently find them to be in stable condition.    Severe Sepsis 3/4 SIRS (WBC, HR, RR). Likely 2/2 CAP given infiltrate on CXR and increased oxygen requirement. CTAP neg. LA 2.5 > 2.8>1.9, Procal 
SLP checked on patient. He was currently sleeping. Shy reports that sometimes when he is awake, he can answer y/n ?'s about pain.    He apparently had wincing with oral care. SLP examined his oral cavity and found extensive raw area on his lower front gum.    Shy also reports choking on liquids when oral care was completed. This is again signs that he continues to aspiration. He is now a DNR.   Any PO elected by family will be for comfort care.    
[facility] Dosing Services: Antimicrobial Stewardship Daily Doc  Consult for antibiotic dosing of Vancomycin by Dr. Paniagua  Indication: Sepsis  Day of Therapy: 1    Ht Readings from Last 1 Encounters:   01/05/24 1.626 m (5' 4\")        Wt Readings from Last 1 Encounters:   01/05/24 73.7 kg (162 lb 6.4 oz)      Vancomycin:  Loading dose: Vancomycin 1750 mg x1 dose now/given  Maintenance dose: Based on level. CrCl < 30 ml/min    Dose calculated to approximate a           a. Target AUC/FRANDY of 400-600          b. Trough of 15-20 mcg/mL     Plan:   CKD  Dose based on level for now  Ordered 24 hours post LD level tomorrow  May start MD if Scr stable    Cefepime:   2 gm followed by 1 gm IV Q12h for CrCl 24 ml/min     Other Antimicrobial   (not dosed by pharmacist) Doxycycline   Cultures   1/5 Blood : Ngsf  1/5 Blood : Ngsf  1/5 MRSA : ip       Serum Creatinine Lab Results   Component Value Date/Time    CREATININE 2.21 01/05/2024 12:07 PM          Creatinine Clearance Estimated Creatinine Clearance: 24 mL/min (A) (based on SCr of 2.21 mg/dL (H)).     Temp Temp: 97.5 °F (36.4 °C) (Axillary)       WBC Lab Results   Component Value Date/Time    WBC 23.6 01/05/2024 12:07 PM          Procalcitonin No results found for: \"PCT\"     For Antifungals, Metronidazole and Nafcillin: Lab Results   Component Value Date/Time     01/05/2024 12:07 PM     01/05/2024 12:07 PM        Pharmacist: Rha Gill  [phone]   
Spiritual Care Assessment/Progress Note  [facility]    Name: Jerry Fry MRN: [id number]    Age: 83 y.o.     Sex: male   Language: English     Date: 1/9/2024            Total Time Calculated: 20 min              Spiritual Assessment begun in [facility] B3 INTERMEDIATE CARE UNIT  Service Provided For:: Patient     Encounter Overview/Reason : Initial Encounter    Spiritual beliefs:      [] Involved in a anaid tradition/spiritual practice:      [] Supported by a anaid community:      [] Claims no spiritual orientation:      [] Seeking spiritual identity:           [] Adheres to an individual form of spirituality:      [x] Not able to assess:                Identified resources for coping and support system:   Support System: Spouse       [] Prayer                  [] Devotional reading               [] Music                  [] Guided Imagery     [] Pet visits                                        [] Other: (COMMENT)     Specific area/focus of visit   Encounter:    Crisis:    Spiritual/Emotional needs: Type: Spiritual Support  Ritual, Rites and Sacraments:    Grief, Loss, and Adjustments:    Ethics/Mediation:    Behavioral Health:    Palliative Care:    Advance Care Planning:      Plan/Referrals: Other (Comment) (Please contact [facility] for further consults.)    Narrative:  visit for the patient on IMCU for length of stay. Reviewed pt's chart and spoke with pt's nurse. Pt and sitter were present. Pt was not able to speak at this time. Pt is also hard of hearing. Pt expressed he is doing ok. Chart indicates pt is Yarsanism. No spiritual or emotional expressed at this time. Please contact [facility] for further referrals.    Chaplain Lyndsey, MS, MDiv, [facility]  Spiritual Health Services  Paging service: [phone] (ILIR)        
Spoke with the patient's son.   Reviewed MBS results with him and his entire hospital stay related to swallowing.    The son reports that he broke his back 2-3 weeks ago and then was not recovering well. He wanted to lay down all the time and stopped eating and drinking. When family fed him he was coughing at times.   It has been 2-3 weeks since he has eaten well.    Son Also reports h/o \"some trouble swallowing\" with coughing up of some solids (like corn) when eating for a long time.  Son wanted to bring some of his favorite items to eat to have SLP re-eval. SLP advised that any PO would be for comfort at this time, as he aspirated thins and mildly thick on MBS and had severe oral dysphagia for Solids. He also failed a trial of purees, moderately thick with increased congestion and increased 02 needs in less than 24 hours (2L to 10L).    Son would like to discuss this more in person when he and his mother arrive later today.   
[facility] Discussion    Time: 2:50pm  Location: Room 326  Parties Present: Mrs. Fry (wife, M-POA), Oli (son), Dr. Frey  Summary: Add SL morphine, lower dose than received on 1/20, to reduce risk of aspiration and sedation; continue with IVF and space lab draws to monitor infection and hypernatremia; continue disposition planning, as family is concerned about level of care required by patient    Met with Mrs. Fry and son Oli in Room 326:   - Provided updates on hospital course since their discussion with Dr. Doty last night.  - Son reports patient was more somnolent yesterday after he received oral morphine. Patient was drowsy for most of my time in the room, after receiving Haldol 4 hours prior.   - Wife and son noted frustration with frequent calls regarding patients status and care; explained that they are his medical decision makers, and he is at a critical stage of his care  - Family expressed frustration with his frequent lab draws, but acknowledge that monitoring his infection and hypernatremia require frequent monitoring; they would like to continue labs, but space them out as possible. I noted that hyper/hyponatremia are things that can change quickly, so if they would like us to make adjustments to his treatment based on his sodium levels, we need up to date labs. Same with lactic acid.  - They have expressed frustration with morphine and haldol not always being available for him. They have also expressed concern about him being sedated and not interactive. We discussed that these medications are causing any sedation and also contributing to agitation. They understood the need to be judicious with these medications, if our goals of care are still oriented to treatment. They wanted to try sublingual morphine for him, since he aspirated on the larger volume morphine orally yesterday and has tenuous IV access. Will order 20mg/mL Morphine, 2.5mg.   - They understand that his IVF are only partially 
Ultrasound IV by Ashley Kuo RN :  Procedure Note    Ultrasound IV education provided to patient. Opportunities for questions given.     Ultrasound used for PIV placement:  20 gauge 5.7 cm AccuCath Ace 5.7cm  right basilic location.  1 X Attempt(s).    Flushed with ease; vigorous blood return.     Procedure tolerated well. Primary RN aware of IV placement and added to LDA.      Ashley Kuo RN   
Ultrasound IV by Massiel Chaparro RN :  Procedure Note    Ultrasound IV education provided to patient. Opportunities for questions given.     Ultrasound used for PIV placement:  20 gauge x 1.1 in.  RT AC location  1 X Attempt(s).    Flushed with ease; vigorous blood return.     Procedure tolerated well. Primary RN aware of IV placement and added to LDA.      Alondra Campos, RN   
Unable to draw ordered blood, this nurse attempted several times, and additional nurse attempted multiple times as well. Rapid Rn notified also, but did not have an opportunity to attempt with US due to multiple emergencies. Will notify oncoming nurse to order phlebotomy for patient. Patient has been otherwise stable this shift.   
Update from Family Conversation:  Had a long conversation over phone with Ms. Gomez, wife, and Oli, son. Reviewed Mr. Fry hospital course and prognosis. Reviewed current treatment and goals of care. Discussed challenges of current treatment plan, with patient being NPO, but poor candidate for TPN or other forms of nutrition. They expressed that their main concern about him at this point is his pain. His pain has been challenging to manage alongside his mental status, due to agitation. Overall, discussion was productive. I attempted to further delineate the differences between palliative care, hospice, and his current care. They expressed that they would like him to remain comfortable. They delineated how they would like his comfort to look. They want his pain treated, anxiety treated, O2 for comfort, with IVF running for hydration, and pulse ox to guide his O2 for comfort. They understood that nasal cannula has an upper limit and were fine with him staying on that level when he can wean to that. Ultimately, Ms. Fry indicated that they would like to change him to comfort care on 1/19, but she does not want to initiate that process until after her doctor's appointment on 1/19. She and Oli will be at [facility] around 1-1:30 on 1/19. They stated that they did not want further blood draws or interventions, so we will not escalate his care at this time, but maintain current measures, until family meeting tomorrow afternoon. They also expressed interest in inpatient Hospice and would like to meet with the relevant parties for more information.    Efraín Frey MD 11:52 AM 1/19/2024   PGY-3 Resident  [facility] [place] [facility] [place]    
Vascular Access Team:      Extended dwell powerglide pro ultrasound guided IV   Region: right basilic  Gauge 20   Length 8 cm  Performed by Krysta RALPH    Brisk blood return noted.    Secured and dressed per protocol.    Alcohol cap utilized.    Patient tolerated well.    Primary RN Mery made aware line is ready for use.      Maria Elena Granger RN   
 1 spray Alternating Nares Daily    [Held by provider] memantine (NAMENDA) tablet 5 mg  5 mg Oral BID    pantoprazole (PROTONIX) 40 mg in sodium chloride (PF) 0.9 % 10 mL injection  40 mg IntraVENous Daily    arformoterol tartrate (BROVANA) nebulizer solution 15 mcg  15 mcg Nebulization BID RT    budesonide (PULMICORT) nebulizer suspension 250 mcg  0.25 mg Nebulization BID RT    [Held by provider] QUEtiapine (SEROQUEL) tablet 50 mg  50 mg Oral TID    sodium chloride flush 0.9 % injection 5-40 mL  5-40 mL IntraVENous 2 times per day    sodium chloride flush 0.9 % injection 5-40 mL  5-40 mL IntraVENous PRN    0.9 % sodium chloride infusion   IntraVENous PRN    ondansetron (ZOFRAN-ODT) disintegrating tablet 4 mg  4 mg Oral Q8H PRN    Or    ondansetron (ZOFRAN) injection 4 mg  4 mg IntraVENous Q6H PRN    polyethylene glycol (GLYCOLAX) packet 17 g  17 g Oral Daily PRN    heparin (porcine) injection 5,000 Units  5,000 Units SubCUTAneous 2 times per day    [Held by provider] aspirin EC tablet 81 mg  81 mg Oral Daily    [Held by provider] buPROPion (WELLBUTRIN) tablet 100 mg  100 mg Oral BID    [Held by provider] cetirizine (ZYRTEC) tablet 5 mg  5 mg Oral Daily    [Held by provider] donepezil (ARICEPT) tablet 10 mg  10 mg Oral Nightly    [Held by provider] ezetimibe (ZETIA) tablet 10 mg  10 mg Oral Nightly    [Held by provider] ferrous sulfate (IRON 325) tablet 325 mg  325 mg Oral QAM AC    fluticasone (FLONASE) 50 MCG/ACT nasal spray 1 spray  1 spray Nasal Daily    [Held by provider] isosorbide mononitrate (IMDUR) extended release tablet 90 mg  90 mg Oral BID    [Held by provider] sertraline (ZOLOFT) tablet 150 mg  150 mg Oral Daily    [Held by provider] tamsulosin (FLOMAX) capsule 0.4 mg  0.4 mg Oral Daily    [Held by provider] traZODone (DESYREL) tablet 25 mg  25 mg Oral QHS    lidocaine 4 % external patch 1 patch  1 patch TransDERmal Daily    [Held by provider] docusate sodium (COLACE) capsule 100 mg  100 mg Oral Daily 
obstructive pulmonary disease) (HCC)     HLD (hyperlipidemia)     T2DM (type 2 diabetes mellitus) (HCC)      Past Surgical History:   Procedure Laterality Date    CHOLECYSTECTOMY, LAPAROSCOPIC      COLONOSCOPY N/A 9/14/2022    COLONOSCOPY/EGD performed by Sandro Ramon MD at [facility] ENDOSCOPY    [place] UNLISTED PROCEDURE CARDIAC SURGERY  2010    CABG and stents    ROTATOR CUFF REPAIR       Prior Level of Function/Home Situation:   Social/Functional History  Lives With: Spouse  Type of Home: House  Home Layout: One level  Bathroom Toilet: Bedside commode  Bathroom Equipment: Shower chair  Home Equipment: Cane, Walker, rolling, Wheelchair-manual  Receives Help From: Personal care attendant  Active : No  Patient's  Info: wife    Baseline Assessment:                Cognitive and Communication Status:  Neurologic State: Drowsy  Orientation Level: Unable to verbalize  Cognition: Unable to assess    Dysphagia:  Oral Assessment:     P.O. Trials:  PO Trials  Assessment Method(s): Observation;Palpation  Patient Position: laying in bed. 02 sats 91--94  Vocal Quality:  (aphonic; nonverbal. wheezing at times.)  Consistency Presented:  (none)             Respiratory Status/Airway:  Nasal cannula, 6LPM         He pulled of NC and pulse o2 3x in this sessionn.                      After treatment:   Patient left in no apparent distress in bed and Nursing notified    COMMUNICATION/EDUCATION:   Patient was educated regarding his deficit(s) of dysphagia  as this relates to his diagnosis of weakness.  He demonstrated Poor understanding as evidenced by Limited understanding/response due to cognitive status.son and wife were present and understood.    The patient's plan of care including recommendations, planned interventions, and recommended diet changes were discussed with: Registered nurse palliative.     Patient/family have participated as able in goal setting and plan of care, Patient/family agree to work toward stated goals and 
status further exacerbated by likely hospital-acquired delirium, they are requesting for something to be scheduled. Informed them that we could try IV morphine, low dose, on a scheduled basis. Also entertained the possibility of oral meds, if needed, if IV access were to be lost. For both of these, family understands the risks of furthered altered mentation, respiratory depression, progressive constipation, reduced alertness. Agreeable with trying Morphine 1 mg every 6 hours for now.  - They also express concerns regarding pt's \"dry mouth\" which is likely 2/2 profound dehydration combined with mouth breathing while remaining NPO. Scopolamine patch was stopped due to limited improvement. A gel product was attempted as well, but seems to have had limited impact. Will investigate options further. Will place order for a humidifier to see if general increase in atmospheric moisture may help relieve some of the dryness.   - Overall, after protracted discussion with both parties, asked what their long-term goals were for Mr. Fry. They could not articulate exactly what they were hoping to achieve by keeping the aforementioned measures in place despite multiple attempts to educate on the futility of continuing the current treatment plan. They expressed desire to discuss this further \"tomorrow\".   - Informed them that we will continue to update them as Mr. Reese's hospital course evolves as their input will guide management.      Mohsin Ali, MD  PGY-3 Family Medicine Resident  To be reviewed by Dr. Lama (FM Attending)  
4\"), weight 74.4 kg (164 lb), SpO2 93 %.  Temp (24hrs), Av.6 °F (36.4 °C), Min:97.2 °F (36.2 °C), Max:98.1 °F (36.7 °C)      Intake and Output:  No intake/output data recorded.  No intake/output data recorded.    Physical Exam:               GENERAL ASSESSMENT: Elderly chronically ill WM NAD  HEENT: Nontraumatic   CHEST: Clear  HEART: S1S2  ABDOMEN: Soft,NT  EXTREMITY: no EDEMA  NEURO: Grossly non focal          ECG/rhythm:    Data Review        Recent Labs     01/15/24  0358 01/15/24  2019 01/16/24  0110   * 143 145   K 3.3* 3.6 3.5   * 118* 120*   CO2 17* 19* 19*   BUN 91* 82* 81*   CREATININE 2.28* 2.26* 2.21*   GLUCOSE 115* 128* 102*   CALCIUM 8.4* 8.4* 8.2*           : Daryn Sotelo MD  2024        [facility]:  [email or web address]  [email or web address]    [place] office:  [address], [address]  [place] [address]  Phone: [phone]  Fax :     [phone]    [place] office:  [address] [place] [address]  Phone - [phone]  Fax - [phone]                                                                                
additional support to the staff member before concluding the visit and exiting.     KIANNA Garcia (Music Therapist, Board Certified)  Spiritual Care Department  Referral-Based Services           
temperature source Axillary, resp. rate 22, height 1.626 m (5' 4\"), weight 74.4 kg (164 lb), SpO2 94 %.  Temp (24hrs), Av.2 °F (36.8 °C), Min:97.8 °F (36.6 °C), Max:98.6 °F (37 °C)      Intake and Output:  No intake/output data recorded.   1901 -  0700  In: 2103.4 [I.V.:1717.6]  Out: 350 [Urine:350]    Physical Exam:               GENERAL ASSESSMENT: Elderly chronically ill WM NAD  HEENT: Nontraumatic   CHEST: Clear  HEART: S1S2  ABDOMEN: Soft,NT  EXTREMITY: no EDEMA  NEURO: Grossly non focal          ECG/rhythm:    Data Review        Recent Labs     01/15/24  0358 01/15/24  2019 01/16/24  0110   * 143 145   K 3.3* 3.6 3.5   * 118* 120*   CO2 17* 19* 19*   BUN 91* 82* 81*   CREATININE 2.28* 2.26* 2.21*   GLUCOSE 115* 128* 102*   CALCIUM 8.4* 8.4* 8.2*           : Daryn Sotelo MD  2024        [facility]:  [email or web address]  [email or web address]    [place] office:  [address], [address]  [place] [address]  Phone: [phone]  Fax :     [phone]    [facility] office:  [address] [place] [address]  Phone - [phone]  Fax - [phone]                                                                                
(%)   01/15/24 1506 0%   01/15/24 1101 0%   01/15/24 0806 0%   01/14/24 1501 0%   01/14/24 1034 0%   01/12/24 0300 0%   01/11/24 2317 0%   01/11/24 1916 0%   01/11/24 0309 0%   01/10/24 2309 0%   01/10/24 1900 0%   01/10/24 0330 0%   01/09/24 1940 0%       Supplement Intake:  Patient Vitals for the past 168 hrs:   PO Supplement (%)   01/14/24 1034 0%       Anthropometric Measures:  Height: 162.6 cm (5' 4\")  Ideal Body Weight (IBW): 130 lbs (59 kg)    Admission Body Weight: 73.7 kg (162 lb 7.7 oz)  Current Body Weight: 74.4 kg (164 lb 0.4 oz), 126.2 % IBW. Weight Source: Bed Scale  Current BMI (kg/m2): 28.1  Usual Body Weight: 73.7 kg (162 lb 7.7 oz)  % Weight Change (Calculated): 0.9  Weight Adjustment For: No Adjustment                 BMI Categories: Overweight (BMI 25.0-29.9)    Wt Readings from Last 10 Encounters:   01/06/24 74.4 kg (164 lb)   12/22/23 73.7 kg (162 lb 6.4 oz)   12/14/23 73.9 kg (163 lb)   10/24/23 73.1 kg (161 lb 3.2 oz)   10/03/23 72.6 kg (160 lb)   06/08/23 74.4 kg (164 lb)   02/10/23 76.2 kg (168 lb)   01/20/23 74.8 kg (165 lb)   01/12/23 75.3 kg (166 lb)   12/23/22 77.4 kg (170 lb 9.6 oz)     Last Weight Metrics:      1/10/2024     3:29 PM 1/6/2024     9:04 AM 1/5/2024    12:04 PM 12/29/2023    10:55 AM 12/22/2023     2:03 PM 12/22/2023     1:59 PM 12/14/2023     1:09 PM   Weight Loss Metrics   Height 5' 4\" 5' 4\" 5' 4\" 5' 4\"   5' 4\"   Weight - Scale  164 lbs 162 lbs 6 oz  162 lbs 6 oz 162 lbs 6 oz 163 lbs   BMI (Calculated)  28.2 kg/m2 27.9 kg/m2  0 kg/m2 0 kg/m2 28 kg/m2       Nutrition Diagnosis:   Inadequate oral intake related to swallowing difficulty as evidenced by swallow study results  Moderate malnutrition related to cognitive or neurological impairment, inadequate protein-energy intake, swallowing difficulty as evidenced by Criteria as identified in malnutrition assessment, mild muscle loss, intake 0-25%    Nutrition Interventions:   Food and/or Nutrient Delivery: Continue Current 
GDMT when able to take meds PO  - Entresto 24-26mg bid (held in s/o NPO)  - Carvedilol 6.25mg bid (held in s/o NPO)  - Will hold on with diuretics given previous worsening of renal function and liver enzymes.      Hypernatremia: Na of 153 this morning, likely 2/2 fluid contraction. Free water deficit of 2.1L today.   - D5 at 100ml/hr for 10hrs initially, will reevaluate fluid status and serum Na   - Nutrition consulted, appreciate recs  - Nephrology consulted, appreciate recs    COPD exacerbation: No baseline O2 . Home regimen includes prn albuterol. VBG showing metabolic acidosis.  -IV Doxy as above  -Duo-nebs q4h scheduled while awake  -Brovana-Pulmicort BID  -IV Solumedrol decreased to 40mg [id number]  -IV protonix for ulcer prophylaxis     Vertebral Fracture: MRI with acute L2 fracture not seen on earlier CT scan. limited options for pain control given NPO status, shocked liver, constipation and worsening renal function.   - Ortho consulted  - Inhaled calcitonin qdaily  - Outpatient kyphoplasty per ortho     Dysphonia/dysphagia: Per patient's wife had been choking on food for some weeks prior to admission. SLP eval with reduced chew, oral holding of solids and silent aspiration with thin and mildly thick liquids.   - Keep NPO  - Nutrition consulted  - CT head and CTA head and neck ordered   - Will need goals of care discussion with family on alternate ways of maintaining hydration and nutrition such as NG tube     Abdominal Pain: likely 2/2 constipation without BM for about 2 weeks. Constipation 2/2 opioid pain medications and immobility. CTAP NAP.  - Cont enemas     Transsaminitis: likely 2/2 shock liver.  - Monitor CMP   - Avoid hepatotoxic medications     Troponinemia: downtrended, non-ischemic EKG  - Cont to monitor for CP     DDD: s/p kyphoplasty 2021 for vertebral fracture. Chronic pain patient, has taken oxcycodone daily for more than a year  - Lidocaine patch daily     CKD 4: POA Cr 2.21 (bl: 2), BUN/Cr: 
1/12/24.  - Keep NPO  - Nutrition consulted    Moderate Malnutrition: mild muscle mass loss with no significant weight loss in s/o subacute dysphagia and current NPO status   - Nutrition consulted, appreciate recs  - Diet recommendations per nutrition/dietitian  - SLP consulted  - D5 w KCL at 100ml/hr     Abdominal Pain: likely 2/2 constipation without BM for about 2 weeks. Constipation 2/2 opioid pain medications and immobility. CTAP NAP. Abdominal series with nonobstructive bowel gas pattern  - Cont enemas     Transsaminitis: likely 2/2 shock liver.  - Monitor CMP   - Avoid hepatotoxic medications     Troponinemia: downtrended, non-ischemic EKG  - Cont to monitor for CP     DDD: s/p kyphoplasty 2021 for vertebral fracture. Chronic pain patient, has taken oxcycodone daily for more than a year  - Lidocaine patch daily     CKD 4: POA Cr 2.21 (bl: 2), BUN/Cr: 24; likely 2/2 IVVD  - monitor on daily labs  - Avoid nephrotoxic meds     Hypertension: POA /57. On home amlodipine 10mg daily , imdur 90mg daily  - Holding home medications  - Will continue to monitor at this time and readjust as BP's trend.     Diabetes Mellitus T2: last A1c 5.3 on 1/20/23.  - AC&HS glucose checks.     Alzheimer's dementia Chronic, f/w neuropsych.  on home Seroque; 50mg tid, Namenda 10mg bid, Aricept 10mg  - Seroquel 50mg tid (held in s/o NPO)  - Namenda 10mg bid (held in s/o NPO)     Depression Chronic, on home Zoloft 150mg daily. Trazodone 25mg daily  - Home zoloft, trazodone (held in s/o NPO)     Hyperlipidemia:  on 7/23/21.   On home Ezetemibe 10mg,   -Hold ezetemibe      CAD s/p CABG + multiple stents. Hx of multiple MI. On home Imdur.   - Hold Imdur      GERD  Chronic, on home protonix   - IV protonix     BPH Chronic, on home flomax 0.4mg daily  - Home flomax (held in s/o NPO)     FEN/GI - NPO  Activity - Ambulate with assistance  DVT prophylaxis - Sub Q Heparin  GI prophylaxis - Protonix  Fall prophylaxis - Fall precautions 
CTAP NAP. Abdominal series with nonobstructive bowel gas pattern  - Cont enemas     Transsaminitis: likely 2/2 shock liver.  - Monitor CMP   - Avoid hepatotoxic medications     Troponinemia: downtrended, non-ischemic EKG  - Cont to monitor for CP     DDD: s/p kyphoplasty 2021 for vertebral fracture. Chronic pain patient, has taken oxcycodone daily for more than a year  - Lidocaine patch daily     CKD 4: POA Cr 2.21 (bl: 2), BUN/Cr: 24; likely 2/2 IVVD  - monitor on daily labs  - Avoid nephrotoxic meds     Hypertension: POA /57. On home amlodipine 10mg daily , imdur 90mg daily  - Holding home medications  - Will continue to monitor at this time and readjust as BP's trend.     Diabetes Mellitus T2: last A1c 5.3 on 1/20/23.  - AC&HS glucose checks.     Alzheimer's dementia Chronic, f/w neuropsych.  on home Seroque; 50mg tid, Namenda 10mg bid, Aricept 10mg  - Seroquel 50mg tid (held in s/o NPO)  - Namenda 10mg bid (held in s/o NPO)     Depression Chronic, on home Zoloft 150mg daily. Trazodone 25mg daily  - Home zoloft, trazodone (held in s/o NPO)     Hyperlipidemia:  on 7/23/21.   On home Ezetemibe 10mg,   -Hold ezetemibe      CAD s/p CABG + multiple stents. Hx of multiple MI. On home Imdur.   - Hold Imdur      GERD  Chronic, on home protonix   - IV protonix     BPH Chronic, on home flomax 0.4mg daily  - Home flomax (held in s/o NPO)     FEN/GI - NPO  Activity - Ambulate with assistance  DVT prophylaxis - Sub Q Heparin  GI prophylaxis - Protonix  Fall prophylaxis - Fall precautions ordered.  Disposition - Admit to Remote Telemetry. Plan to d/c to Home. Consulting PT/OT/CM  Code Status - Full Code, discussed with patient / caregivers.  Next of Kin Name and Contact Patricia Fry (Spouse)  [phone]           Patient Jerry DALTON Fry will be discussed with Paola Adame MD Sydney A Sykes, MD  Family Medicine Resident       For Billing    Chief Complaint   Patient presents with    Fatigue            
Cont to monitor for CP     DDD: s/p kyphoplasty 2021 for vertebral fracture. Chronic pain patient, has taken oxcycodone daily for more than a year  - Lidocaine patch daily     CKD 4: POA Cr 2.21 (bl: 2), BUN/Cr: 24; likely 2/2 IVVD  - monitor on daily labs  - Avoid nephrotoxic meds     Hypertension: POA /57. On home amlodipine 10mg daily , imdur 90mg daily  - Holding home medications  - Will continue to monitor at this time and readjust as BP's trend.     Diabetes Mellitus T2: last A1c 5.3 on 1/20/23.  - AC&HS glucose checks.     Alzheimer's dementia Chronic, f/w neuropsych.  on home Seroque; 50mg tid, Namenda 10mg bid, Aricept 10mg  - Seroquel 50mg tid (held in s/o NPO)  - Namenda 10mg bid (held in s/o NPO)     Depression Chronic, on home Zoloft 150mg daily. Trazodone 25mg daily  - Home zoloft, trazodone (held in s/o NPO)     Hyperlipidemia:  on 7/23/21.   On home Ezetemibe 10mg,   -Hold ezetemibe      CAD s/p CABG + multiple stents. Hx of multiple MI. On home Imdur.   - Hold Imdur      GERD  Chronic, on home protonix   - IV protonix     BPH Chronic, on home flomax 0.4mg daily  - Home flomax (held in s/o NPO)     FEN/GI - NPO  Activity - Ambulate with assistance  DVT prophylaxis - Sub Q Heparin  GI prophylaxis - Protonix  Fall prophylaxis - Fall precautions ordered.  Disposition - Admit to Remote Telemetry. Plan to d/c to Home. Consulting PT/OT/CM  Code Status - Full Code, discussed with patient / caregivers.  Next of Kin Name and Contact Patricia Fry (Spouse)  [phone]           Patient Jerry DALTON Fry will be discussed with Paola Adame MD Sydney A Sykes, MD  Family Medicine Resident       For Billing    Chief Complaint   Patient presents with    Fatigue            
Maryellen Lama, DO Kiya Medina MD  Family Medicine Resident       For Billing    Chief Complaint   Patient presents with    Fatigue            
01/05/24 12:31 PM   Result Value Ref Range    POC pH 7.29 (L) 7.35 - 7.45      POC pCO2 42.6 35.0 - 45.0 MMHG    POC PO2 <27 (LL) 80 - 100 MMHG    POC HCO3 20.4 (L) 22 - 26 MMOL/L    BASE DEFICIT (POC) 6.0 mmol/L    Specimen type: VENOUS BLOOD      Performed by: EBONY Issa  PCT     POC Lactic Acid 2.50 (HH) 0.40 - 2.00 mmol/L    Critical Value Read Back GUZARDO        Imaging  CXR:  INDICATION: Chest pain.     Portable AP view of the chest.     Direct comparison made to prior chest x-ray dated July 2022.     Cardiomediastinal silhouette is stable. Median sternotomy wires are noted. There are mild diffuse increased interstitial markings. There is mild patchy airspace disease within the left and right midlungs. There is no pleural fluid. There is no pneumothorax.     IMPRESSION:  Mild diffuse bilateral increased interstitial markings. Bilateral  mid lung mild patchy airspace disease.      Assessment and Plan     Jerry Fry is a 83 y.o. male with PMHx of COPD, DMT2, CKD Stage IV, HTN, Alzheimer's who presents to the ED complaining of weakness and recurrent falls. Patient admitted 1/5/2024 for sepsis, AHRF, and SBO.    Severe Sepsis 2/2 PNA vs GI: 3/4 SIRS criteria met (WBC 23.0, HR 95, RR 29, LA 2.50>2.90). Procalcitonin 0.76. ABG pH 7.29 with pCO2 42.6 likely metabolic acidosis. New O2 requirement (RA at home) desaturating to 87%, now on 4L NC. CXR showing mild patchy airspace disease. CT abdomen showing large stool burden in rectal vault c/f SBO. Differential includes SBO vs mesenteric ischemia. Colonoscopy 2020. S/p CTX 2g IV, azithromycin 500 mg in ED.  - Admit to telemetry, VS per unit protocol  - Vital signs per unit protocol  - NS 500cc fluid bolus  - Hold mIVF for concern of fluid overload   - Cefepime 2 g IV LD once 1/5/24, then cefepime 1 g IV q12 starting 1/6/24  - Vancomycin 1750 mg , renal dosing per pharmacy  - Maintain a MAP >65, would consider pressors (Levophed) if MAP drops below goal.   -

## 2024-01-26 NOTE — PROGRESS NOTES
Spiritual Care Assessment/Progress Note  [facility]    Name: Jerry Fry MRN: [id number]    Age: 83 y.o.     Sex: male   Language: English     Date: 1/26/2024            Total Time Calculated: 12 min              Spiritual Assessment begun in [facility] MULTI-SPECIALTY ONCOLOGY 1  Service Provided For:: Family  Referral/Consult From:: Other , Family, Nurse  Encounter Overview/Reason : Spiritual/Emotional Needs, Family Care    Spiritual beliefs:      [] Involved in a anaid tradition/spiritual practice:      [] Supported by a anaid community:      [] Claims no spiritual orientation:      [] Seeking spiritual identity:           [] Adheres to an individual form of spirituality:      [] Not able to assess:                Identified resources for coping and support system:           [x] Prayer                  [] Devotional reading               [] Music                  [] Guided Imagery     [] Pet visits                                        [] Other: (COMMENT)     Specific area/focus of visit   Encounter:    Crisis: Type: Emotional distress, Family Care  Spiritual/Emotional needs: Type: Emotional Distress, Spiritual Support  Ritual, Rites and Sacraments:    Grief, Loss, and Adjustments: Type: Death, Grief and loss  Ethics/Mediation:    Behavioral Health:    Palliative Care: Type: Palliative Care, Follow-up  Advance Care Planning:      Plan/Referrals: Other (Comment) (Shared  Availability)    Narrative:   Request visit by nurse and family upon notification from nurse of patient death.  Upon entry the daughter and son in law and patients wife were present.  Able to provide ministry of presence a bible scripture and prayer.  Shared  availability.       Intern Darcy amato  [phone]

## 2024-01-26 NOTE — DISCHARGE SUMMARY
[id number] [id number]   Office [phone]  Fax [phone]       Discharge / Transfer / Off-Service Note     Name: Jerry Fry MRN: [id number]  Sex: Male   YOB: 1940  Age: 83 y.o.  PCP: Renetta Cota MD     Date of admission: 1/5/2024  Date of discharge/transfer: 1/25/2024    Attending physician at admission: Maryellen Lama DO.  Attending physician at discharge/transfer: Maryellen Lama DO.  Resident physician at discharge/transfer: Kiya Medina MD     Consultants during hospitalization  IP CONSULT TO PHARMACY  IP CONSULT TO ORTHOPEDIC SURGERY  IP CONSULT TO CARDIOLOGY  IP CONSULT TO NEPHROLOGY  IP CONSULT TO PALLIATIVE CARE  IP CONSULT TO DIETITIAN  IP CONSULT TO DIETITIAN  IP CONSULT TO HOSPICE  IP CONSULT TO DIETITIAN  IP CONSULT TO ETHICS  IP CONSULT TO DIETITIAN  IP WOUND CARE NURSE CONSULT TO EVAL  IP CONSULT TO HOSPICE     Admission diagnoses   Hypoxia [R09.02]  COPD exacerbation (HCC) [J44.1]  Severe sepsis (HCC) [A41.9, R65.20]  Pneumonia of both lungs due to infectious organism, unspecified part of lung [J18.9]  Acute on chronic congestive heart failure, unspecified heart failure type (HCC) [I50.9]    Recommended follow-up after discharge    1. PCP-Renetta Cota MD       To follow up on with PCP:   ------------------------------------------------------------------------------------------------------------------    History of Present Illness    As per admitting provider, Dr. Medina, Kiya CAZARES MD:   \"Jerry Fry is a 83 y.o. male with known history of dementia, CAD s/p CABG, CKD 4, COPD on 4L, T2DM who was brought in to the ED by his caregivers for fatigue and shortness of breath. Caregiver states that patient was very weak and slumped down during his shower prompting them to call EMTs.   No family is present with him and his ability to provide history is limited, but he complains of abdominal pain and not feeling too well.      Additional

## 2024-01-26 NOTE — DISCHARGE SUMMARY
Hospice Discharge Summary    [facility]  Good Help to Those in Need        Date of Admission: 1/25/2024  Date of Discharge: 1/26/24    Jerry Fry is a 83 y.o. year old who was admitted to [facility] at [facility] with a Hospice diagnosis of Acute respiratory failure (HCC) [J96.00].      The patient's care was focused on comfort and the patient passed away on 1/26/24

## 2024-01-26 NOTE — PROGRESS NOTES
[facility] LCSW death visit.     LCSW met with pts wife Patricia, pts daughter Alexa and her  upon pts passing. Wife grieving, vented frustration with pts medical course.  LCSW provided grief support and reassurance of on going support through our bereavement services. Pts son is flying in [place] and family will be with wife through the weekend..Zay [employer]ady  in [place] to serve ( [phone]).

## 2024-01-26 NOTE — PROGRESS NOTES
Hospice Medical Director    Had a three way call with spouse, Lizabeth via phone. She was very upset about what she perceived as his lack of care-NRB off, still in distress. Clearly frustrated with his stay at [facility] but had to be very upfront/forthright about what hospice does-focus on his comfort and we are making adjustments in the medication to include dilaudid which was initially listed as a allergy but the note states she said no allergy. She disagrees with that and said it caused confusion-explained that is not an issue now but this may help with his SOB better than morphine. Offered for him to come off hospice if she was not happy but I would not recommend as he clearly is transitioning towards end of life. She states will visit later.

## 2024-01-26 NOTE — PROGRESS NOTES
2030  Patient's wife approached this RN regarding pt.'s Oxygen Sats at 79%. He is on 3-4lpm via NC and patient is a mouth breather that's why wife is requesting for non re breather mask even not recommended for comfort of pt.     On non re breather mask at 3lpm per wife's request. O2 sats went up to 97%.    Wife is also asking why patients is NPO as she was informed pt. maybe able to drink and eat - explained pt. Is very high risk for aspiration and is very drowsy.     She is also asking for IV fluids and some nutritional support for pt.      2155  Called hospice, Dr. Perez made a call back and spoke to patient's wife. She explained and addressed wife's concerns. Should there be any other non urgent concerns will be addressed tomorrow.

## 2024-01-26 NOTE — PROGRESS NOTES
[facility]  Good Help to Those in Need  [phone]    Discharge/Death Nursing Note   Patient Name: Jerry Fry  YOB: 1940  Age: 83 y.o.    Date of Death: 24  Admitted Date: 2024  Time of Death: 1256    Facility of Care: [facility]  Level of Care: GIP  Patient Room: [facility]     Hospice Attending: Colt Lipscomb MD  Hospice Diagnosis: Acute respiratory failure (HCC) [J96.00]    Death Pronouncement   Pronouncement of death completed by: Dr Lipscomb     Agency staff was  present at the time of death    At the time of death the patient was documented as No response to verbal and tactile stimuli.   No respiratory effort.   Absent heart sounds and pulses.   Pupils fixed and dilated.     The pt  within [facility]    The following were notified of the patient's death: hospice at bedside at TO, wife notified by MD at appx 01:05pm, wife arrived to bedside around 3:45pm.     Medications were disposed of per facility protocol     Discharge Summary   Discharge Reason: Death    Summary of Care Provided:    [x] Post mortem care provided by bedside RN  [x] Notification of  home by nursing supervisor  [] Referrals/Community resources provided:   [] Goals completed  [] Durable Medical Equipment vendor notified     Disciplines involved: [] RN [] SW []  [] BEGUM [] Vol [] PT [] OT [] ST [] BC    [x] IDT communication/notification    Attending Physician, Dr. Lipscomb, notified of death    Bereaved   Verify bereaved identified with name, address, telephone number and risk level          2024     1:26 PM   Demographics   Marital Status

## 2024-01-26 NOTE — PROGRESS NOTES
Initial call from Nurse on the unit floor about the patients death.  No family present at bedside.

## 2024-01-26 NOTE — PLAN OF CARE
Problem: Chronic Conditions and Co-morbidities  Goal: Patient's chronic conditions and co-morbidity symptoms are monitored and maintained or improved  Outcome: Progressing     Problem: Potential for Alteration in Skin Integrity  Goal: Monitor skin for areas of alteration in skin integrity  Description: Patient [unfilled] will remain free from alterations of or worsening skin integrity as evidenced by no changes to skin during assessment each shift during the inpatient hospice stay.  Outcome: Progressing     Problem: Risk for Falls  Goal: Fall prevention  Description: Patient  will remain free from falls as evidenced by no witnessed or reported falls each shift during the inpatient hospice stay.     Patient  and or family/caregiver will receive education on fall prevention as evidenced by verbalizing recall of using the call lights system, fall prevention devices, and asking for help during the admission process and ongoing as needed during the inpatient hospice stay.    Outcome: Progressing

## 2024-01-26 NOTE — PROGRESS NOTES
[facility]  Provider Death Note    Called to examine patient who has .   No response to verbal and tactile stimuli.   No respiratory effort.   Absent heart sounds and pulses.   Pupils fixed and dilated.   Patient pronounced dead at 12:56 PM    I have called Patricia-spouse and she will come to the hospital before he goes to the [facility]

## 2024-01-26 NOTE — PROGRESS NOTES
[facility]  Good Help to Those in Need  [phone]    Social Work Admission Note  Patient Name: Jerry Fry  YOB: 1940  Age: 83 y.o.    Date of Visit: 1/25/2024  Facility of Care: [facility]  Patient Room: 509/01     Hospice Attending: Colt Lipscomb MD  Hospice Diagnosis: Acute respiratory failure (HCC) [J96.00]    Level of Care:    [x]  GIP    []  Respite   []  Routine    Consents/NCD Documentation:     Consents Reviewed:   []  Yes  [x]  No, completed by other hospice staff member.    Person Reviewed/Signed with:  []  Patient   []  Pts NOK/MPOA  Name:     Right to NCD Reviewed:   []  Yes  []  No, completed by other hospice staff member.    NCD Requested:   []  Yes  []  No    Admission Nurse/Intake Notified NCD was requested:  []  Yes  []  No  []  Not requested    Planned Start of Care Date:     Hospice Witness Representative:    NARRATIVE   This LCSW met with pts wife Patricia at bedside. Pt was unresponsive. Pt is an 82 y/o CM with a hospice diagnosis of acute respiratory failure. Pt has comorbid AD, coronary disease , COPD and sepsis. Pt was admitted to [facility] d/t a fall. Pt and wife have 1 son Oli, who has been at bedside for some time and recently returned to [place] to go back to work. LCSW introduced self and role of SW. LCSW provided active listening as wife talked about pts medical course and vented frustration re pts back issues and fall. LCSW and wife discussed pts transition to hospice and inpatient hospice services. LCSW provided GFMS. LCSW and wife discussed supports and resources. Wife reports limited support her [place] with her son In [place]. Wife also concerned about finances for pts burial. LCSW shared FH resources with wife. Pt is an [employer] , she is working with [facility], LCSW provided reassurance of support. LCSW will continue to assess and monitor pt and family needs.   ADVANCE CARE PLANNING    Advance Directive:  [x]  Yes  []  No   []  Would like to  support; no family members or friends; basically ALONE  Notes:      Emotional Status: (racquel all that apply)    Patient Caregiver Response                 [x]                []    Mood/Affect stable and appropriate                   []                []    Angry                 []                [x]    Anxious                 []                []    Apprehensive                 []                []    Avoidant                 []                []    Clinging                 []                []    Depressed                 []                []    Distraught                 []                []    Fearful                 []                []    Flat Affect                 []                []    Helpless                 []                []    Hostile                 []                []    Impulsive                 []                []    Irritable                 []                []    Labile                 []                []    Manic                 []                []    Restlessness                 []                [x]    Sad                 []                []    Strain/Stress                 []                []    Suspicious                 []                []     Tearful                 []                 []     Withdrawn          Notes:     Coping Skills (strengths/weakness):    Patient: Coping Skills (strength/weakness): Unresponsive   Family/caregiver (strength/weakness):son is supportive, has some health issues, has frustration re pts fall and back issues, limited finances..     [place] of care upon discharge (racquel all that apply):     []  No burden evident   []  Family must administer medications   []  Illness causing financial strain   [x]  Family/Support feels overwhelmed   []  Family/Support sleep disturbed with patient’s care   []  Patient’s care causes extra physical stress  of death  []  Illness causes changes in family lifestyle  []  Illness impacting family/support employment  []  Family

## 2024-01-26 NOTE — PROGRESS NOTES
Hospice note:    Received call from RN just before 9 pm, and she stated that patient's wife has questions about patient's oxygen and IVF. I spoke with patient's wife by phone. She said she asked the RN to check patient's vitals and that the oxygen was 79% and she is concerned by this. She also asked why he is not on IVF. We discussed end of life symptoms and the management of end of life symptoms when the focus is comfort measures on hospice in detail. Discussed that we can certainly continue oxygen by NC but that I would not recommend putting any sort of oxygen mask on him because that could cause more discomfort. I also discussed why we typically do not recommend IVF at the end of life. We also discussed the use of morphine and ativan to manage patient's symptoms. The RN administered a prn dose of morphine while I was on the phone with the wife, and the wife requested a dose of prn ativan. Patient's wife did ask me if there is a way to leave hospice if she decides to do that, and we discussed the process of revoking and asking the hospital team reassess him. Ultimately she did not decide for this option at this time. Patient's wife agreed to continuing with oxygen by NC, continuing comfort meds and not starting IVF at this time. I let her know I would ask the hospice team to follow up with her in the morning.

## 2024-01-29 ENCOUNTER — HOME CARE VISIT (OUTPATIENT)
Age: 84
End: 2024-01-29
Payer: MEDICARE

## (undated) DEVICE — 3M™ CUROS™ DISINFECTING CAP FOR NEEDLELESS CONNECTORS 270/CARTON 20 CARTONS/CASE CFF1-270: Brand: CUROS™

## (undated) DEVICE — ELECTRODE,RADIOTRANSLUCENT,FOAM,3PK: Brand: MEDLINE

## (undated) DEVICE — 1200 GUARD II KIT W/5MM TUBE W/O VAC TUBE: Brand: GUARDIAN

## (undated) DEVICE — SET ADMIN 16ML TBNG L100IN 2 Y INJ SITE IV PIGGY BK DISP

## (undated) DEVICE — FORCEPS BX L240CM JAW DIA2.8MM L CAP W/ NDL MIC MESH TOOTH

## (undated) DEVICE — CANNULA CUSH AD W/ 14FT TBG

## (undated) DEVICE — BITEBLOCK ENDOSCP 60FR MAXI WHT POLYETH STURDY W/ VELC WVN

## (undated) DEVICE — CANN NASAL O2 CAPNOGRAPHY AD -- FILTERLINE

## (undated) DEVICE — BAG BELONG PT PERS CLEAR HANDL

## (undated) DEVICE — SNARE ENDOSCP M L240CM W27MM SHTH DIA2.4MM CHN 2.8MM OVL

## (undated) DEVICE — BASIN EMSIS 16OZ GRAPHITE PLAS KID SHP MOLD GRAD FOR ORAL

## (undated) DEVICE — SOLIDIFIER FLD 2OZ 1500CC N DISINF IN BTL DISP SAFESORB

## (undated) DEVICE — CUFF RMFG BP INF SZ 11 DISP -- LAWSON OEM ITEM 238915

## (undated) DEVICE — ADULT SPO2 SENSOR,REMANUFACTURED,REPROCESSED DEVICE FOR SINGLE USE; REPROCESSED BY COVIDIEN LLC: Brand: NELLCOR

## (undated) DEVICE — KIT COLON W/ 1.1OZ LUB AND 2 END

## (undated) DEVICE — SOLIDIFIER MEDC 1200ML -- CONVERT TO 356117

## (undated) DEVICE — POLYP TRAP: Brand: TRAPEASE®

## (undated) DEVICE — SIMPLICITY FLUFF UNDERPAD 23X36, MODERATE: Brand: SIMPLICITY

## (undated) DEVICE — SET GRAV CK VLV NEEDLESS ST 3 GANGED 4WAY STPCOCK HI FLO 10

## (undated) DEVICE — CATH IV AUTOGRD BC PNK 20GA 25 -- INSYTE

## (undated) DEVICE — CATH IV AUTOGRD BC BLU 22GA 25 -- INSYTE

## (undated) DEVICE — CONTAINER SPEC 20 ML LID NEUT BUFF FORMALIN 10 % POLYPR STS

## (undated) DEVICE — Device

## (undated) DEVICE — BAG SPEC BIOHZRD 10 X 10 IN --

## (undated) DEVICE — BLUNTFILL WITH FILTER: Brand: MONOJECT

## (undated) DEVICE — SYR 3ML LL TIP 1/10ML GRAD --